# Patient Record
Sex: FEMALE | Race: WHITE | NOT HISPANIC OR LATINO | Employment: OTHER | URBAN - METROPOLITAN AREA
[De-identification: names, ages, dates, MRNs, and addresses within clinical notes are randomized per-mention and may not be internally consistent; named-entity substitution may affect disease eponyms.]

---

## 2017-02-13 ENCOUNTER — ALLSCRIPTS OFFICE VISIT (OUTPATIENT)
Dept: OTHER | Facility: OTHER | Age: 72
End: 2017-02-13

## 2017-06-16 DIAGNOSIS — I10 ESSENTIAL (PRIMARY) HYPERTENSION: ICD-10-CM

## 2017-06-16 DIAGNOSIS — E11.9 TYPE 2 DIABETES MELLITUS WITHOUT COMPLICATIONS (HCC): ICD-10-CM

## 2017-06-16 DIAGNOSIS — E03.9 HYPOTHYROIDISM: ICD-10-CM

## 2017-06-27 ENCOUNTER — ALLSCRIPTS OFFICE VISIT (OUTPATIENT)
Dept: OTHER | Facility: OTHER | Age: 72
End: 2017-06-27

## 2017-08-15 ENCOUNTER — ALLSCRIPTS OFFICE VISIT (OUTPATIENT)
Dept: OTHER | Facility: OTHER | Age: 72
End: 2017-08-15

## 2017-10-20 ENCOUNTER — GENERIC CONVERSION - ENCOUNTER (OUTPATIENT)
Dept: OTHER | Facility: OTHER | Age: 72
End: 2017-10-20

## 2017-10-30 ENCOUNTER — GENERIC CONVERSION - ENCOUNTER (OUTPATIENT)
Dept: OTHER | Facility: OTHER | Age: 72
End: 2017-10-30

## 2017-10-30 DIAGNOSIS — G51.0 BELL'S PALSY: ICD-10-CM

## 2018-01-02 ENCOUNTER — GENERIC CONVERSION - ENCOUNTER (OUTPATIENT)
Dept: OTHER | Facility: OTHER | Age: 73
End: 2018-01-02

## 2018-01-02 DIAGNOSIS — E03.9 HYPOTHYROIDISM: ICD-10-CM

## 2018-01-02 DIAGNOSIS — E11.9 TYPE 2 DIABETES MELLITUS WITHOUT COMPLICATIONS (HCC): ICD-10-CM

## 2018-01-11 NOTE — PROGRESS NOTES
Assessment    1  Encounter for preventive health examination (V70 0) (Z00 00)   2  Hypothyroidism (244 9) (E03 9)   3  Hyperlipidemia (272 4) (E78 5)   4  Hypertension (401 9) (I10)   5  Type 2 diabetes mellitus (250 00) (E11 9)    Plan  Hyperlipidemia    · Lovastatin 40 MG Oral Tablet; TAKE 1 TABLET BY MOUTH EVERY DAY AS  DIRECTED  Hypertension    · AmLODIPine Besylate 5 MG Oral Tablet; TAKE 1 TABLET BY MOUTH EVERY  DAY FOR BLOOD PRESSURE   · Levothyroxine Sodium 50 MCG Oral Tablet; Take 1 tablet daily   · Valsartan-Hydrochlorothiazide 320-25 MG Oral Tablet; take 1 tablet by mouth  every day  Type 2 diabetes mellitus    · GlipiZIDE ER 2 5 MG Oral Tablet Extended Release 24 Hour; TAKE 1 TABLET  BY MOUTH EVERY DAY AS DIRECTED   · (1) COMPREHENSIVE METABOLIC PANEL; Status:Active; Requested [id number];    · (1) HEMOGLOBIN A1C; Status:Active; Requested WBJ:[id number];    · (1) LIPID PANEL, FASTING; Status:Active; Requested [id number]:[id number];    · *VB - Foot Exam; Status:Complete;   Done: 44YBN0327 04:05PM   · Follow-up visit in 6 months Evaluation and Treatment  Follow-up  Status: Hold For -  Scheduling  Requested for: 27Jun2017    Discussion/Summary  health maintenance visit the risks and benefits of cervical cancer screening were discussed Breast cancer screening: the risks and benefits of breast cancer screening were discussed  Colorectal cancer screening: colorectal cancer screening is current  Osteoporosis screening: the risks and benefits of osteoporosis screening were discussed  The risks and benefits of immunizations were discussed, immunizations are needed and due for pneumovax  Advice and education were given regarding nutrition, aerobic exercise and cardiovascular risk reduction  Patient discussion: discussed with the patient  Chief Complaint  Patient is here for a yearly check up      History of Present Illness  HM, Adult Female: The patient is being seen for a health maintenance evaluation     General Health: The patient's health since the last visit is described as good  She has regular dental visits  She denies vision problems  She denies hearing loss  Immunizations status: up to date  Lifestyle:  She consumes a diverse and healthy diet  She does not have any weight concerns  She exercises regularly  She does not use tobacco  She denies alcohol use  She denies drug use  Reproductive health:  she reports normal menses  Screening: cancer screening reviewed and current  metabolic screening reviewed and current  risk screening reviewed and current  Review of Systems    Constitutional: No fever, no chills, feels well, no tiredness, no recent weight gain or weight loss  Eyes: No complaints of eye pain, no red eyes, no eyesight problems, no discharge, no dry eyes, no itching of eyes  ENT: no complaints of earache, no loss of hearing, no nose bleeds, no nasal discharge, no sore throat, no hoarseness  Cardiovascular: No complaints of slow heart rate, no fast heart rate, no chest pain, no palpitations, no leg claudication, no lower extremity edema  Respiratory: No complaints of shortness of breath, no wheezing, no cough, no SOB on exertion, no orthopnea, no PND  Gastrointestinal: No complaints of abdominal pain, no constipation, no nausea or vomiting, no diarrhea, no bloody stools  Genitourinary: No complaints of dysuria, no incontinence, no pelvic pain, no dysmenorrhea, no vaginal discharge or bleeding  Musculoskeletal: No complaints of arthralgias, no myalgias, no joint swelling or stiffness, no limb pain or swelling  Integumentary: No complaints of skin rash or lesions, no itching, no skin wounds, no breast pain or lump  Neurological: No complaints of headache, no confusion, no convulsions, no numbness, no dizziness or fainting, no tingling, no limb weakness, no difficulty walking     Psychiatric: Not suicidal, no sleep disturbance, no anxiety or depression, no change in personality, no emotional problems  Endocrine: No complaints of proptosis, no hot flashes, no muscle weakness, no deepening of the voice, no feelings of weakness  Hematologic/Lymphatic: No complaints of swollen glands, no swollen glands in the neck, does not bleed easily, does not bruise easily  Active Problems    1  Allergic rhinitis (477 9) (J30 9)   2  Anemia (285 9) (D64 9)   3  Arthralgia of both knees (719 46) (M25 561,M25 562)   4  Arthropathy (716 90) (M12 9)   5  Back pain, acute (724 5) (M54 9)   6  Cardiac pacemaker in situ (V45 01) (Z95 0)   7  Cellulitis of left upper extremity (682 3) (L03 114)   8  Colon cancer screening (V76 51) (Z12 11)   9  Dyspnea, unspecified dyspnea   10  Encounter for special screening examination for genitourinary disorder (V81 6) (Z13 89)   11  Epicondylitis, lateral, left (726 32) (M77 12)   12  Fatigue (780 79) (R53 83)   13  Hyperlipidemia (272 4) (E78 5)   14  Hypertension (401 9) (I10)   15  Hypokalemia (276 8) (E87 6)   16  Hypothyroidism (244 9) (E03 9)   17  Screening for genitourinary condition (V81 6) (Z13 89)   18  Sinoatrial node dysfunction (427 81) (I49 5)   19  Trapezius muscle spasm (728 85) (M62 838)   20  Type 2 diabetes mellitus (250 00) (E11 9)   21  URI (upper respiratory infection) (465 9) (J06 9)   22   Visit for screening mammogram (V76 12) (Z12 31)    Past Medical History    · History of Carpal tunnel syndrome, unspecified laterality (354 0) (G56 00)   · History of colonoscopy (V45 89) (S22 410)   · History of edema (V13 89) (Z87 898)   · History of mammogram (V15 89) (Z92 89)   · History of Reported A Previous Heart Murmur   · History of Visit for screening mammogram (V76 12) (Z12 31)    Surgical History    · History of Cataract Surgery   · History of Cholecystectomy   · History of Knee Surgery   · History of Total Abdominal Hysterectomy With Removal Of Right Ovary    Family History  Father    · Family history of Myocardial Infarction Arrhythmias  Family History    · Family history of Coronary Artery Disease (V17 49)   · Family history of Diabetes Mellitus (V18 0)   · Family history of Myocardial Infarction Arrhythmias    Social History    · Alcohol Use (History)   · Always uses seat belt   · Does not use illicit drugs (Z26 50) (Z78 9)   · Employed   · Lives with spouse   ·    · Never A Smoker   · Primary spoken language English    Current Meds   1  AmLODIPine Besylate 5 MG Oral Tablet; TAKE 1 TABLET BY MOUTH EVERY DAY FOR   BLOOD PRESSURE; Therapy: 75ZQR7160 to (Vineet Zaldivar)  Requested for: 17FCA1295; Last   Rx:04Jan2017 Ordered   2  FreeStyle Lancets Miscellaneous; CHECK BLOOD SUGAR EVERY DAY; Therapy: 98XGS1877 to (Last Rx:17Jan2017)  Requested for: 12ORK3648 Ordered   3  FreeStyle Lite Test In Vitro Strip; CHECK BLOOD SUGAR DAILY DX:250 00; Therapy: 94BOB3171 to (Evaluate:55Pwk2333)  Requested for: 62EMX4979; Last   Rx:17Jan2017 Ordered   4  GlipiZIDE ER 2 5 MG Oral Tablet Extended Release 24 Hour; TAKE 1 TABLET BY   MOUTH EVERY DAY AS DIRECTED; Therapy: 45TAM0284 to (Evaluate:14Oct2017)  Requested for: 84VOP6372; Last   Rx:17Jan2017 Ordered   5  GNP Niacin TABS Recorded   6  GNP Potassium Gluconate TABS Recorded   7  Hydrocodone-Acetaminophen 5-325 MG Oral Tablet; TAKE 1 TO 2 TABLETS EVERY 4   TO 6 HOURS AS NEEDED FOR PAIN  NO MORE THAN 8 TABLETS PER   DAY; Therapy: 65PUP5949 to (Evaluate:01Jan2016); Last Rx:70Jaq1179 Ordered   8  Indomethacin 50 MG Oral Capsule; TAKE 1 CAPSULE 3 TIMES DAILY WITH FOOD; Therapy: 51KSJ0317 to (Evaluate:13Jan2016)  Requested for: 99Hfa5027; Last   Rx:00Nhw2434 Ordered   9  Levothyroxine Sodium 50 MCG Oral Tablet; Take 1 tablet daily; Therapy: 10JWY3914 to (Nina Caceres)  Requested for: 30BJC1103; Last   UY:74GEM7925 Ordered   10  Lovastatin 40 MG Oral Tablet; TAKE 1 TABLET BY MOUTH EVERY DAY AS DIRECTED;     Therapy: 23YLB7418 to (Vineet Zaldivar)  Requested for: 98DOO2638; Last    OM:34EZV2666 Ordered   11  TiZANidine HCl - 6 MG Oral Capsule; TAKE 1 CAPSULE EVERY 6 TO 8 HOURS AS    NEEDED for muscle spasm; Therapy: 72Blj8101 to (Evaluate:22Jan2016)  Requested for: 92Kyx3757; Last    Rx:12Dtm9403 Ordered   12  Valsartan-Hydrochlorothiazide 320-25 MG Oral Tablet; take 1 tablet by mouth every day; Therapy: 36YRD9185 to (Last WD:42OBG2981)  Requested for: 44HDZ9522 Ordered    Allergies    1  No Known Drug Allergies    Vitals   Recorded: 27Jun2017 03:42PM   Heart Rate 60   Systolic 614   Diastolic 70   Height 5 ft 2 in   Weight 168 lb    BMI Calculated 30 73   BSA Calculated 1 77   O2 Saturation 98     Physical Exam    Constitutional   General appearance: No acute distress, well appearing and well nourished  Eyes   Conjunctiva and lids: No swelling, erythema or discharge  Pupils and irises: Equal, round, reactive to light  Ophthalmoscopic examination: Normal fundi and optic discs  Ears, Nose, Mouth, and Throat   External inspection of ears and nose: Normal     Otoscopic examination: Tympanic membranes translucent with normal light reflex  Canals patent without erythema  Hearing: Normal     Nasal mucosa, septum, and turbinates: Normal without edema or erythema  Lips, teeth, and gums: Normal, good dentition  Oropharynx: Normal with no erythema, edema, exudate or lesions  Neck   Neck: Supple, symmetric, trachea midline, no masses  Thyroid: Normal, no thyromegaly  Pulmonary   Respiratory effort: No increased work of breathing or signs of respiratory distress  Percussion of chest: Normal     Palpation of chest: Normal     Auscultation of lungs: Clear to auscultation  Cardiovascular   Palpation of heart: Normal PMI, no thrills  Auscultation of heart: Normal rate and rhythm, normal S1 and S2, no murmurs  Carotid pulses: 2+ bilaterally  Abdominal aorta: Normal     Femoral pulses: 2+ bilaterally  Pedal pulses: 2+ bilaterally      Examination of extremities for edema and/or varicosities: Normal     Chest   Breasts: Normal, no dimpling or skin changes appreciated  Palpation of breasts and axillae: Normal, no masses palpated  Abdomen   Abdomen: Non-tender, no masses  Liver and spleen: No hepatomegaly or splenomegaly  Examination for hernias: No hernia appreciated  Lymphatic   Palpation of lymph nodes in neck: No lymphadenopathy  Palpation of lymph nodes in axillae: No lymphadenopathy  Palpation of lymph nodes in groin: No lymphadenopathy  Palpation of lymph nodes in other areas: No lymphadenopathy  Musculoskeletal   Gait and station: Normal     Digits and nails: Normal without clubbing or cyanosis  Joints, bones, and muscles: Normal     Range of motion: Normal     Stability: Normal     Muscle strength/tone: Normal     Skin   Skin and subcutaneous tissue: Normal without rashes or lesions  Palpation of skin and subcutaneous tissue: Normal turgor  Neurologic   Cranial nerves: Cranial nerves II-XII intact  Reflexes: 2+ and symmetric  Sensation: No sensory loss  Psychiatric   Judgment and insight: Normal     Orientation to person, place, and time: Normal     Recent and remote memory: Intact      Mood and affect: Normal        Future Appointments    Date/Time Provider Specialty Site   07/14/2017 10:10 AM [facility]     Signatures   Electronically signed by : Joice Goldmann, DO; Jun 27 2017  4:14PM EST                       (Author)

## 2018-01-12 VITALS
HEIGHT: 62 IN | SYSTOLIC BLOOD PRESSURE: 158 MMHG | OXYGEN SATURATION: 98 % | BODY MASS INDEX: 30.91 KG/M2 | DIASTOLIC BLOOD PRESSURE: 70 MMHG | HEART RATE: 60 BPM | WEIGHT: 168 LBS

## 2018-01-13 VITALS
OXYGEN SATURATION: 97 % | HEIGHT: 62 IN | HEART RATE: 78 BPM | DIASTOLIC BLOOD PRESSURE: 70 MMHG | BODY MASS INDEX: 29.44 KG/M2 | WEIGHT: 160 LBS | SYSTOLIC BLOOD PRESSURE: 162 MMHG

## 2018-01-14 VITALS
DIASTOLIC BLOOD PRESSURE: 66 MMHG | WEIGHT: 165 LBS | HEART RATE: 60 BPM | HEIGHT: 62 IN | SYSTOLIC BLOOD PRESSURE: 138 MMHG | OXYGEN SATURATION: 98 % | BODY MASS INDEX: 30.36 KG/M2

## 2018-01-22 VITALS
WEIGHT: 160.25 LBS | BODY MASS INDEX: 29.49 KG/M2 | DIASTOLIC BLOOD PRESSURE: 68 MMHG | TEMPERATURE: 97.9 F | SYSTOLIC BLOOD PRESSURE: 126 MMHG | HEIGHT: 62 IN | OXYGEN SATURATION: 97 % | HEART RATE: 60 BPM

## 2018-01-24 VITALS
SYSTOLIC BLOOD PRESSURE: 120 MMHG | WEIGHT: 166 LBS | HEIGHT: 62 IN | OXYGEN SATURATION: 98 % | DIASTOLIC BLOOD PRESSURE: 60 MMHG | HEART RATE: 60 BPM | BODY MASS INDEX: 30.55 KG/M2

## 2018-02-06 ENCOUNTER — OFFICE VISIT (OUTPATIENT)
Dept: FAMILY MEDICINE CLINIC | Facility: CLINIC | Age: 73
End: 2018-02-06
Payer: COMMERCIAL

## 2018-02-06 VITALS
WEIGHT: 164 LBS | HEIGHT: 61 IN | DIASTOLIC BLOOD PRESSURE: 68 MMHG | BODY MASS INDEX: 30.96 KG/M2 | OXYGEN SATURATION: 96 % | SYSTOLIC BLOOD PRESSURE: 140 MMHG | HEART RATE: 74 BPM

## 2018-02-06 DIAGNOSIS — M79.605 PAIN OF LEFT LOWER EXTREMITY: Primary | ICD-10-CM

## 2018-02-06 DIAGNOSIS — E78.2 MIXED HYPERLIPIDEMIA: ICD-10-CM

## 2018-02-06 DIAGNOSIS — I10 ESSENTIAL HYPERTENSION: ICD-10-CM

## 2018-02-06 DIAGNOSIS — Z23 ENCOUNTER FOR VACCINATION: ICD-10-CM

## 2018-02-06 DIAGNOSIS — D50.8 IRON DEFICIENCY ANEMIA SECONDARY TO INADEQUATE DIETARY IRON INTAKE: ICD-10-CM

## 2018-02-06 DIAGNOSIS — E11.9 TYPE 2 DIABETES MELLITUS WITHOUT COMPLICATION, WITHOUT LONG-TERM CURRENT USE OF INSULIN (HCC): ICD-10-CM

## 2018-02-06 PROCEDURE — 90471 IMMUNIZATION ADMIN: CPT

## 2018-02-06 PROCEDURE — 90670 PCV13 VACCINE IM: CPT

## 2018-02-06 PROCEDURE — 99214 OFFICE O/P EST MOD 30 MIN: CPT | Performed by: FAMILY MEDICINE

## 2018-02-06 RX ORDER — NIACIN 500 MG
TABLET ORAL
COMMUNITY

## 2018-02-06 RX ORDER — TIZANIDINE HYDROCHLORIDE 6 MG/1
CAPSULE, GELATIN COATED ORAL
COMMUNITY
Start: 2015-12-21 | End: 2020-06-09

## 2018-02-06 RX ORDER — LOVASTATIN 40 MG/1
1 TABLET ORAL DAILY
COMMUNITY
Start: 2016-11-03 | End: 2018-02-19 | Stop reason: SDUPTHER

## 2018-02-06 RX ORDER — MAGNESIUM GLUCONATE 30 MG(550)
TABLET ORAL DAILY
COMMUNITY
End: 2019-10-15

## 2018-02-06 RX ORDER — VALSARTAN AND HYDROCHLOROTHIAZIDE 320; 25 MG/1; MG/1
1 TABLET, FILM COATED ORAL DAILY
COMMUNITY
Start: 2015-02-05 | End: 2018-02-19 | Stop reason: SDUPTHER

## 2018-02-06 RX ORDER — GLIPIZIDE 10 MG/1
10 TABLET ORAL
Qty: 180 TABLET | Refills: 3 | Status: SHIPPED | OUTPATIENT
Start: 2018-02-06 | End: 2018-02-19

## 2018-02-06 RX ORDER — LEVOTHYROXINE SODIUM 0.05 MG/1
1 TABLET ORAL DAILY
COMMUNITY
Start: 2016-11-03 | End: 2018-02-19 | Stop reason: SDUPTHER

## 2018-02-06 RX ORDER — GLIPIZIDE 5 MG/1
1 TABLET, FILM COATED, EXTENDED RELEASE ORAL DAILY
COMMUNITY
Start: 2016-11-03 | End: 2018-02-06 | Stop reason: DRUGHIGH

## 2018-02-06 RX ORDER — AMLODIPINE BESYLATE 2.5 MG/1
1 TABLET ORAL DAILY
COMMUNITY
Start: 2016-11-03 | End: 2018-02-19

## 2018-02-06 RX ORDER — MELOXICAM 15 MG/1
15 TABLET ORAL DAILY
Qty: 30 TABLET | Refills: 0 | Status: SHIPPED | OUTPATIENT
Start: 2018-02-06 | End: 2018-07-01 | Stop reason: SDUPTHER

## 2018-02-08 NOTE — PROGRESS NOTES
Assessment/Plan:       Diagnoses and all orders for this visit:    Pain of left lower extremity  -     meloxicam (MOBIC) 15 mg tablet; Take 1 tablet (15 mg total) by mouth daily    Type 2 diabetes mellitus without complication, without long-term current use of insulin (HCC)  -     glipiZIDE (GLUCOTROL) 10 mg tablet; Take 1 tablet (10 mg total) by mouth 2 (two) times a day before meals    Essential hypertension    Mixed hyperlipidemia    Iron deficiency anemia secondary to inadequate dietary iron intake  -     CBC; Future    Encounter for vaccination  -     PNEUMOCOCCAL CONJUGATE VACCINE 13-VALENT GREATER THAN 6 MONTHS    Other orders  -     amLODIPine (NORVASC) 2 5 mg tablet; Take 1 tablet by mouth daily  -     B Complex Vitamins (B COMPLEX 100 PO); Take 1 tablet by mouth daily  -     Discontinue: glipiZIDE (GLUCOTROL XL) 5 mg 24 hr tablet; Take 1 tablet by mouth Daily  -     niacin 250 MG tablet; Take by mouth  -     Potassium Gluconate (GNP POTASSIUM GLUCONATE) 595 (99 K) MG TABS; Take by mouth  -     levothyroxine 50 mcg tablet; Take 1 tablet by mouth daily  -     lovastatin (MEVACOR) 40 MG tablet; Take 1 tablet by mouth daily  -     TiZANidine (ZANAFLEX) 6 MG capsule; Take by mouth  -     valsartan-hydrochlorothiazide (DIOVAN-HCT) 320-25 MG per tablet; Take 1 tablet by mouth daily        Continue current medications, call if mobic does not help  Monitor blood sugar daily  Subjective:      Patient ID: Pedro Lazo is a 74 y o  female  Leg Pain    The incident occurred 3 to 5 days ago  The incident occurred at home  There was no injury mechanism  The pain is present in the left leg  The pain is moderate  The pain has been constant since onset  Pertinent negatives include no numbness  She reports no foreign bodies present  The symptoms are aggravated by movement  She has tried nothing for the symptoms  Diabetes   She presents for her follow-up diabetic visit  She has type 2 diabetes mellitus   Her disease course has been stable  There are no hypoglycemic associated symptoms  Pertinent negatives for hypoglycemia include no confusion, dizziness, headaches, nervousness/anxiousness or tremors  Pertinent negatives for diabetes include no chest pain and no fatigue  There are no hypoglycemic complications  There are no diabetic complications  Current diabetic treatment includes oral agent (monotherapy)  She is compliant with treatment all of the time  She participates in exercise daily  There is no change in her home blood glucose trend  Hypertension   This is a chronic problem  The current episode started more than 1 year ago  The problem is unchanged  The problem is controlled  Pertinent negatives include no chest pain, headaches or shortness of breath  Past treatments include calcium channel blockers, angiotensin blockers and diuretics  The current treatment provides moderate improvement  The following portions of the patient's history were reviewed and updated as appropriate:   She has a past medical history of Carpal tunnel syndrome and Heart murmur  ,   does not have any pertinent problems on file  ,   has a past surgical history that includes Colonoscopy (11/24/2007); Cataract extraction; Cholecystectomy; Knee surgery; and Total abdominal hysterectomy w/ bilateral salpingoophorectomy  ,  family history includes Coronary artery disease in her family; Diabetes in her family; Heart attack in her family and father  ,   reports that she has never smoked  She does not have any smokeless tobacco history on file  She reports that she drinks alcohol  She reports that she does not use drugs  ,  has No Known Allergies         Review of Systems   Constitutional: Negative for chills, fatigue and fever  HENT: Negative for congestion, ear pain, hearing loss, postnasal drip, rhinorrhea and sore throat  Eyes: Negative for pain and visual disturbance     Respiratory: Negative for chest tightness, shortness of breath and wheezing  Cardiovascular: Negative for chest pain and leg swelling  Gastrointestinal: Negative for abdominal distention, abdominal pain, constipation, diarrhea and vomiting  Endocrine: Negative for cold intolerance and heat intolerance  Genitourinary: Negative for difficulty urinating, frequency and urgency  Musculoskeletal: Positive for arthralgias  Negative for gait problem  Skin: Negative for color change  Neurological: Negative for dizziness, tremors, syncope, numbness and headaches  Hematological: Negative for adenopathy  Psychiatric/Behavioral: Negative for agitation, confusion and sleep disturbance  The patient is not nervous/anxious  Objective:  Vitals:    02/06/18 1537   BP: 140/68   Pulse: 74   SpO2: 96%      Physical Exam   Constitutional: She is oriented to person, place, and time  She appears well-developed and well-nourished  HENT:   Head: Normocephalic  Eyes: Conjunctivae are normal  No scleral icterus  Neck: Normal range of motion  No thyromegaly present  Cardiovascular: Normal rate, regular rhythm and normal heart sounds  No murmur heard  Pulmonary/Chest: No respiratory distress  She has no wheezes  Abdominal: Soft  Bowel sounds are normal  She exhibits no distension  There is no tenderness  Musculoskeletal: Normal range of motion  She exhibits tenderness (over left lateral knee)  She exhibits no edema  Lymphadenopathy:     She has no cervical adenopathy  Neurological: She is alert and oriented to person, place, and time  No cranial nerve deficit  Skin: Skin is warm and dry  No rash noted  No pallor  Psychiatric: She has a normal mood and affect   Her behavior is normal

## 2018-02-19 ENCOUNTER — TELEPHONE (OUTPATIENT)
Dept: FAMILY MEDICINE CLINIC | Facility: CLINIC | Age: 73
End: 2018-02-19

## 2018-02-19 DIAGNOSIS — E03.9 ADULT HYPOTHYROIDISM: ICD-10-CM

## 2018-02-19 DIAGNOSIS — I10 ESSENTIAL HYPERTENSION: ICD-10-CM

## 2018-02-19 DIAGNOSIS — E11.9 TYPE 2 DIABETES MELLITUS WITHOUT COMPLICATION, WITHOUT LONG-TERM CURRENT USE OF INSULIN (HCC): Primary | ICD-10-CM

## 2018-02-19 DIAGNOSIS — E78.2 MIXED HYPERLIPIDEMIA: ICD-10-CM

## 2018-02-19 RX ORDER — LOVASTATIN 40 MG/1
40 TABLET ORAL DAILY
Qty: 90 TABLET | Refills: 3 | Status: SHIPPED | OUTPATIENT
Start: 2018-02-19 | End: 2018-11-05 | Stop reason: SDUPTHER

## 2018-02-19 RX ORDER — VALSARTAN AND HYDROCHLOROTHIAZIDE 320; 25 MG/1; MG/1
1 TABLET, FILM COATED ORAL DAILY
Qty: 90 TABLET | Refills: 3 | Status: SHIPPED | OUTPATIENT
Start: 2018-02-19 | End: 2018-03-30 | Stop reason: SDUPTHER

## 2018-02-19 RX ORDER — LEVOTHYROXINE SODIUM 0.05 MG/1
50 TABLET ORAL DAILY
Qty: 90 TABLET | Refills: 3 | Status: SHIPPED | OUTPATIENT
Start: 2018-02-19 | End: 2018-11-05

## 2018-02-19 RX ORDER — AMLODIPINE BESYLATE 5 MG/1
5 TABLET ORAL DAILY
Qty: 90 TABLET | Refills: 3 | Status: SHIPPED | OUTPATIENT
Start: 2018-02-19 | End: 2018-11-05 | Stop reason: SDUPTHER

## 2018-02-19 RX ORDER — GLIPIZIDE 2.5 MG/1
TABLET, EXTENDED RELEASE ORAL
Qty: 180 TABLET | Refills: 3 | Status: SHIPPED | OUTPATIENT
Start: 2018-02-19 | End: 2018-07-31 | Stop reason: SDUPTHER

## 2018-03-23 ENCOUNTER — OFFICE VISIT (OUTPATIENT)
Dept: CARDIOLOGY CLINIC | Facility: CLINIC | Age: 73
End: 2018-03-23
Payer: COMMERCIAL

## 2018-03-23 DIAGNOSIS — I49.5 SINOATRIAL NODE DYSFUNCTION (HCC): ICD-10-CM

## 2018-03-23 DIAGNOSIS — Z95.0 CARDIAC PACEMAKER IN SITU: Primary | ICD-10-CM

## 2018-03-23 PROCEDURE — 93280 PM DEVICE PROGR EVAL DUAL: CPT | Performed by: INTERNAL MEDICINE

## 2018-03-23 NOTE — PROGRESS NOTES
Device in Person    [place] [employer] device    Normal pacemaker function  Normal lead impedance  Battery status is good    P waves -   3 6 mV  R-waves  -  6 5 mV    Capture thresholds    Atrial lead -     1 25 V@  0 5  ms    Ventricular lead -    0 75 V@   0 5  ms    Atrial lead impedance -    340   Ohms     ventricular lead impedance -    400  Ohms    Brief AMS episodes   AT/AF burden less than 1%

## 2018-03-26 ENCOUNTER — OFFICE VISIT (OUTPATIENT)
Dept: CARDIOLOGY CLINIC | Facility: CLINIC | Age: 73
End: 2018-03-26
Payer: COMMERCIAL

## 2018-03-26 VITALS
OXYGEN SATURATION: 97 % | HEIGHT: 61 IN | DIASTOLIC BLOOD PRESSURE: 64 MMHG | SYSTOLIC BLOOD PRESSURE: 124 MMHG | BODY MASS INDEX: 30.81 KG/M2 | WEIGHT: 163.2 LBS | HEART RATE: 60 BPM

## 2018-03-26 DIAGNOSIS — I49.5 SINOATRIAL NODE DYSFUNCTION (HCC): ICD-10-CM

## 2018-03-26 DIAGNOSIS — I10 ESSENTIAL HYPERTENSION: Primary | ICD-10-CM

## 2018-03-26 PROCEDURE — 99213 OFFICE O/P EST LOW 20 MIN: CPT | Performed by: INTERNAL MEDICINE

## 2018-03-26 RX ORDER — MULTIVITAMIN
1 TABLET ORAL DAILY
COMMUNITY

## 2018-03-26 NOTE — PROGRESS NOTES
[facility]  [address] [place] [id number]  Cardiology Follow Up    Dillon Herrera Winter  1945  [id number]      1  Essential hypertension     2  Sinoatrial node dysfunction [facility])         Chief Complaint   Patient presents with    Follow-up       Interval History:   Patient presents for follow-up visit  Patient denies any history of chest pain shortness of breath  Patient denies any history of leg edema or orthopnea PND  No history of presyncope syncope  Patient states compliance with the present list of medications  Patient Active Problem List   Diagnosis    Iron deficiency anemia secondary to inadequate dietary iron intake    Arthralgia of both knees    Mixed hyperlipidemia    Essential hypertension    Adult hypothyroidism    Sinoatrial node dysfunction (HCC)    Type 2 diabetes mellitus without complication, without long-term current use of insulin (HCC)     Past Medical History:   Diagnosis Date    Carpal tunnel syndrome     Heart murmur      Social History     Social History    Marital status: /Civil Union     Spouse name: N/A    Number of children: N/A    Years of education: N/A     Occupational History    Not on file       Social History Main Topics    Smoking status: Never Smoker    Smokeless tobacco: Not on file    Alcohol use Yes    Drug use: No    Sexual activity: Not on file     Other Topics Concern    Not on file     Social History Narrative    Always uses seat belt    Employed    Lives with spouse       Family History   Problem Relation Age of Onset    Heart attack Father      ARRHYTHMIAS    Coronary artery disease Family     Diabetes Family     Heart attack Family      ARRHYTHMIAS     Past Surgical History:   Procedure Laterality Date    CATARACT EXTRACTION      CHOLECYSTECTOMY      COLONOSCOPY  11/24/2007    KNEE SURGERY      TOTAL ABDOMINAL HYSTERECTOMY W/ BILATERAL SALPINGOOPHORECTOMY         Current Outpatient Prescriptions:     amLODIPine (NORVASC) 5 mg tablet, Take 1 tablet (5 mg total) by mouth daily, Disp: 90 tablet, Rfl: 3    B Complex Vitamins (B COMPLEX 100 PO), Take 1 tablet by mouth daily, Disp: , Rfl:     glipiZIDE (GLUCOTROL XL) 2 5 mg 24 hr tablet, One tablet twice daily  , Disp: 180 tablet, Rfl: 3    glucose blood (FREESTYLE LITE) test strip, Use as instructed, Disp: 100 each, Rfl: 3    levothyroxine 50 mcg tablet, Take 1 tablet (50 mcg total) by mouth daily, Disp: 90 tablet, Rfl: 3    lovastatin (MEVACOR) 40 MG tablet, Take 1 tablet (40 mg total) by mouth daily, Disp: 90 tablet, Rfl: 3    meloxicam (MOBIC) 15 mg tablet, Take 1 tablet (15 mg total) by mouth daily, Disp: 30 tablet, Rfl: 0    Multiple Vitamin (MULTIVITAMIN) tablet, Take 1 tablet by mouth daily, Disp: , Rfl:     niacin 250 MG tablet, Take by mouth, Disp: , Rfl:     Potassium Gluconate (GNP POTASSIUM GLUCONATE) 595 (99 K) MG TABS, Take by mouth daily  , Disp: , Rfl:     valsartan-hydrochlorothiazide (DIOVAN-HCT) 320-25 MG per tablet, Take 1 tablet by mouth daily, Disp: 90 tablet, Rfl: 3    TiZANidine (ZANAFLEX) 6 MG capsule, Take by mouth, Disp: , Rfl:   No Known Allergies    Labs:  No visits with results within 2 Month(s) from this visit  Latest known visit with results is:   No results found for any previous visit  Imaging: No results found      Review of Systems:  Review of Systems   REVIEW OF SYSTEMS:  Constitutional:  Denies fever or chills   Eyes:  Denies change in visual acuity   HENT:  Denies nasal congestion or sore throat   Respiratory:  Denies cough or shortness of breath   Cardiovascular:  Denies chest pain or edema   GI:  Denies abdominal pain, nausea, vomiting, bloody stools or diarrhea   :  Denies dysuria, frequency, difficulty in micturition and nocturia  Musculoskeletal:  Denies back pain or joint pain   Neurologic:  Denies headache, focal weakness or sensory changes   Endocrine:  Denies polyuria or polydipsia Lymphatic:  Denies swollen glands   Psychiatric:  Denies depression or anxiety     Physical Exam:    /64   Pulse 60   Ht 5' 1" (1 549 m)   Wt 74 kg (163 lb 3 2 oz)   SpO2 97%   BMI 30 84 kg/m²     Physical Exam   PHYSICAL EXAM:  General:  Patient is not in acute distress   Head: Normocephalic, Atraumatic  HEENT:  Both pupils normal-size atraumatic, normocephalic, nonicteric  Neck:  JVP not raised  Trachea central  No carotid bruit  Respiratory:  normal breath sounds no crackles  no rhonchi  Cardiovascular:  Regular rate and rhythm no S3 no murmurs  GI:  Abdomen soft nontender  No organomegaly  Lymphatic:  No cervical or inguinal lymphadenopathy  Neurologic:  Patient is awake alert, oriented   Grossly nonfocal    Discussion/Summary:  Patient with multiple medical problems who seems to be doing reasonably well from cardiac standpoint  Previous studies reviewed with patient  Medications reviewed and possible side effects discussed  concepts of cardiovascular disease , signs and symptoms of heart disease  Dietary and risk factor modification reinforced  All questions answered  Safety measures reviewed  Patient advised to report any problems prompting medical attention  Follow up with pacemaker clinic  Importance of salt restriction reinforced with the patient  Follow-up with primary care physician  Followup in 6 months

## 2018-03-30 DIAGNOSIS — I10 ESSENTIAL HYPERTENSION: ICD-10-CM

## 2018-03-30 DIAGNOSIS — E11.9 TYPE 2 DIABETES MELLITUS WITHOUT COMPLICATION, WITHOUT LONG-TERM CURRENT USE OF INSULIN (HCC): ICD-10-CM

## 2018-03-30 RX ORDER — VALSARTAN AND HYDROCHLOROTHIAZIDE 320; 25 MG/1; MG/1
1 TABLET, FILM COATED ORAL DAILY
Qty: 90 TABLET | Refills: 3 | Status: SHIPPED | OUTPATIENT
Start: 2018-03-30 | End: 2018-05-07

## 2018-04-27 LAB — HBA1C MFR BLD HPLC: 7.2 %

## 2018-05-01 ENCOUNTER — OFFICE VISIT (OUTPATIENT)
Dept: FAMILY MEDICINE CLINIC | Facility: CLINIC | Age: 73
End: 2018-05-01
Payer: COMMERCIAL

## 2018-05-01 VITALS
DIASTOLIC BLOOD PRESSURE: 62 MMHG | HEART RATE: 65 BPM | OXYGEN SATURATION: 98 % | HEIGHT: 61 IN | SYSTOLIC BLOOD PRESSURE: 122 MMHG | BODY MASS INDEX: 30.58 KG/M2 | WEIGHT: 162 LBS

## 2018-05-01 DIAGNOSIS — D50.8 IRON DEFICIENCY ANEMIA SECONDARY TO INADEQUATE DIETARY IRON INTAKE: ICD-10-CM

## 2018-05-01 DIAGNOSIS — E78.2 MIXED HYPERLIPIDEMIA: ICD-10-CM

## 2018-05-01 DIAGNOSIS — E11.9 TYPE 2 DIABETES MELLITUS WITHOUT COMPLICATION, WITHOUT LONG-TERM CURRENT USE OF INSULIN (HCC): Primary | ICD-10-CM

## 2018-05-01 DIAGNOSIS — I10 ESSENTIAL HYPERTENSION: ICD-10-CM

## 2018-05-01 DIAGNOSIS — E03.9 ADULT HYPOTHYROIDISM: ICD-10-CM

## 2018-05-01 PROCEDURE — 99214 OFFICE O/P EST MOD 30 MIN: CPT | Performed by: FAMILY MEDICINE

## 2018-05-01 PROCEDURE — 1101F PT FALLS ASSESS-DOCD LE1/YR: CPT | Performed by: FAMILY MEDICINE

## 2018-05-01 RX ORDER — RIBOFLAVIN (VITAMIN B2) 100 MG
500 TABLET ORAL DAILY
COMMUNITY

## 2018-05-01 NOTE — PATIENT INSTRUCTIONS
Diabetes in the Older Adult   WHAT YOU NEED TO KNOW:   Older adults with diabetes are at risk for heart disease, stroke, kidney disease, blindness, and nerve damage  You may also be at risk for any of the following:  · Poor nutrition or low blood sugar levels    · Confusion or problems with memory, attention, or learning new things    · Trouble controlling urination or frequent urinary tract infections    · Trouble with coordination or balance    · Falls and injuries    · Pain    · Depression    · Open sores on your legs or feet  DISCHARGE INSTRUCTIONS:   Call 911 for any of the following:   · You have any of the following signs of a stroke:      ¨ Numbness or drooping on one side of your face     ¨ Weakness in an arm or leg    ¨ Confusion or difficulty speaking    ¨ Dizziness, a severe headache, or vision loss    · You have any of the following signs of a heart attack:      ¨ Squeezing, pressure, or pain in your chest that lasts longer than 5 minutes or returns    ¨ Discomfort or pain in your back, neck, jaw, stomach, or arm     ¨ Trouble breathing    ¨ Nausea or vomiting    ¨ Lightheadedness or a sudden cold sweat, especially with chest pain or trouble breathing  Return to the emergency department if:   · You have severe abdominal pain, or the pain spreads to your back  You may also be vomiting  · You have trouble staying awake or focusing  · You are shaking or sweating  · You have blurred or double vision  · Your breath has a fruity, sweet smell  · Your breathing is deep and labored, or rapid and shallow  · Your heartbeat is fast and weak  · You fall and get hurt  Contact your healthcare provider if:   · You are vomiting or have diarrhea  · You have an upset stomach and cannot eat the foods on your meal plan  · You feel weak or more tired than usual      · You feel dizzy, have headaches, or are easily irritated  · Your skin is red, warm, dry, or swollen       · You have a wound that does not heal      · You have numbness in your arms or legs  · You have trouble coping with your illness, or you feel anxious or depressed  · You have problems with your memory  · You have changes in your vision  · You have questions or concerns about your condition or care  Medicines  may be given to decrease the amount of sugar in your blood  You may also need medicine to lower your blood pressure or cholesterol, or medicine to prevent blood clots  Manage the ABCs and prevent problems caused by diabetes:   · Check your blood sugar levels as directed  Your healthcare provider will tell you when and how often to check during the day  Your healthcare provider will also tell you what your blood sugar levels should be before and after a meal  You may need to check for ketones in your urine or blood if your level is higher than directed  Write down your results and show them to your healthcare provider  Your provider may use the results to make changes to your medicine, food, or exercise schedules  Ask your healthcare provider for more information about how to treat a high or low blood sugar level  · Follow your meal plan as directed  A dietitian will help you make a meal plan to keep your blood sugar level steady and make sure you get enough nutrition  Do not skip meals  Your blood sugar level may drop too low if you have taken diabetes medicine and do not eat  Ask your healthcare provider about programs in your community that can deliver the meals to your home  · Try to be active for 30 to 60 minutes most days of the week  Exercise can help keep your blood sugar level steady, decrease your risk of heart disease, and help you lose weight  It can also help improve your balance and decrease your risk for falls  Work with your healthcare provider to create an exercise plan  Ask a family member or friend to exercise with you   Start slow and exercise for 5 to 10 minutes at a time  Examples of activities include walking or swimming  Include muscle strengthening activities 2 to 3 days each week  Include balance training 2 to 3 times each week  Activities that help increase balance include yoga and sarthak chi      · Maintain a healthy weight  Ask your healthcare provider how much you should weigh  A healthy weight can help you control your diabetes and prevent heart disease  Ask your provider to help you create a weight loss plan if you are overweight  Together you can set manageable weight loss goals  · Do not smoke  Ask your healthcare provider for information if you currently smoke and need help to quit  Do not use e-cigarettes or smokeless tobacco in place of cigarettes or to help you quit  They still contain nicotine  · Manage stress  Stress may increase your blood sugar level  Deep breathing, muscle relaxation, and music may help you relax  Ask your healthcare provider for more information about these practices  Other ways to manage your diabetes:   · Check your feet every day for sores  Look at your whole foot, including the bottom, and between and under your toes  Check for wounds, corns, and calluses  Use a mirror to see the bottom of your feet  The skin on your feet may be shiny, tight, dry, or darker than normal  Your feet may also be cold and pale  Feel your feet by running your hands along the tops, bottoms, sides, and between your toes  Redness, swelling, and warmth are signs of blood flow problems that can lead to a foot ulcer  Do not try to remove corns or calluses yourself  · Wear medical alert identification  Wear medical alert jewelry or carry a card that says you have diabetes  Ask your healthcare provider where to get these items  · Ask about vaccines  You have a higher risk for serious illness if you get the flu, pneumonia, or hepatitis   Ask your healthcare provider if you should get a flu, pneumonia, shingles, or hepatitis B vaccine, and when to get the vaccine  · Keep all appointments  You may need to return to have your A1c checked every 3 months  You will need to return at least once each year to have your feet checked  You will need an eye exam once a year to check for retinopathy  You will also need urine tests every year to check for kidney problems  You may need tests to monitor for heart disease  Write down your questions so you remember to ask them during your visits  · Get help from family and friends  You may need help checking your blood sugar level, giving insulin injections, or preparing your meals  Ask your family and friends to help you with these tasks  Talk to your healthcare provider if you do not have someone at home to help you  A healthcare provider can come to your home to help you with these tasks  Follow up with your healthcare provider as directed: You may need to return to have your A1c checked every 3 months  You will need to return at least once each year to have your feet checked  You will need an eye exam once a year to check for retinopathy  You will also need urine tests every year to check for kidney problems  You may need tests to monitor for heart disease  Write down your questions so you remember to ask them during your visits  © 2017 [employer] Information is for End User's use only and may not be sold, redistributed or otherwise used for commercial purposes  All illustrations and images included in CareNotes® are the copyrighted property of [employer]  or David Aviles  The above information is an  only  It is not intended as medical advice for individual conditions or treatments  Talk to your doctor, nurse or pharmacist before following any medical regimen to see if it is safe and effective for you

## 2018-05-01 NOTE — ASSESSMENT & PLAN NOTE
She continues to take her iron, she is doing well however she has noted some increased fatigue  She did have her blood work done at [facility] last week, and we do not have the results yet

## 2018-05-01 NOTE — ASSESSMENT & PLAN NOTE
Stable, she is taking lovastatin without any problems, no compliance issues  She did take her blood work on Friday, we do not the results yet

## 2018-05-01 NOTE — PROGRESS NOTES
Assessment/Plan:       Diagnoses and all orders for this visit:    Type 2 diabetes mellitus without complication, without long-term current use of insulin (HCC)  -     Comprehensive metabolic panel; Future  -     HEMOGLOBIN A1C W/ EAG ESTIMATION; Future    Essential hypertension  -     CBC; Future    Adult hypothyroidism  -     TSH, 3rd generation; Future    Mixed hyperlipidemia  -     Lipid panel; Future    Iron deficiency anemia secondary to inadequate dietary iron intake    Other orders  -     Ascorbic Acid (VITAMIN C) 100 MG tablet; Vitamin C        Adult hypothyroidism  Taking her levothyroxine without any problems, however she does note some increasing fatigue  Type 2 diabetes mellitus without complication, without long-term current use of insulin ([facility])    Doing well, taking her glipizide without any problems, no compliance issues  She has been check her sugars daily and have been running in the 110-130 range  Essential hypertension    Doing well, taking her amlodipine, valsartan / hydrochlorothiazide without any problems  Iron deficiency anemia secondary to inadequate dietary iron intake    She continues to take her iron, she is doing well however she has noted some increased fatigue  She did have her blood work done at [facility] last week, and we do not have the results yet  Mixed hyperlipidemia    Stable, she is taking lovastatin without any problems, no compliance issues  She did take her blood work on Friday, we do not the results yet  Continue current medications, we will obtain her blood work results and see her back in 4 months time with her blood work done prior  Subjective:      Patient ID: Margo Alicea is a 74 y o  female  HPI    The following portions of the patient's history were reviewed and updated as appropriate:   She has a past medical history of Carpal tunnel syndrome and Heart murmur  ,   does not have any pertinent problems on file  ,   has a past surgical history that includes Colonoscopy (11/24/2007); Cataract extraction; Cholecystectomy; Knee surgery; and Total abdominal hysterectomy w/ bilateral salpingoophorectomy  ,  family history includes Coronary artery disease in her family; Diabetes in her family; Heart attack in her family and father  ,   reports that she has never smoked  She has never used smokeless tobacco  She reports that she drinks alcohol  She reports that she does not use drugs  ,  has No Known Allergies     Current Outpatient Prescriptions   Medication Sig Dispense Refill    amLODIPine (NORVASC) 5 mg tablet Take 1 tablet (5 mg total) by mouth daily 90 tablet 3    Ascorbic Acid (VITAMIN C) 100 MG tablet Vitamin C      B Complex Vitamins (B COMPLEX 100 PO) Take 1 tablet by mouth daily      glipiZIDE (GLUCOTROL XL) 2 5 mg 24 hr tablet One tablet twice daily  180 tablet 3    glucose blood (FREESTYLE LITE) test strip Use as instructed 100 each 3    levothyroxine 50 mcg tablet Take 1 tablet (50 mcg total) by mouth daily 90 tablet 3    lovastatin (MEVACOR) 40 MG tablet Take 1 tablet (40 mg total) by mouth daily 90 tablet 3    meloxicam (MOBIC) 15 mg tablet Take 1 tablet (15 mg total) by mouth daily 30 tablet 0    Multiple Vitamin (MULTIVITAMIN) tablet Take 1 tablet by mouth daily      niacin 250 MG tablet Take by mouth      Potassium Gluconate (GNP POTASSIUM GLUCONATE) 595 (99 K) MG TABS Take by mouth daily        TiZANidine (ZANAFLEX) 6 MG capsule Take by mouth      valsartan-hydrochlorothiazide (DIOVAN-HCT) 320-25 MG per tablet Take 1 tablet by mouth daily 90 tablet 3     No current facility-administered medications for this visit  Review of Systems   Constitutional: Negative for chills, fatigue and fever  HENT: Negative for congestion, ear pain, hearing loss, postnasal drip, rhinorrhea and sore throat  Eyes: Negative for pain and visual disturbance     Respiratory: Negative for chest tightness, shortness of breath and wheezing  Cardiovascular: Negative for chest pain and leg swelling  Gastrointestinal: Negative for abdominal distention, abdominal pain, constipation, diarrhea and vomiting  Endocrine: Negative for cold intolerance and heat intolerance  Genitourinary: Negative for difficulty urinating, frequency and urgency  Musculoskeletal: Positive for arthralgias (of both knees)  Negative for gait problem  Skin: Negative for color change  Neurological: Negative for dizziness, tremors, syncope, numbness and headaches  Hematological: Negative for adenopathy  Psychiatric/Behavioral: Negative for agitation, confusion and sleep disturbance  The patient is not nervous/anxious  Objective:  Vitals:    05/01/18 1013   BP: 122/62   Pulse: 65   SpO2: 98%   Weight: 73 5 kg (162 lb)   Height: 5' 1" (1 549 m)     Body mass index is 30 61 kg/m²  Physical Exam   Constitutional: She is oriented to person, place, and time  She appears well-developed and well-nourished  HENT:   Head: Normocephalic  Mouth/Throat: Oropharynx is clear and moist    Eyes: Conjunctivae are normal  No scleral icterus  Neck: Normal range of motion  No thyromegaly present  Cardiovascular: Normal rate, regular rhythm and normal heart sounds  No murmur heard  Pulmonary/Chest: Effort normal and breath sounds normal  No respiratory distress  She has no wheezes  Abdominal: Soft  Bowel sounds are normal  She exhibits no distension  There is no tenderness  Musculoskeletal: Normal range of motion  She exhibits tenderness (over the anterior knees)  She exhibits no edema  Lymphadenopathy:     She has no cervical adenopathy  Neurological: She is alert and oriented to person, place, and time  No cranial nerve deficit  Skin: Skin is warm and dry  No rash noted  No pallor  Psychiatric: She has a normal mood and affect  Her behavior is normal    Nursing note and vitals reviewed

## 2018-05-01 NOTE — ASSESSMENT & PLAN NOTE
Doing well, taking her glipizide without any problems, no compliance issues  She has been check her sugars daily and have been running in the 110-130 range

## 2018-05-07 ENCOUNTER — TELEPHONE (OUTPATIENT)
Dept: FAMILY MEDICINE CLINIC | Facility: CLINIC | Age: 73
End: 2018-05-07

## 2018-05-07 DIAGNOSIS — I10 ESSENTIAL HYPERTENSION: Primary | ICD-10-CM

## 2018-05-07 RX ORDER — ATENOLOL AND CHLORTHALIDONE TABLET 50; 25 MG/1; MG/1
1 TABLET ORAL DAILY
Qty: 30 TABLET | Refills: 3 | Status: SHIPPED | OUTPATIENT
Start: 2018-05-07 | End: 2018-05-29 | Stop reason: SDUPTHER

## 2018-05-07 NOTE — TELEPHONE ENCOUNTER
Lm for to give us a call back regarding her labs  Dr Kip Venegas stated that her A1C is high it is 7 2 and her triglycerides are slightly elevated they are 195

## 2018-05-29 DIAGNOSIS — I10 ESSENTIAL HYPERTENSION: ICD-10-CM

## 2018-05-29 RX ORDER — ATENOLOL AND CHLORTHALIDONE TABLET 50; 25 MG/1; MG/1
1 TABLET ORAL DAILY
Qty: 30 TABLET | Refills: 5 | Status: SHIPPED | OUTPATIENT
Start: 2018-05-29 | End: 2018-11-05 | Stop reason: SDUPTHER

## 2018-06-08 PROCEDURE — 3072F LOW RISK FOR RETINOPATHY: CPT | Performed by: FAMILY MEDICINE

## 2018-07-01 DIAGNOSIS — M79.605 PAIN OF LEFT LOWER EXTREMITY: ICD-10-CM

## 2018-07-01 RX ORDER — MELOXICAM 15 MG/1
TABLET ORAL
Qty: 30 TABLET | Refills: 0 | Status: SHIPPED | OUTPATIENT
Start: 2018-07-01 | End: 2018-07-05 | Stop reason: SDUPTHER

## 2018-07-05 DIAGNOSIS — M79.605 PAIN OF LEFT LOWER EXTREMITY: ICD-10-CM

## 2018-07-05 RX ORDER — MELOXICAM 15 MG/1
15 TABLET ORAL DAILY
Qty: 30 TABLET | Refills: 3 | Status: SHIPPED | OUTPATIENT
Start: 2018-07-05 | End: 2018-11-05

## 2018-07-23 ENCOUNTER — OFFICE VISIT (OUTPATIENT)
Dept: FAMILY MEDICINE CLINIC | Facility: CLINIC | Age: 73
End: 2018-07-23
Payer: COMMERCIAL

## 2018-07-23 VITALS
BODY MASS INDEX: 30.58 KG/M2 | HEIGHT: 61 IN | WEIGHT: 162 LBS | DIASTOLIC BLOOD PRESSURE: 60 MMHG | SYSTOLIC BLOOD PRESSURE: 136 MMHG | HEART RATE: 62 BPM

## 2018-07-23 DIAGNOSIS — H54.7 WORSENING VISION: Primary | ICD-10-CM

## 2018-07-23 DIAGNOSIS — Z01.818 PRE-OPERATIVE CLEARANCE: ICD-10-CM

## 2018-07-23 PROCEDURE — 99213 OFFICE O/P EST LOW 20 MIN: CPT | Performed by: NURSE PRACTITIONER

## 2018-07-23 NOTE — PROGRESS NOTES
Subjective:     Neva Torres is a 74 y o  female who presents to the office today for a preoperative consultation at the request of surgeon Dr Geri Powell who plans on performing correction of bilateral ptosis on August 3  Diagnosed with bell's palsy in October, symptoms worsened since this time  This consultation is requested for the specific conditions prompting preoperative evaluation (i e  because of potential affect on operative risk): Dillon Figueroa Planned anesthesia: MAC  The patient has the following known anesthesia issues: none identified  Patients bleeding risk: no recent abnormal bleeding  Patient does not have objections to receiving blood products if needed  Denies chest pain, SOB, recent bleeding, abnormal bruising, dizziness, or issues with anesthesia in the past      The following portions of the patient's history were reviewed and updated as appropriate:   She  has a past medical history of Carpal tunnel syndrome and Heart murmur  She   Patient Active Problem List    Diagnosis Date Noted    Worsening vision 07/23/2018    Pre-operative clearance 07/23/2018    Arthralgia of both knees 12/27/2016    Sinoatrial node dysfunction ([facility] [address] ) 01/23/2016    Iron deficiency anemia secondary to inadequate dietary iron intake 12/31/2015    Adult hypothyroidism 04/07/2015    Type 2 diabetes mellitus without complication, without long-term current use of insulin ([facility] [address] ) 12/23/2014    Mixed hyperlipidemia 04/29/2014    Essential hypertension 04/29/2014     She  has a past surgical history that includes Colonoscopy (11/24/2007); Cataract extraction; Cholecystectomy; Knee surgery; and Total abdominal hysterectomy w/ bilateral salpingoophorectomy    Current Outpatient Prescriptions   Medication Sig Dispense Refill    amLODIPine (NORVASC) 5 mg tablet Take 1 tablet (5 mg total) by mouth daily 90 tablet 3    Ascorbic Acid (VITAMIN C) 100 MG tablet Vitamin C      atenolol-chlorthalidone (TENORETIC) 50-25 mg per tablet Take 1 tablet by mouth daily 30 tablet 5    B Complex Vitamins (B COMPLEX 100 PO) Take 1 tablet by mouth daily      glipiZIDE (GLUCOTROL XL) 2 5 mg 24 hr tablet One tablet twice daily  180 tablet 3    glucose blood (FREESTYLE LITE) test strip Use as instructed 100 each 3    levothyroxine 50 mcg tablet Take 1 tablet (50 mcg total) by mouth daily 90 tablet 3    lovastatin (MEVACOR) 40 MG tablet Take 1 tablet (40 mg total) by mouth daily 90 tablet 3    meloxicam (MOBIC) 15 mg tablet Take 1 tablet (15 mg total) by mouth daily 30 tablet 3    Multiple Vitamin (MULTIVITAMIN) tablet Take 1 tablet by mouth daily      niacin 250 MG tablet Take by mouth      Potassium Gluconate (GNP POTASSIUM GLUCONATE) 595 (99 K) MG TABS Take by mouth daily        TiZANidine (ZANAFLEX) 6 MG capsule Take by mouth       No current facility-administered medications for this visit  She has No Known Allergies       Review of Systems  Constitutional: negative  Eyes: positive for difficulty seeing  Ears, nose, mouth, throat, and face: negative  Respiratory: negative  Cardiovascular: negative  Gastrointestinal: negative  Genitourinary:negative  Integument/breast: negative  Hematologic/lymphatic: negative  Musculoskeletal:negative  Neurological: negative  Behavioral/Psych: negative  Endocrine: negative  Allergic/Immunologic: negative     Objective:  Physical Exam   Constitutional: She is oriented to person, place, and time  She appears well-developed and well-nourished  HENT:   Head: Normocephalic and atraumatic  Nose: Nose normal    Mouth/Throat: Oropharynx is clear and moist    Eyes: Conjunctivae are normal  Pupils are equal, round, and reactive to light  Bilateral ptosis   Neck: Normal range of motion  Neck supple  Cardiovascular: Normal rate and regular rhythm  No murmur heard  Pulmonary/Chest: Effort normal and breath sounds normal    Abdominal: Soft  Bowel sounds are normal    Musculoskeletal: Normal range of motion  Neurological: She is alert and oriented to person, place, and time  Mild left sided facial droop   Skin: Skin is warm and dry  Capillary refill takes less than 2 seconds  Psychiatric: She has a normal mood and affect  Her behavior is normal  Judgment and thought content normal          Predictors of intubation difficulty:   Morbid obesity? no   Anatomically abnormal facies? no   Prominent incisors? no   Receding mandible? no   Short, thick neck? no   Neck range of motion: normal   Mallampati score: I (soft palate, uvula, fauces, and tonsillar pillars visible)   Dentition: No chipped, loose, or missing teeth  Cardiographics  ECG: Not obtained  Echocardiogram: not done    Imaging  Chest x-ray: Not obtained     Lab Review       Last obtain labs May 2018, will request copy for review and patient's chart  Assessment:     74 y o  female with planned surgery as above  Known risk factors for perioperative complications: Anemia  Diabetes mellitus             Plan:     1  Will obtain recent labs for review  Patient cleared for surgery  Will complete form and fax to [facility] eyes associated

## 2018-07-31 ENCOUNTER — TELEPHONE (OUTPATIENT)
Dept: FAMILY MEDICINE CLINIC | Facility: CLINIC | Age: 73
End: 2018-07-31

## 2018-07-31 DIAGNOSIS — E11.9 TYPE 2 DIABETES MELLITUS WITHOUT COMPLICATION, WITHOUT LONG-TERM CURRENT USE OF INSULIN (HCC): ICD-10-CM

## 2018-07-31 RX ORDER — GLIPIZIDE 2.5 MG/1
TABLET, EXTENDED RELEASE ORAL
Qty: 360 TABLET | Refills: 0 | Status: SHIPPED | OUTPATIENT
Start: 2018-07-31 | End: 2018-07-31 | Stop reason: SDUPTHER

## 2018-07-31 RX ORDER — GLIPIZIDE 2.5 MG/1
TABLET, EXTENDED RELEASE ORAL
Qty: 360 TABLET | Refills: 0 | Status: SHIPPED | OUTPATIENT
Start: 2018-07-31 | End: 2018-08-15 | Stop reason: SDUPTHER

## 2018-07-31 RX ORDER — GLIPIZIDE 2.5 MG/1
TABLET, EXTENDED RELEASE ORAL
Qty: 360 TABLET | Refills: 1 | Status: SHIPPED | OUTPATIENT
Start: 2018-07-31 | End: 2018-07-31 | Stop reason: SDUPTHER

## 2018-07-31 NOTE — TELEPHONE ENCOUNTER
glipiZIDE (GLUCOTROL XL) 2 5 mg 24 hr tablet   Pt stated the wrong prescription was sent in, She stated she suppose to take 2 in the morning and 2 at night     Please send to [address]

## 2018-08-15 ENCOUNTER — OFFICE VISIT (OUTPATIENT)
Dept: FAMILY MEDICINE CLINIC | Facility: CLINIC | Age: 73
End: 2018-08-15
Payer: COMMERCIAL

## 2018-08-15 VITALS
HEART RATE: 63 BPM | RESPIRATION RATE: 16 BRPM | TEMPERATURE: 99.1 F | DIASTOLIC BLOOD PRESSURE: 64 MMHG | WEIGHT: 161 LBS | HEIGHT: 61 IN | OXYGEN SATURATION: 94 % | BODY MASS INDEX: 30.4 KG/M2 | SYSTOLIC BLOOD PRESSURE: 126 MMHG

## 2018-08-15 DIAGNOSIS — E11.9 TYPE 2 DIABETES MELLITUS WITHOUT COMPLICATION, WITHOUT LONG-TERM CURRENT USE OF INSULIN (HCC): ICD-10-CM

## 2018-08-15 DIAGNOSIS — J40 BRONCHITIS: Primary | ICD-10-CM

## 2018-08-15 PROCEDURE — 3008F BODY MASS INDEX DOCD: CPT | Performed by: NURSE PRACTITIONER

## 2018-08-15 PROCEDURE — 99213 OFFICE O/P EST LOW 20 MIN: CPT | Performed by: NURSE PRACTITIONER

## 2018-08-15 RX ORDER — BENZONATATE 100 MG/1
100 CAPSULE ORAL 3 TIMES DAILY PRN
Qty: 20 CAPSULE | Refills: 0 | Status: SHIPPED | OUTPATIENT
Start: 2018-08-15 | End: 2019-06-17

## 2018-08-15 RX ORDER — ALBUTEROL SULFATE 90 UG/1
2 AEROSOL, METERED RESPIRATORY (INHALATION) EVERY 6 HOURS PRN
Qty: 1 INHALER | Refills: 0 | Status: SHIPPED | OUTPATIENT
Start: 2018-08-15 | End: 2019-06-17

## 2018-08-15 RX ORDER — AZITHROMYCIN 250 MG/1
TABLET, FILM COATED ORAL
Qty: 6 TABLET | Refills: 0 | Status: SHIPPED | OUTPATIENT
Start: 2018-08-15 | End: 2018-08-20

## 2018-08-15 RX ORDER — GLIPIZIDE 2.5 MG/1
TABLET, EXTENDED RELEASE ORAL
Qty: 360 TABLET | Refills: 0 | Status: SHIPPED | OUTPATIENT
Start: 2018-08-15 | End: 2018-11-05 | Stop reason: SDUPTHER

## 2018-08-15 NOTE — ASSESSMENT & PLAN NOTE
To begin azithromycin, 2 tabs today then 1 for the next 4 days and Ventolin 1-2 puffs as needed for shortness of breath/wheezing  Also provided Tessalon Perles needed for cough suppression  Counseled increasing fluid intake and avoiding respiratory irritants  Follow-up in 1 week if no improvement symptoms or sooner if suddenly worsen

## 2018-08-15 NOTE — PROGRESS NOTES
Assessment/Plan:    Bronchitis  To begin azithromycin, 2 tabs today then 1 for the next 4 days and Ventolin 1-2 puffs as needed for shortness of breath/wheezing  Also provided Tessalon Perles needed for cough suppression  Counseled increasing fluid intake and avoiding respiratory irritants  Follow-up in 1 week if no improvement symptoms or sooner if suddenly worsen  Diagnoses and all orders for this visit:    Bronchitis  -     azithromycin (ZITHROMAX) 250 mg tablet; Take 2 tabs today, then 1 for the next 4 days  -     albuterol (VENTOLIN HFA) 90 mcg/act inhaler; Inhale 2 puffs every 6 (six) hours as needed for wheezing or shortness of breath  -     benzonatate (TESSALON PERLES) 100 mg capsule; Take 1 capsule (100 mg total) by mouth 3 (three) times a day as needed for cough    Type 2 diabetes mellitus without complication, without long-term current use of insulin (HCC)  -     glipiZIDE (GLUCOTROL XL) 2 5 mg 24 hr tablet; two tablets twice daily  Subjective:      Patient ID: Yamile Lai is a 74 y o  female  Ashish Gloria reporting a dry cough for the past week  She is also having nasal congestion, shortness of breath, and chills  She has been taking over-the-counter Mucinex as well as Robitussin with minimal improvement  Denies sick contacts, wheezing, tobacco exposure or fever  Cough   Associated symptoms include chills, a fever, rhinorrhea and shortness of breath  Pertinent negatives include no wheezing         The following portions of the patient's history were reviewed and updated as appropriate: She   Patient Active Problem List    Diagnosis Date Noted    Bronchitis 08/15/2018    Worsening vision 07/23/2018    Pre-operative clearance 07/23/2018    Arthralgia of both knees 12/27/2016    Sinoatrial node dysfunction ([facility] [address] ) 01/23/2016    Iron deficiency anemia secondary to inadequate dietary iron intake 12/31/2015    Adult hypothyroidism 04/07/2015    Type 2 diabetes mellitus without complication, without long-term current use of insulin ([facility] [address] ) 12/23/2014    Mixed hyperlipidemia 04/29/2014    Essential hypertension 04/29/2014     Current Outpatient Prescriptions   Medication Sig Dispense Refill    amLODIPine (NORVASC) 5 mg tablet Take 1 tablet (5 mg total) by mouth daily 90 tablet 3    Ascorbic Acid (VITAMIN C) 100 MG tablet Vitamin C      atenolol-chlorthalidone (TENORETIC) 50-25 mg per tablet Take 1 tablet by mouth daily 30 tablet 5    B Complex Vitamins (B COMPLEX 100 PO) Take 1 tablet by mouth daily      glipiZIDE (GLUCOTROL XL) 2 5 mg 24 hr tablet two tablets twice daily  360 tablet 0    glucose blood (FREESTYLE LITE) test strip Use as instructed 100 each 3    levothyroxine 50 mcg tablet Take 1 tablet (50 mcg total) by mouth daily 90 tablet 3    lovastatin (MEVACOR) 40 MG tablet Take 1 tablet (40 mg total) by mouth daily 90 tablet 3    meloxicam (MOBIC) 15 mg tablet Take 1 tablet (15 mg total) by mouth daily 30 tablet 3    Multiple Vitamin (MULTIVITAMIN) tablet Take 1 tablet by mouth daily      niacin 250 MG tablet Take by mouth      Potassium Gluconate (GNP POTASSIUM GLUCONATE) 595 (99 K) MG TABS Take by mouth daily        TiZANidine (ZANAFLEX) 6 MG capsule Take by mouth      albuterol (VENTOLIN HFA) 90 mcg/act inhaler Inhale 2 puffs every 6 (six) hours as needed for wheezing or shortness of breath 1 Inhaler 0    azithromycin (ZITHROMAX) 250 mg tablet Take 2 tabs today, then 1 for the next 4 days 6 tablet 0    benzonatate (TESSALON PERLES) 100 mg capsule Take 1 capsule (100 mg total) by mouth 3 (three) times a day as needed for cough 20 capsule 0     No current facility-administered medications for this visit  She has No Known Allergies       Review of Systems   Constitutional: Positive for chills and fever  HENT: Positive for congestion, hearing loss and rhinorrhea  Eyes: Negative  Respiratory: Positive for cough and shortness of breath  Negative for chest tightness and wheezing  Cardiovascular: Negative  Neurological: Negative  Psychiatric/Behavioral: Negative  /64   Pulse 63   Temp 99 1 °F (37 3 °C)   Resp 16   Ht 5' 1" (1 549 m)   Wt 73 kg (161 lb)   SpO2 94%   BMI 30 42 kg/m²     Objective:     Physical Exam   Constitutional: She is oriented to person, place, and time  She appears well-developed and well-nourished  HENT:   Head: Normocephalic and atraumatic  Nose: Mucosal edema and rhinorrhea present  Right sinus exhibits no maxillary sinus tenderness and no frontal sinus tenderness  Left sinus exhibits no maxillary sinus tenderness and no frontal sinus tenderness  Mouth/Throat: Oropharynx is clear and moist and mucous membranes are normal      Right TM obscured by cerumen in canal     Left TM gray and opaque   Eyes: Conjunctivae are normal    Neck: Neck supple  Cardiovascular: Regular rhythm and normal heart sounds  No murmur heard  Pulmonary/Chest: Effort normal  She has no decreased breath sounds  She has wheezes in the right middle field and the right lower field  She has no rhonchi  She has no rales  Lymphadenopathy:     She has no cervical adenopathy  Neurological: She is alert and oriented to person, place, and time  Presence of left sided facial droop, residual from bell's palsy    Skin: Skin is warm and dry  Psychiatric: She has a normal mood and affect   Her behavior is normal  Judgment and thought content normal

## 2018-09-28 ENCOUNTER — TELEPHONE (OUTPATIENT)
Dept: FAMILY MEDICINE CLINIC | Facility: CLINIC | Age: 73
End: 2018-09-28

## 2018-09-28 ENCOUNTER — IN-CLINIC DEVICE VISIT (OUTPATIENT)
Dept: CARDIOLOGY CLINIC | Facility: CLINIC | Age: 73
End: 2018-09-28
Payer: COMMERCIAL

## 2018-09-28 DIAGNOSIS — Z95.0 CARDIAC PACEMAKER IN SITU: Primary | ICD-10-CM

## 2018-09-28 DIAGNOSIS — I49.5 SINOATRIAL NODE DYSFUNCTION (HCC): ICD-10-CM

## 2018-09-28 PROCEDURE — 93280 PM DEVICE PROGR EVAL DUAL: CPT | Performed by: INTERNAL MEDICINE

## 2018-10-01 DIAGNOSIS — E11.9 TYPE 2 DIABETES MELLITUS WITHOUT COMPLICATION, WITHOUT LONG-TERM CURRENT USE OF INSULIN (HCC): Primary | ICD-10-CM

## 2018-10-02 ENCOUNTER — OFFICE VISIT (OUTPATIENT)
Dept: CARDIOLOGY CLINIC | Facility: CLINIC | Age: 73
End: 2018-10-02
Payer: COMMERCIAL

## 2018-10-02 VITALS
BODY MASS INDEX: 31.3 KG/M2 | HEIGHT: 61 IN | SYSTOLIC BLOOD PRESSURE: 120 MMHG | HEART RATE: 66 BPM | OXYGEN SATURATION: 93 % | WEIGHT: 165.8 LBS | DIASTOLIC BLOOD PRESSURE: 64 MMHG

## 2018-10-02 DIAGNOSIS — I10 ESSENTIAL HYPERTENSION: Primary | ICD-10-CM

## 2018-10-02 DIAGNOSIS — I49.5 SINOATRIAL NODE DYSFUNCTION (HCC): ICD-10-CM

## 2018-10-02 PROCEDURE — 99214 OFFICE O/P EST MOD 30 MIN: CPT | Performed by: INTERNAL MEDICINE

## 2018-10-02 NOTE — PROGRESS NOTES
[facility]  [address] [place] [id number]  Cardiology Follow Up    Celeste Carroll Winter  1945  [id number]    1  Essential hypertension     2  Sinoatrial node dysfunction [facility])         Chief Complaint   Patient presents with    Follow-up       Interval History:  Patient presents for follow-up visit with history of hypertension SA node dysfunction/pacemaker implantation  Patient states she has been feeling well denies any chest pain, chest pressure, chest heaviness  She denies any dizziness, syncope, palpitations  She is considering a right total knee replacement  No date has been set yet  Shes still deciding if she wants to have it done  Patient Active Problem List   Diagnosis    Iron deficiency anemia secondary to inadequate dietary iron intake    Arthralgia of both knees    Mixed hyperlipidemia    Essential hypertension    Adult hypothyroidism    Sinoatrial node dysfunction (HCC)    Type 2 diabetes mellitus without complication, without long-term current use of insulin (HCC)    Worsening vision    Pre-operative clearance    Bronchitis    Cardiac pacemaker in situ     Past Medical History:   Diagnosis Date    Carpal tunnel syndrome     Heart murmur      Social History     Social History    Marital status: /Civil Union     Spouse name: N/A    Number of children: N/A    Years of education: N/A     Occupational History    Not on file       Social History Main Topics    Smoking status: Never Smoker    Smokeless tobacco: Never Used    Alcohol use Yes    Drug use: No    Sexual activity: Not on file     Other Topics Concern    Not on file     Social History Narrative    Always uses seat belt    Employed    Lives with spouse       Family History   Problem Relation Age of Onset    Heart attack Father         ARRHYTHMIAS    Coronary artery disease Family     Diabetes Family     Heart attack Family         ARRHYTHMIAS     Past Surgical History: Procedure Laterality Date    CATARACT EXTRACTION      CHOLECYSTECTOMY      COLONOSCOPY  11/24/2007    KNEE SURGERY      TOTAL ABDOMINAL HYSTERECTOMY W/ BILATERAL SALPINGOOPHORECTOMY         Current Outpatient Prescriptions:     albuterol (VENTOLIN HFA) 90 mcg/act inhaler, Inhale 2 puffs every 6 (six) hours as needed for wheezing or shortness of breath, Disp: 1 Inhaler, Rfl: 0    amLODIPine (NORVASC) 5 mg tablet, Take 1 tablet (5 mg total) by mouth daily, Disp: 90 tablet, Rfl: 3    Ascorbic Acid (VITAMIN C) 100 MG tablet, Vitamin C, Disp: , Rfl:     atenolol-chlorthalidone (TENORETIC) 50-25 mg per tablet, Take 1 tablet by mouth daily, Disp: 30 tablet, Rfl: 5    B Complex Vitamins (B COMPLEX 100 PO), Take 1 tablet by mouth daily, Disp: , Rfl:     benzonatate (TESSALON PERLES) 100 mg capsule, Take 1 capsule (100 mg total) by mouth 3 (three) times a day as needed for cough, Disp: 20 capsule, Rfl: 0    glipiZIDE (GLUCOTROL XL) 2 5 mg 24 hr tablet, two tablets twice daily  , Disp: 360 tablet, Rfl: 0    glucose blood (JASWANT CONTOUR TEST) test strip, Check blood sugar twice daily, DX:E11 9, Disp: 100 each, Rfl: 3    glucose blood (FREESTYLE LITE) test strip, Use as instructed, Disp: 100 each, Rfl: 3    levothyroxine 50 mcg tablet, Take 1 tablet (50 mcg total) by mouth daily, Disp: 90 tablet, Rfl: 3    lovastatin (MEVACOR) 40 MG tablet, Take 1 tablet (40 mg total) by mouth daily, Disp: 90 tablet, Rfl: 3    meloxicam (MOBIC) 15 mg tablet, Take 1 tablet (15 mg total) by mouth daily, Disp: 30 tablet, Rfl: 3    Multiple Vitamin (MULTIVITAMIN) tablet, Take 1 tablet by mouth daily, Disp: , Rfl:     niacin 250 MG tablet, Take by mouth, Disp: , Rfl:     Potassium Gluconate (GNP POTASSIUM GLUCONATE) 595 (99 K) MG TABS, Take by mouth daily  , Disp: , Rfl:     TiZANidine (ZANAFLEX) 6 MG capsule, Take by mouth, Disp: , Rfl:   No Known Allergies      Imaging: No results found      Review of Systems:  Review of Systems   Constitutional: Negative  HENT: Negative  Respiratory: Negative  Cardiovascular: Negative  Genitourinary: Negative  Neurological: Negative  All other systems reviewed and are negative  /64   Pulse 66   Ht 5' 1" (1 549 m)   Wt 75 2 kg (165 lb 12 8 oz)   SpO2 93%   BMI 31 33 kg/m²     Physical Exam:  Physical Exam   Constitutional: She is oriented to person, place, and time  She appears well-developed and well-nourished  HENT:   Head: Normocephalic and atraumatic  Cardiovascular: Normal rate and regular rhythm  Pulmonary/Chest: Effort normal and breath sounds normal    Musculoskeletal: Normal range of motion  She exhibits edema  Neurological: She is alert and oriented to person, place, and time  Psychiatric: She has a normal mood and affect  Her behavior is normal  Judgment and thought content normal    Nursing note and vitals reviewed  Discussion/Summary:  1-hypertension, stable  Continue medications    2-SA node dysfunction,/pacemaker implantation  Following pacemaker clinic  Patient advised she will need preoperative cardiac clearance with stress test before having knee replacement  Continue all medications  Previous studies reviewed with patient, medications reviewed and possible side effects discussed  Continue risk factor modification  Optimize weight, regular exercise and follow up with appropriate specialists and primary care physician as discussed  All questions answered  Patient advised to report any problems prompting to medical attention   Return for follow up visit in 6 months or earlier if needed

## 2018-10-25 LAB — HBA1C MFR BLD HPLC: 7.8 %

## 2018-11-05 ENCOUNTER — OFFICE VISIT (OUTPATIENT)
Dept: FAMILY MEDICINE CLINIC | Facility: CLINIC | Age: 73
End: 2018-11-05
Payer: COMMERCIAL

## 2018-11-05 VITALS
TEMPERATURE: 98.5 F | BODY MASS INDEX: 30.58 KG/M2 | OXYGEN SATURATION: 96 % | HEIGHT: 61 IN | DIASTOLIC BLOOD PRESSURE: 70 MMHG | HEART RATE: 61 BPM | SYSTOLIC BLOOD PRESSURE: 140 MMHG | WEIGHT: 162 LBS | RESPIRATION RATE: 17 BRPM

## 2018-11-05 DIAGNOSIS — E11.9 TYPE 2 DIABETES MELLITUS WITHOUT COMPLICATION, WITHOUT LONG-TERM CURRENT USE OF INSULIN (HCC): Primary | ICD-10-CM

## 2018-11-05 DIAGNOSIS — E78.2 MIXED HYPERLIPIDEMIA: ICD-10-CM

## 2018-11-05 DIAGNOSIS — E03.9 ADULT HYPOTHYROIDISM: ICD-10-CM

## 2018-11-05 DIAGNOSIS — I10 ESSENTIAL HYPERTENSION: ICD-10-CM

## 2018-11-05 PROBLEM — Z01.818 PRE-OPERATIVE CLEARANCE: Status: RESOLVED | Noted: 2018-07-23 | Resolved: 2018-11-05

## 2018-11-05 PROCEDURE — 99214 OFFICE O/P EST MOD 30 MIN: CPT | Performed by: FAMILY MEDICINE

## 2018-11-05 PROCEDURE — 3008F BODY MASS INDEX DOCD: CPT | Performed by: FAMILY MEDICINE

## 2018-11-05 PROCEDURE — 1036F TOBACCO NON-USER: CPT | Performed by: FAMILY MEDICINE

## 2018-11-05 RX ORDER — AMLODIPINE BESYLATE 5 MG/1
5 TABLET ORAL DAILY
Qty: 90 TABLET | Refills: 3 | Status: SHIPPED | OUTPATIENT
Start: 2018-11-05 | End: 2020-01-22

## 2018-11-05 RX ORDER — LEVOTHYROXINE SODIUM 0.07 MG/1
75 TABLET ORAL DAILY
Qty: 90 TABLET | Refills: 3 | Status: SHIPPED | OUTPATIENT
Start: 2018-11-05 | End: 2019-10-25 | Stop reason: SDUPTHER

## 2018-11-05 RX ORDER — GLIPIZIDE 2.5 MG/1
TABLET, EXTENDED RELEASE ORAL
Qty: 360 TABLET | Refills: 3 | Status: SHIPPED | OUTPATIENT
Start: 2018-11-05 | End: 2019-12-10 | Stop reason: SDUPTHER

## 2018-11-05 RX ORDER — ATENOLOL AND CHLORTHALIDONE TABLET 50; 25 MG/1; MG/1
1 TABLET ORAL DAILY
Qty: 90 TABLET | Refills: 3 | Status: SHIPPED | OUTPATIENT
Start: 2018-11-05 | End: 2019-10-25 | Stop reason: SDUPTHER

## 2018-11-05 RX ORDER — LOVASTATIN 40 MG/1
40 TABLET ORAL DAILY
Qty: 90 TABLET | Refills: 3 | Status: SHIPPED | OUTPATIENT
Start: 2018-11-05 | End: 2020-02-17

## 2018-11-05 RX ORDER — PIOGLITAZONEHYDROCHLORIDE 15 MG/1
15 TABLET ORAL DAILY
Qty: 90 TABLET | Refills: 3 | Status: SHIPPED | OUTPATIENT
Start: 2018-11-05 | End: 2019-06-17

## 2018-11-05 NOTE — ASSESSMENT & PLAN NOTE
Lab Results   Component Value Date    HGBA1C 7 8 10/25/2018       No results for input(s): POCGLU in the last 72 hours      Blood Sugar Average: Last 72 hrs:  start pioglitazone and repeat blood work in 4 months

## 2018-11-05 NOTE — PROGRESS NOTES
Assessment/Plan:       Diagnoses and all orders for this visit:    Type 2 diabetes mellitus without complication, without long-term current use of insulin (HCC)  -     pioglitazone (ACTOS) 15 mg tablet; Take 1 tablet (15 mg total) by mouth daily  -     glipiZIDE (GLUCOTROL XL) 2 5 mg 24 hr tablet; two tablets twice daily   -     glucose blood (JASWANT CONTOUR TEST) test strip; Check blood sugar twice daily, DX:E11 9  -     Comprehensive metabolic panel; Future  -     Hemoglobin A1C; Future    Essential hypertension  -     amLODIPine (NORVASC) 5 mg tablet; Take 1 tablet (5 mg total) by mouth daily  -     atenolol-chlorthalidone (TENORETIC) 50-25 mg per tablet; Take 1 tablet by mouth daily    Mixed hyperlipidemia  -     lovastatin (MEVACOR) 40 MG tablet; Take 1 tablet (40 mg total) by mouth daily  -     Lipid panel; Future    Adult hypothyroidism  -     levothyroxine 75 mcg tablet; Take 1 tablet (75 mcg total) by mouth daily  -     TSH, 3rd generation; Future        Adult hypothyroidism  Increase levothyroxine to 75mcg and re-check in 4 months    Type 2 diabetes mellitus without complication, without long-term current use of insulin (HCC)  Lab Results   Component Value Date    HGBA1C 7 8 10/25/2018       No results for input(s): POCGLU in the last 72 hours  Blood Sugar Average: Last 72 hrs:  start pioglitazone and repeat blood work in 4 months    Essential hypertension  Controlled with amlodipine and atenolol/chlorthalidone    Mixed hyperlipidemia  Continue lovastatin    Continue current medications, we will see her back in 4 months time with follow-up blood work done prior  Subjective:      Patient ID: Tish [place] is a 74 y o  female  Patient comes in today for a check on her diabetes, hypertension, hyperlipidemia, hypothyroidism  She states she has been monitoring her blood sugars for her diabetes, and has noticed that they have been gradually increasing    She continues to take her glipizide without any problems, she is compliant with this  She is taking her levothyroxine for hypothyroidism  She did her blood work done this was reviewed with her today  She is taking her atenolol/chlorthalidone and her amlodipine for hypertension, she is compliant with these, she does check her blood pressure periodically and she is well controlled  She is taking her lovastatin for hyperlipidemia, she did her blood work done this reviewed with her today  The following portions of the patient's history were reviewed and updated as appropriate:   She has a past medical history of Carpal tunnel syndrome and Heart murmur  ,   does not have any pertinent problems on file  ,   has a past surgical history that includes Colonoscopy (11/24/2007); Cataract extraction; Cholecystectomy; Knee surgery; and Total abdominal hysterectomy w/ bilateral salpingoophorectomy  ,  family history includes Coronary artery disease in her family; Diabetes in her family; Heart attack in her family and father  ,   reports that she has never smoked  She has never used smokeless tobacco  She reports that she drinks alcohol  She reports that she does not use drugs  ,  has No Known Allergies     Current Outpatient Prescriptions   Medication Sig Dispense Refill    albuterol (VENTOLIN HFA) 90 mcg/act inhaler Inhale 2 puffs every 6 (six) hours as needed for wheezing or shortness of breath 1 Inhaler 0    amLODIPine (NORVASC) 5 mg tablet Take 1 tablet (5 mg total) by mouth daily 90 tablet 3    Ascorbic Acid (VITAMIN C) 100 MG tablet Vitamin C      atenolol-chlorthalidone (TENORETIC) 50-25 mg per tablet Take 1 tablet by mouth daily 90 tablet 3    B Complex Vitamins (B COMPLEX 100 PO) Take 1 tablet by mouth daily      benzonatate (TESSALON PERLES) 100 mg capsule Take 1 capsule (100 mg total) by mouth 3 (three) times a day as needed for cough 20 capsule 0    glipiZIDE (GLUCOTROL XL) 2 5 mg 24 hr tablet two tablets twice daily   360 tablet 3    glucose blood (JASWANT CONTOUR TEST) test strip Check blood sugar twice daily, DX:E11 9 200 each 3    glucose blood (FREESTYLE LITE) test strip Use as instructed 100 each 3    levothyroxine 75 mcg tablet Take 1 tablet (75 mcg total) by mouth daily 90 tablet 3    lovastatin (MEVACOR) 40 MG tablet Take 1 tablet (40 mg total) by mouth daily 90 tablet 3    Multiple Vitamin (MULTIVITAMIN) tablet Take 1 tablet by mouth daily      niacin 250 MG tablet Take by mouth      pioglitazone (ACTOS) 15 mg tablet Take 1 tablet (15 mg total) by mouth daily 90 tablet 3    Potassium Gluconate (GNP POTASSIUM GLUCONATE) 595 (99 K) MG TABS Take by mouth daily        TiZANidine (ZANAFLEX) 6 MG capsule Take by mouth       No current facility-administered medications for this visit  Review of Systems   Constitutional: Negative for chills, fatigue and fever  HENT: Negative for congestion, ear pain, hearing loss, postnasal drip, rhinorrhea and sore throat  Eyes: Negative for pain and visual disturbance  Respiratory: Negative for chest tightness, shortness of breath and wheezing  Cardiovascular: Negative for chest pain and leg swelling  Gastrointestinal: Negative for abdominal distention, abdominal pain, constipation, diarrhea and vomiting  Endocrine: Negative for cold intolerance and heat intolerance  Genitourinary: Negative for difficulty urinating, frequency and urgency  Musculoskeletal: Negative for arthralgias and gait problem  Skin: Negative for color change  Neurological: Negative for dizziness, tremors, syncope, numbness and headaches  Hematological: Negative for adenopathy  Psychiatric/Behavioral: Negative for agitation, confusion and sleep disturbance  The patient is not nervous/anxious  Objective:  Vitals:    11/05/18 1014   BP: 140/70   Pulse: 61   Resp: 17   Temp: 98 5 °F (36 9 °C)   SpO2: 96%   Weight: 73 5 kg (162 lb)   Height: 5' 1" (1 549 m)     Body mass index is 30 61 kg/m²       Physical Exam Constitutional: She is oriented to person, place, and time  She appears well-developed and well-nourished  HENT:   Head: Normocephalic  Mouth/Throat: Oropharynx is clear and moist    Eyes: Conjunctivae are normal  No scleral icterus  Neck: Normal range of motion  No thyromegaly present  Cardiovascular: Normal rate, regular rhythm and normal heart sounds  Pulses are no weak pulses  No murmur heard  Pulses:       Dorsalis pedis pulses are 2+ on the right side, and 2+ on the left side  Posterior tibial pulses are 2+ on the right side, and 2+ on the left side  Pulmonary/Chest: Effort normal and breath sounds normal  No respiratory distress  She has no wheezes  Abdominal: Soft  Bowel sounds are normal  She exhibits no distension  There is no tenderness  Musculoskeletal: Normal range of motion  She exhibits no edema or tenderness  Feet:   Right Foot:   Skin Integrity: Negative for ulcer, skin breakdown, erythema, warmth, callus or dry skin  Left Foot:   Skin Integrity: Negative for ulcer, skin breakdown, erythema, warmth, callus or dry skin  Lymphadenopathy:     She has no cervical adenopathy  Neurological: She is alert and oriented to person, place, and time  No cranial nerve deficit  Skin: Skin is warm and dry  No rash noted  No pallor  Psychiatric: She has a normal mood and affect  Her behavior is normal    Nursing note and vitals reviewed  Patient's shoes and socks removed  Right Foot/Ankle   Right Foot Inspection  Skin Exam: skin normal and skin intact no dry skin, no warmth, no callus, no erythema, no maceration, no abnormal color, no pre-ulcer, no ulcer and no callus                            Sensory       Monofilament testing: intact  Vascular    The right DP pulse is 2+  The right PT pulse is 2+       Left Foot/Ankle  Left Foot Inspection  Skin Exam: skin normal and skin intactno dry skin, no warmth, no erythema, no maceration, normal color, no pre-ulcer, no ulcer and no callus                                         Sensory       Monofilament: intact  Vascular    The left DP pulse is 2+  The left PT pulse is 2+  Assign Risk Category:  No deformity present; No loss of protective sensation;  No weak pulses       Risk: 0

## 2019-02-11 ENCOUNTER — TELEPHONE (OUTPATIENT)
Dept: CARDIOLOGY CLINIC | Facility: CLINIC | Age: 74
End: 2019-02-11

## 2019-02-11 ENCOUNTER — OFFICE VISIT (OUTPATIENT)
Dept: FAMILY MEDICINE CLINIC | Facility: CLINIC | Age: 74
End: 2019-02-11
Payer: COMMERCIAL

## 2019-02-11 VITALS
TEMPERATURE: 98.2 F | OXYGEN SATURATION: 97 % | SYSTOLIC BLOOD PRESSURE: 122 MMHG | HEIGHT: 61 IN | WEIGHT: 165 LBS | DIASTOLIC BLOOD PRESSURE: 68 MMHG | HEART RATE: 72 BPM | BODY MASS INDEX: 31.15 KG/M2 | RESPIRATION RATE: 16 BRPM

## 2019-02-11 DIAGNOSIS — E11.9 TYPE 2 DIABETES MELLITUS WITHOUT COMPLICATION, WITHOUT LONG-TERM CURRENT USE OF INSULIN (HCC): ICD-10-CM

## 2019-02-11 DIAGNOSIS — E03.9 ADULT HYPOTHYROIDISM: ICD-10-CM

## 2019-02-11 DIAGNOSIS — M25.561 ARTHRALGIA OF BOTH KNEES: Primary | ICD-10-CM

## 2019-02-11 DIAGNOSIS — M25.562 ARTHRALGIA OF BOTH KNEES: Primary | ICD-10-CM

## 2019-02-11 DIAGNOSIS — Z95.0 CARDIAC PACEMAKER IN SITU: ICD-10-CM

## 2019-02-11 DIAGNOSIS — Z01.818 PRE-OP EXAMINATION: ICD-10-CM

## 2019-02-11 PROCEDURE — 99214 OFFICE O/P EST MOD 30 MIN: CPT | Performed by: PHYSICIAN ASSISTANT

## 2019-02-11 NOTE — PROGRESS NOTES
Subjective:     Dixon Smith is a 74 y o  female who presents to the office today for a preoperative consultation at the request of surgeon Stacy Gonzales MD who plans on performing right total knee replacement on March 4  This consultation is requested for the specific conditions prompting preoperative evaluation (i e  because of potential affect on operative risk): diabetes, hypertension  Planned anesthesia: general  The patient has the following known anesthesia issues: severe nausea and vomiting  Patients bleeding risk: no recent abnormal bleeding  Patient does not have objections to receiving blood products if needed  The following portions of the patient's history were reviewed and updated as appropriate:   She  has a past medical history of Carpal tunnel syndrome and Heart murmur  She   Patient Active Problem List    Diagnosis Date Noted    Pre-op examination 02/11/2019    Cardiac pacemaker in situ     Bronchitis 08/15/2018    Worsening vision 07/23/2018    Arthralgia of both knees 12/27/2016    Sinoatrial node dysfunction (HCC) 01/23/2016    Iron deficiency anemia secondary to inadequate dietary iron intake 12/31/2015    Adult hypothyroidism 04/07/2015    Type 2 diabetes mellitus without complication, without long-term current use of insulin ([facility] [address] ) 12/23/2014    Mixed hyperlipidemia 04/29/2014    Essential hypertension 04/29/2014     She  has a past surgical history that includes Colonoscopy (11/24/2007); Cataract extraction; Cholecystectomy; Knee surgery; and Total abdominal hysterectomy w/ bilateral salpingoophorectomy  Her family history includes Coronary artery disease in her family; Diabetes in her family; Heart attack in her family and father  She  reports that she has never smoked  She has never used smokeless tobacco  She reports that she drinks alcohol  She reports that she does not use drugs    Current Outpatient Medications   Medication Sig Dispense Refill    albuterol (VENTOLIN HFA) 90 mcg/act inhaler Inhale 2 puffs every 6 (six) hours as needed for wheezing or shortness of breath 1 Inhaler 0    amLODIPine (NORVASC) 5 mg tablet Take 1 tablet (5 mg total) by mouth daily 90 tablet 3    Ascorbic Acid (VITAMIN C) 100 MG tablet Vitamin C      atenolol-chlorthalidone (TENORETIC) 50-25 mg per tablet Take 1 tablet by mouth daily 90 tablet 3    B Complex Vitamins (B COMPLEX 100 PO) Take 1 tablet by mouth daily      benzonatate (TESSALON PERLES) 100 mg capsule Take 1 capsule (100 mg total) by mouth 3 (three) times a day as needed for cough 20 capsule 0    glipiZIDE (GLUCOTROL XL) 2 5 mg 24 hr tablet two tablets twice daily  360 tablet 3    glucose blood (JASWANT CONTOUR TEST) test strip Check blood sugar twice daily, DX:E11 9 200 each 3    glucose blood (FREESTYLE LITE) test strip Use as instructed 100 each 3    levothyroxine 75 mcg tablet Take 1 tablet (75 mcg total) by mouth daily 90 tablet 3    lovastatin (MEVACOR) 40 MG tablet Take 1 tablet (40 mg total) by mouth daily 90 tablet 3    Multiple Vitamin (MULTIVITAMIN) tablet Take 1 tablet by mouth daily      niacin 250 MG tablet Take by mouth      pioglitazone (ACTOS) 15 mg tablet Take 1 tablet (15 mg total) by mouth daily 90 tablet 3    Potassium Gluconate (GNP POTASSIUM GLUCONATE) 595 (99 K) MG TABS Take by mouth daily        TiZANidine (ZANAFLEX) 6 MG capsule Take by mouth       No current facility-administered medications for this visit        Current Outpatient Medications on File Prior to Visit   Medication Sig    albuterol (VENTOLIN HFA) 90 mcg/act inhaler Inhale 2 puffs every 6 (six) hours as needed for wheezing or shortness of breath    amLODIPine (NORVASC) 5 mg tablet Take 1 tablet (5 mg total) by mouth daily    Ascorbic Acid (VITAMIN C) 100 MG tablet Vitamin C    atenolol-chlorthalidone (TENORETIC) 50-25 mg per tablet Take 1 tablet by mouth daily    B Complex Vitamins (B COMPLEX 100 PO) Take 1 tablet by mouth daily    benzonatate (TESSALON PERLES) 100 mg capsule Take 1 capsule (100 mg total) by mouth 3 (three) times a day as needed for cough    glipiZIDE (GLUCOTROL XL) 2 5 mg 24 hr tablet two tablets twice daily   glucose blood (JASWANT CONTOUR TEST) test strip Check blood sugar twice daily, DX:E11 9    glucose blood (FREESTYLE LITE) test strip Use as instructed    levothyroxine 75 mcg tablet Take 1 tablet (75 mcg total) by mouth daily    lovastatin (MEVACOR) 40 MG tablet Take 1 tablet (40 mg total) by mouth daily    Multiple Vitamin (MULTIVITAMIN) tablet Take 1 tablet by mouth daily    niacin 250 MG tablet Take by mouth    pioglitazone (ACTOS) 15 mg tablet Take 1 tablet (15 mg total) by mouth daily    Potassium Gluconate (GNP POTASSIUM GLUCONATE) 595 (99 K) MG TABS Take by mouth daily      TiZANidine (ZANAFLEX) 6 MG capsule Take by mouth     No current facility-administered medications on file prior to visit  She has No Known Allergies       Review of Systems  Pertinent items are noted in HPI       Objective:     /68   Pulse 72   Temp 98 2 °F (36 8 °C)   Resp 16   Ht 5' 1" (1 549 m)   Wt 74 8 kg (165 lb)   SpO2 97%   BMI 31 18 kg/m²   General appearance: alert and oriented, in no acute distress  Head: Normocephalic, without obvious abnormality, atraumatic  Eyes: negative  Ears: normal TM's and external ear canals both ears  Nose: no discharge  Throat: lips, mucosa, and tongue normal; teeth and gums normal  Neck: no adenopathy, no carotid bruit, no JVD, supple, symmetrical, trachea midline and thyroid not enlarged, symmetric, no tenderness/mass/nodules  Back: negative  Lungs: clear to auscultation bilaterally  Heart: regular rate and rhythm  Abdomen: soft, non-tender; bowel sounds normal; no masses,  no organomegaly  Extremities: no edema, redness or tenderness in the calves or thighs  Pulses: 2+ and symmetric  Skin: Skin color, texture, turgor normal  No rashes or lesions  Lymph nodes: Cervical adenopathy: normal  Neurologic: Grossly normal    Predictors of intubation difficulty:   Morbid obesity? no   Anatomically abnormal facies? no   Prominent incisors? no   Receding mandible? no   Short, thick neck? no   Neck range of motion: normal   Mallampati score: II (hard and soft palate, upper portion of tonsils anduvula visible)     Dentition: No chipped, loose, or missing teeth  Cardiographics  ECG: atrial paced rhythm with occasional Premature ventricular complexes  Echocardiogram: not done    Imaging  Chest x-ray: normal     Lab Review   HgbA1c 7 0      Assessment:     74 y o  female with planned surgery as above  Known risk factors for perioperative complications: Diabetes mellitus  cardiac arrhytmia with pacemaker in place    Difficulty with intubation is not anticipated  Cardiac Risk Estimation: Moderate, Cleared for surgery by us, to see cardiology for cardiac clearance    Current medications which may produce withdrawal symptoms if withheld perioperatively: none      Plan:     1  Preoperative workup as follows cardiac clearance  2  Change in medication regimen before surgery: discontinue Ca-blockers (atorvastatin) 2 weeks before surgery to reduce bleeding risk  3  Prophylaxis for cardiac events with perioperative beta-blockers: should be considered, specific regimen per anesthesia  4  Invasive hemodynamic monitoring perioperatively: not indicated  5  Deep vein thrombosis prophylaxis postoperatively:regimen to be chosen by surgical team   6  Surveillance for postoperative MI with ECG immediately postoperatively and on postoperative days 1 and 2 AND troponin levels 24 hours postoperatively and on day 4 or hospital discharge (whichever comes first): at the discretion of anesthesiologist   7  Other measures: Preoperative pacemaker evaluation to confirm normal function (seeing cardiology)

## 2019-02-19 ENCOUNTER — OFFICE VISIT (OUTPATIENT)
Dept: CARDIOLOGY CLINIC | Facility: CLINIC | Age: 74
End: 2019-02-19
Payer: MEDICARE

## 2019-02-19 VITALS
SYSTOLIC BLOOD PRESSURE: 128 MMHG | DIASTOLIC BLOOD PRESSURE: 64 MMHG | OXYGEN SATURATION: 97 % | HEIGHT: 61 IN | WEIGHT: 162 LBS | HEART RATE: 60 BPM | BODY MASS INDEX: 30.58 KG/M2

## 2019-02-19 DIAGNOSIS — I49.5 SINOATRIAL NODE DYSFUNCTION (HCC): ICD-10-CM

## 2019-02-19 DIAGNOSIS — Z01.818 PRE-OP EXAMINATION: ICD-10-CM

## 2019-02-19 DIAGNOSIS — Z01.810 PRE-OPERATIVE CARDIOVASCULAR EXAMINATION: ICD-10-CM

## 2019-02-19 DIAGNOSIS — Z95.0 CARDIAC PACEMAKER IN SITU: ICD-10-CM

## 2019-02-19 DIAGNOSIS — R06.02 SHORTNESS OF BREATH: ICD-10-CM

## 2019-02-19 DIAGNOSIS — I10 ESSENTIAL HYPERTENSION: Primary | ICD-10-CM

## 2019-02-19 PROCEDURE — 99213 OFFICE O/P EST LOW 20 MIN: CPT | Performed by: INTERNAL MEDICINE

## 2019-02-19 NOTE — PROGRESS NOTES
[facility]  [address]  [address]  Cardiology Follow Up    Lucille Closs Winter  1945  [id number]      1  Essential hypertension     2  Pre-op examination  Ambulatory referral to Cardiology   3  Cardiac pacemaker in situ  Ambulatory referral to Cardiology   4  Sinoatrial node dysfunction (HCC)     5  Shortness of breath  NM myocardial perfusion spect (rx stress and/or rest)   6  Pre-operative cardiovascular examination  NM myocardial perfusion spect (rx stress and/or rest)       Chief Complaint   Patient presents with    Pre-op Clearance     Knee replacement       Interval History:  Patient presents for preoperative cardiovascular evaluation for knee replacement surgery on the right side by Orthopedics in 1st week of March  Patient denies any chest pain  Patient does have some shortness of breath with exertion  Patient has multiple risk factors for coronary artery disease  Patient has not had any recent functional cardiac evaluation  Patient has history of pacemaker implantation and is followed regularly by pacemaker clinic      Patient Active Problem List   Diagnosis    Iron deficiency anemia secondary to inadequate dietary iron intake    Arthralgia of both knees    Mixed hyperlipidemia    Essential hypertension    Adult hypothyroidism    Sinoatrial node dysfunction (HCC)    Type 2 diabetes mellitus without complication, without long-term current use of insulin (HCC)    Worsening vision    Bronchitis    Cardiac pacemaker in situ    Pre-op examination     Past Medical History:   Diagnosis Date    Carpal tunnel syndrome     Heart murmur      Social History     Socioeconomic History    Marital status: /Civil Union     Spouse name: Not on file    Number of children: Not on file    Years of education: Not on file    Highest education level: Not on file   Occupational History    Not on file   Social Needs    Financial resource strain: Not on file    Food insecurity:     Worry: Not on file     Inability: Not on file    Transportation needs:     Medical: Not on file     Non-medical: Not on file   Tobacco Use    Smoking status: Never Smoker    Smokeless tobacco: Never Used   Substance and Sexual Activity    Alcohol use:  Yes    Drug use: No    Sexual activity: Not on file   Lifestyle    Physical activity:     Days per week: Not on file     Minutes per session: Not on file    Stress: Not on file   Relationships    Social connections:     Talks on phone: Not on file     Gets together: Not on file     Attends Yarsanism service: Not on file     Active member of club or organization: Not on file     Attends meetings of clubs or organizations: Not on file     Relationship status: Not on file    Intimate partner violence:     Fear of current or ex partner: Not on file     Emotionally abused: Not on file     Physically abused: Not on file     Forced sexual activity: Not on file   Other Topics Concern    Not on file   Social History Narrative    Always uses seat belt    Employed    Lives with spouse       Family History   Problem Relation Age of Onset    Heart attack Father         ARRHYTHMIAS    Coronary artery disease Family     Diabetes Family     Heart attack Family         ARRHYTHMIAS     Past Surgical History:   Procedure Laterality Date    CATARACT EXTRACTION      CHOLECYSTECTOMY      COLONOSCOPY  11/24/2007    KNEE SURGERY      TOTAL ABDOMINAL HYSTERECTOMY W/ BILATERAL SALPINGOOPHORECTOMY         Current Outpatient Medications:     amLODIPine (NORVASC) 5 mg tablet, Take 1 tablet (5 mg total) by mouth daily, Disp: 90 tablet, Rfl: 3    Ascorbic Acid (VITAMIN C) 100 MG tablet, Vitamin C, Disp: , Rfl:     atenolol-chlorthalidone (TENORETIC) 50-25 mg per tablet, Take 1 tablet by mouth daily, Disp: 90 tablet, Rfl: 3    B Complex Vitamins (B COMPLEX 100 PO), Take 1 tablet by mouth daily, Disp: , Rfl:     glipiZIDE (GLUCOTROL XL) 2 5 mg 24 hr tablet, two tablets twice daily  , Disp: 360 tablet, Rfl: 3    glucose blood (JASWANT CONTOUR TEST) test strip, Check blood sugar twice daily, DX:E11 9, Disp: 200 each, Rfl: 3    glucose blood (FREESTYLE LITE) test strip, Use as instructed, Disp: 100 each, Rfl: 3    levothyroxine 75 mcg tablet, Take 1 tablet (75 mcg total) by mouth daily, Disp: 90 tablet, Rfl: 3    lovastatin (MEVACOR) 40 MG tablet, Take 1 tablet (40 mg total) by mouth daily, Disp: 90 tablet, Rfl: 3    Multiple Vitamin (MULTIVITAMIN) tablet, Take 1 tablet by mouth daily, Disp: , Rfl:     niacin 250 MG tablet, Take by mouth, Disp: , Rfl:     pioglitazone (ACTOS) 15 mg tablet, Take 1 tablet (15 mg total) by mouth daily, Disp: 90 tablet, Rfl: 3    albuterol (VENTOLIN HFA) 90 mcg/act inhaler, Inhale 2 puffs every 6 (six) hours as needed for wheezing or shortness of breath (Patient not taking: Reported on 2/19/2019), Disp: 1 Inhaler, Rfl: 0    benzonatate (TESSALON PERLES) 100 mg capsule, Take 1 capsule (100 mg total) by mouth 3 (three) times a day as needed for cough (Patient not taking: Reported on 2/19/2019), Disp: 20 capsule, Rfl: 0    Potassium Gluconate (GNP POTASSIUM GLUCONATE) 595 (99 K) MG TABS, Take by mouth daily  , Disp: , Rfl:     TiZANidine (ZANAFLEX) 6 MG capsule, Take by mouth, Disp: , Rfl:   No Known Allergies    Labs:  No visits with results within 2 Month(s) from this visit  Latest known visit with results is:   Orders Only on 10/25/2018   Component Date Value    Hemoglobin A1C 10/25/2018 7 8      Imaging: No results found      Review of Systems:  Review of Systems   REVIEW OF SYSTEMS:  Constitutional:  Denies fever or chills   Eyes:  Denies change in visual acuity   HENT:  Denies nasal congestion or sore throat   Respiratory:   shortness of breath   Cardiovascular:  Denies chest pain or edema   GI:  Denies abdominal pain, nausea, vomiting, bloody stools or diarrhea   :  Denies dysuria, frequency, difficulty in micturition and nocturia  Musculoskeletal:  DJD  Neurologic:  Denies headache, focal weakness or sensory changes   Endocrine:  Denies polyuria or polydipsia   Lymphatic:  Denies swollen glands   Psychiatric:  Denies depression or anxiety     Physical Exam:    /64   Pulse 60   Ht 5' 1" (1 549 m)   Wt 73 5 kg (162 lb)   SpO2 97%   BMI 30 61 kg/m²     Physical Exam   PHYSICAL EXAM:  General:  Patient is not in acute distress   Head: Normocephalic, Atraumatic  HEENT:  Both pupils normal-size atraumatic, normocephalic, nonicteric  Neck:  JVP not raised  Trachea central  No carotid bruit  Respiratory:  normal breath sounds no crackles  no rhonchi  Cardiovascular:  Regular rate and rhythm no S3 no murmurs  GI:  Abdomen soft nontender  No organomegaly  Lymphatic:  No cervical or inguinal lymphadenopathy  Neurologic:  Patient is awake alert, oriented   Grossly nonfocal    Discussion/Summary:  Patient with advanced age and multiple risk factors for coronary artery disease with symptoms of shortness of breath and preop for right knee replacement surgery  Patient will need a pharmacological nuclear stress test for further preoperative risk assessment  Patient will be considered low to moderate risk for knee surgery if pharmacological stress test showed no evidence of ischemia  Medications reviewed  Rationale for preoperative risk assessment discussed with the patient  Continue to follow up with pacemaker clinic  Follow-up with orthopedics as well as primary care physician    Followup in 6 months

## 2019-02-20 ENCOUNTER — TELEPHONE (OUTPATIENT)
Dept: CARDIOLOGY CLINIC | Facility: CLINIC | Age: 74
End: 2019-02-20

## 2019-02-20 NOTE — TELEPHONE ENCOUNTER
BERNARDINO ASHTON [facility]  NEEDS A SCRIPT FOR NUCLEAR MED STRESS TEST THAT PT IS HAVING THIS Saturday   PLEASE FAX -[phone]

## 2019-02-26 ENCOUNTER — TELEPHONE (OUTPATIENT)
Dept: CARDIOLOGY CLINIC | Facility: CLINIC | Age: 74
End: 2019-02-26

## 2019-02-26 NOTE — TELEPHONE ENCOUNTER
PT CALLED BACK WITH INFO:  DR Sherley Cheng  PHONE - [phone] [address] [facility] - [phone]

## 2019-02-26 NOTE — TELEPHONE ENCOUNTER
PT HAD A STRESS TEST ON Saturday 02/23/19  [address] [place]  PT WANTS TO KNOW IF DR NIEVES HAD A CHANCE TO REVIEW  PT HAS SURGERY ON 03/04/19  PLEASE CALL PT AND LET HER KNOW   Leon Simpson

## 2019-02-26 NOTE — TELEPHONE ENCOUNTER
S/w pt and infromed her stress test is overall good  She will be calling back office with 's info for me to fax letter

## 2019-02-26 NOTE — TELEPHONE ENCOUNTER
Please let the patient know that the stress test was overall good  Patient okay for surgery  Letter stating the same dated today in the chart to be faxed to her orthopedic surgeon

## 2019-05-02 ENCOUNTER — IN-CLINIC DEVICE VISIT (OUTPATIENT)
Dept: CARDIOLOGY CLINIC | Facility: CLINIC | Age: 74
End: 2019-05-02
Payer: COMMERCIAL

## 2019-05-02 DIAGNOSIS — I49.5 SSS (SICK SINUS SYNDROME) (HCC): Primary | ICD-10-CM

## 2019-05-02 DIAGNOSIS — Z95.0 PRESENCE OF PERMANENT CARDIAC PACEMAKER: ICD-10-CM

## 2019-05-02 PROCEDURE — 93280 PM DEVICE PROGR EVAL DUAL: CPT | Performed by: INTERNAL MEDICINE

## 2019-05-16 ENCOUNTER — EVALUATION (OUTPATIENT)
Dept: PHYSICAL THERAPY | Facility: CLINIC | Age: 74
End: 2019-05-16
Payer: COMMERCIAL

## 2019-05-16 DIAGNOSIS — Z96.651 PRESENCE OF RIGHT ARTIFICIAL KNEE JOINT: Primary | ICD-10-CM

## 2019-05-16 PROCEDURE — 97112 NEUROMUSCULAR REEDUCATION: CPT | Performed by: PHYSICAL THERAPIST

## 2019-05-16 PROCEDURE — 97116 GAIT TRAINING THERAPY: CPT | Performed by: PHYSICAL THERAPIST

## 2019-05-16 PROCEDURE — 97161 PT EVAL LOW COMPLEX 20 MIN: CPT | Performed by: PHYSICAL THERAPIST

## 2019-05-20 ENCOUNTER — OFFICE VISIT (OUTPATIENT)
Dept: PHYSICAL THERAPY | Facility: CLINIC | Age: 74
End: 2019-05-20
Payer: COMMERCIAL

## 2019-05-20 DIAGNOSIS — Z96.651 PRESENCE OF RIGHT ARTIFICIAL KNEE JOINT: Primary | ICD-10-CM

## 2019-05-20 PROCEDURE — 97110 THERAPEUTIC EXERCISES: CPT | Performed by: PHYSICAL THERAPIST

## 2019-05-20 PROCEDURE — 97010 HOT OR COLD PACKS THERAPY: CPT | Performed by: PHYSICAL THERAPIST

## 2019-05-22 ENCOUNTER — OFFICE VISIT (OUTPATIENT)
Dept: PHYSICAL THERAPY | Facility: CLINIC | Age: 74
End: 2019-05-22
Payer: COMMERCIAL

## 2019-05-22 DIAGNOSIS — Z96.651 PRESENCE OF RIGHT ARTIFICIAL KNEE JOINT: Primary | ICD-10-CM

## 2019-05-22 PROCEDURE — 97110 THERAPEUTIC EXERCISES: CPT | Performed by: PHYSICAL THERAPIST

## 2019-05-28 ENCOUNTER — OFFICE VISIT (OUTPATIENT)
Dept: PHYSICAL THERAPY | Facility: CLINIC | Age: 74
End: 2019-05-28
Payer: COMMERCIAL

## 2019-05-28 DIAGNOSIS — Z96.651 PRESENCE OF RIGHT ARTIFICIAL KNEE JOINT: Primary | ICD-10-CM

## 2019-05-28 PROCEDURE — 97116 GAIT TRAINING THERAPY: CPT | Performed by: PHYSICAL THERAPIST

## 2019-05-28 PROCEDURE — 97112 NEUROMUSCULAR REEDUCATION: CPT | Performed by: PHYSICAL THERAPIST

## 2019-05-28 PROCEDURE — 97110 THERAPEUTIC EXERCISES: CPT | Performed by: PHYSICAL THERAPIST

## 2019-05-30 ENCOUNTER — OFFICE VISIT (OUTPATIENT)
Dept: PHYSICAL THERAPY | Facility: CLINIC | Age: 74
End: 2019-05-30
Payer: COMMERCIAL

## 2019-05-30 DIAGNOSIS — Z96.651 PRESENCE OF RIGHT ARTIFICIAL KNEE JOINT: Primary | ICD-10-CM

## 2019-05-30 PROCEDURE — 97112 NEUROMUSCULAR REEDUCATION: CPT

## 2019-05-30 PROCEDURE — 97110 THERAPEUTIC EXERCISES: CPT

## 2019-06-03 ENCOUNTER — OFFICE VISIT (OUTPATIENT)
Dept: PHYSICAL THERAPY | Facility: CLINIC | Age: 74
End: 2019-06-03
Payer: COMMERCIAL

## 2019-06-03 DIAGNOSIS — Z96.651 PRESENCE OF RIGHT ARTIFICIAL KNEE JOINT: Primary | ICD-10-CM

## 2019-06-03 PROCEDURE — 97112 NEUROMUSCULAR REEDUCATION: CPT | Performed by: PHYSICAL THERAPIST

## 2019-06-03 PROCEDURE — 97110 THERAPEUTIC EXERCISES: CPT | Performed by: PHYSICAL THERAPIST

## 2019-06-06 ENCOUNTER — APPOINTMENT (OUTPATIENT)
Dept: PHYSICAL THERAPY | Facility: CLINIC | Age: 74
End: 2019-06-06
Payer: COMMERCIAL

## 2019-06-17 ENCOUNTER — OFFICE VISIT (OUTPATIENT)
Dept: FAMILY MEDICINE CLINIC | Facility: CLINIC | Age: 74
End: 2019-06-17
Payer: COMMERCIAL

## 2019-06-17 VITALS
BODY MASS INDEX: 30.29 KG/M2 | OXYGEN SATURATION: 93 % | HEART RATE: 72 BPM | WEIGHT: 160.4 LBS | SYSTOLIC BLOOD PRESSURE: 122 MMHG | TEMPERATURE: 99.4 F | HEIGHT: 61 IN | DIASTOLIC BLOOD PRESSURE: 58 MMHG

## 2019-06-17 DIAGNOSIS — E03.9 ADULT HYPOTHYROIDISM: ICD-10-CM

## 2019-06-17 DIAGNOSIS — E78.2 MIXED HYPERLIPIDEMIA: ICD-10-CM

## 2019-06-17 DIAGNOSIS — E11.9 TYPE 2 DIABETES MELLITUS WITHOUT COMPLICATION, WITHOUT LONG-TERM CURRENT USE OF INSULIN (HCC): Primary | ICD-10-CM

## 2019-06-17 DIAGNOSIS — I49.5 SINOATRIAL NODE DYSFUNCTION (HCC): ICD-10-CM

## 2019-06-17 DIAGNOSIS — E66.9 OBESITY (BMI 30.0-34.9): ICD-10-CM

## 2019-06-17 DIAGNOSIS — I10 ESSENTIAL HYPERTENSION: ICD-10-CM

## 2019-06-17 PROBLEM — Z96.659 ARTIFICIAL KNEE JOINT PRESENT: Status: ACTIVE | Noted: 2019-03-20

## 2019-06-17 PROBLEM — Z01.818 PRE-OP EXAMINATION: Status: RESOLVED | Noted: 2019-02-11 | Resolved: 2019-06-17

## 2019-06-17 PROBLEM — J40 BRONCHITIS: Status: RESOLVED | Noted: 2018-08-15 | Resolved: 2019-06-17

## 2019-06-17 LAB — SL AMB POCT HEMOGLOBIN AIC: 6.1 (ref ?–6.5)

## 2019-06-17 PROCEDURE — 3078F DIAST BP <80 MM HG: CPT | Performed by: FAMILY MEDICINE

## 2019-06-17 PROCEDURE — 83036 HEMOGLOBIN GLYCOSYLATED A1C: CPT | Performed by: FAMILY MEDICINE

## 2019-06-17 PROCEDURE — 1036F TOBACCO NON-USER: CPT | Performed by: FAMILY MEDICINE

## 2019-06-17 PROCEDURE — 99214 OFFICE O/P EST MOD 30 MIN: CPT | Performed by: FAMILY MEDICINE

## 2019-06-17 PROCEDURE — 3074F SYST BP LT 130 MM HG: CPT | Performed by: FAMILY MEDICINE

## 2019-08-07 ENCOUNTER — REMOTE DEVICE CLINIC VISIT (OUTPATIENT)
Dept: CARDIOLOGY CLINIC | Facility: CLINIC | Age: 74
End: 2019-08-07
Payer: COMMERCIAL

## 2019-08-07 DIAGNOSIS — Z95.0 PRESENCE OF PERMANENT CARDIAC PACEMAKER: Primary | ICD-10-CM

## 2019-08-07 PROCEDURE — 93294 REM INTERROG EVL PM/LDLS PM: CPT | Performed by: INTERNAL MEDICINE

## 2019-08-07 PROCEDURE — 93296 REM INTERROG EVL PM/IDS: CPT | Performed by: INTERNAL MEDICINE

## 2019-08-07 NOTE — PROGRESS NOTES
S[facility] DUAL CHAMBER PM  [employer] TRANSMISSION:  BATTERY VOLTAGE ADEQUATE 9 1 YR)   AP 95%  4 0%    ALL LEAD PARAMETERS WITHIN NORMAL LIMITS   1 HVR EPISODE WITH EGM SHOWING PAT (31 @ 145 BPM)   NORMAL DEVICE FUNCTION   RG

## 2019-09-16 LAB
LEFT EYE DIABETIC RETINOPATHY: NORMAL
RIGHT EYE DIABETIC RETINOPATHY: NORMAL

## 2019-09-24 ENCOUNTER — TELEPHONE (OUTPATIENT)
Dept: FAMILY MEDICINE CLINIC | Facility: CLINIC | Age: 74
End: 2019-09-24

## 2019-09-24 NOTE — TELEPHONE ENCOUNTER
Pt  Is asking for an antibiotic with a decongestant - she has sinus inf - no appts left - would you be willing to call in medication?   Call back

## 2019-09-25 DIAGNOSIS — J31.0 RHINOSINUSITIS: Primary | ICD-10-CM

## 2019-09-25 DIAGNOSIS — J32.9 RHINOSINUSITIS: Primary | ICD-10-CM

## 2019-09-25 RX ORDER — AMOXICILLIN 500 MG/1
500 CAPSULE ORAL EVERY 8 HOURS SCHEDULED
Qty: 30 CAPSULE | Refills: 0 | Status: SHIPPED | OUTPATIENT
Start: 2019-09-25 | End: 2019-10-05

## 2019-10-15 ENCOUNTER — OFFICE VISIT (OUTPATIENT)
Dept: CARDIOLOGY CLINIC | Facility: CLINIC | Age: 74
End: 2019-10-15
Payer: COMMERCIAL

## 2019-10-15 VITALS
SYSTOLIC BLOOD PRESSURE: 104 MMHG | HEIGHT: 61 IN | BODY MASS INDEX: 29.07 KG/M2 | OXYGEN SATURATION: 95 % | DIASTOLIC BLOOD PRESSURE: 60 MMHG | HEART RATE: 60 BPM | WEIGHT: 154 LBS

## 2019-10-15 DIAGNOSIS — I10 ESSENTIAL HYPERTENSION: Primary | ICD-10-CM

## 2019-10-15 DIAGNOSIS — I49.5 SSS (SICK SINUS SYNDROME) (HCC): ICD-10-CM

## 2019-10-15 PROCEDURE — 3078F DIAST BP <80 MM HG: CPT | Performed by: INTERNAL MEDICINE

## 2019-10-15 PROCEDURE — 99213 OFFICE O/P EST LOW 20 MIN: CPT | Performed by: INTERNAL MEDICINE

## 2019-10-15 PROCEDURE — 3074F SYST BP LT 130 MM HG: CPT | Performed by: INTERNAL MEDICINE

## 2019-10-15 NOTE — PROGRESS NOTES
[facility]  [address] [facility] [address]  Cardiology Follow Up    Sushil Hurley Winter  1945  [id number]      1  Essential hypertension     2  [facility])         Chief Complaint   Patient presents with    Follow-up    Hypertension       Interval History:  Patient presents for follow-up visit  Patient denies any history of chest pain shortness of breath  Patient denies any history of leg edema or orthopnea PND  No history of presyncope syncope  Patient states compliance with the present list of medications  Patient Active Problem List   Diagnosis    Iron deficiency anemia secondary to inadequate dietary iron intake    Arthralgia of both knees    Mixed hyperlipidemia    Essential hypertension    Adult hypothyroidism    Sinoatrial node dysfunction (HCC)    Type 2 diabetes mellitus without complication, without long-term current use of insulin (HCC)    Worsening vision    Cardiac pacemaker in situ    Artificial knee joint present     Past Medical History:   Diagnosis Date    Carpal tunnel syndrome     Heart murmur      Social History     Socioeconomic History    Marital status: /Civil Union     Spouse name: Not on file    Number of children: Not on file    Years of education: Not on file    Highest education level: Not on file   Occupational History    Not on file   Social Needs    Financial resource strain: Not on file    Food insecurity:     Worry: Not on file     Inability: Not on file    Transportation needs:     Medical: Not on file     Non-medical: Not on file   Tobacco Use    Smoking status: Never Smoker    Smokeless tobacco: Never Used   Substance and Sexual Activity    Alcohol use:  Yes    Drug use: No    Sexual activity: Not on file   Lifestyle    Physical activity:     Days per week: Not on file     Minutes per session: Not on file    Stress: Not on file   Relationships    Social connections: Talks on phone: Not on file     Gets together: Not on file     Attends Sikhism service: Not on file     Active member of club or organization: Not on file     Attends meetings of clubs or organizations: Not on file     Relationship status: Not on file    Intimate partner violence:     Fear of current or ex partner: Not on file     Emotionally abused: Not on file     Physically abused: Not on file     Forced sexual activity: Not on file   Other Topics Concern    Not on file   Social History Narrative    Always uses seat belt    Employed    Lives with spouse       Family History   Problem Relation Age of Onset    Heart attack Father         ARRHYTHMIAS    Coronary artery disease Family     Diabetes Family     Heart attack Family         ARRHYTHMIAS     Past Surgical History:   Procedure Laterality Date    CATARACT EXTRACTION      CHOLECYSTECTOMY      COLONOSCOPY  11/24/2007    KNEE SURGERY      REPLACEMENT TOTAL KNEE Right     TOTAL ABDOMINAL HYSTERECTOMY W/ BILATERAL SALPINGOOPHORECTOMY         Current Outpatient Medications:     amLODIPine (NORVASC) 5 mg tablet, Take 1 tablet (5 mg total) by mouth daily, Disp: 90 tablet, Rfl: 3    Ascorbic Acid (VITAMIN C) 100 MG tablet, Vitamin C, Disp: , Rfl:     atenolol-chlorthalidone (TENORETIC) 50-25 mg per tablet, Take 1 tablet by mouth daily, Disp: 90 tablet, Rfl: 3    B Complex Vitamins (B COMPLEX 100 PO), Take 1 tablet by mouth daily, Disp: , Rfl:     glipiZIDE (GLUCOTROL XL) 2 5 mg 24 hr tablet, two tablets twice daily  , Disp: 360 tablet, Rfl: 3    glucose blood (JASWANT CONTOUR TEST) test strip, Check blood sugar twice daily, DX:E11 9, Disp: 200 each, Rfl: 3    glucose blood (FREESTYLE LITE) test strip, Use as instructed, Disp: 100 each, Rfl: 3    levothyroxine 75 mcg tablet, Take 1 tablet (75 mcg total) by mouth daily, Disp: 90 tablet, Rfl: 3    lovastatin (MEVACOR) 40 MG tablet, Take 1 tablet (40 mg total) by mouth daily, Disp: 90 tablet, Rfl: 3   Multiple Vitamin (MULTIVITAMIN) tablet, Take 1 tablet by mouth daily, Disp: , Rfl:     niacin 500 mg tablet, Take by mouth , Disp: , Rfl:     TiZANidine (ZANAFLEX) 6 MG capsule, Take by mouth, Disp: , Rfl:   No Known Allergies    Labs:  Orders Only on 09/16/2019   Component Date Value    Right Eye Diabetic Retin* 09/16/2019 None     Left Eye Diabetic Retino* 09/16/2019 None      Imaging: No results found  Review of Systems:  Review of Systems     REVIEW OF SYSTEMS:  Constitutional:  Denies fever or chills   Eyes:  Denies change in visual acuity   HENT:  Denies nasal congestion or sore throat   Respiratory:  Denies cough or shortness of breath   Cardiovascular:  Denies chest pain or edema   GI:  Denies abdominal pain, nausea, vomiting, bloody stools or diarrhea   :  Denies dysuria, frequency, difficulty in micturition and nocturia  Musculoskeletal:  Denies back pain or joint pain   Neurologic:  Denies headache, focal weakness or sensory changes   Endocrine:  Denies polyuria or polydipsia   Lymphatic:  Denies swollen glands   Psychiatric:  Denies depression or anxiety     Physical Exam:    /60   Pulse 60   Ht 5' 1" (1 549 m)   Wt 69 9 kg (154 lb)   SpO2 95%   BMI 29 10 kg/m²     Physical Exam   PHYSICAL EXAM:  General:  Patient is not in acute distress   Head: Normocephalic, Atraumatic  HEENT:  Both pupils normal-size atraumatic, normocephalic, nonicteric  Neck:  JVP not raised  Trachea central  No carotid bruit  Respiratory:  normal breath sounds no crackles  no rhonchi  Cardiovascular:  Regular rate and rhythm no S3 no murmurs  GI:  Abdomen soft nontender  No organomegaly  Lymphatic:  No cervical or inguinal lymphadenopathy  Neurologic:  Patient is awake alert, oriented   Grossly nonfocal    Discussion/Summary:  Patient with multiple medical problems who seems to be doing reasonably well from cardiac standpoint  Previous studies reviewed with patient   Medications reviewed and possible side effects discussed  concepts of cardiovascular disease , signs and symptoms of heart disease  Dietary and risk factor modification reinforced  All questions answered  Safety measures reviewed  Patient advised to report any problems prompting medical attention  Patient will continue to follow up with pacemaker clinic  Events of last pacemaker interrogation data reviewed  Medications reviewed  Follow-up in 6 months  Follow-up with primary care physician

## 2019-10-25 DIAGNOSIS — E03.9 ADULT HYPOTHYROIDISM: ICD-10-CM

## 2019-10-25 DIAGNOSIS — I10 ESSENTIAL HYPERTENSION: ICD-10-CM

## 2019-10-25 RX ORDER — ATENOLOL AND CHLORTHALIDONE TABLET 50; 25 MG/1; MG/1
TABLET ORAL
Qty: 90 TABLET | Refills: 2 | Status: SHIPPED | OUTPATIENT
Start: 2019-10-25 | End: 2020-08-12

## 2019-10-25 RX ORDER — LEVOTHYROXINE SODIUM 0.07 MG/1
TABLET ORAL
Qty: 90 TABLET | Refills: 2 | Status: SHIPPED | OUTPATIENT
Start: 2019-10-25 | End: 2020-08-14

## 2019-11-06 ENCOUNTER — REMOTE DEVICE CLINIC VISIT (OUTPATIENT)
Dept: CARDIOLOGY CLINIC | Facility: CLINIC | Age: 74
End: 2019-11-06
Payer: COMMERCIAL

## 2019-11-06 DIAGNOSIS — Z95.0 PRESENCE OF PERMANENT CARDIAC PACEMAKER: Primary | ICD-10-CM

## 2019-11-06 PROCEDURE — 93296 REM INTERROG EVL PM/IDS: CPT | Performed by: INTERNAL MEDICINE

## 2019-11-06 PROCEDURE — 93294 REM INTERROG EVL PM/LDLS PM: CPT | Performed by: INTERNAL MEDICINE

## 2019-11-06 NOTE — PROGRESS NOTES
[facility] DUAL CHAMBER PM  [employer]LIN TRANSMISSION:  BATTERY VOLTAGE ADEQUATE (9 0 YR)   AP 92%  4 8%    ALL LEAD PARAMETERS WITHIN NORMAL LIMITS   NO SIGNIFICANT HIGH RATE EPISODES   NORMAL DEVICE FUNCTION  Anjali Rose

## 2019-12-10 DIAGNOSIS — E11.9 TYPE 2 DIABETES MELLITUS WITHOUT COMPLICATION, WITHOUT LONG-TERM CURRENT USE OF INSULIN (HCC): ICD-10-CM

## 2019-12-10 RX ORDER — GLIPIZIDE 2.5 MG/1
TABLET, EXTENDED RELEASE ORAL
Qty: 360 TABLET | Refills: 2 | Status: SHIPPED | OUTPATIENT
Start: 2019-12-10 | End: 2020-02-17

## 2019-12-12 ENCOUNTER — TELEPHONE (OUTPATIENT)
Dept: CARDIOLOGY CLINIC | Facility: CLINIC | Age: 74
End: 2019-12-12

## 2019-12-12 ENCOUNTER — APPOINTMENT (OUTPATIENT)
Dept: LAB | Facility: HOSPITAL | Age: 74
End: 2019-12-12
Attending: FAMILY MEDICINE
Payer: MEDICARE

## 2019-12-12 DIAGNOSIS — E78.2 MIXED HYPERLIPIDEMIA: ICD-10-CM

## 2019-12-12 DIAGNOSIS — E11.9 TYPE 2 DIABETES MELLITUS WITHOUT COMPLICATION, WITHOUT LONG-TERM CURRENT USE OF INSULIN (HCC): ICD-10-CM

## 2019-12-12 DIAGNOSIS — I47.2 NSVT (NONSUSTAINED VENTRICULAR TACHYCARDIA) (HCC): Primary | ICD-10-CM

## 2019-12-12 DIAGNOSIS — E03.9 ADULT HYPOTHYROIDISM: ICD-10-CM

## 2019-12-12 LAB
ALBUMIN SERPL BCP-MCNC: 3.7 G/DL (ref 3.5–5)
ALP SERPL-CCNC: 83 U/L (ref 46–116)
ALT SERPL W P-5'-P-CCNC: 28 U/L (ref 12–78)
ANION GAP SERPL CALCULATED.3IONS-SCNC: 10 MMOL/L (ref 4–13)
AST SERPL W P-5'-P-CCNC: 26 U/L (ref 5–45)
BILIRUB SERPL-MCNC: 0.3 MG/DL (ref 0.2–1)
BUN SERPL-MCNC: 18 MG/DL (ref 5–25)
CALCIUM ALBUM COR SERPL-MCNC: 10.8 MG/DL (ref 8.3–10.1)
CALCIUM SERPL-MCNC: 10.6 MG/DL (ref 8.3–10.1)
CHLORIDE SERPL-SCNC: 98 MMOL/L (ref 100–108)
CHOLEST SERPL-MCNC: 172 MG/DL (ref 50–200)
CO2 SERPL-SCNC: 30 MMOL/L (ref 21–32)
CREAT SERPL-MCNC: 0.73 MG/DL (ref 0.6–1.3)
CREAT UR-MCNC: 65.8 MG/DL
EST. AVERAGE GLUCOSE BLD GHB EST-MCNC: 160 MG/DL
GFR SERPL CREATININE-BSD FRML MDRD: 80 ML/MIN/1.73SQ M
GLUCOSE P FAST SERPL-MCNC: 148 MG/DL (ref 65–99)
HBA1C MFR BLD: 7.2 % (ref 4.2–6.3)
HDLC SERPL-MCNC: 40 MG/DL
LDLC SERPL CALC-MCNC: 98 MG/DL (ref 0–100)
MICROALBUMIN UR-MCNC: 112 MG/L (ref 0–20)
MICROALBUMIN/CREAT 24H UR: 170 MG/G CREATININE (ref 0–30)
NONHDLC SERPL-MCNC: 132 MG/DL
POTASSIUM SERPL-SCNC: 3 MMOL/L (ref 3.5–5.3)
PROT SERPL-MCNC: 7.7 G/DL (ref 6.4–8.2)
SODIUM SERPL-SCNC: 138 MMOL/L (ref 136–145)
TRIGL SERPL-MCNC: 169 MG/DL
TSH SERPL DL<=0.05 MIU/L-ACNC: 0.94 UIU/ML (ref 0.36–3.74)

## 2019-12-12 PROCEDURE — 36415 COLL VENOUS BLD VENIPUNCTURE: CPT

## 2019-12-12 PROCEDURE — 80061 LIPID PANEL: CPT

## 2019-12-12 PROCEDURE — 83036 HEMOGLOBIN GLYCOSYLATED A1C: CPT

## 2019-12-12 PROCEDURE — 82570 ASSAY OF URINE CREATININE: CPT

## 2019-12-12 PROCEDURE — 80053 COMPREHEN METABOLIC PANEL: CPT

## 2019-12-12 PROCEDURE — 84443 ASSAY THYROID STIM HORMONE: CPT

## 2019-12-12 PROCEDURE — 82043 UR ALBUMIN QUANTITATIVE: CPT

## 2019-12-12 NOTE — TELEPHONE ENCOUNTER
----- Message from Jeff Bains MD sent at 12/12/2019  4:19 PM EST -----  Please call the patient  Patient had 1 episode of fast heart rate/an SVT on her pacemaker interrogation  To make sure that she is taking Tenoretic  which has a beta-blocker  Also, she should have an echocardiogram done to make sure her ejection fraction is normal   Order in the chart  Please schedule

## 2019-12-12 NOTE — TELEPHONE ENCOUNTER
Pt showed up on workque [phone], stating that pts Medicare is NOT primary  Pt confirmed that Medicare IS primary and [employer] is secondary(spoke to [employer] [place] they said that [employer] only covers hospital side NOT office) - was unable to remove pt from the workque

## 2019-12-13 NOTE — TELEPHONE ENCOUNTER
Spoke with patient she states she is taking her medication as prescribed   I provided her with the scheduling number for her to schedule her testing

## 2019-12-16 ENCOUNTER — OFFICE VISIT (OUTPATIENT)
Dept: FAMILY MEDICINE CLINIC | Facility: CLINIC | Age: 74
End: 2019-12-16
Payer: MEDICARE

## 2019-12-16 VITALS
HEART RATE: 60 BPM | TEMPERATURE: 98.9 F | DIASTOLIC BLOOD PRESSURE: 68 MMHG | WEIGHT: 155.2 LBS | HEIGHT: 61 IN | OXYGEN SATURATION: 97 % | SYSTOLIC BLOOD PRESSURE: 126 MMHG | BODY MASS INDEX: 29.3 KG/M2

## 2019-12-16 DIAGNOSIS — I10 ESSENTIAL HYPERTENSION: ICD-10-CM

## 2019-12-16 DIAGNOSIS — E87.6 HYPOKALEMIA: ICD-10-CM

## 2019-12-16 DIAGNOSIS — E03.9 ADULT HYPOTHYROIDISM: Primary | ICD-10-CM

## 2019-12-16 DIAGNOSIS — E11.9 TYPE 2 DIABETES MELLITUS WITHOUT COMPLICATION, WITHOUT LONG-TERM CURRENT USE OF INSULIN (HCC): ICD-10-CM

## 2019-12-16 DIAGNOSIS — Z00.00 HEALTH CARE MAINTENANCE: ICD-10-CM

## 2019-12-16 DIAGNOSIS — E78.2 MIXED HYPERLIPIDEMIA: ICD-10-CM

## 2019-12-16 PROCEDURE — G0402 INITIAL PREVENTIVE EXAM: HCPCS | Performed by: FAMILY MEDICINE

## 2019-12-16 PROCEDURE — 99214 OFFICE O/P EST MOD 30 MIN: CPT | Performed by: FAMILY MEDICINE

## 2019-12-16 RX ORDER — POTASSIUM CHLORIDE 20 MEQ/1
20 TABLET, EXTENDED RELEASE ORAL DAILY
Qty: 30 TABLET | Refills: 1 | Status: SHIPPED | OUTPATIENT
Start: 2019-12-16 | End: 2020-04-06

## 2019-12-16 RX ORDER — PIOGLITAZONEHYDROCHLORIDE 15 MG/1
15 TABLET ORAL DAILY
Refills: 2 | COMMUNITY
Start: 2019-10-26 | End: 2020-06-30 | Stop reason: SDUPTHER

## 2019-12-16 NOTE — ASSESSMENT & PLAN NOTE
Continue the glipizide, pioglitazone  Monitor her blood sugar daily  Check CMP, hemoglobin A1c in 6 months    Lab Results   Component Value Date    HGBA1C 7 2 (H) 12/12/2019

## 2019-12-16 NOTE — PROGRESS NOTES
Assessment/Plan:       Diagnoses and all orders for this visit:    Adult hypothyroidism    Type 2 diabetes mellitus without complication, without long-term current use of insulin ([address] )  -     Comprehensive metabolic panel; Future  -     Hemoglobin A1C; Future    Essential hypertension    Mixed hyperlipidemia  -     Lipid panel; Future    Hypokalemia  -     potassium chloride (K-DUR,KLOR-CON) 20 mEq tablet; Take 1 tablet (20 mEq total) by mouth daily  -     Basic metabolic panel; Future    Health care maintenance    Other orders  -     pioglitazone (ACTOS) 15 mg tablet; Take 15 mg by mouth daily        Adult hypothyroidism    Continue levothyroxine  Type 2 diabetes mellitus without complication, without long-term current use of insulin ([facility])    Continue the glipizide, pioglitazone  Monitor her blood sugar daily  Check CMP, hemoglobin A1c in 6 months  Lab Results   Component Value Date    HGBA1C 7 2 (H) 12/12/2019       Essential hypertension    Well controlled, continue her Tenoretic , amlodipine  Mixed hyperlipidemia    Controlled, continue lovastatin  Check CMP, lipid profile in 6 months  Hypokalemia    Start potassium 20 mil equivalents daily, check BMP in 1 month  Subjective:      Patient ID: Basim Poole is a 66 y o  female  Patient comes in today for checkup on her diabetes, hypertension, hyperlipidemia  She was hospitalized at [facility]note 3] for severe hypokalemia  She has not had any follow-up blood work done , except for what we ordered  She continues to  Take her medications as prescribed, no compliance issues or side effects  She does check her sugar and it is running around 120 in the morning  She denies any hypoglycemic episodes  She does not check her blood pressure at home  The following portions of the patient's history were reviewed and updated as appropriate:   She has a past medical history of Carpal tunnel syndrome and Heart murmur  ,  does not have any pertinent problems on file  ,   has a past surgical history that includes Colonoscopy (11/24/2007); Cataract extraction; Cholecystectomy; Knee surgery; Total abdominal hysterectomy w/ bilateral salpingoophorectomy; and Replacement total knee (Right)  ,  family history includes Coronary artery disease in her family; Diabetes in her family; Heart attack in her family and father  ,   reports that she has never smoked  She has never used smokeless tobacco  She reports that she drinks alcohol  She reports that she does not use drugs  ,  has No Known Allergies     Current Outpatient Medications   Medication Sig Dispense Refill    amLODIPine (NORVASC) 5 mg tablet Take 1 tablet (5 mg total) by mouth daily 90 tablet 3    Ascorbic Acid (VITAMIN C) 100 MG tablet Vitamin C      atenolol-chlorthalidone (TENORETIC) 50-25 mg per tablet TAKE 1 TABLET BY MOUTH ONCE DAILY 90 tablet 2    B Complex Vitamins (B COMPLEX 100 PO) Take 1 tablet by mouth daily      glipiZIDE (GLUCOTROL XL) 2 5 mg 24 hr tablet TAKE 2 TABLETS BY MOUTH TWICE DAILY 360 tablet 2    glucose blood (JASWANT CONTOUR TEST) test strip Check blood sugar twice daily, DX:E11 9 200 each 3    glucose blood (FREESTYLE LITE) test strip Use as instructed 100 each 3    levothyroxine 75 mcg tablet TAKE 1 TABLET BY MOUTH ONCE DAILY 90 tablet 2    lovastatin (MEVACOR) 40 MG tablet Take 1 tablet (40 mg total) by mouth daily 90 tablet 3    niacin 500 mg tablet Take by mouth       pioglitazone (ACTOS) 15 mg tablet Take 15 mg by mouth daily  2    Multiple Vitamin (MULTIVITAMIN) tablet Take 1 tablet by mouth daily      potassium chloride (K-DUR,KLOR-CON) 20 mEq tablet Take 1 tablet (20 mEq total) by mouth daily 30 tablet 1    TiZANidine (ZANAFLEX) 6 MG capsule Take by mouth       No current facility-administered medications for this visit  Review of Systems   Constitutional: Negative for chills, fatigue and fever     HENT: Negative for congestion, ear pain, hearing loss, postnasal drip, rhinorrhea and sore throat  Eyes: Negative for pain and visual disturbance  Respiratory: Negative for chest tightness, shortness of breath and wheezing  Cardiovascular: Negative for chest pain and leg swelling  Gastrointestinal: Negative for abdominal distention, abdominal pain, constipation, diarrhea and vomiting  Endocrine: Negative for cold intolerance and heat intolerance  Genitourinary: Negative for difficulty urinating, frequency and urgency  Musculoskeletal: Negative for arthralgias and gait problem  Skin: Negative for color change  Neurological: Negative for dizziness, tremors, syncope, numbness and headaches  Hematological: Negative for adenopathy  Psychiatric/Behavioral: Negative for agitation, confusion and sleep disturbance  The patient is not nervous/anxious  Objective:  Vitals:    12/16/19 1502   BP: 126/68   Pulse: 60   Temp: 98 9 °F (37 2 °C)   TempSrc: Tympanic   SpO2: 97%   Weight: 70 4 kg (155 lb 3 2 oz)   Height: 5' 1" (1 549 m)     Body mass index is 29 32 kg/m²  Physical Exam   Constitutional: She is oriented to person, place, and time  She appears well-developed and well-nourished  HENT:   Head: Normocephalic  Mouth/Throat: Oropharynx is clear and moist    Eyes: Conjunctivae are normal  No scleral icterus  Neck: Normal range of motion  No thyromegaly present  Cardiovascular: Normal rate, regular rhythm and normal heart sounds  Pulses are no weak pulses  No murmur heard  Pulses:       Dorsalis pedis pulses are 2+ on the right side, and 2+ on the left side  Posterior tibial pulses are 2+ on the right side, and 2+ on the left side  Pulmonary/Chest: Effort normal and breath sounds normal  No respiratory distress  She has no wheezes  Abdominal: Soft  Bowel sounds are normal  She exhibits no distension  There is no tenderness  Musculoskeletal: Normal range of motion  She exhibits no edema or tenderness     Feet:   Right Foot:   Skin Integrity: Negative for ulcer, skin breakdown, erythema, warmth, callus or dry skin  Left Foot:   Skin Integrity: Negative for ulcer, skin breakdown, erythema, warmth, callus or dry skin  Lymphadenopathy:     She has no cervical adenopathy  Neurological: She is alert and oriented to person, place, and time  No cranial nerve deficit  Skin: Skin is warm and dry  No rash noted  No pallor  Psychiatric: She has a normal mood and affect  Her behavior is normal    Nursing note and vitals reviewed  Patient's shoes and socks removed  Right Foot/Ankle   Right Foot Inspection  Skin Exam: skin normal and skin intact no dry skin, no warmth, no callus, no erythema, no maceration, no abnormal color, no pre-ulcer, no ulcer and no callus                            Sensory       Monofilament testing: intact  Vascular    The right DP pulse is 2+  The right PT pulse is 2+  Left Foot/Ankle  Left Foot Inspection  Skin Exam: skin normal and skin intactno dry skin, no warmth, no erythema, no maceration, normal color, no pre-ulcer, no ulcer and no callus                                         Sensory       Monofilament: intact  Vascular    The left DP pulse is 2+  The left PT pulse is 2+  Assign Risk Category:  No deformity present; No loss of protective sensation;  No weak pulses       Risk: 0

## 2019-12-16 NOTE — PATIENT INSTRUCTIONS
Medicare Preventive Visit Patient Instructions  Thank you for completing your Welcome to Medicare Visit or Medicare Annual Wellness Visit today  Your next wellness visit will be due in one year (12/16/2020)  The screening/preventive services that you may require over the next 5-10 years are detailed below  Some tests may not apply to you based off risk factors and/or age  Screening tests ordered at today's visit but not completed yet may show as past due  Also, please note that scanned in results may not display below  Preventive Screenings:  Service Recommendations Previous Testing/Comments   Colorectal Cancer Screening  * Colonoscopy    * Fecal Occult Blood Test (FOBT)/Fecal Immunochemical Test (FIT)  * Fecal DNA/Cologuard Test  * Flexible Sigmoidoscopy Age: 54-65 years old   Colonoscopy: every 10 years (may be performed more frequently if at higher risk)  OR  FOBT/FIT: every 1 year  OR  Cologuard: every 3 years  OR  Sigmoidoscopy: every 5 years  Screening may be recommended earlier than age 48 if at higher risk for colorectal cancer  Also, an individualized decision between you and your healthcare provider will decide whether screening between the ages of 74-80 would be appropriate  Colonoscopy: 11/24/2014  FOBT/FIT: Not on file  Cologuard: Not on file  Sigmoidoscopy: Not on file    Screening Current     Breast Cancer Screening Age: 36 years old  Frequency: every 1-2 years  Not required if history of left and right mastectomy Mammogram: Not on file       Cervical Cancer Screening Between the ages of 21-29, pap smear recommended once every 3 years  Between the ages of 33-67, can perform pap smear with HPV co-testing every 5 years     Recommendations may differ for women with a history of total hysterectomy, cervical cancer, or abnormal pap smears in past  Pap Smear: Not on file    Screening Not Indicated   Hepatitis C Screening Once for adults born between 1945 and 1965  More frequently in patients at high risk for Hepatitis C Hep C Antibody: Not on file       Diabetes Screening 1-2 times per year if you're at risk for diabetes or have pre-diabetes Fasting glucose: 148 mg/dL   A1C: 7 2 %    Screening Not Indicated  History Diabetes   Cholesterol Screening Once every 5 years if you don't have a lipid disorder  May order more often based on risk factors  Lipid panel: 12/12/2019    Screening Not Indicated  History Lipid Disorder     Other Preventive Screenings Covered by Medicare:  1  Abdominal Aortic Aneurysm (AAA) Screening: covered once if your at risk  You're considered to be at risk if you have a family history of AAA  2  Lung Cancer Screening: covers low dose CT scan once per year if you meet all of the following conditions: (1) Age 50-69; (2) No signs or symptoms of lung cancer; (3) Current smoker or have quit smoking within the last 15 years; (4) You have a tobacco smoking history of at least 30 pack years (packs per day multiplied by number of years you smoked); (5) You get a written order from a healthcare provider  3  Glaucoma Screening: covered annually if you're considered high risk: (1) You have diabetes OR (2) Family history of glaucoma OR (3)  aged 48 and older OR (3)  American aged 72 and older  3  Osteoporosis Screening: covered every 2 years if you meet one of the following conditions: (1) You're estrogen deficient and at risk for osteoporosis based off medical history and other findings; (2) Have a vertebral abnormality; (3) On glucocorticoid therapy for more than 3 months; (4) Have primary hyperparathyroidism; (5) On osteoporosis medications and need to assess response to drug therapy  · Last bone density test (DXA Scan): Not on file  5  HIV Screening: covered annually if you're between the age of 12-76  Also covered annually if you are younger than 13 and older than 72 with risk factors for HIV infection   For pregnant patients, it is covered up to 3 times per pregnancy  Immunizations:  Immunization Recommendations   Influenza Vaccine Annual influenza vaccination during flu season is recommended for all persons aged >= 6 months who do not have contraindications   Pneumococcal Vaccine (Prevnar and Pneumovax)  * Prevnar = PCV13  * Pneumovax = PPSV23   Adults 25-60 years old: 1-3 doses may be recommended based on certain risk factors  Adults 72 years old: Prevnar (PCV13) vaccine recommended followed by Pneumovax (PPSV23) vaccine  If already received PPSV23 since turning 65, then PCV13 recommended at least one year after PPSV23 dose  Hepatitis B Vaccine 3 dose series if at intermediate or high risk (ex: diabetes, end stage renal disease, liver disease)   Tetanus (Td) Vaccine - COST NOT COVERED BY MEDICARE PART B Following completion of primary series, a booster dose should be given every 10 years to maintain immunity against tetanus  Td may also be given as tetanus wound prophylaxis  Tdap Vaccine - COST NOT COVERED BY MEDICARE PART B Recommended at least once for all adults  For pregnant patients, recommended with each pregnancy  Shingles Vaccine (Shingrix) - COST NOT COVERED BY MEDICARE PART B  2 shot series recommended in those aged 48 and above     Health Maintenance Due:      Topic Date Due    CRC Screening: Colonoscopy  11/24/2024     Immunizations Due:      Topic Date Due    Pneumococcal Vaccine: 65+ Years (2 of 2 - PPSV23) 02/06/2019     Advance Directives   What are advance directives? Advance directives are legal documents that state your wishes and plans for medical care  These plans are made ahead of time in case you lose your ability to make decisions for yourself  Advance directives can apply to any medical decision, such as the treatments you want, and if you want to donate organs  What are the types of advance directives? There are many types of advance directives, and each state has rules about how to use them   You may choose a combination of any of the following:  · Living will: This is a written record of the treatment you want  You can also choose which treatments you do not want, which to limit, and which to stop at a certain time  This includes surgery, medicine, IV fluid, and tube feedings  · Durable power of  for healthcare [facility]): This is a written record that states who you want to make healthcare choices for you when you are unable to make them for yourself  This person, called a proxy, is usually a family member or a friend  You may choose more than 1 proxy  · Do not resuscitate (DNR) order:  A DNR order is used in case your heart stops beating or you stop breathing  It is a request not to have certain forms of treatment, such as CPR  A DNR order may be included in other types of advance directives  · Medical directive: This covers the care that you want if you are in a coma, near death, or unable to make decisions for yourself  You can list the treatments you want for each condition  Treatment may include pain medicine, surgery, blood transfusions, dialysis, IV or tube feedings, and a ventilator (breathing machine)  · Values history: This document has questions about your views, beliefs, and how you feel and think about life  This information can help others choose the care that you would choose  Why are advance directives important? An advance directive helps you control your care  Although spoken wishes may be used, it is better to have your wishes written down  Spoken wishes can be misunderstood, or not followed  Treatments may be given even if you do not want them  An advance directive may make it easier for your family to make difficult choices about your care  Fall Prevention    Fall prevention  includes ways to make your home and other areas safer  It also includes ways you can move more carefully to prevent a fall   Health conditions that cause changes in your blood pressure, vision, or muscle strength and coordination may increase your risk for falls  Medicines may also increase your risk for falls if they make you dizzy, weak, or sleepy  Fall prevention tips:   · Stand or sit up slowly  · Use assistive devices as directed  · Wear shoes that fit well and have soles that   · Wear a personal alarm  · Stay active  · Manage your medical conditions  Home Safety Tips:  · Add items to prevent falls in the bathroom  · Keep paths clear  · Install bright lights in your home  · Keep items you use often on shelves within reach  · Paint or place reflective tape on the edges of your stairs  Urinary Incontinence   Urinary incontinence (UI)  is when you lose control of your bladder  UI develops because your bladder cannot store or empty urine properly  The 3 most common types of UI are stress incontinence, urge incontinence, or both  Medicines:   · May be given to help strengthen your bladder control  Report any side effects of medication to your healthcare provider  Do pelvic muscle exercises often:  Your pelvic muscles help you stop urinating  Squeeze these muscles tight for 5 seconds, then relax for 5 seconds  Gradually work up to squeezing for 10 seconds  Do 3 sets of 15 repetitions a day, or as directed  This will help strengthen your pelvic muscles and improve bladder control  Train your bladder:  Go to the bathroom at set times, such as every 2 hours, even if you do not feel the urge to go  You can also try to hold your urine when you feel the urge to go  For example, hold your urine for 5 minutes when you feel the urge to go  As that becomes easier, hold your urine for 10 minutes  Self-care:   · Keep a UI record  Write down how often you leak urine and how much you leak  Make a note of what you were doing when you leaked urine  · Drink liquids as directed  You may need to limit the amount of liquid you drink to help control your urine leakage   Do not drink any liquid right before you go to bed  Limit or do not have drinks that contain caffeine or alcohol  · Prevent constipation  Eat a variety of high-fiber foods  Good examples are high-fiber cereals, beans, vegetables, and whole-grain breads  Walking is the best way to trigger your intestines to have a bowel movement  · Exercise regularly and maintain a healthy weight  Weight loss and exercise will decrease pressure on your bladder and help you control your leakage  · Use a catheter as directed  to help empty your bladder  A catheter is a tiny, plastic tube that is put into your bladder to drain your urine  · Go to behavior therapy as directed  Behavior therapy may be used to help you learn to control your urge to urinate  Weight Management   Why it is important to manage your weight:  Being overweight increases your risk of health conditions such as heart disease, high blood pressure, type 2 diabetes, and certain types of cancer  It can also increase your risk for osteoarthritis, sleep apnea, and other respiratory problems  Aim for a slow, steady weight loss  Even a small amount of weight loss can lower your risk of health problems  How to lose weight safely:  A safe and healthy way to lose weight is to eat fewer calories and get regular exercise  You can lose up about 1 pound a week by decreasing the number of calories you eat by 500 calories each day  Healthy meal plan for weight management:  A healthy meal plan includes a variety of foods, contains fewer calories, and helps you stay healthy  A healthy meal plan includes the following:  · Eat whole-grain foods more often  A healthy meal plan should contain fiber  Fiber is the part of grains, fruits, and vegetables that is not broken down by your body  Whole-grain foods are healthy and provide extra fiber in your diet  Some examples of whole-grain foods are whole-wheat breads and pastas, oatmeal, brown rice, and bulgur  · Eat a variety of vegetables every day    Include dark, leafy greens such as spinach, kale, olga greens, and mustard greens  Eat yellow and orange vegetables such as carrots, sweet potatoes, and winter squash  · Eat a variety of fruits every day  Choose fresh or canned fruit (canned in its own juice or light syrup) instead of juice  Fruit juice has very little or no fiber  · Eat low-fat dairy foods  Drink fat-free (skim) milk or 1% milk  Eat fat-free yogurt and low-fat cottage cheese  Try low-fat cheeses such as mozzarella and other reduced-fat cheeses  · Choose meat and other protein foods that are low in fat  Choose beans or other legumes such as split peas or lentils  Choose fish, skinless poultry (chicken or turkey), or lean cuts of red meat (beef or pork)  Before you cook meat or poultry, cut off any visible fat  · Use less fat and oil  Try baking foods instead of frying them  Add less fat, such as margarine, sour cream, regular salad dressing and mayonnaise to foods  Eat fewer high-fat foods  Some examples of high-fat foods include french fries, doughnuts, ice cream, and cakes  · Eat fewer sweets  Limit foods and drinks that are high in sugar  This includes candy, cookies, regular soda, and sweetened drinks  Exercise:  Exercise at least 30 minutes per day on most days of the week  Some examples of exercise include walking, biking, dancing, and swimming  You can also fit in more physical activity by taking the stairs instead of the elevator or parking farther away from stores  Ask your healthcare provider about the best exercise plan for you  © Copyright [employer] 2018 Information is for End User's use only and may not be sold, redistributed or otherwise used for commercial purposes   All illustrations and images included in CareNotes® are the copyrighted property of [employer]  or [address] [place] [address]

## 2019-12-16 NOTE — PROGRESS NOTES
Assessment and Plan:     Problem List Items Addressed This Visit        Endocrine    Adult hypothyroidism - Primary    Type 2 diabetes mellitus without complication, without long-term current use of insulin (HCC)    Relevant Medications    pioglitazone (ACTOS) 15 mg tablet    Other Relevant Orders    Comprehensive metabolic panel    Hemoglobin A1C       Cardiovascular and Mediastinum    Essential hypertension       Other    Mixed hyperlipidemia    Relevant Orders    Lipid panel    Hypokalemia    Relevant Medications    potassium chloride (K-DUR,KLOR-CON) 20 mEq tablet    Other Relevant Orders    Basic metabolic panel      Other Visit Diagnoses     Health care maintenance               Preventive health issues were discussed with patient, and age appropriate screening tests were ordered as noted in patient's After Visit Summary  Personalized health advice and appropriate referrals for health education or preventive services given if needed, as noted in patient's After Visit Summary       History of Present Illness:     Patient presents for Medicare Annual Wellness visit    Patient Care Team:  Raudel Lowry DO as PCP - General     Problem List:     Patient Active Problem List   Diagnosis    Iron deficiency anemia secondary to inadequate dietary iron intake    Arthralgia of both knees    Mixed hyperlipidemia    Essential hypertension    Hypokalemia    Adult hypothyroidism    Sinoatrial node dysfunction ([address] )    Type 2 diabetes mellitus without complication, without long-term current use of insulin ([address] )    Worsening vision    Cardiac pacemaker in situ    Artificial knee joint present      Past Medical and Surgical History:     Past Medical History:   Diagnosis Date    Carpal tunnel syndrome     Heart murmur      Past Surgical History:   Procedure Laterality Date    CATARACT EXTRACTION      CHOLECYSTECTOMY      COLONOSCOPY  11/24/2007    KNEE SURGERY      REPLACEMENT TOTAL KNEE Right     TOTAL ABDOMINAL HYSTERECTOMY W/ BILATERAL SALPINGOOPHORECTOMY        Family History:     Family History   Problem Relation Age of Onset    Heart attack Father         ARRHYTHMIAS    Coronary artery disease Family     Diabetes Family     Heart attack Family         ARRHYTHMIAS      Social History:     Social History     Socioeconomic History    Marital status: /Civil Union     Spouse name: Not on file    Number of children: Not on file    Years of education: Not on file    Highest education level: Not on file   Occupational History    Not on file   Social Needs    Financial resource strain: Not on file    Food insecurity:     Worry: Not on file     Inability: Not on file    Transportation needs:     Medical: Not on file     Non-medical: Not on file   Tobacco Use    Smoking status: Never Smoker    Smokeless tobacco: Never Used   Substance and Sexual Activity    Alcohol use:  Yes    Drug use: No    Sexual activity: Not on file   Lifestyle    Physical activity:     Days per week: Not on file     Minutes per session: Not on file    Stress: Not on file   Relationships    Social connections:     Talks on phone: Not on file     Gets together: Not on file     Attends Sikhism service: Not on file     Active member of club or organization: Not on file     Attends meetings of clubs or organizations: Not on file     Relationship status: Not on file    Intimate partner violence:     Fear of current or ex partner: Not on file     Emotionally abused: Not on file     Physically abused: Not on file     Forced sexual activity: Not on file   Other Topics Concern    Not on file   Social History Narrative    Always uses seat belt    Employed    Lives with spouse        Medications and Allergies:     Current Outpatient Medications   Medication Sig Dispense Refill    amLODIPine (NORVASC) 5 mg tablet Take 1 tablet (5 mg total) by mouth daily 90 tablet 3    Ascorbic Acid (VITAMIN C) 100 MG tablet Vitamin C      atenolol-chlorthalidone (TENORETIC) 50-25 mg per tablet TAKE 1 TABLET BY MOUTH ONCE DAILY 90 tablet 2    B Complex Vitamins (B COMPLEX 100 PO) Take 1 tablet by mouth daily      glipiZIDE (GLUCOTROL XL) 2 5 mg 24 hr tablet TAKE 2 TABLETS BY MOUTH TWICE DAILY 360 tablet 2    glucose blood (JASWANT CONTOUR TEST) test strip Check blood sugar twice daily, DX:E11 9 200 each 3    glucose blood (FREESTYLE LITE) test strip Use as instructed 100 each 3    levothyroxine 75 mcg tablet TAKE 1 TABLET BY MOUTH ONCE DAILY 90 tablet 2    lovastatin (MEVACOR) 40 MG tablet Take 1 tablet (40 mg total) by mouth daily 90 tablet 3    niacin 500 mg tablet Take by mouth       pioglitazone (ACTOS) 15 mg tablet Take 15 mg by mouth daily  2    Multiple Vitamin (MULTIVITAMIN) tablet Take 1 tablet by mouth daily      potassium chloride (K-DUR,KLOR-CON) 20 mEq tablet Take 1 tablet (20 mEq total) by mouth daily 30 tablet 1    TiZANidine (ZANAFLEX) 6 MG capsule Take by mouth       No current facility-administered medications for this visit  No Known Allergies   Immunizations:     Immunization History   Administered Date(s) Administered    H1N1, All Formulations 11/11/2009    Influenza Split High Dose Preservative Free IM 10/03/2018, 10/03/2019    Influenza TIV (IM) 10/01/2015, 10/31/2016, 10/04/2017    Pneumococcal Conjugate 13-Valent 02/06/2018    Pneumococcal Polysaccharide PPV23 08/07/2006      Health Maintenance:         Topic Date Due    CRC Screening: Colonoscopy  11/24/2024         Topic Date Due    Pneumococcal Vaccine: 65+ Years (2 of 2 - PPSV23) 02/06/2019      Medicare Health Risk Assessment:     /68   Pulse 60   Temp 98 9 °F (37 2 °C) (Tympanic)   Ht 5' 1" (1 549 m)   Wt 70 4 kg (155 lb 3 2 oz)   SpO2 97%   BMI 29 32 kg/m²      Kenyon Lindsay is here for her Subsequent Wellness visit  Last Medicare Wellness visit information reviewed, patient interviewed and updates made to the record today        Health Risk Assessment:   Patient rates overall health as good  Patient feels that their physical health rating is same  Eyesight was rated as slightly worse  Hearing was rated as same  Patient feels that their emotional and mental health rating is same  Pain experienced in the last 7 days has been none  Patient states that she has experienced no weight loss or gain in last 6 months  Depression Screening:   PHQ-2 Score: 1      Fall Risk Screening: In the past year, patient has experienced: history of falling in past year    Number of falls: 1  Injured during fall?: No    Feels unsteady when standing or walking?: Yes    Worried about falling?: Yes      Urinary Incontinence Screening:   Patient has leaked urine accidently in the last six months  Home Safety:  Patient does not have trouble with stairs inside or outside of their home  Patient has working smoke alarms and has working carbon monoxide detector  Home safety hazards include: none  Nutrition:   Current diet is No Added Salt and Regular  Medications:   Patient is currently taking over-the-counter supplements  OTC medications include: see medication list  Patient is able to manage medications  Activities of Daily Living (ADLs)/Instrumental Activities of Daily Living (IADLs):   Walk and transfer into and out of bed and chair?: Yes  Dress and groom yourself?: Yes    Bathe or shower yourself?: Yes    Feed yourself? Yes  Do your laundry/housekeeping?: Yes  Manage your money, pay your bills and track your expenses?: Yes  Make your own meals?: Yes    Do your own shopping?: Yes    Previous Hospitalizations:   Any hospitalizations or ED visits within the last 12 months?: Yes    How many hospitalizations have you had in the last year?: 1-2    Advance Care Planning:   Living will: Yes    Durable POA for healthcare:  Yes    Advanced directive: Yes      Cognitive Screening:   Provider or family/friend/caregiver concerned regarding cognition?: No    PREVENTIVE SCREENINGS      Cardiovascular Screening:    General: Screening Not Indicated and History Lipid Disorder      Diabetes Screening:     General: Screening Not Indicated and History Diabetes      Colorectal Cancer Screening:     General: Screening Current      Breast Cancer Screening:     General: Patient Declines      Cervical Cancer Screening:    General: Screening Not Indicated      Osteoporosis Screening:    General: Risks and Benefits Discussed and Patient Declines      Abdominal Aortic Aneurysm (AAA) Screening:        General: Screening Not Indicated      Lung Cancer Screening:     General: Screening Not Indicated      Hepatitis C Screening:    General: Patient Declines      Jan Whitlock, DO

## 2019-12-23 ENCOUNTER — HOSPITAL ENCOUNTER (OUTPATIENT)
Dept: NON INVASIVE DIAGNOSTICS | Facility: CLINIC | Age: 74
Discharge: HOME | End: 2019-12-23
Payer: MEDICARE

## 2019-12-23 ENCOUNTER — TELEPHONE (OUTPATIENT)
Dept: CARDIOLOGY CLINIC | Facility: CLINIC | Age: 74
End: 2019-12-23

## 2019-12-23 DIAGNOSIS — I47.2 NSVT (NONSUSTAINED VENTRICULAR TACHYCARDIA) (HCC): ICD-10-CM

## 2019-12-23 PROCEDURE — 93306 TTE W/DOPPLER COMPLETE: CPT | Performed by: INTERNAL MEDICINE

## 2019-12-23 PROCEDURE — 93306 TTE W/DOPPLER COMPLETE: CPT

## 2019-12-23 NOTE — TELEPHONE ENCOUNTER
----- Message from Jesus Guerra MD sent at 12/23/2019  4:30 PM EST -----  Please call  Echocardiogram overall good

## 2020-01-17 ENCOUNTER — APPOINTMENT (OUTPATIENT)
Dept: LAB | Facility: HOSPITAL | Age: 75
End: 2020-01-17
Attending: FAMILY MEDICINE
Payer: MEDICARE

## 2020-01-17 DIAGNOSIS — E87.6 HYPOKALEMIA: ICD-10-CM

## 2020-01-17 LAB
ALBUMIN SERPL BCP-MCNC: 3.7 G/DL (ref 3.5–5)
ANION GAP SERPL CALCULATED.3IONS-SCNC: 9 MMOL/L (ref 4–13)
BUN SERPL-MCNC: 23 MG/DL (ref 5–25)
CALCIUM ALBUM COR SERPL-MCNC: 10.7 MG/DL (ref 8.3–10.1)
CALCIUM SERPL-MCNC: 10.5 MG/DL (ref 8.3–10.1)
CALCIUM SERPL-MCNC: 10.5 MG/DL (ref 8.3–10.1)
CHLORIDE SERPL-SCNC: 98 MMOL/L (ref 100–108)
CO2 SERPL-SCNC: 30 MMOL/L (ref 21–32)
CREAT SERPL-MCNC: 0.82 MG/DL (ref 0.6–1.3)
GFR SERPL CREATININE-BSD FRML MDRD: 70 ML/MIN/1.73SQ M
GLUCOSE P FAST SERPL-MCNC: 165 MG/DL (ref 65–99)
POTASSIUM SERPL-SCNC: 3.2 MMOL/L (ref 3.5–5.3)
SODIUM SERPL-SCNC: 137 MMOL/L (ref 136–145)

## 2020-01-17 PROCEDURE — 82040 ASSAY OF SERUM ALBUMIN: CPT

## 2020-01-17 PROCEDURE — 36415 COLL VENOUS BLD VENIPUNCTURE: CPT

## 2020-01-17 PROCEDURE — 80048 BASIC METABOLIC PNL TOTAL CA: CPT

## 2020-01-20 DIAGNOSIS — E83.52 HYPERCALCEMIA: Primary | ICD-10-CM

## 2020-01-22 DIAGNOSIS — I10 ESSENTIAL HYPERTENSION: ICD-10-CM

## 2020-01-22 RX ORDER — AMLODIPINE BESYLATE 5 MG/1
TABLET ORAL
Qty: 90 TABLET | Refills: 3 | Status: SHIPPED | OUTPATIENT
Start: 2020-01-22 | End: 2020-08-31

## 2020-01-23 ENCOUNTER — APPOINTMENT (OUTPATIENT)
Dept: LAB | Facility: HOSPITAL | Age: 75
End: 2020-01-23
Attending: FAMILY MEDICINE
Payer: MEDICARE

## 2020-01-23 DIAGNOSIS — E83.52 HYPERCALCEMIA: ICD-10-CM

## 2020-01-23 LAB — PTH-INTACT SERPL-MCNC: 81.5 PG/ML (ref 18.4–80.1)

## 2020-01-23 PROCEDURE — 83970 ASSAY OF PARATHORMONE: CPT

## 2020-01-23 PROCEDURE — 36415 COLL VENOUS BLD VENIPUNCTURE: CPT

## 2020-02-05 ENCOUNTER — REMOTE DEVICE CLINIC VISIT (OUTPATIENT)
Dept: CARDIOLOGY CLINIC | Facility: CLINIC | Age: 75
End: 2020-02-05
Payer: MEDICARE

## 2020-02-05 DIAGNOSIS — Z95.0 PRESENCE OF PERMANENT CARDIAC PACEMAKER: Primary | ICD-10-CM

## 2020-02-05 PROCEDURE — 93294 REM INTERROG EVL PM/LDLS PM: CPT | Performed by: INTERNAL MEDICINE

## 2020-02-05 PROCEDURE — 93296 REM INTERROG EVL PM/IDS: CPT | Performed by: INTERNAL MEDICINE

## 2020-02-05 NOTE — PROGRESS NOTES
SJM DUAL CHAMBER PM  MERLIN TRANSMISSION:  BATTERY VOLTAGE ADEQUATE (9 1 YR)   AP 96%  3 9%    ALL LEAD PARAMETERS WITHIN NORMAL LIMITS  1 NEW HVR EPISODE WITH EGM SHOWING PAT (25 @ 140 BPM)   NORMAL DEVICE FUNCTION   RG

## 2020-02-16 DIAGNOSIS — E78.2 MIXED HYPERLIPIDEMIA: ICD-10-CM

## 2020-02-17 ENCOUNTER — TELEPHONE (OUTPATIENT)
Dept: FAMILY MEDICINE CLINIC | Facility: CLINIC | Age: 75
End: 2020-02-17

## 2020-02-17 DIAGNOSIS — E11.9 TYPE 2 DIABETES MELLITUS WITHOUT COMPLICATION, WITHOUT LONG-TERM CURRENT USE OF INSULIN (HCC): Primary | ICD-10-CM

## 2020-02-17 RX ORDER — GLIPIZIDE 5 MG/1
5 TABLET, FILM COATED, EXTENDED RELEASE ORAL DAILY
Qty: 180 TABLET | Refills: 1 | Status: SHIPPED | OUTPATIENT
Start: 2020-02-17 | End: 2020-02-25 | Stop reason: SDUPTHER

## 2020-02-17 RX ORDER — LOVASTATIN 40 MG/1
TABLET ORAL
Qty: 90 TABLET | Refills: 3 | Status: SHIPPED | OUTPATIENT
Start: 2020-02-17 | End: 2021-02-05

## 2020-02-17 NOTE — TELEPHONE ENCOUNTER
Pt said her insurance isn't covering the Glipizide 2 5mg take two tablets twice a day - they would like to change it to 5 mg  one tablet twice a day -

## 2020-02-25 ENCOUNTER — TELEPHONE (OUTPATIENT)
Dept: FAMILY MEDICINE CLINIC | Facility: CLINIC | Age: 75
End: 2020-02-25

## 2020-02-25 DIAGNOSIS — E11.9 TYPE 2 DIABETES MELLITUS WITHOUT COMPLICATION, WITHOUT LONG-TERM CURRENT USE OF INSULIN (HCC): ICD-10-CM

## 2020-02-25 RX ORDER — GLIPIZIDE 5 MG/1
5 TABLET, FILM COATED, EXTENDED RELEASE ORAL 2 TIMES DAILY
Qty: 180 TABLET | Refills: 1 | Status: SHIPPED | OUTPATIENT
Start: 2020-02-25 | End: 2020-09-28

## 2020-02-25 NOTE — TELEPHONE ENCOUNTER
Glipizide   Was on 2 5 mg  BID, then increased to 2 5 2 tablets q am and q pm    Pt asked for refill and received 5 mg 1 qd  Should it be BID? Cheri Zhu: pt has a discomfort in her chest when she takes it  She d/c'd it  She went back to taking her OTC

## 2020-03-02 ENCOUNTER — TELEPHONE (OUTPATIENT)
Dept: FAMILY MEDICINE CLINIC | Facility: CLINIC | Age: 75
End: 2020-03-02

## 2020-03-02 NOTE — TELEPHONE ENCOUNTER
Pt has FBW orders for Lipid, HgbA1c, CMP  She is questioning if all 3 of theses are due prior to her next appt?

## 2020-04-01 ENCOUNTER — TELEPHONE (OUTPATIENT)
Dept: FAMILY MEDICINE CLINIC | Facility: CLINIC | Age: 75
End: 2020-04-01

## 2020-04-02 DIAGNOSIS — E11.9 TYPE 2 DIABETES MELLITUS WITHOUT COMPLICATION, WITHOUT LONG-TERM CURRENT USE OF INSULIN (HCC): ICD-10-CM

## 2020-04-02 RX ORDER — BLOOD-GLUCOSE METER
KIT MISCELLANEOUS
Qty: 100 EACH | Refills: 3 | Status: SHIPPED | OUTPATIENT
Start: 2020-04-02 | End: 2020-04-08 | Stop reason: SDUPTHER

## 2020-04-02 RX ORDER — BLOOD-GLUCOSE METER
KIT MISCELLANEOUS 2 TIMES DAILY
Qty: 1 EACH | Refills: 0 | Status: SHIPPED | OUTPATIENT
Start: 2020-04-02

## 2020-04-04 DIAGNOSIS — E87.6 HYPOKALEMIA: ICD-10-CM

## 2020-04-06 ENCOUNTER — TELEPHONE (OUTPATIENT)
Dept: FAMILY MEDICINE CLINIC | Facility: CLINIC | Age: 75
End: 2020-04-06

## 2020-04-06 RX ORDER — POTASSIUM CHLORIDE 1500 MG/1
TABLET, EXTENDED RELEASE ORAL
Qty: 30 TABLET | Refills: 1 | Status: SHIPPED | OUTPATIENT
Start: 2020-04-06 | End: 2020-06-08

## 2020-04-08 DIAGNOSIS — E11.9 TYPE 2 DIABETES MELLITUS WITHOUT COMPLICATION, WITHOUT LONG-TERM CURRENT USE OF INSULIN (HCC): ICD-10-CM

## 2020-04-08 RX ORDER — BLOOD-GLUCOSE METER
KIT MISCELLANEOUS
Qty: 100 EACH | Refills: 3 | Status: SHIPPED | OUTPATIENT
Start: 2020-04-08 | End: 2021-05-17 | Stop reason: SDUPTHER

## 2020-05-13 ENCOUNTER — REMOTE DEVICE CLINIC VISIT (OUTPATIENT)
Dept: CARDIOLOGY CLINIC | Facility: CLINIC | Age: 75
End: 2020-05-13
Payer: MEDICARE

## 2020-05-13 DIAGNOSIS — Z95.0 PRESENCE OF PERMANENT CARDIAC PACEMAKER: Primary | ICD-10-CM

## 2020-05-13 PROCEDURE — 93296 REM INTERROG EVL PM/IDS: CPT | Performed by: INTERNAL MEDICINE

## 2020-05-13 PROCEDURE — 93294 REM INTERROG EVL PM/LDLS PM: CPT | Performed by: INTERNAL MEDICINE

## 2020-06-01 ENCOUNTER — APPOINTMENT (OUTPATIENT)
Dept: LAB | Facility: HOSPITAL | Age: 75
End: 2020-06-01
Attending: FAMILY MEDICINE
Payer: MEDICARE

## 2020-06-01 DIAGNOSIS — E78.2 MIXED HYPERLIPIDEMIA: ICD-10-CM

## 2020-06-01 DIAGNOSIS — E11.9 TYPE 2 DIABETES MELLITUS WITHOUT COMPLICATION, WITHOUT LONG-TERM CURRENT USE OF INSULIN (HCC): ICD-10-CM

## 2020-06-01 LAB
ALBUMIN SERPL BCP-MCNC: 3.8 G/DL (ref 3.5–5)
ALP SERPL-CCNC: 84 U/L (ref 46–116)
ALT SERPL W P-5'-P-CCNC: 31 U/L (ref 12–78)
ANION GAP SERPL CALCULATED.3IONS-SCNC: 7 MMOL/L (ref 4–13)
AST SERPL W P-5'-P-CCNC: 28 U/L (ref 5–45)
BILIRUB SERPL-MCNC: 0.5 MG/DL (ref 0.2–1)
BUN SERPL-MCNC: 22 MG/DL (ref 5–25)
CALCIUM ALBUM COR SERPL-MCNC: 10.9 MG/DL (ref 8.3–10.1)
CALCIUM SERPL-MCNC: 10.7 MG/DL (ref 8.3–10.1)
CHLORIDE SERPL-SCNC: 101 MMOL/L (ref 100–108)
CHOLEST SERPL-MCNC: 191 MG/DL (ref 50–200)
CO2 SERPL-SCNC: 28 MMOL/L (ref 21–32)
CREAT SERPL-MCNC: 0.74 MG/DL (ref 0.6–1.3)
EST. AVERAGE GLUCOSE BLD GHB EST-MCNC: 163 MG/DL
GFR SERPL CREATININE-BSD FRML MDRD: 79 ML/MIN/1.73SQ M
GLUCOSE P FAST SERPL-MCNC: 157 MG/DL (ref 65–99)
HBA1C MFR BLD: 7.3 %
HDLC SERPL-MCNC: 39 MG/DL
LDLC SERPL CALC-MCNC: 116 MG/DL (ref 0–100)
NONHDLC SERPL-MCNC: 152 MG/DL
POTASSIUM SERPL-SCNC: 3.1 MMOL/L (ref 3.5–5.3)
PROT SERPL-MCNC: 7.6 G/DL (ref 6.4–8.2)
SODIUM SERPL-SCNC: 136 MMOL/L (ref 136–145)
TRIGL SERPL-MCNC: 182 MG/DL

## 2020-06-01 PROCEDURE — 80061 LIPID PANEL: CPT

## 2020-06-01 PROCEDURE — 80053 COMPREHEN METABOLIC PANEL: CPT

## 2020-06-01 PROCEDURE — 36415 COLL VENOUS BLD VENIPUNCTURE: CPT

## 2020-06-01 PROCEDURE — 83036 HEMOGLOBIN GLYCOSYLATED A1C: CPT

## 2020-06-08 ENCOUNTER — OFFICE VISIT (OUTPATIENT)
Dept: FAMILY MEDICINE CLINIC | Facility: CLINIC | Age: 75
End: 2020-06-08
Payer: MEDICARE

## 2020-06-08 VITALS
DIASTOLIC BLOOD PRESSURE: 64 MMHG | WEIGHT: 160 LBS | BODY MASS INDEX: 30.21 KG/M2 | OXYGEN SATURATION: 97 % | SYSTOLIC BLOOD PRESSURE: 122 MMHG | HEIGHT: 61 IN | HEART RATE: 63 BPM | TEMPERATURE: 97.8 F

## 2020-06-08 DIAGNOSIS — E11.9 TYPE 2 DIABETES MELLITUS WITHOUT COMPLICATION, WITHOUT LONG-TERM CURRENT USE OF INSULIN (HCC): ICD-10-CM

## 2020-06-08 DIAGNOSIS — E03.9 ADULT HYPOTHYROIDISM: Primary | ICD-10-CM

## 2020-06-08 DIAGNOSIS — E78.2 MIXED HYPERLIPIDEMIA: ICD-10-CM

## 2020-06-08 DIAGNOSIS — I10 ESSENTIAL HYPERTENSION: ICD-10-CM

## 2020-06-08 PROCEDURE — 4040F PNEUMOC VAC/ADMIN/RCVD: CPT | Performed by: FAMILY MEDICINE

## 2020-06-08 PROCEDURE — 1160F RVW MEDS BY RX/DR IN RCRD: CPT | Performed by: FAMILY MEDICINE

## 2020-06-08 PROCEDURE — 1036F TOBACCO NON-USER: CPT | Performed by: FAMILY MEDICINE

## 2020-06-08 PROCEDURE — 3008F BODY MASS INDEX DOCD: CPT | Performed by: FAMILY MEDICINE

## 2020-06-08 PROCEDURE — 3051F HG A1C>EQUAL 7.0%<8.0%: CPT | Performed by: FAMILY MEDICINE

## 2020-06-08 PROCEDURE — 99214 OFFICE O/P EST MOD 30 MIN: CPT | Performed by: FAMILY MEDICINE

## 2020-06-08 PROCEDURE — 3074F SYST BP LT 130 MM HG: CPT | Performed by: FAMILY MEDICINE

## 2020-06-08 PROCEDURE — 2022F DILAT RTA XM EVC RTNOPTHY: CPT | Performed by: FAMILY MEDICINE

## 2020-06-08 PROCEDURE — 3078F DIAST BP <80 MM HG: CPT | Performed by: FAMILY MEDICINE

## 2020-06-09 ENCOUNTER — OFFICE VISIT (OUTPATIENT)
Dept: CARDIOLOGY CLINIC | Facility: CLINIC | Age: 75
End: 2020-06-09
Payer: MEDICARE

## 2020-06-09 VITALS
SYSTOLIC BLOOD PRESSURE: 108 MMHG | TEMPERATURE: 98.3 F | OXYGEN SATURATION: 97 % | HEART RATE: 60 BPM | HEIGHT: 61 IN | DIASTOLIC BLOOD PRESSURE: 54 MMHG | BODY MASS INDEX: 30.4 KG/M2 | WEIGHT: 161 LBS

## 2020-06-09 DIAGNOSIS — I10 ESSENTIAL HYPERTENSION: Primary | ICD-10-CM

## 2020-06-09 DIAGNOSIS — I49.5 SSS (SICK SINUS SYNDROME) (HCC): ICD-10-CM

## 2020-06-09 PROCEDURE — 3078F DIAST BP <80 MM HG: CPT | Performed by: INTERNAL MEDICINE

## 2020-06-09 PROCEDURE — 1160F RVW MEDS BY RX/DR IN RCRD: CPT | Performed by: INTERNAL MEDICINE

## 2020-06-09 PROCEDURE — 1036F TOBACCO NON-USER: CPT | Performed by: INTERNAL MEDICINE

## 2020-06-09 PROCEDURE — 3074F SYST BP LT 130 MM HG: CPT | Performed by: INTERNAL MEDICINE

## 2020-06-09 PROCEDURE — 2022F DILAT RTA XM EVC RTNOPTHY: CPT | Performed by: INTERNAL MEDICINE

## 2020-06-09 PROCEDURE — 4040F PNEUMOC VAC/ADMIN/RCVD: CPT | Performed by: INTERNAL MEDICINE

## 2020-06-09 PROCEDURE — 3051F HG A1C>EQUAL 7.0%<8.0%: CPT | Performed by: INTERNAL MEDICINE

## 2020-06-09 PROCEDURE — 99213 OFFICE O/P EST LOW 20 MIN: CPT | Performed by: INTERNAL MEDICINE

## 2020-06-09 PROCEDURE — 3008F BODY MASS INDEX DOCD: CPT | Performed by: INTERNAL MEDICINE

## 2020-06-30 DIAGNOSIS — E78.2 MIXED HYPERLIPIDEMIA: Primary | ICD-10-CM

## 2020-06-30 RX ORDER — PIOGLITAZONEHYDROCHLORIDE 15 MG/1
15 TABLET ORAL DAILY
Qty: 90 TABLET | Refills: 1 | Status: SHIPPED | OUTPATIENT
Start: 2020-06-30 | End: 2020-12-15

## 2020-08-12 ENCOUNTER — REMOTE DEVICE CLINIC VISIT (OUTPATIENT)
Dept: CARDIOLOGY CLINIC | Facility: CLINIC | Age: 75
End: 2020-08-12
Payer: MEDICARE

## 2020-08-12 DIAGNOSIS — I10 ESSENTIAL HYPERTENSION: ICD-10-CM

## 2020-08-12 DIAGNOSIS — Z95.0 CARDIAC PACEMAKER IN SITU: Primary | ICD-10-CM

## 2020-08-12 PROCEDURE — 93294 REM INTERROG EVL PM/LDLS PM: CPT | Performed by: INTERNAL MEDICINE

## 2020-08-12 PROCEDURE — 93296 REM INTERROG EVL PM/IDS: CPT | Performed by: INTERNAL MEDICINE

## 2020-08-12 RX ORDER — ATENOLOL AND CHLORTHALIDONE TABLET 50; 25 MG/1; MG/1
TABLET ORAL
Qty: 90 TABLET | Refills: 2 | Status: SHIPPED | OUTPATIENT
Start: 2020-08-12 | End: 2021-04-23 | Stop reason: SDUPTHER

## 2020-08-12 NOTE — TELEPHONE ENCOUNTER
Requested medication(s) are due for refill today: Yes  Patient has already received a courtesy refill: No  Other reason request has been forwarded to provider: Abnormal K and Ca in chart 06/2020

## 2020-08-12 NOTE — PROGRESS NOTES
Results for orders placed or performed in visit on 08/12/20   Cardiac EP device report    Narrative    SJM DUAL CHAMBER PM  CARELINK TRANSMISSION: BATTERY STATUS "OK"  AP 97%  3 3%  ALL AVAILABLE LEAD PARAMETERS WITHIN NORMAL LIMITS  NO SIGNIFICANT HIGH RATE EPISODES  NORMAL DEVICE FUNCTION   NC       Current Outpatient Medications:     amLODIPine (NORVASC) 5 mg tablet, TAKE 1 TABLET BY MOUTH ONCE DAILY, Disp: 90 tablet, Rfl: 3    Ascorbic Acid (VITAMIN C) 100 MG tablet, Vitamin C, Disp: , Rfl:     atenolol-chlorthalidone (TENORETIC) 50-25 mg per tablet, TAKE 1 TABLET BY MOUTH ONCE DAILY, Disp: 90 tablet, Rfl: 2    B Complex Vitamins (B COMPLEX 100 PO), Take 1 tablet by mouth daily, Disp: , Rfl:     Blood Glucose Monitoring Suppl (FREESTYLE LITE) RANULFO, by Does not apply route 2 (two) times a day Dx E11 9, Disp: 1 each, Rfl: 0    FREESTYLE LITE test strip, Use as instructed once daily, Dx E11 9, Disp: 100 each, Rfl: 3    glipiZIDE (GLUCOTROL XL) 5 mg 24 hr tablet, Take 1 tablet (5 mg total) by mouth 2 (two) times a day, Disp: 180 tablet, Rfl: 1    glucose blood (JASWANT CONTOUR TEST) test strip, Check blood sugar twice daily, DX:E11 9, Disp: 200 each, Rfl: 3    levothyroxine 75 mcg tablet, TAKE 1 TABLET BY MOUTH ONCE DAILY, Disp: 90 tablet, Rfl: 2    lovastatin (MEVACOR) 40 MG tablet, TAKE 1 TABLET BY MOUTH ONCE DAILY IN THE EVENING, Disp: 90 tablet, Rfl: 3    Multiple Vitamin (MULTIVITAMIN) tablet, Take 1 tablet by mouth daily, Disp: , Rfl:     niacin 500 mg tablet, Take by mouth , Disp: , Rfl:     pioglitazone (ACTOS) 15 mg tablet, Take 1 tablet (15 mg total) by mouth daily, Disp: 90 tablet, Rfl: 1    POTASSIUM PO, Take by mouth 2 (two) times a day, Disp: , Rfl:

## 2020-08-14 DIAGNOSIS — E03.9 ADULT HYPOTHYROIDISM: ICD-10-CM

## 2020-08-14 RX ORDER — LEVOTHYROXINE SODIUM 0.07 MG/1
TABLET ORAL
Qty: 90 TABLET | Refills: 2 | Status: SHIPPED | OUTPATIENT
Start: 2020-08-14 | End: 2021-04-23 | Stop reason: SDUPTHER

## 2020-08-31 ENCOUNTER — TELEPHONE (OUTPATIENT)
Dept: FAMILY MEDICINE CLINIC | Facility: CLINIC | Age: 75
End: 2020-08-31

## 2020-08-31 DIAGNOSIS — I10 ESSENTIAL HYPERTENSION: Primary | ICD-10-CM

## 2020-08-31 RX ORDER — IRBESARTAN 150 MG/1
150 TABLET ORAL
Qty: 30 TABLET | Refills: 5 | Status: SHIPPED | OUTPATIENT
Start: 2020-08-31 | End: 2020-09-21 | Stop reason: SDUPTHER

## 2020-08-31 NOTE — TELEPHONE ENCOUNTER
T/C from patient regarding swelling in her Legs  She said she has mentioned it before but she has it again  Starts to swell in the afternoon and into the evenings  Please advise  If meds, she uses [facility]     Call pt to inform: [phone]

## 2020-09-10 ENCOUNTER — TELEPHONE (OUTPATIENT)
Dept: FAMILY MEDICINE CLINIC | Facility: CLINIC | Age: 75
End: 2020-09-10

## 2020-09-10 DIAGNOSIS — M54.6 ACUTE BILATERAL THORACIC BACK PAIN: Primary | ICD-10-CM

## 2020-09-10 RX ORDER — CYCLOBENZAPRINE HCL 10 MG
10 TABLET ORAL 3 TIMES DAILY PRN
Qty: 20 TABLET | Refills: 0 | Status: SHIPPED | OUTPATIENT
Start: 2020-09-10 | End: 2021-06-07

## 2020-09-10 NOTE — TELEPHONE ENCOUNTER
Pt called, she sees Dr Renny Harden  Pt states she pulled a muscle when she got out of the bed and twisted a certain way  Pt states she feels pulling in her shoulder up into her neck  Pt is asking if a muscle relaxer can be sent in for a few days   Please advise

## 2020-09-21 DIAGNOSIS — I10 ESSENTIAL HYPERTENSION: ICD-10-CM

## 2020-09-21 RX ORDER — IRBESARTAN 150 MG/1
150 TABLET ORAL
Qty: 30 TABLET | Refills: 5 | Status: SHIPPED | OUTPATIENT
Start: 2020-09-21 | End: 2020-10-26

## 2020-09-21 NOTE — TELEPHONE ENCOUNTER
Requested medication(s) are due for refill today: Yes  Patient has already received a courtesy refill: No  Other reason request has been forwarded to provider: kenia K

## 2020-09-27 DIAGNOSIS — E11.9 TYPE 2 DIABETES MELLITUS WITHOUT COMPLICATION, WITHOUT LONG-TERM CURRENT USE OF INSULIN (HCC): ICD-10-CM

## 2020-09-28 RX ORDER — GLIPIZIDE 5 MG/1
TABLET, FILM COATED, EXTENDED RELEASE ORAL
Qty: 180 TABLET | Refills: 1 | Status: SHIPPED | OUTPATIENT
Start: 2020-09-28 | End: 2021-03-22

## 2020-10-06 ENCOUNTER — TELEPHONE (OUTPATIENT)
Dept: FAMILY MEDICINE CLINIC | Facility: CLINIC | Age: 75
End: 2020-10-06

## 2020-10-06 DIAGNOSIS — N76.0 ACUTE VAGINITIS: Primary | ICD-10-CM

## 2020-10-06 RX ORDER — FLUCONAZOLE 150 MG/1
150 TABLET ORAL ONCE
Qty: 1 TABLET | Refills: 0 | Status: SHIPPED | OUTPATIENT
Start: 2020-10-06 | End: 2020-10-06

## 2020-10-13 DIAGNOSIS — N76.0 ACUTE VAGINITIS: Primary | ICD-10-CM

## 2020-10-13 RX ORDER — FLUCONAZOLE 150 MG/1
150 TABLET ORAL ONCE
Qty: 1 TABLET | Refills: 0 | Status: SHIPPED | OUTPATIENT
Start: 2020-10-13 | End: 2020-10-13

## 2020-10-21 ENCOUNTER — TELEPHONE (OUTPATIENT)
Dept: FAMILY MEDICINE CLINIC | Facility: CLINIC | Age: 75
End: 2020-10-21

## 2020-10-23 ENCOUNTER — IMMUNIZATIONS (OUTPATIENT)
Dept: FAMILY MEDICINE CLINIC | Facility: CLINIC | Age: 75
End: 2020-10-23
Payer: MEDICARE

## 2020-10-23 DIAGNOSIS — Z23 ENCOUNTER FOR IMMUNIZATION: ICD-10-CM

## 2020-10-23 PROCEDURE — G0008 ADMIN INFLUENZA VIRUS VAC: HCPCS

## 2020-10-23 PROCEDURE — 90662 IIV NO PRSV INCREASED AG IM: CPT

## 2020-11-13 ENCOUNTER — REMOTE DEVICE CLINIC VISIT (OUTPATIENT)
Dept: CARDIOLOGY CLINIC | Facility: CLINIC | Age: 75
End: 2020-11-13
Payer: MEDICARE

## 2020-11-13 DIAGNOSIS — Z95.0 PRESENCE OF CARDIAC PACEMAKER: Primary | ICD-10-CM

## 2020-11-13 PROCEDURE — 93296 REM INTERROG EVL PM/IDS: CPT | Performed by: INTERNAL MEDICINE

## 2020-11-13 PROCEDURE — 93294 REM INTERROG EVL PM/LDLS PM: CPT | Performed by: INTERNAL MEDICINE

## 2020-11-24 ENCOUNTER — LAB (OUTPATIENT)
Dept: LAB | Facility: HOSPITAL | Age: 75
End: 2020-11-24
Attending: FAMILY MEDICINE
Payer: MEDICARE

## 2020-11-24 DIAGNOSIS — E03.9 ADULT HYPOTHYROIDISM: ICD-10-CM

## 2020-11-24 DIAGNOSIS — E11.9 TYPE 2 DIABETES MELLITUS WITHOUT COMPLICATION, WITHOUT LONG-TERM CURRENT USE OF INSULIN (HCC): ICD-10-CM

## 2020-11-24 DIAGNOSIS — E78.2 MIXED HYPERLIPIDEMIA: ICD-10-CM

## 2020-11-24 LAB
ALBUMIN SERPL BCP-MCNC: 3.7 G/DL (ref 3.5–5)
ALP SERPL-CCNC: 72 U/L (ref 46–116)
ALT SERPL W P-5'-P-CCNC: 26 U/L (ref 12–78)
ANION GAP SERPL CALCULATED.3IONS-SCNC: 8 MMOL/L (ref 4–13)
AST SERPL W P-5'-P-CCNC: 23 U/L (ref 5–45)
BILIRUB SERPL-MCNC: 0.4 MG/DL (ref 0.2–1)
BUN SERPL-MCNC: 20 MG/DL (ref 5–25)
CALCIUM SERPL-MCNC: 11 MG/DL (ref 8.3–10.1)
CHLORIDE SERPL-SCNC: 102 MMOL/L (ref 100–108)
CHOLEST SERPL-MCNC: 161 MG/DL (ref 50–200)
CO2 SERPL-SCNC: 30 MMOL/L (ref 21–32)
CREAT SERPL-MCNC: 0.72 MG/DL (ref 0.6–1.3)
CREAT UR-MCNC: 64 MG/DL
EST. AVERAGE GLUCOSE BLD GHB EST-MCNC: 134 MG/DL
GFR SERPL CREATININE-BSD FRML MDRD: 81 ML/MIN/1.73SQ M
GLUCOSE P FAST SERPL-MCNC: 106 MG/DL (ref 65–99)
HBA1C MFR BLD: 6.3 %
HDLC SERPL-MCNC: 36 MG/DL
LDLC SERPL CALC-MCNC: 89 MG/DL (ref 0–100)
MICROALBUMIN UR-MCNC: 160 MG/L (ref 0–20)
MICROALBUMIN/CREAT 24H UR: 250 MG/G CREATININE (ref 0–30)
NONHDLC SERPL-MCNC: 125 MG/DL
POTASSIUM SERPL-SCNC: 3.2 MMOL/L (ref 3.5–5.3)
PROT SERPL-MCNC: 7.5 G/DL (ref 6.4–8.2)
SODIUM SERPL-SCNC: 140 MMOL/L (ref 136–145)
TRIGL SERPL-MCNC: 180 MG/DL
TSH SERPL DL<=0.05 MIU/L-ACNC: 0.84 UIU/ML (ref 0.36–3.74)

## 2020-11-24 PROCEDURE — 80053 COMPREHEN METABOLIC PANEL: CPT

## 2020-11-24 PROCEDURE — 84443 ASSAY THYROID STIM HORMONE: CPT

## 2020-11-24 PROCEDURE — 83036 HEMOGLOBIN GLYCOSYLATED A1C: CPT

## 2020-11-24 PROCEDURE — 82570 ASSAY OF URINE CREATININE: CPT

## 2020-11-24 PROCEDURE — 80061 LIPID PANEL: CPT

## 2020-11-24 PROCEDURE — 82043 UR ALBUMIN QUANTITATIVE: CPT

## 2020-11-24 PROCEDURE — 36415 COLL VENOUS BLD VENIPUNCTURE: CPT

## 2020-11-30 ENCOUNTER — OFFICE VISIT (OUTPATIENT)
Dept: FAMILY MEDICINE CLINIC | Facility: CLINIC | Age: 75
End: 2020-11-30
Payer: MEDICARE

## 2020-11-30 VITALS
HEIGHT: 61 IN | OXYGEN SATURATION: 96 % | HEART RATE: 84 BPM | TEMPERATURE: 99 F | WEIGHT: 162 LBS | SYSTOLIC BLOOD PRESSURE: 132 MMHG | DIASTOLIC BLOOD PRESSURE: 72 MMHG | BODY MASS INDEX: 30.58 KG/M2 | RESPIRATION RATE: 18 BRPM

## 2020-11-30 DIAGNOSIS — E03.9 ADULT HYPOTHYROIDISM: ICD-10-CM

## 2020-11-30 DIAGNOSIS — M25.561 ARTHRALGIA OF BOTH KNEES: ICD-10-CM

## 2020-11-30 DIAGNOSIS — E11.9 TYPE 2 DIABETES MELLITUS WITHOUT COMPLICATION, WITHOUT LONG-TERM CURRENT USE OF INSULIN (HCC): Primary | ICD-10-CM

## 2020-11-30 DIAGNOSIS — M25.562 ARTHRALGIA OF BOTH KNEES: ICD-10-CM

## 2020-11-30 DIAGNOSIS — I10 ESSENTIAL HYPERTENSION: ICD-10-CM

## 2020-11-30 DIAGNOSIS — E78.2 MIXED HYPERLIPIDEMIA: ICD-10-CM

## 2020-11-30 PROCEDURE — 99214 OFFICE O/P EST MOD 30 MIN: CPT | Performed by: FAMILY MEDICINE

## 2020-11-30 RX ORDER — FLUCONAZOLE 150 MG/1
TABLET ORAL
COMMUNITY
Start: 2020-10-13 | End: 2022-01-12 | Stop reason: ALTCHOICE

## 2020-12-15 DIAGNOSIS — E78.2 MIXED HYPERLIPIDEMIA: ICD-10-CM

## 2020-12-15 RX ORDER — PIOGLITAZONEHYDROCHLORIDE 15 MG/1
TABLET ORAL
Qty: 90 TABLET | Refills: 1 | Status: SHIPPED | OUTPATIENT
Start: 2020-12-15 | End: 2021-04-23 | Stop reason: SDUPTHER

## 2021-01-20 DIAGNOSIS — Z23 ENCOUNTER FOR IMMUNIZATION: ICD-10-CM

## 2021-01-26 ENCOUNTER — IMMUNIZATIONS (OUTPATIENT)
Dept: FAMILY MEDICINE CLINIC | Facility: HOSPITAL | Age: 76
End: 2021-01-26

## 2021-01-26 DIAGNOSIS — Z23 ENCOUNTER FOR IMMUNIZATION: Primary | ICD-10-CM

## 2021-01-26 PROCEDURE — 0011A SARS-COV-2 / COVID-19 MRNA VACCINE (MODERNA) 100 MCG: CPT

## 2021-01-26 PROCEDURE — 91301 SARS-COV-2 / COVID-19 MRNA VACCINE (MODERNA) 100 MCG: CPT

## 2021-02-05 DIAGNOSIS — E78.2 MIXED HYPERLIPIDEMIA: ICD-10-CM

## 2021-02-05 RX ORDER — LOVASTATIN 40 MG/1
TABLET ORAL
Qty: 90 TABLET | Refills: 3 | Status: SHIPPED | OUTPATIENT
Start: 2021-02-05 | End: 2021-04-23 | Stop reason: SDUPTHER

## 2021-02-24 ENCOUNTER — IMMUNIZATIONS (OUTPATIENT)
Dept: FAMILY MEDICINE CLINIC | Facility: HOSPITAL | Age: 76
End: 2021-02-24

## 2021-02-24 DIAGNOSIS — Z23 ENCOUNTER FOR IMMUNIZATION: Primary | ICD-10-CM

## 2021-02-24 PROCEDURE — 0012A SARS-COV-2 / COVID-19 MRNA VACCINE (MODERNA) 100 MCG: CPT

## 2021-02-24 PROCEDURE — 91301 SARS-COV-2 / COVID-19 MRNA VACCINE (MODERNA) 100 MCG: CPT

## 2021-03-21 DIAGNOSIS — E11.9 TYPE 2 DIABETES MELLITUS WITHOUT COMPLICATION, WITHOUT LONG-TERM CURRENT USE OF INSULIN (HCC): ICD-10-CM

## 2021-03-22 RX ORDER — GLIPIZIDE 5 MG/1
TABLET, FILM COATED, EXTENDED RELEASE ORAL
Qty: 180 TABLET | Refills: 1 | Status: SHIPPED | OUTPATIENT
Start: 2021-03-22 | End: 2021-04-23 | Stop reason: SDUPTHER

## 2021-04-13 ENCOUNTER — REMOTE DEVICE CLINIC VISIT (OUTPATIENT)
Dept: CARDIOLOGY CLINIC | Facility: CLINIC | Age: 76
End: 2021-04-13
Payer: MEDICARE

## 2021-04-13 DIAGNOSIS — Z95.0 PRESENCE OF PERMANENT CARDIAC PACEMAKER: Primary | ICD-10-CM

## 2021-04-13 PROCEDURE — 93296 REM INTERROG EVL PM/IDS: CPT | Performed by: INTERNAL MEDICINE

## 2021-04-13 PROCEDURE — 93294 REM INTERROG EVL PM/LDLS PM: CPT | Performed by: INTERNAL MEDICINE

## 2021-04-13 NOTE — PROGRESS NOTES
Results for orders placed or performed in visit on 04/13/21   Cardiac EP device report    Narrative    SJM DUAL CHAMBER PM  MERLIN T%RANSMISSION: BATTERY VOLTAGE ADEQUATE  (8 5 YRS) AP 90%  8%  ALL AVAILABLE LEAD PARAMETERS WITHIN NORMAL LIMITS  21 AMS EPISODES DETECTED LONGEST 13 MIN  PATIENT IS NOT ON AC'S  MADE DR NIEVES AWARE  NORMAL DEVICE FUNCTION  ----REN

## 2021-04-19 ENCOUNTER — TELEPHONE (OUTPATIENT)
Dept: CARDIOLOGY CLINIC | Facility: CLINIC | Age: 76
End: 2021-04-19

## 2021-04-19 DIAGNOSIS — I48.0 PAF (PAROXYSMAL ATRIAL FIBRILLATION) (HCC): Primary | ICD-10-CM

## 2021-04-19 NOTE — TELEPHONE ENCOUNTER
Patient stopped in the office for Eliquis samples     Per device report on 4/16 ~ talked to the patient   Explained her episodes of atrial fibrillation on on pacemaker transmission   Patient will  Eliquis 5 mg twice a day for 2 weeks samples from [facility] office Monday        Med added to medication list (sample)

## 2021-04-21 NOTE — TELEPHONE ENCOUNTER
Pt called and said that the Eliquis is too expensive for her to purchase  Pt would like to know if there is anything cheaper she is able to get on?  Pt would like a call back on her house phone [phone]

## 2021-04-22 NOTE — TELEPHONE ENCOUNTER
S/w patient she stated that she has been taking the Eliquis for about 3 days now  Informed and offered patient to fill out Eliquis Assistance form  Patient declined and would like to try the Warfarin(Coumadin)

## 2021-04-22 NOTE — TELEPHONE ENCOUNTER
This patient has paroxysmal atrial fibrillation which is new on her pacemaker interrogation  Patient was initially offered Eliquis  However she cannot afford  Please transition her to Coumadin  You can start slow like 2 5 mg daily and take life from there  Let me know if you need any help

## 2021-04-22 NOTE — TELEPHONE ENCOUNTER
Patient can check and see if Xarelto 20 mg daily is any less expensive  If that is also very expensive, we may need to consider warfarin  Thank you

## 2021-04-22 NOTE — TELEPHONE ENCOUNTER
I s/w the pt  She has agreed to go on Warfarin  I gave her instructions on how to convert  She states she is on the process of switching pharmacies and she just got a 2 week sample so she does not want to switch now  She suggested I call her back early next week so I can get her new pharmacy info and go over the instructions again  I told her that I will do that

## 2021-04-23 DIAGNOSIS — E03.9 ADULT HYPOTHYROIDISM: ICD-10-CM

## 2021-04-23 DIAGNOSIS — E11.9 TYPE 2 DIABETES MELLITUS WITHOUT COMPLICATION, WITHOUT LONG-TERM CURRENT USE OF INSULIN (HCC): ICD-10-CM

## 2021-04-23 DIAGNOSIS — I10 ESSENTIAL HYPERTENSION: ICD-10-CM

## 2021-04-23 DIAGNOSIS — E78.2 MIXED HYPERLIPIDEMIA: ICD-10-CM

## 2021-04-23 RX ORDER — GLIPIZIDE 5 MG/1
5 TABLET, FILM COATED, EXTENDED RELEASE ORAL 2 TIMES DAILY
Qty: 180 TABLET | Refills: 1 | Status: SHIPPED | OUTPATIENT
Start: 2021-04-23 | End: 2021-06-24 | Stop reason: SDUPTHER

## 2021-04-23 RX ORDER — ATENOLOL AND CHLORTHALIDONE TABLET 50; 25 MG/1; MG/1
1 TABLET ORAL DAILY
Qty: 90 TABLET | Refills: 2 | Status: SHIPPED | OUTPATIENT
Start: 2021-04-23 | End: 2022-01-17

## 2021-04-23 RX ORDER — LEVOTHYROXINE SODIUM 0.07 MG/1
75 TABLET ORAL DAILY
Qty: 90 TABLET | Refills: 2 | Status: SHIPPED | OUTPATIENT
Start: 2021-04-23 | End: 2021-10-25

## 2021-04-23 RX ORDER — PIOGLITAZONEHYDROCHLORIDE 15 MG/1
15 TABLET ORAL DAILY
Qty: 90 TABLET | Refills: 1 | Status: SHIPPED | OUTPATIENT
Start: 2021-04-23 | End: 2022-01-17

## 2021-04-23 RX ORDER — LOVASTATIN 40 MG/1
40 TABLET ORAL EVERY EVENING
Qty: 90 TABLET | Refills: 3 | Status: SHIPPED | OUTPATIENT
Start: 2021-04-23 | End: 2021-06-07

## 2021-04-23 NOTE — TELEPHONE ENCOUNTER
Pt changing pharmacies  Anamika is having difficulty getting [facility] to forward rx[facility]s and asked for new rx's for everything to get her started  [facility] not [facility] at this time though

## 2021-04-26 DIAGNOSIS — I48.0 PAF (PAROXYSMAL ATRIAL FIBRILLATION) (HCC): ICD-10-CM

## 2021-04-26 DIAGNOSIS — Z79.01 ANTICOAGULATION MONITORING, INR RANGE 2-3: Primary | ICD-10-CM

## 2021-04-26 RX ORDER — WARFARIN SODIUM 2.5 MG/1
TABLET ORAL
Qty: 90 TABLET | Refills: 3 | Status: SHIPPED | OUTPATIENT
Start: 2021-04-26 | End: 2021-06-23 | Stop reason: DRUGHIGH

## 2021-04-26 NOTE — TELEPHONE ENCOUNTER
I s/w pt  I gave her dosing instructions and sent RX for Warfarin 2 5mg to her [facility] pharmacy

## 2021-04-29 ENCOUNTER — TELEPHONE (OUTPATIENT)
Dept: CARDIOLOGY CLINIC | Facility: CLINIC | Age: 76
End: 2021-04-29

## 2021-04-29 NOTE — TELEPHONE ENCOUNTER
I s/w the pt  She had questions in regards to dietary restrictions while being on Warfarin  I informed her to eat her greens in moderation and be consistent  Informed her how alcohol, cranberry juice and other juices may increase the INR as well as some otc meds, such as nsaids and sleep aids  Pt verbalized understanding

## 2021-04-30 ENCOUNTER — TELEPHONE (OUTPATIENT)
Dept: FAMILY MEDICINE CLINIC | Facility: CLINIC | Age: 76
End: 2021-04-30

## 2021-05-05 ENCOUNTER — ANTICOAG VISIT (OUTPATIENT)
Dept: CARDIOLOGY CLINIC | Facility: CLINIC | Age: 76
End: 2021-05-05

## 2021-05-05 ENCOUNTER — APPOINTMENT (OUTPATIENT)
Dept: LAB | Facility: HOSPITAL | Age: 76
End: 2021-05-05
Attending: FAMILY MEDICINE
Payer: MEDICARE

## 2021-05-05 ENCOUNTER — TRANSCRIBE ORDERS (OUTPATIENT)
Dept: ADMINISTRATIVE | Facility: HOSPITAL | Age: 76
End: 2021-05-05

## 2021-05-05 DIAGNOSIS — E78.2 MIXED HYPERLIPIDEMIA: ICD-10-CM

## 2021-05-05 DIAGNOSIS — I48.0 PAF (PAROXYSMAL ATRIAL FIBRILLATION) (HCC): Primary | ICD-10-CM

## 2021-05-05 DIAGNOSIS — E11.9 TYPE 2 DIABETES MELLITUS WITHOUT COMPLICATION, WITHOUT LONG-TERM CURRENT USE OF INSULIN (HCC): ICD-10-CM

## 2021-05-05 DIAGNOSIS — E03.9 ADULT HYPOTHYROIDISM: ICD-10-CM

## 2021-05-05 LAB
ALBUMIN SERPL BCP-MCNC: 3.5 G/DL (ref 3.5–5)
ALP SERPL-CCNC: 76 U/L (ref 46–116)
ALT SERPL W P-5'-P-CCNC: 25 U/L (ref 12–78)
ANION GAP SERPL CALCULATED.3IONS-SCNC: 10 MMOL/L (ref 4–13)
AST SERPL W P-5'-P-CCNC: 23 U/L (ref 5–45)
BILIRUB SERPL-MCNC: 0.47 MG/DL (ref 0.2–1)
BUN SERPL-MCNC: 24 MG/DL (ref 5–25)
CALCIUM SERPL-MCNC: 10.8 MG/DL (ref 8.3–10.1)
CHLORIDE SERPL-SCNC: 99 MMOL/L (ref 100–108)
CHOLEST SERPL-MCNC: 193 MG/DL (ref 50–200)
CO2 SERPL-SCNC: 29 MMOL/L (ref 21–32)
CREAT SERPL-MCNC: 0.86 MG/DL (ref 0.6–1.3)
EST. AVERAGE GLUCOSE BLD GHB EST-MCNC: 137 MG/DL
GFR SERPL CREATININE-BSD FRML MDRD: 65 ML/MIN/1.73SQ M
GLUCOSE P FAST SERPL-MCNC: 137 MG/DL (ref 65–99)
HBA1C MFR BLD: 6.4 %
HDLC SERPL-MCNC: 35 MG/DL
INR PPP: 1.56 (ref 0.84–1.19)
LDLC SERPL CALC-MCNC: 121 MG/DL (ref 0–100)
NONHDLC SERPL-MCNC: 158 MG/DL
POTASSIUM SERPL-SCNC: 3.1 MMOL/L (ref 3.5–5.3)
PROT SERPL-MCNC: 7.6 G/DL (ref 6.4–8.2)
PROTHROMBIN TIME: 18 SECONDS (ref 11.6–14.5)
SODIUM SERPL-SCNC: 138 MMOL/L (ref 136–145)
TRIGL SERPL-MCNC: 187 MG/DL
TSH SERPL DL<=0.05 MIU/L-ACNC: 0.73 UIU/ML (ref 0.36–3.74)

## 2021-05-05 PROCEDURE — 83036 HEMOGLOBIN GLYCOSYLATED A1C: CPT

## 2021-05-05 PROCEDURE — 84443 ASSAY THYROID STIM HORMONE: CPT

## 2021-05-05 PROCEDURE — 80053 COMPREHEN METABOLIC PANEL: CPT

## 2021-05-05 PROCEDURE — 36415 COLL VENOUS BLD VENIPUNCTURE: CPT

## 2021-05-05 PROCEDURE — 80061 LIPID PANEL: CPT

## 2021-05-05 PROCEDURE — 85610 PROTHROMBIN TIME: CPT | Performed by: PHYSICIAN ASSISTANT

## 2021-05-12 ENCOUNTER — APPOINTMENT (OUTPATIENT)
Dept: LAB | Facility: HOSPITAL | Age: 76
End: 2021-05-12
Payer: MEDICARE

## 2021-05-12 ENCOUNTER — ANTICOAG VISIT (OUTPATIENT)
Dept: CARDIOLOGY CLINIC | Facility: CLINIC | Age: 76
End: 2021-05-12

## 2021-05-12 DIAGNOSIS — I48.0 PAF (PAROXYSMAL ATRIAL FIBRILLATION) (HCC): ICD-10-CM

## 2021-05-12 LAB
INR PPP: 2.21 (ref 0.84–1.19)
PROTHROMBIN TIME: 23.5 SECONDS (ref 11.6–14.5)

## 2021-05-12 PROCEDURE — 36415 COLL VENOUS BLD VENIPUNCTURE: CPT

## 2021-05-12 PROCEDURE — 85610 PROTHROMBIN TIME: CPT

## 2021-05-17 DIAGNOSIS — E11.9 TYPE 2 DIABETES MELLITUS WITHOUT COMPLICATION, WITHOUT LONG-TERM CURRENT USE OF INSULIN (HCC): ICD-10-CM

## 2021-05-17 RX ORDER — BLOOD-GLUCOSE METER
KIT MISCELLANEOUS
Qty: 100 EACH | Refills: 3 | Status: SHIPPED | OUTPATIENT
Start: 2021-05-17 | End: 2021-06-30 | Stop reason: SDUPTHER

## 2021-05-17 NOTE — TELEPHONE ENCOUNTER
PT also needs needles - do not see on list - is using a different pharmacy now and is completely out of supplies

## 2021-05-19 ENCOUNTER — ANTICOAG VISIT (OUTPATIENT)
Dept: CARDIOLOGY CLINIC | Facility: CLINIC | Age: 76
End: 2021-05-19

## 2021-05-19 ENCOUNTER — APPOINTMENT (OUTPATIENT)
Dept: LAB | Facility: HOSPITAL | Age: 76
End: 2021-05-19
Payer: MEDICARE

## 2021-05-19 DIAGNOSIS — I48.0 PAF (PAROXYSMAL ATRIAL FIBRILLATION) (HCC): ICD-10-CM

## 2021-05-19 LAB
INR PPP: 2.47 (ref 0.84–1.19)
PROTHROMBIN TIME: 25.6 SECONDS (ref 11.6–14.5)

## 2021-05-19 PROCEDURE — 85610 PROTHROMBIN TIME: CPT

## 2021-05-19 PROCEDURE — 36415 COLL VENOUS BLD VENIPUNCTURE: CPT

## 2021-06-02 ENCOUNTER — TELEPHONE (OUTPATIENT)
Dept: FAMILY MEDICINE CLINIC | Facility: CLINIC | Age: 76
End: 2021-06-02

## 2021-06-02 ENCOUNTER — APPOINTMENT (OUTPATIENT)
Dept: LAB | Facility: HOSPITAL | Age: 76
End: 2021-06-02
Payer: MEDICARE

## 2021-06-02 ENCOUNTER — ANTICOAG VISIT (OUTPATIENT)
Dept: CARDIOLOGY CLINIC | Facility: CLINIC | Age: 76
End: 2021-06-02

## 2021-06-02 DIAGNOSIS — J31.0 RHINOSINUSITIS: Primary | ICD-10-CM

## 2021-06-02 DIAGNOSIS — I48.0 PAF (PAROXYSMAL ATRIAL FIBRILLATION) (HCC): ICD-10-CM

## 2021-06-02 DIAGNOSIS — J32.9 RHINOSINUSITIS: Primary | ICD-10-CM

## 2021-06-02 LAB
INR PPP: 2.15 (ref 0.84–1.19)
PROTHROMBIN TIME: 23 SECONDS (ref 11.6–14.5)

## 2021-06-02 PROCEDURE — 36415 COLL VENOUS BLD VENIPUNCTURE: CPT

## 2021-06-02 PROCEDURE — 85610 PROTHROMBIN TIME: CPT

## 2021-06-02 RX ORDER — AMOXICILLIN 500 MG/1
500 CAPSULE ORAL EVERY 8 HOURS SCHEDULED
Qty: 30 CAPSULE | Refills: 0 | Status: SHIPPED | OUTPATIENT
Start: 2021-06-02 | End: 2021-06-12

## 2021-06-02 NOTE — TELEPHONE ENCOUNTER
T/c from pt - she is having severe sinus issues, would like amoxicillin script (pt does have appt on 6/7) - said she has been trying otc things that were suggested in the past and they have not been helping

## 2021-06-07 ENCOUNTER — OFFICE VISIT (OUTPATIENT)
Dept: FAMILY MEDICINE CLINIC | Facility: CLINIC | Age: 76
End: 2021-06-07
Payer: MEDICARE

## 2021-06-07 VITALS
HEART RATE: 96 BPM | BODY MASS INDEX: 30.51 KG/M2 | RESPIRATION RATE: 16 BRPM | TEMPERATURE: 99.4 F | WEIGHT: 161.6 LBS | OXYGEN SATURATION: 96 % | HEIGHT: 61 IN | DIASTOLIC BLOOD PRESSURE: 62 MMHG | SYSTOLIC BLOOD PRESSURE: 130 MMHG

## 2021-06-07 DIAGNOSIS — I49.5 SINOATRIAL NODE DYSFUNCTION (HCC): ICD-10-CM

## 2021-06-07 DIAGNOSIS — I10 ESSENTIAL HYPERTENSION: ICD-10-CM

## 2021-06-07 DIAGNOSIS — E11.9 TYPE 2 DIABETES MELLITUS WITHOUT COMPLICATION, WITHOUT LONG-TERM CURRENT USE OF INSULIN (HCC): ICD-10-CM

## 2021-06-07 DIAGNOSIS — E78.2 MIXED HYPERLIPIDEMIA: ICD-10-CM

## 2021-06-07 DIAGNOSIS — E03.9 ADULT HYPOTHYROIDISM: ICD-10-CM

## 2021-06-07 DIAGNOSIS — Z00.00 HEALTH CARE MAINTENANCE: Primary | ICD-10-CM

## 2021-06-07 DIAGNOSIS — J00 ACUTE RHINITIS: ICD-10-CM

## 2021-06-07 PROCEDURE — 99214 OFFICE O/P EST MOD 30 MIN: CPT | Performed by: FAMILY MEDICINE

## 2021-06-07 PROCEDURE — G0439 PPPS, SUBSEQ VISIT: HCPCS | Performed by: FAMILY MEDICINE

## 2021-06-07 PROCEDURE — 1123F ACP DISCUSS/DSCN MKR DOCD: CPT | Performed by: FAMILY MEDICINE

## 2021-06-07 RX ORDER — ROSUVASTATIN CALCIUM 10 MG/1
10 TABLET, COATED ORAL DAILY
Qty: 90 TABLET | Refills: 3 | Status: SHIPPED | OUTPATIENT
Start: 2021-06-07 | End: 2022-04-17

## 2021-06-07 RX ORDER — AZELASTINE 1 MG/ML
2 SPRAY, METERED NASAL 2 TIMES DAILY
Qty: 30 ML | Refills: 3 | Status: SHIPPED | OUTPATIENT
Start: 2021-06-07 | End: 2022-01-12 | Stop reason: ALTCHOICE

## 2021-06-07 RX ORDER — LANCETS 28 GAUGE
EACH MISCELLANEOUS
Qty: 100 EACH | Refills: 1 | Status: SHIPPED | OUTPATIENT
Start: 2021-06-07 | End: 2022-02-07

## 2021-06-07 NOTE — ASSESSMENT & PLAN NOTE
Continue her pioglitazone and her glipizide, check CMP, hemoglobin A1c in 6  Lab Results   Component Value Date    HGBA1C 6 4 (H) 05/05/2021

## 2021-06-07 NOTE — ASSESSMENT & PLAN NOTE
Stop the lovastatin, switch to rosuvastatin 10 mg p o  daily, check a CMP, lipid profile in 6 months

## 2021-06-07 NOTE — PROGRESS NOTES
Assessment/Plan:         Problem List Items Addressed This Visit        Endocrine    Adult hypothyroidism       Continue levothyroxine, check TSH in 6 months  Relevant Orders    TSH, 3rd generation    Type 2 diabetes mellitus without complication, without long-term current use of insulin (HCC)       Continue her pioglitazone and her glipizide, check CMP, hemoglobin A1c in 6  Lab Results   Component Value Date    HGBA1C 6 4 (H) 05/05/2021            Relevant Medications    Lancets (freestyle) lancets    Other Relevant Orders    Comprehensive metabolic panel    Hemoglobin A1C       Cardiovascular and Mediastinum    Essential hypertension      Controlled, continue her atenolol/chlorthalidone         Relevant Orders    CBC    Sinoatrial node dysfunction (HCC)      Continue Coumadin, follow up with her cardiologist as scheduled  Other    Mixed hyperlipidemia       Stop the lovastatin, switch to rosuvastatin 10 mg p o  daily, check a CMP, lipid profile in 6 months         Relevant Medications    rosuvastatin (CRESTOR) 10 MG tablet    Other Relevant Orders    Lipid panel      Other Visit Diagnoses     Health care maintenance    -  Primary    Acute rhinitis        Relevant Medications    azelastine (ASTELIN) 0 1 % nasal spray            Subjective:      Patient ID: Cherie Timmons is a 64 y o  female  Patient comes in today for checkup on her diabetes, hypertension, sinoatrial node dysfunction, hyperlipidemia  Hypothyroidism  She does complain sinus congestion and postnasal drip  She is using Mucinex without any relief  She is now taking Coumadin as she has had several episodes of paroxysmal atrial fibrillation as identified by her pacemaker  She continues to take her atenolol  Her Coumadin is monitored by cardiologist   She continues take her pioglitazone, glipizide as prescribed  She did get her blood work done, and this reviewed with her today   She is taking her levothyroxine as prescribed, no compliance issues or side effects  The following portions of the patient's history were reviewed and updated as appropriate:   Past Medical History:  She has a past medical history of Carpal tunnel syndrome and Heart murmur  ,  _______________________________________________________________________  Medical Problems:  does not have any pertinent problems on file ,  _______________________________________________________________________  Past Surgical History:   has a past surgical history that includes Colonoscopy (11/24/2007); Cataract extraction; Cholecystectomy; Knee surgery; Total abdominal hysterectomy w/ bilateral salpingoophorectomy; and Replacement total knee (Right)  ,  _______________________________________________________________________  Family History:  family history includes Coronary artery disease in her family; Diabetes in her family; Heart attack in her family and father ,  _______________________________________________________________________  Social History:   reports that she has never smoked  She has never used smokeless tobacco  She reports current alcohol use  She reports that she does not use drugs  ,  _______________________________________________________________________  Allergies:  has No Known Allergies     _______________________________________________________________________  Current Outpatient Medications   Medication Sig Dispense Refill    amoxicillin (AMOXIL) 500 mg capsule Take 1 capsule (500 mg total) by mouth every 8 (eight) hours for 10 days 30 capsule 0    Ascorbic Acid (VITAMIN C) 100 MG tablet Vitamin C      atenolol-chlorthalidone (TENORETIC) 50-25 mg per tablet Take 1 tablet by mouth daily 90 tablet 2    B Complex Vitamins (B COMPLEX 100 PO) Take 1 tablet by mouth daily      Blood Glucose Monitoring Suppl (FREESTYLE LITE) RANULFO by Does not apply route 2 (two) times a day Dx E11 9 1 each 0    fluconazole (DIFLUCAN) 150 mg tablet TAKE 1 TABLET BY MOUTH AS ONE DOSE  FREESTYLE LITE test strip Use as instructed once daily, Dx E11 9 100 each 3    glipiZIDE (GLUCOTROL XL) 5 mg 24 hr tablet Take 1 tablet (5 mg total) by mouth 2 (two) times a day 180 tablet 1    glucose blood (JASWANT CONTOUR TEST) test strip Check blood sugar twice daily, DX:E11 9 200 each 3    levothyroxine 75 mcg tablet Take 1 tablet (75 mcg total) by mouth daily 90 tablet 2    Multiple Vitamin (MULTIVITAMIN) tablet Take 1 tablet by mouth daily      niacin 500 mg tablet Take by mouth       pioglitazone (ACTOS) 15 mg tablet Take 1 tablet (15 mg total) by mouth daily 90 tablet 1    POTASSIUM PO Take by mouth 2 (two) times a day      warfarin (COUMADIN) 2 5 mg tablet Take one tablet daily or as directed 90 tablet 3    azelastine (ASTELIN) 0 1 % nasal spray 2 sprays into each nostril 2 (two) times a day Use in each nostril as directed 30 mL 3    Lancets (freestyle) lancets Use as instructed  each 1    rosuvastatin (CRESTOR) 10 MG tablet Take 1 tablet (10 mg total) by mouth daily 90 tablet 3     No current facility-administered medications for this visit       _______________________________________________________________________  Review of Systems   Constitutional: Negative for chills, fatigue and fever  HENT: Positive for postnasal drip and rhinorrhea  Negative for congestion, ear pain, hearing loss and sore throat  Eyes: Negative for pain and visual disturbance  Respiratory: Negative for chest tightness, shortness of breath and wheezing  Cardiovascular: Negative for chest pain and leg swelling  Gastrointestinal: Negative for abdominal distention, abdominal pain, constipation, diarrhea and vomiting  Endocrine: Negative for cold intolerance and heat intolerance  Genitourinary: Negative for difficulty urinating, frequency and urgency  Musculoskeletal: Negative for arthralgias and gait problem  Skin: Negative for color change     Neurological: Negative for dizziness, tremors, syncope, numbness and headaches  Hematological: Negative for adenopathy  Psychiatric/Behavioral: Negative for agitation, confusion and sleep disturbance  The patient is not nervous/anxious  Objective:  Vitals:    06/07/21 1308   BP: 130/62   Pulse: 96   Resp: 16   Temp: 99 4 °F (37 4 °C)   TempSrc: Tympanic   SpO2: 96%   Weight: 73 3 kg (161 lb 9 6 oz)   Height: 5' 1" (1 549 m)     Body mass index is 30 53 kg/m²  Physical Exam  Vitals signs and nursing note reviewed  Constitutional:       Appearance: She is well-developed  HENT:      Head: Normocephalic  Eyes:      General: No scleral icterus  Conjunctiva/sclera: Conjunctivae normal    Neck:      Musculoskeletal: Normal range of motion  Thyroid: No thyromegaly  Cardiovascular:      Rate and Rhythm: Normal rate and regular rhythm  Heart sounds: Normal heart sounds  No murmur  Pulmonary:      Effort: Pulmonary effort is normal  No respiratory distress  Breath sounds: Normal breath sounds  No wheezing  Abdominal:      General: Bowel sounds are normal  There is no distension  Palpations: Abdomen is soft  Tenderness: There is no abdominal tenderness  Musculoskeletal: Normal range of motion  General: No tenderness  Lymphadenopathy:      Cervical: No cervical adenopathy  Skin:     General: Skin is warm and dry  Coloration: Skin is not pale  Findings: No rash  Neurological:      Mental Status: She is alert and oriented to person, place, and time  Cranial Nerves: No cranial nerve deficit     Psychiatric:         Behavior: Behavior normal

## 2021-06-07 NOTE — PATIENT INSTRUCTIONS
Medicare Preventive Visit Patient Instructions  Thank you for completing your Welcome to Medicare Visit or Medicare Annual Wellness Visit today  Your next wellness visit will be due in one year (6/8/2022)  The screening/preventive services that you may require over the next 5-10 years are detailed below  Some tests may not apply to you based off risk factors and/or age  Screening tests ordered at today's visit but not completed yet may show as past due  Also, please note that scanned in results may not display below  Preventive Screenings:  Service Recommendations Previous Testing/Comments   Colorectal Cancer Screening  * Colonoscopy    * Fecal Occult Blood Test (FOBT)/Fecal Immunochemical Test (FIT)  * Fecal DNA/Cologuard Test  * Flexible Sigmoidoscopy Age: 54-65 years old   Colonoscopy: every 10 years (may be performed more frequently if at higher risk)  OR  FOBT/FIT: every 1 year  OR  Cologuard: every 3 years  OR  Sigmoidoscopy: every 5 years  Screening may be recommended earlier than age 48 if at higher risk for colorectal cancer  Also, an individualized decision between you and your healthcare provider will decide whether screening between the ages of 74-80 would be appropriate  Colonoscopy: 11/24/2007  FOBT/FIT: Not on file  Cologuard: Not on file  Sigmoidoscopy: Not on file          Breast Cancer Screening Age: 36 years old  Frequency: every 1-2 years  Not required if history of left and right mastectomy Mammogram: Not on file        Cervical Cancer Screening Between the ages of 21-29, pap smear recommended once every 3 years  Between the ages of 33-67, can perform pap smear with HPV co-testing every 5 years     Recommendations may differ for women with a history of total hysterectomy, cervical cancer, or abnormal pap smears in past  Pap Smear: Not on file    Screening Not Indicated   Hepatitis C Screening Once for adults born between [facility]  More frequently in patients at high risk for Hepatitis C Hep C Antibody: Not on file        Diabetes Screening 1-2 times per year if you're at risk for diabetes or have pre-diabetes Fasting glucose: 137 mg/dL   A1C: 6 4 %    Screening Not Indicated  History Diabetes   Cholesterol Screening Once every 5 years if you don't have a lipid disorder  May order more often based on risk factors  Lipid panel: 05/05/2021    Screening Not Indicated  History Lipid Disorder     Other Preventive Screenings Covered by Medicare:  1  Abdominal Aortic Aneurysm (AAA) Screening: covered once if your at risk  You're considered to be at risk if you have a family history of AAA  2  Lung Cancer Screening: covers low dose CT scan once per year if you meet all of the following conditions: (1) Age 50-69; (2) No signs or symptoms of lung cancer; (3) Current smoker or have quit smoking within the last 15 years; (4) You have a tobacco smoking history of at least 30 pack years (packs per day multiplied by number of years you smoked); (5) You get a written order from a healthcare provider  3  Glaucoma Screening: covered annually if you're considered high risk: (1) You have diabetes OR (2) Family history of glaucoma OR (3)  aged 48 and older OR (3)  American aged 72 and older  3  Osteoporosis Screening: covered every 2 years if you meet one of the following conditions: (1) You're estrogen deficient and at risk for osteoporosis based off medical history and other findings; (2) Have a vertebral abnormality; (3) On glucocorticoid therapy for more than 3 months; (4) Have primary hyperparathyroidism; (5) On osteoporosis medications and need to assess response to drug therapy  · Last bone density test (DXA Scan): Not on file  5  HIV Screening: covered annually if you're between the age of 12-76  Also covered annually if you are younger than 13 and older than 72 with risk factors for HIV infection   For pregnant patients, it is covered up to 3 times per pregnancy  Immunizations:  Immunization Recommendations   Influenza Vaccine Annual influenza vaccination during flu season is recommended for all persons aged >= 6 months who do not have contraindications   Pneumococcal Vaccine (Prevnar and Pneumovax)  * Prevnar = PCV13  * Pneumovax = PPSV23   Adults 25-60 years old: 1-3 doses may be recommended based on certain risk factors  Adults 72 years old: Prevnar (PCV13) vaccine recommended followed by Pneumovax (PPSV23) vaccine  If already received PPSV23 since turning 65, then PCV13 recommended at least one year after PPSV23 dose  Hepatitis B Vaccine 3 dose series if at intermediate or high risk (ex: diabetes, end stage renal disease, liver disease)   Tetanus (Td) Vaccine - COST NOT COVERED BY MEDICARE PART B Following completion of primary series, a booster dose should be given every 10 years to maintain immunity against tetanus  Td may also be given as tetanus wound prophylaxis  Tdap Vaccine - COST NOT COVERED BY MEDICARE PART B Recommended at least once for all adults  For pregnant patients, recommended with each pregnancy  Shingles Vaccine (Shingrix) - COST NOT COVERED BY MEDICARE PART B  2 shot series recommended in those aged 48 and above     Health Maintenance Due:  There are no preventive care reminders to display for this patient  Immunizations Due:      Topic Date Due    DTaP,Tdap,and Td Vaccines (1 - Tdap) Never done    Pneumococcal Vaccine: 65+ Years (2 of 2 - PPSV23) 02/06/2019     Advance Directives   What are advance directives? Advance directives are legal documents that state your wishes and plans for medical care  These plans are made ahead of time in case you lose your ability to make decisions for yourself  Advance directives can apply to any medical decision, such as the treatments you want, and if you want to donate organs  What are the types of advance directives?   There are many types of advance directives, and each state has rules about how to use them  You may choose a combination of any of the following:  · Living will: This is a written record of the treatment you want  You can also choose which treatments you do not want, which to limit, and which to stop at a certain time  This includes surgery, medicine, IV fluid, and tube feedings  · Durable power of  for healthcare [facility]): This is a written record that states who you want to make healthcare choices for you when you are unable to make them for yourself  This person, called a proxy, is usually a family member or a friend  You may choose more than 1 proxy  · Do not resuscitate (DNR) order:  A DNR order is used in case your heart stops beating or you stop breathing  It is a request not to have certain forms of treatment, such as CPR  A DNR order may be included in other types of advance directives  · Medical directive: This covers the care that you want if you are in a coma, near death, or unable to make decisions for yourself  You can list the treatments you want for each condition  Treatment may include pain medicine, surgery, blood transfusions, dialysis, IV or tube feedings, and a ventilator (breathing machine)  · Values history: This document has questions about your views, beliefs, and how you feel and think about life  This information can help others choose the care that you would choose  Why are advance directives important? An advance directive helps you control your care  Although spoken wishes may be used, it is better to have your wishes written down  Spoken wishes can be misunderstood, or not followed  Treatments may be given even if you do not want them  An advance directive may make it easier for your family to make difficult choices about your care  Fall Prevention    Fall prevention  includes ways to make your home and other areas safer  It also includes ways you can move more carefully to prevent a fall   Health conditions that cause changes in your blood pressure, vision, or muscle strength and coordination may increase your risk for falls  Medicines may also increase your risk for falls if they make you dizzy, weak, or sleepy  Fall prevention tips:   · Stand or sit up slowly  · Use assistive devices as directed  · Wear shoes that fit well and have soles that   · Wear a personal alarm  · Stay active  · Manage your medical conditions  Home Safety Tips:  · Add items to prevent falls in the bathroom  · Keep paths clear  · Install bright lights in your home  · Keep items you use often on shelves within reach  · Paint or place reflective tape on the edges of your stairs  Urinary Incontinence   Urinary incontinence (UI)  is when you lose control of your bladder  UI develops because your bladder cannot store or empty urine properly  The 3 most common types of UI are stress incontinence, urge incontinence, or both  Medicines:   · May be given to help strengthen your bladder control  Report any side effects of medication to your healthcare provider  Do pelvic muscle exercises often:  Your pelvic muscles help you stop urinating  Squeeze these muscles tight for 5 seconds, then relax for 5 seconds  Gradually work up to squeezing for 10 seconds  Do 3 sets of 15 repetitions a day, or as directed  This will help strengthen your pelvic muscles and improve bladder control  Train your bladder:  Go to the bathroom at set times, such as every 2 hours, even if you do not feel the urge to go  You can also try to hold your urine when you feel the urge to go  For example, hold your urine for 5 minutes when you feel the urge to go  As that becomes easier, hold your urine for 10 minutes  Self-care:   · Keep a UI record  Write down how often you leak urine and how much you leak  Make a note of what you were doing when you leaked urine  · Drink liquids as directed   You may need to limit the amount of liquid you drink to help control your urine leakage  Do not drink any liquid right before you go to bed  Limit or do not have drinks that contain caffeine or alcohol  · Prevent constipation  Eat a variety of high-fiber foods  Good examples are high-fiber cereals, beans, vegetables, and whole-grain breads  Walking is the best way to trigger your intestines to have a bowel movement  · Exercise regularly and maintain a healthy weight  Weight loss and exercise will decrease pressure on your bladder and help you control your leakage  · Use a catheter as directed  to help empty your bladder  A catheter is a tiny, plastic tube that is put into your bladder to drain your urine  · Go to behavior therapy as directed  Behavior therapy may be used to help you learn to control your urge to urinate  Weight Management   Why it is important to manage your weight:  Being overweight increases your risk of health conditions such as heart disease, high blood pressure, type 2 diabetes, and certain types of cancer  It can also increase your risk for osteoarthritis, sleep apnea, and other respiratory problems  Aim for a slow, steady weight loss  Even a small amount of weight loss can lower your risk of health problems  How to lose weight safely:  A safe and healthy way to lose weight is to eat fewer calories and get regular exercise  You can lose up about 1 pound a week by decreasing the number of calories you eat by 500 calories each day  Healthy meal plan for weight management:  A healthy meal plan includes a variety of foods, contains fewer calories, and helps you stay healthy  A healthy meal plan includes the following:  · Eat whole-grain foods more often  A healthy meal plan should contain fiber  Fiber is the part of grains, fruits, and vegetables that is not broken down by your body  Whole-grain foods are healthy and provide extra fiber in your diet   Some examples of whole-grain foods are whole-wheat breads and pastas, oatmeal, brown rice, and bulgur  · Eat a variety of vegetables every day  Include dark, leafy greens such as spinach, kale, olga greens, and mustard greens  Eat yellow and orange vegetables such as carrots, sweet potatoes, and winter squash  · Eat a variety of fruits every day  Choose fresh or canned fruit (canned in its own juice or light syrup) instead of juice  Fruit juice has very little or no fiber  · Eat low-fat dairy foods  Drink fat-free (skim) milk or 1% milk  Eat fat-free yogurt and low-fat cottage cheese  Try low-fat cheeses such as mozzarella and other reduced-fat cheeses  · Choose meat and other protein foods that are low in fat  Choose beans or other legumes such as split peas or lentils  Choose fish, skinless poultry (chicken or turkey), or lean cuts of red meat (beef or pork)  Before you cook meat or poultry, cut off any visible fat  · Use less fat and oil  Try baking foods instead of frying them  Add less fat, such as margarine, sour cream, regular salad dressing and mayonnaise to foods  Eat fewer high-fat foods  Some examples of high-fat foods include french fries, doughnuts, ice cream, and cakes  · Eat fewer sweets  Limit foods and drinks that are high in sugar  This includes candy, cookies, regular soda, and sweetened drinks  Exercise:  Exercise at least 30 minutes per day on most days of the week  Some examples of exercise include walking, biking, dancing, and swimming  You can also fit in more physical activity by taking the stairs instead of the elevator or parking farther away from stores  Ask your healthcare provider about the best exercise plan for you  © Copyright [address] 2018 Information is for End User's use only and may not be sold, redistributed or otherwise used for commercial purposes   All illustrations and images included in CareNotes® are the copyrighted property of [employer]  or [address] [place] [address] [facility]

## 2021-06-07 NOTE — PROGRESS NOTES
Assessment and Plan:     Problem List Items Addressed This Visit     None      Visit Diagnoses     Health care maintenance    -  Primary        BMI Counseling: Body mass index is 30 53 kg/m²  The BMI is above normal  Nutrition recommendations include decreasing portion sizes and encouraging healthy choices of fruits and vegetables  Exercise recommendations include moderate physical activity 150 minutes/week  No pharmacotherapy was ordered  Preventive health issues were discussed with patient, and age appropriate screening tests were ordered as noted in patient's After Visit Summary  Personalized health advice and appropriate referrals for health education or preventive services given if needed, as noted in patient's After Visit Summary       History of Present Illness:     Patient presents for Medicare Annual Wellness visit    Patient Care Team:  Monico Lynch DO as PCP - General     Problem List:     Patient Active Problem List   Diagnosis    Iron deficiency anemia secondary to inadequate dietary iron intake    Arthralgia of both knees    Mixed hyperlipidemia    Essential hypertension    Hypokalemia    Adult hypothyroidism    Sinoatrial node dysfunction ([address] )    Type 2 diabetes mellitus without complication, without long-term current use of insulin ([address] )    Worsening vision    Cardiac pacemaker in situ    Artificial knee joint present      Past Medical and Surgical History:     Past Medical History:   Diagnosis Date    Carpal tunnel syndrome     Heart murmur      Past Surgical History:   Procedure Laterality Date    CATARACT EXTRACTION      CHOLECYSTECTOMY      COLONOSCOPY  11/24/2007    KNEE SURGERY      REPLACEMENT TOTAL KNEE Right     TOTAL ABDOMINAL HYSTERECTOMY W/ BILATERAL SALPINGOOPHORECTOMY        Family History:     Family History   Problem Relation Age of Onset    Heart attack Father         ARRHYTHMIAS    Coronary artery disease Family     Diabetes Family     Heart attack Family         ARRHYTHMIAS      Social History:        Social History     Socioeconomic History    Marital status:      Spouse name: Not on file    Number of children: Not on file    Years of education: Not on file    Highest education level: Not on file   Occupational History    Not on file   Social Needs    Financial resource strain: Not on file    Food insecurity     Worry: Not on file     Inability: Not on file    Transportation needs     Medical: Not on file     Non-medical: Not on file   Tobacco Use    Smoking status: Never Smoker    Smokeless tobacco: Never Used   Substance and Sexual Activity    Alcohol use:  Yes    Drug use: No    Sexual activity: Not on file   Lifestyle    Physical activity     Days per week: Not on file     Minutes per session: Not on file    Stress: Not on file   Relationships    Social connections     Talks on phone: Not on file     Gets together: Not on file     Attends Yazidism service: Not on file     Active member of club or organization: Not on file     Attends meetings of clubs or organizations: Not on file     Relationship status: Not on file    Intimate partner violence     Fear of current or ex partner: Not on file     Emotionally abused: Not on file     Physically abused: Not on file     Forced sexual activity: Not on file   Other Topics Concern    Not on file   Social History Narrative    Always uses seat belt    Employed    Lives with spouse       Medications and Allergies:     Current Outpatient Medications   Medication Sig Dispense Refill    amoxicillin (AMOXIL) 500 mg capsule Take 1 capsule (500 mg total) by mouth every 8 (eight) hours for 10 days 30 capsule 0    Ascorbic Acid (VITAMIN C) 100 MG tablet Vitamin C      atenolol-chlorthalidone (TENORETIC) 50-25 mg per tablet Take 1 tablet by mouth daily 90 tablet 2    B Complex Vitamins (B COMPLEX 100 PO) Take 1 tablet by mouth daily      Blood Glucose Monitoring Suppl (FREESTYLE LITE) RANULFO by Does not apply route 2 (two) times a day Dx E11 9 1 each 0    fluconazole (DIFLUCAN) 150 mg tablet TAKE 1 TABLET BY MOUTH AS ONE DOSE      FREESTYLE LITE test strip Use as instructed once daily, Dx E11 9 100 each 3    glipiZIDE (GLUCOTROL XL) 5 mg 24 hr tablet Take 1 tablet (5 mg total) by mouth 2 (two) times a day 180 tablet 1    glucose blood (JASWANT CONTOUR TEST) test strip Check blood sugar twice daily, DX:E11 9 200 each 3    levothyroxine 75 mcg tablet Take 1 tablet (75 mcg total) by mouth daily 90 tablet 2    lovastatin (MEVACOR) 40 MG tablet Take 1 tablet (40 mg total) by mouth every evening 90 tablet 3    Multiple Vitamin (MULTIVITAMIN) tablet Take 1 tablet by mouth daily      niacin 500 mg tablet Take by mouth       pioglitazone (ACTOS) 15 mg tablet Take 1 tablet (15 mg total) by mouth daily 90 tablet 1    POTASSIUM PO Take by mouth 2 (two) times a day      warfarin (COUMADIN) 2 5 mg tablet Take one tablet daily or as directed 90 tablet 3     No current facility-administered medications for this visit  No Known Allergies   Immunizations:     Immunization History   Administered Date(s) Administered    H1N1, All Formulations 11/11/2009    Influenza Split High Dose Preservative Free IM 10/03/2018, 10/03/2019    Influenza, high dose seasonal 0 7 mL 10/23/2020    Influenza, seasonal, injectable 10/01/2015, 10/31/2016, 10/04/2017    Pneumococcal Conjugate 13-Valent 02/06/2018    Pneumococcal Polysaccharide PPV23 08/07/2006    SARS-CoV-2 / COVID-19 mRNA IM (Link [facility]k) 01/26/2021, 02/24/2021      Health Maintenance: There are no preventive care reminders to display for this patient        Topic Date Due    DTaP,Tdap,and Td Vaccines (1 - Tdap) Never done    Pneumococcal Vaccine: 65+ Years (2 of 2 - PPSV23) 02/06/2019      Medicare Health Risk Assessment:     /62   Pulse 96   Temp 99 4 °F (37 4 °C) (Tympanic)   Resp 16   Ht 5' 1" (1 549 m)   Wt 73 3 kg (161 lb 9 6 oz)   SpO2 96% BMI 30 53 kg/m²      Renetta Campa is here for her Subsequent Wellness visit  Last Medicare Wellness visit information reviewed, patient interviewed and updates made to the record today  Health Risk Assessment:   Patient rates overall health as good  Patient feels that their physical health rating is same  Patient is very satisfied with their life  Eyesight was rated as slightly worse  Hearing was rated as same  Patient feels that their emotional and mental health rating is same  Patients states they are never, rarely angry  Patient states they are often unusually tired/fatigued  Pain experienced in the last 7 days has been some  Patient's pain rating has been 6/10  Patient states that she has experienced weight loss or gain in last 6 months  Depression Screening:   PHQ-2 Score: 0      Fall Risk Screening: In the past year, patient has experienced: history of falling in past year    Number of falls: 1  Injured during fall?: No    Feels unsteady when standing or walking?: No    Worried about falling?: No      Urinary Incontinence Screening:   Patient has not leaked urine accidently in the last six months  Home Safety:  Patient has trouble with stairs inside or outside of their home  Patient has working smoke alarms and has working carbon monoxide detector  Home safety hazards include: loose rugs on the floor  Medications:   Patient is currently taking over-the-counter supplements  OTC medications include: see medication list  Patient is able to manage medications  Activities of Daily Living (ADLs)/Instrumental Activities of Daily Living (IADLs):   Walk and transfer into and out of bed and chair?: Yes  Dress and groom yourself?: Yes    Bathe or shower yourself?: Yes    Feed yourself?  Yes  Do your laundry/housekeeping?: Yes  Manage your money, pay your bills and track your expenses?: Yes  Make your own meals?: Yes    Do your own shopping?: Yes    Previous Hospitalizations:   Any hospitalizations or ED visits within the last 12 months?: No      Advance Care Planning:   Living will: Yes    Advanced directive: Yes      Cognitive Screening:   Provider or family/friend/caregiver concerned regarding cognition?: No    PREVENTIVE SCREENINGS      Cardiovascular Screening:    General: Screening Not Indicated and History Lipid Disorder      Diabetes Screening:     General: Screening Not Indicated and History Diabetes      Colorectal Cancer Screening:     General: Screening Not Indicated      Breast Cancer Screening:     General: Screening Not Indicated      Cervical Cancer Screening:    General: Screening Not Indicated      Osteoporosis Screening:    General: Patient Declines      Abdominal Aortic Aneurysm (AAA) Screening:        General: Screening Not Indicated      Lung Cancer Screening:     General: Screening Not Indicated      Hepatitis C Screening:    General: Screening Not Indicated    Screening, Brief Intervention, and Referral to Treatment (SBIRT)    Screening  Typical number of drinks in a day: 0  Typical number of drinks in a week: 3  Interpretation: Low risk drinking behavior      Single Item Drug Screening:  How often have you used an illegal drug (including marijuana) or a prescription medication for non-medical reasons in the past year? never    Single Item Drug Screen Score: 0  Interpretation: Negative screen for possible drug use disorder      Esau Brunson, DO

## 2021-06-08 ENCOUNTER — TELEPHONE (OUTPATIENT)
Dept: FAMILY MEDICINE CLINIC | Facility: CLINIC | Age: 76
End: 2021-06-08

## 2021-06-08 NOTE — TELEPHONE ENCOUNTER
T/c from pt - Was in to see Dr Genaro Anderson recently and was prescribed medicine  when she woke up this morning she now has a   dry hard burning cough and would like to know what she can do    Burns throat down into chest    Had the cough when she saw him but now is worse    Please advsie     or [phone]

## 2021-06-09 ENCOUNTER — APPOINTMENT (OUTPATIENT)
Dept: LAB | Facility: HOSPITAL | Age: 76
End: 2021-06-09
Payer: MEDICARE

## 2021-06-09 ENCOUNTER — ANTICOAG VISIT (OUTPATIENT)
Dept: CARDIOLOGY CLINIC | Facility: CLINIC | Age: 76
End: 2021-06-09

## 2021-06-09 DIAGNOSIS — I48.0 PAF (PAROXYSMAL ATRIAL FIBRILLATION) (HCC): ICD-10-CM

## 2021-06-09 LAB
INR PPP: 1.9 (ref 0.84–1.19)
PROTHROMBIN TIME: 22 SECONDS (ref 11.6–14.5)

## 2021-06-09 PROCEDURE — 85610 PROTHROMBIN TIME: CPT

## 2021-06-09 PROCEDURE — 36415 COLL VENOUS BLD VENIPUNCTURE: CPT

## 2021-06-14 ENCOUNTER — TELEPHONE (OUTPATIENT)
Dept: FAMILY MEDICINE CLINIC | Facility: CLINIC | Age: 76
End: 2021-06-14

## 2021-06-14 DIAGNOSIS — R05.9 COUGH: Primary | ICD-10-CM

## 2021-06-14 DIAGNOSIS — J06.9 ACUTE URI: Primary | ICD-10-CM

## 2021-06-14 RX ORDER — AZITHROMYCIN 250 MG/1
TABLET, FILM COATED ORAL
Qty: 6 TABLET | Refills: 0 | Status: SHIPPED | OUTPATIENT
Start: 2021-06-14 | End: 2021-06-18

## 2021-06-14 RX ORDER — DEXTROMETHORPHAN HYDROBROMIDE AND PROMETHAZINE HYDROCHLORIDE 15; 6.25 MG/5ML; MG/5ML
5 SOLUTION ORAL 4 TIMES DAILY PRN
Qty: 240 ML | Refills: 0 | Status: SHIPPED | OUTPATIENT
Start: 2021-06-14 | End: 2022-04-05 | Stop reason: ALTCHOICE

## 2021-06-14 NOTE — TELEPHONE ENCOUNTER
Tried to notify pt - she wants something else for her congestion - said she only has 2 days of amoxicillin left and wants something else, because it doesn't seen to be working

## 2021-06-14 NOTE — TELEPHONE ENCOUNTER
Patient called and states that she has two more days of the Amoxicillin left and that her dry cough has not had much improvement  She mentioned that she tried the delsym but this has not change the cough  Would like to know if theres anything else she could be taking for this

## 2021-06-21 ENCOUNTER — ANTICOAG VISIT (OUTPATIENT)
Dept: CARDIOLOGY CLINIC | Facility: CLINIC | Age: 76
End: 2021-06-21

## 2021-06-21 ENCOUNTER — APPOINTMENT (OUTPATIENT)
Dept: LAB | Facility: HOSPITAL | Age: 76
End: 2021-06-21
Payer: MEDICARE

## 2021-06-21 DIAGNOSIS — I48.0 PAF (PAROXYSMAL ATRIAL FIBRILLATION) (HCC): ICD-10-CM

## 2021-06-21 LAB
INR PPP: 2.23 (ref 0.84–1.19)
PROTHROMBIN TIME: 23.6 SECONDS (ref 11.6–14.5)

## 2021-06-21 PROCEDURE — 36415 COLL VENOUS BLD VENIPUNCTURE: CPT

## 2021-06-21 PROCEDURE — 85610 PROTHROMBIN TIME: CPT

## 2021-06-23 DIAGNOSIS — I48.0 PAF (PAROXYSMAL ATRIAL FIBRILLATION) (HCC): Primary | ICD-10-CM

## 2021-06-23 RX ORDER — WARFARIN SODIUM 5 MG/1
TABLET ORAL
Qty: 90 TABLET | Refills: 3 | Status: SHIPPED | OUTPATIENT
Start: 2021-06-23 | End: 2021-06-23 | Stop reason: SDUPTHER

## 2021-06-24 ENCOUNTER — OFFICE VISIT (OUTPATIENT)
Dept: CARDIOLOGY CLINIC | Facility: CLINIC | Age: 76
End: 2021-06-24
Payer: MEDICARE

## 2021-06-24 ENCOUNTER — IN-CLINIC DEVICE VISIT (OUTPATIENT)
Dept: CARDIOLOGY CLINIC | Facility: CLINIC | Age: 76
End: 2021-06-24
Payer: MEDICARE

## 2021-06-24 VITALS
DIASTOLIC BLOOD PRESSURE: 52 MMHG | OXYGEN SATURATION: 98 % | HEIGHT: 61 IN | WEIGHT: 156 LBS | HEART RATE: 65 BPM | SYSTOLIC BLOOD PRESSURE: 120 MMHG | BODY MASS INDEX: 29.45 KG/M2

## 2021-06-24 DIAGNOSIS — Z79.01 ANTICOAGULATION MONITORING, INR RANGE 2-3: ICD-10-CM

## 2021-06-24 DIAGNOSIS — E11.9 TYPE 2 DIABETES MELLITUS WITHOUT COMPLICATION, WITHOUT LONG-TERM CURRENT USE OF INSULIN (HCC): ICD-10-CM

## 2021-06-24 DIAGNOSIS — I48.0 PAF (PAROXYSMAL ATRIAL FIBRILLATION) (HCC): Primary | ICD-10-CM

## 2021-06-24 DIAGNOSIS — Z95.0 PRESENCE OF PERMANENT CARDIAC PACEMAKER: Primary | ICD-10-CM

## 2021-06-24 DIAGNOSIS — I49.5 SSS (SICK SINUS SYNDROME) (HCC): ICD-10-CM

## 2021-06-24 DIAGNOSIS — I47.2 NSVT (NONSUSTAINED VENTRICULAR TACHYCARDIA) (HCC): ICD-10-CM

## 2021-06-24 DIAGNOSIS — I10 ESSENTIAL HYPERTENSION: ICD-10-CM

## 2021-06-24 PROCEDURE — 93280 PM DEVICE PROGR EVAL DUAL: CPT | Performed by: INTERNAL MEDICINE

## 2021-06-24 PROCEDURE — 99214 OFFICE O/P EST MOD 30 MIN: CPT | Performed by: INTERNAL MEDICINE

## 2021-06-24 RX ORDER — GLIPIZIDE 2.5 MG/1
2.5 TABLET, EXTENDED RELEASE ORAL 2 TIMES DAILY
Start: 2021-06-24 | End: 2021-12-13 | Stop reason: SDUPTHER

## 2021-06-24 NOTE — PROGRESS NOTES
[facility]  [address] [place] [id number]  Cardiology Follow Up    Dillon Herrera Winter  1945  [id number]      1  PAF (paroxysmal atrial fibrillation) ([facility] 75 )     2  Anticoagulation monitoring, INR range 2-3     3  SSS (sick sinus syndrome) ([facility] )     4  NSVT (nonsustained ventricular tachycardia) ([facility] 75 )     5  Essential hypertension         Chief Complaint   Patient presents with    Follow-up       Interval History:   Patient presents for follow-up visit  Patient denies any history of chest pain shortness of breath  Patient denies any history of leg edema or orthopnea PND  No history of presyncope syncope  Patient states compliance with the present list of medications  Patient denies any bleeding issues  Patient Active Problem List   Diagnosis    Iron deficiency anemia secondary to inadequate dietary iron intake    Arthralgia of both knees    Mixed hyperlipidemia    Essential hypertension    Hypokalemia    Adult hypothyroidism    Sinoatrial node dysfunction (HCC)    Type 2 diabetes mellitus without complication, without long-term current use of insulin (HCC)    Worsening vision    Cardiac pacemaker in situ    Artificial knee joint present     Past Medical History:   Diagnosis Date    Carpal tunnel syndrome     Heart murmur      Social History     Socioeconomic History    Marital status:      Spouse name: Not on file    Number of children: Not on file    Years of education: Not on file    Highest education level: Not on file   Occupational History    Not on file   Tobacco Use    Smoking status: Never Smoker    Smokeless tobacco: Never Used   Substance and Sexual Activity    Alcohol use:  Yes    Drug use: No    Sexual activity: Not on file   Other Topics Concern    Not on file   Social History Narrative    Always uses seat belt    Employed    Lives with spouse      Social Determinants of Health     Financial Resource Strain:     Difficulty of Paying Living Expenses:    Food Insecurity:     Worried About Running Out of Food in the Last Year:     [address] in the Last Year:    Transportation Needs:     Lack of Transportation (Medical):      Lack of Transportation (Non-Medical):    Physical Activity:     Days of Exercise per Week:     Minutes of Exercise per Session:    Stress:     Feeling of Stress :    Social Connections:     Frequency of Communication with Friends and Family:     Frequency of Social Gatherings with Friends and Family:     Attends [employer] Services:     Active Member of Clubs or Organizations:     Attends Club or Organization Meetings:     Marital Status:    Intimate Partner Violence:     Fear of Current or Ex-Partner:     Emotionally Abused:     Physically Abused:     Sexually Abused:       Family History   Problem Relation Age of Onset    Heart attack Father         ARRHYTHMIAS    Coronary artery disease Family     Diabetes Family     Heart attack Family         ARRHYTHMIAS     Past Surgical History:   Procedure Laterality Date    CATARACT EXTRACTION      CHOLECYSTECTOMY      COLONOSCOPY  11/24/2007    KNEE SURGERY      REPLACEMENT TOTAL KNEE Right     TOTAL ABDOMINAL HYSTERECTOMY W/ BILATERAL SALPINGOOPHORECTOMY         Current Outpatient Medications:     Ascorbic Acid (VITAMIN C) 100 MG tablet, Vitamin C, Disp: , Rfl:     atenolol-chlorthalidone (TENORETIC) 50-25 mg per tablet, Take 1 tablet by mouth daily, Disp: 90 tablet, Rfl: 2    azelastine (ASTELIN) 0 1 % nasal spray, 2 sprays into each nostril 2 (two) times a day Use in each nostril as directed, Disp: 30 mL, Rfl: 3    B Complex Vitamins (B COMPLEX 100 PO), Take 1 tablet by mouth daily, Disp: , Rfl:     Blood Glucose Monitoring Suppl (FREESTYLE LITE) RANULFO, by Does not apply route 2 (two) times a day Dx E11 9, Disp: 1 each, Rfl: 0    fluconazole (DIFLUCAN) 150 mg tablet, TAKE 1 TABLET BY MOUTH AS ONE DOSE, Disp: , Rfl:    FREESTYLE LITE test strip, Use as instructed once daily, Dx E11 9, Disp: 100 each, Rfl: 3    glipiZIDE (GLUCOTROL XL) 5 mg 24 hr tablet, Take 1 tablet (5 mg total) by mouth 2 (two) times a day (Patient taking differently: Take 2 5 mg by mouth 2 (two) times a day ), Disp: 180 tablet, Rfl: 1    glucose blood (JASWANT CONTOUR TEST) test strip, Check blood sugar twice daily, DX:E11 9, Disp: 200 each, Rfl: 3    Lancets (freestyle) lancets, Use as instructed BID, Disp: 100 each, Rfl: 1    levothyroxine 75 mcg tablet, Take 1 tablet (75 mcg total) by mouth daily, Disp: 90 tablet, Rfl: 2    Multiple Vitamin (MULTIVITAMIN) tablet, Take 1 tablet by mouth daily, Disp: , Rfl:     niacin 500 mg tablet, Take by mouth , Disp: , Rfl:     pioglitazone (ACTOS) 15 mg tablet, Take 1 tablet (15 mg total) by mouth daily, Disp: 90 tablet, Rfl: 1    POTASSIUM PO, Take by mouth daily , Disp: , Rfl:     Promethazine-DM (PHENERGAN-DM) 6 25-15 mg/5 mL oral syrup, Take 5 mL by mouth 4 (four) times a day as needed for cough, Disp: 240 mL, Rfl: 0    rosuvastatin (CRESTOR) 10 MG tablet, Take 1 tablet (10 mg total) by mouth daily, Disp: 90 tablet, Rfl: 3    warfarin (COUMADIN) 5 mg tablet, Take one tablet daily depending on the INR, Disp: 90 tablet, Rfl: 3  No Known Allergies    Labs:  Appointment on 06/21/2021   Component Date Value    Protime 06/21/2021 23 6*    INR 06/21/2021 2 23*   Appointment on 06/09/2021   Component Date Value    Protime 06/09/2021 22 0*    INR 06/09/2021 1 90*   Appointment on 06/02/2021   Component Date Value    Protime 06/02/2021 23 0*    INR 06/02/2021 2 15*   Appointment on 05/19/2021   Component Date Value    Protime 05/19/2021 25 6*    INR 05/19/2021 2 47*   Appointment on 05/12/2021   Component Date Value    Protime 05/12/2021 23 5*    INR 05/12/2021 2 21*   Appointment on 05/05/2021   Component Date Value    Sodium 05/05/2021 138     Potassium 05/05/2021 3 1*    Chloride 05/05/2021 99*    CO2 05/05/2021 29     ANION GAP 05/05/2021 10     BUN 05/05/2021 24     Creatinine 05/05/2021 0 86     Glucose, Fasting 05/05/2021 137*    Calcium 05/05/2021 10 8*    AST 05/05/2021 23     ALT 05/05/2021 25     Alkaline Phosphatase 05/05/2021 76     Total Protein 05/05/2021 7 6     Albumin 05/05/2021 3 5     Total Bilirubin 05/05/2021 0 47     eGFR 05/05/2021 65     Hemoglobin A1C 05/05/2021 6 4*    EAG 05/05/2021 137     Cholesterol 05/05/2021 193     Triglycerides 05/05/2021 187*    HDL, Direct 05/05/2021 35*    LDL Calculated 05/05/2021 121*    Non-HDL-Chol (CHOL-HDL) 05/05/2021 158     TSH 3RD GENERATON 05/05/2021 0 726    Orders Only on 05/05/2021   Component Date Value    Protime 05/05/2021 18 0*    INR 05/05/2021 1 56*     Imaging: Cardiac EP device report    Result Date: 6/24/2021  Narrative: SJM DUAL CHAMBER PM/NOT MRI CONDITIONAL DEVICE INTERROGATED IN THE [place] OFFICE:  BATTERY VOLTAGE ADEQUATE (8 4 YR)  AP 82%  11%  ALL AVAILABLE LEAD PARAMETERS WITHIN NORMAL LIMITS  NO SIGNIFICANT HIGH RATE EPISODES  NO PROGRAMMING CHANGES MADE TO DEVICE PARAMETERS  NORMAL DEVICE FUNCTION    RG       Review of Systems:  Review of Systems     REVIEW OF SYSTEMS:  Constitutional:  Denies fever or chills   Eyes:  Denies change in visual acuity   HENT:  Denies nasal congestion or sore throat   Respiratory:  Denies cough or shortness of breath   Cardiovascular:  Denies chest pain or edema   GI:  Denies abdominal pain, nausea, vomiting, bloody stools or diarrhea   :  Denies dysuria, frequency, difficulty in micturition and nocturia  Musculoskeletal:  Denies back pain or joint pain   Neurologic:  Denies headache, focal weakness or sensory changes   Endocrine:  Denies polyuria or polydipsia   Lymphatic:  Denies swollen glands   Psychiatric:  Denies depression or anxiety     Physical Exam:    /52   Pulse 65   Ht 5' 1" (1 549 m)   Wt 70 8 kg (156 lb)   SpO2 98%   BMI 29 48 kg/m² Physical Exam   PHYSICAL EXAM:  General:  Patient is not in acute distress   Head: Normocephalic, Atraumatic  HEENT:  Both pupils normal-size atraumatic, normocephalic, nonicteric  Neck:  JVP not raised  Trachea central  No carotid bruit  Respiratory:  normal breath sounds no crackles  no rhonchi  Cardiovascular:  Regular rate and rhythm no S3 no murmurs  GI:  Abdomen soft nontender  No organomegaly  Lymphatic:  No cervical or inguinal lymphadenopathy  Neurologic:  Patient is awake alert, oriented   Grossly nonfocal    Extremities no edema        Recent pacemaker interrogation data reviewed  Normally functioning device  Battery status is good  Discussion/Summary:    Patient with multiple medical problems who seems to be doing reasonably well from cardiac standpoint  Previous studies reviewed with patient  Medications reviewed and possible side effects discussed  concepts of cardiovascular disease , signs and symptoms of heart disease  Dietary and risk factor modification reinforced  All questions answered  Safety measures reviewed  Patient advised to report any problems prompting medical attention  Risks and benefits  and alternativesof anticoagulation to prevent thromboembolic risk from atrial fibrillation discussed at length  Patient to report any bleeding issues  Patient will continue to follow up with pacemaker clinic  Follow-up in 6 months or earlier as needed  Patient is agreeable with the plan of care

## 2021-06-24 NOTE — PROGRESS NOTES
SJM DUAL CHAMBER PM/NOT MRI CONDITIONAL   DEVICE INTERROGATED IN THE [place] OFFICE:  BATTERY VOLTAGE ADEQUATE (8 4 YR)    AP 82%  11%   ALL AVAILABLE LEAD PARAMETERS WITHIN NORMAL LIMITS   NO SIGNIFICANT HIGH RATE EPISODES   NO PROGRAMMING CHANGES MADE TO DEVICE PARAMETERS   NORMAL DEVICE FUNCTION   RG

## 2021-06-30 DIAGNOSIS — E11.9 TYPE 2 DIABETES MELLITUS WITHOUT COMPLICATION, WITHOUT LONG-TERM CURRENT USE OF INSULIN (HCC): ICD-10-CM

## 2021-06-30 RX ORDER — BLOOD-GLUCOSE METER
1 KIT MISCELLANEOUS 2 TIMES DAILY
Qty: 100 EACH | Refills: 3 | Status: SHIPPED | OUTPATIENT
Start: 2021-06-30 | End: 2022-02-07

## 2021-06-30 NOTE — TELEPHONE ENCOUNTER
Pt needs new rx sent to [facility] for test strips to state that she test her sugar twice daily  Normal for race

## 2021-07-02 ENCOUNTER — ANTICOAG VISIT (OUTPATIENT)
Dept: CARDIOLOGY CLINIC | Facility: CLINIC | Age: 76
End: 2021-07-02

## 2021-07-02 ENCOUNTER — APPOINTMENT (OUTPATIENT)
Dept: LAB | Facility: HOSPITAL | Age: 76
End: 2021-07-02
Payer: MEDICARE

## 2021-07-02 DIAGNOSIS — I48.0 PAF (PAROXYSMAL ATRIAL FIBRILLATION) (HCC): ICD-10-CM

## 2021-07-02 LAB
INR PPP: 2.7 (ref 0.84–1.19)
PROTHROMBIN TIME: 27.5 SECONDS (ref 11.6–14.5)

## 2021-07-02 PROCEDURE — 85610 PROTHROMBIN TIME: CPT

## 2021-07-02 PROCEDURE — 36415 COLL VENOUS BLD VENIPUNCTURE: CPT

## 2021-07-15 ENCOUNTER — TELEPHONE (OUTPATIENT)
Dept: CARDIOLOGY CLINIC | Facility: CLINIC | Age: 76
End: 2021-07-15

## 2021-07-15 NOTE — TELEPHONE ENCOUNTER
I s/w the pt. She was unsure of her Warfarin dose. I gave her the dose she should be on. She had no further questions.

## 2021-07-19 ENCOUNTER — APPOINTMENT (OUTPATIENT)
Dept: LAB | Facility: HOSPITAL | Age: 76
End: 2021-07-19
Payer: MEDICARE

## 2021-07-19 DIAGNOSIS — I48.0 PAF (PAROXYSMAL ATRIAL FIBRILLATION) (HCC): ICD-10-CM

## 2021-07-19 LAB
INR PPP: 3.33 (ref 0.84–1.19)
PROTHROMBIN TIME: 34.1 SECONDS (ref 11.6–14.5)

## 2021-07-19 PROCEDURE — 85610 PROTHROMBIN TIME: CPT

## 2021-07-19 PROCEDURE — 36415 COLL VENOUS BLD VENIPUNCTURE: CPT

## 2021-07-20 ENCOUNTER — ANTICOAG VISIT (OUTPATIENT)
Dept: CARDIOLOGY CLINIC | Facility: CLINIC | Age: 76
End: 2021-07-20

## 2021-07-20 ENCOUNTER — TELEPHONE (OUTPATIENT)
Dept: CARDIOLOGY CLINIC | Facility: CLINIC | Age: 76
End: 2021-07-20

## 2021-07-20 NOTE — PROGRESS NOTES
As per Dr Camara Jefferson pt is to hold one day and resume regular dosing retest in 2 week  Pt verbally understood

## 2021-08-02 ENCOUNTER — ANTICOAG VISIT (OUTPATIENT)
Dept: CARDIOLOGY CLINIC | Facility: CLINIC | Age: 76
End: 2021-08-02

## 2021-08-02 ENCOUNTER — APPOINTMENT (OUTPATIENT)
Dept: LAB | Facility: HOSPITAL | Age: 76
End: 2021-08-02
Payer: MEDICARE

## 2021-08-02 DIAGNOSIS — I48.0 PAF (PAROXYSMAL ATRIAL FIBRILLATION) (HCC): ICD-10-CM

## 2021-08-02 LAB
INR PPP: 3.44 (ref 0.84–1.19)
PROTHROMBIN TIME: 35 SECONDS (ref 11.6–14.5)

## 2021-08-02 PROCEDURE — 85610 PROTHROMBIN TIME: CPT

## 2021-08-02 PROCEDURE — 36415 COLL VENOUS BLD VENIPUNCTURE: CPT

## 2021-08-09 ENCOUNTER — TELEPHONE (OUTPATIENT)
Dept: FAMILY MEDICINE CLINIC | Facility: CLINIC | Age: 76
End: 2021-08-09

## 2021-08-09 NOTE — TELEPHONE ENCOUNTER
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 She can't go to ER or come in d/t watching her grandchildren

## 2021-08-09 NOTE — TELEPHONE ENCOUNTER
I contacted pt to give Gayle's advisement  Pt states she has already been using Cortisone cream  Should pt try something else?

## 2021-08-10 NOTE — TELEPHONE ENCOUNTER
Spoke with pt and gave her Gayle's advisement  Pt states, "that is fine, I will do as advised  Just wanted to make sure I didn't need to do anything special " Pt verbalized understanding

## 2021-08-17 ENCOUNTER — TELEPHONE (OUTPATIENT)
Dept: CARDIOLOGY CLINIC | Facility: CLINIC | Age: 76
End: 2021-08-17

## 2021-08-17 NOTE — TELEPHONE ENCOUNTER
I s/w the pt. She wanted to let me know that she did not go for her INR yesterday and will try to go this week or early next week

## 2021-08-18 ENCOUNTER — APPOINTMENT (OUTPATIENT)
Dept: LAB | Facility: HOSPITAL | Age: 76
End: 2021-08-18
Payer: MEDICARE

## 2021-08-18 ENCOUNTER — ANTICOAG VISIT (OUTPATIENT)
Dept: CARDIOLOGY CLINIC | Facility: CLINIC | Age: 76
End: 2021-08-18

## 2021-08-18 DIAGNOSIS — I48.0 PAF (PAROXYSMAL ATRIAL FIBRILLATION) (HCC): ICD-10-CM

## 2021-08-18 LAB
INR PPP: 2.65 (ref 0.84–1.19)
PROTHROMBIN TIME: 27.1 SECONDS (ref 11.6–14.5)

## 2021-08-18 PROCEDURE — 85610 PROTHROMBIN TIME: CPT

## 2021-08-18 PROCEDURE — 36415 COLL VENOUS BLD VENIPUNCTURE: CPT

## 2021-09-08 ENCOUNTER — APPOINTMENT (OUTPATIENT)
Dept: LAB | Facility: HOSPITAL | Age: 76
End: 2021-09-08
Payer: MEDICARE

## 2021-09-08 ENCOUNTER — ANTICOAG VISIT (OUTPATIENT)
Dept: CARDIOLOGY CLINIC | Facility: CLINIC | Age: 76
End: 2021-09-08

## 2021-09-08 DIAGNOSIS — I48.0 PAF (PAROXYSMAL ATRIAL FIBRILLATION) (HCC): ICD-10-CM

## 2021-09-08 LAB
INR PPP: 2.71 (ref 0.84–1.19)
PROTHROMBIN TIME: 29 SECONDS (ref 11.6–14.5)

## 2021-09-08 PROCEDURE — 36415 COLL VENOUS BLD VENIPUNCTURE: CPT

## 2021-09-08 PROCEDURE — 85610 PROTHROMBIN TIME: CPT

## 2021-09-30 ENCOUNTER — REMOTE DEVICE CLINIC VISIT (OUTPATIENT)
Dept: CARDIOLOGY CLINIC | Facility: CLINIC | Age: 76
End: 2021-09-30
Payer: MEDICARE

## 2021-09-30 DIAGNOSIS — Z95.0 CARDIAC PACEMAKER IN SITU: Primary | ICD-10-CM

## 2021-09-30 PROCEDURE — 93294 REM INTERROG EVL PM/LDLS PM: CPT | Performed by: INTERNAL MEDICINE

## 2021-09-30 PROCEDURE — 93296 REM INTERROG EVL PM/IDS: CPT | Performed by: INTERNAL MEDICINE

## 2021-09-30 NOTE — PROGRESS NOTES
Results for orders placed or performed in visit on 09/30/21   Cardiac EP device report    Narrative    SJM DUAL CHAMBER PM/NOT MRI CONDITIONAL  MERLIN TRANSMISSION: BATTERY VOLTAGE ADEQUATE (8 5 YRS)  AP-93%, -8%  ALL AVAILABLE LEAD PARAMETERS WITHIN NORMAL LIMITS  1 VHR EPISODE @ 151 BPM LASTING 12 SEC- SVT ON EGM  3 AMS EPISDOES EACH 6 SEC DURATION- COMPETITIVE ATRIAL PACING ON EGM'S  PT R [place] [address] [address]  NORMAL DEVICE FUNCTION   GV

## 2021-10-07 ENCOUNTER — APPOINTMENT (OUTPATIENT)
Dept: LAB | Facility: HOSPITAL | Age: 76
End: 2021-10-07
Payer: MEDICARE

## 2021-10-07 ENCOUNTER — ANTICOAG VISIT (OUTPATIENT)
Dept: CARDIOLOGY CLINIC | Facility: CLINIC | Age: 76
End: 2021-10-07

## 2021-10-13 LAB
LEFT EYE DIABETIC RETINOPATHY: NORMAL
RIGHT EYE DIABETIC RETINOPATHY: NORMAL
SEVERITY (EYE EXAM): NORMAL

## 2021-10-25 DIAGNOSIS — E03.9 ADULT HYPOTHYROIDISM: ICD-10-CM

## 2021-10-25 RX ORDER — LEVOTHYROXINE SODIUM 0.07 MG/1
TABLET ORAL
Qty: 90 TABLET | Refills: 2 | Status: SHIPPED | OUTPATIENT
Start: 2021-10-25 | End: 2021-12-13

## 2021-11-04 ENCOUNTER — APPOINTMENT (OUTPATIENT)
Dept: LAB | Facility: HOSPITAL | Age: 76
End: 2021-11-04
Payer: MEDICARE

## 2021-11-05 ENCOUNTER — ANTICOAG VISIT (OUTPATIENT)
Dept: CARDIOLOGY CLINIC | Facility: CLINIC | Age: 76
End: 2021-11-05

## 2021-11-06 DIAGNOSIS — E11.9 TYPE 2 DIABETES MELLITUS WITHOUT COMPLICATION, WITHOUT LONG-TERM CURRENT USE OF INSULIN (HCC): Primary | ICD-10-CM

## 2021-11-06 RX ORDER — GLIPIZIDE 5 MG/1
TABLET, FILM COATED, EXTENDED RELEASE ORAL
Qty: 180 TABLET | Refills: 1 | Status: SHIPPED | OUTPATIENT
Start: 2021-11-06 | End: 2021-12-13

## 2021-12-04 ENCOUNTER — APPOINTMENT (OUTPATIENT)
Dept: LAB | Facility: HOSPITAL | Age: 76
End: 2021-12-04
Payer: MEDICARE

## 2021-12-04 DIAGNOSIS — I10 ESSENTIAL HYPERTENSION: ICD-10-CM

## 2021-12-04 DIAGNOSIS — E11.9 TYPE 2 DIABETES MELLITUS WITHOUT COMPLICATION, WITHOUT LONG-TERM CURRENT USE OF INSULIN (HCC): ICD-10-CM

## 2021-12-04 DIAGNOSIS — E03.9 ADULT HYPOTHYROIDISM: ICD-10-CM

## 2021-12-04 DIAGNOSIS — E78.2 MIXED HYPERLIPIDEMIA: ICD-10-CM

## 2021-12-04 LAB
ALBUMIN SERPL BCP-MCNC: 3.5 G/DL (ref 3.5–5)
ALP SERPL-CCNC: 79 U/L (ref 46–116)
ALT SERPL W P-5'-P-CCNC: 23 U/L (ref 12–78)
ANION GAP SERPL CALCULATED.3IONS-SCNC: 7 MMOL/L (ref 4–13)
AST SERPL W P-5'-P-CCNC: 21 U/L (ref 5–45)
BILIRUB SERPL-MCNC: 0.32 MG/DL (ref 0.2–1)
BUN SERPL-MCNC: 19 MG/DL (ref 5–25)
CALCIUM SERPL-MCNC: 11 MG/DL (ref 8.3–10.1)
CHLORIDE SERPL-SCNC: 100 MMOL/L (ref 100–108)
CHOLEST SERPL-MCNC: 159 MG/DL
CO2 SERPL-SCNC: 32 MMOL/L (ref 21–32)
CREAT SERPL-MCNC: 0.8 MG/DL (ref 0.6–1.3)
ERYTHROCYTE [DISTWIDTH] IN BLOOD BY AUTOMATED COUNT: 13.7 % (ref 11.6–15.1)
GFR SERPL CREATININE-BSD FRML MDRD: 71 ML/MIN/1.73SQ M
GLUCOSE P FAST SERPL-MCNC: 96 MG/DL (ref 65–99)
HCT VFR BLD AUTO: 38.4 % (ref 34.8–46.1)
HDLC SERPL-MCNC: 32 MG/DL
HGB BLD-MCNC: 11.9 G/DL (ref 11.5–15.4)
LDLC SERPL CALC-MCNC: 85 MG/DL (ref 0–100)
MCH RBC QN AUTO: 28.4 PG (ref 26.8–34.3)
MCHC RBC AUTO-ENTMCNC: 31 G/DL (ref 31.4–37.4)
MCV RBC AUTO: 92 FL (ref 82–98)
NONHDLC SERPL-MCNC: 127 MG/DL
PLATELET # BLD AUTO: 403 THOUSANDS/UL (ref 149–390)
PMV BLD AUTO: 10.4 FL (ref 8.9–12.7)
POTASSIUM SERPL-SCNC: 3.4 MMOL/L (ref 3.5–5.3)
PROT SERPL-MCNC: 7.5 G/DL (ref 6.4–8.2)
RBC # BLD AUTO: 4.19 MILLION/UL (ref 3.81–5.12)
SODIUM SERPL-SCNC: 139 MMOL/L (ref 136–145)
TRIGL SERPL-MCNC: 208 MG/DL
WBC # BLD AUTO: 8.42 THOUSAND/UL (ref 4.31–10.16)

## 2021-12-04 PROCEDURE — 80061 LIPID PANEL: CPT

## 2021-12-04 PROCEDURE — 80053 COMPREHEN METABOLIC PANEL: CPT

## 2021-12-04 PROCEDURE — 85027 COMPLETE CBC AUTOMATED: CPT

## 2021-12-04 PROCEDURE — 83036 HEMOGLOBIN GLYCOSYLATED A1C: CPT

## 2021-12-05 LAB
EST. AVERAGE GLUCOSE BLD GHB EST-MCNC: 137 MG/DL
HBA1C MFR BLD: 6.4 %

## 2021-12-06 ENCOUNTER — ANTICOAG VISIT (OUTPATIENT)
Dept: CARDIOLOGY CLINIC | Facility: CLINIC | Age: 76
End: 2021-12-06

## 2021-12-13 ENCOUNTER — OFFICE VISIT (OUTPATIENT)
Dept: FAMILY MEDICINE CLINIC | Facility: CLINIC | Age: 76
End: 2021-12-13
Payer: MEDICARE

## 2021-12-13 ENCOUNTER — APPOINTMENT (OUTPATIENT)
Dept: LAB | Facility: HOSPITAL | Age: 76
End: 2021-12-13
Payer: MEDICARE

## 2021-12-13 ENCOUNTER — ANTICOAG VISIT (OUTPATIENT)
Dept: CARDIOLOGY CLINIC | Facility: CLINIC | Age: 76
End: 2021-12-13

## 2021-12-13 VITALS
RESPIRATION RATE: 16 BRPM | BODY MASS INDEX: 29.07 KG/M2 | SYSTOLIC BLOOD PRESSURE: 130 MMHG | HEART RATE: 73 BPM | HEIGHT: 61 IN | TEMPERATURE: 98.2 F | DIASTOLIC BLOOD PRESSURE: 58 MMHG | WEIGHT: 154 LBS | OXYGEN SATURATION: 95 %

## 2021-12-13 DIAGNOSIS — E03.9 ADULT HYPOTHYROIDISM: Primary | ICD-10-CM

## 2021-12-13 DIAGNOSIS — E11.9 TYPE 2 DIABETES MELLITUS WITHOUT COMPLICATION, WITHOUT LONG-TERM CURRENT USE OF INSULIN (HCC): ICD-10-CM

## 2021-12-13 DIAGNOSIS — I10 ESSENTIAL HYPERTENSION: ICD-10-CM

## 2021-12-13 DIAGNOSIS — H02.421 MYOGENIC PTOSIS OF RIGHT EYELID: ICD-10-CM

## 2021-12-13 DIAGNOSIS — E78.2 MIXED HYPERLIPIDEMIA: ICD-10-CM

## 2021-12-13 DIAGNOSIS — E03.9 ADULT HYPOTHYROIDISM: ICD-10-CM

## 2021-12-13 LAB — TSH SERPL DL<=0.05 MIU/L-ACNC: 0.23 UIU/ML (ref 0.36–3.74)

## 2021-12-13 PROCEDURE — 83519 RIA NONANTIBODY: CPT

## 2021-12-13 PROCEDURE — 84443 ASSAY THYROID STIM HORMONE: CPT

## 2021-12-13 PROCEDURE — 83516 IMMUNOASSAY NONANTIBODY: CPT

## 2021-12-13 PROCEDURE — 36415 COLL VENOUS BLD VENIPUNCTURE: CPT

## 2021-12-13 PROCEDURE — 99214 OFFICE O/P EST MOD 30 MIN: CPT | Performed by: FAMILY MEDICINE

## 2021-12-13 RX ORDER — GLIPIZIDE 2.5 MG/1
2.5 TABLET, EXTENDED RELEASE ORAL 2 TIMES DAILY
Qty: 180 TABLET | Refills: 1 | Status: SHIPPED | OUTPATIENT
Start: 2021-12-13 | End: 2022-07-14

## 2021-12-13 RX ORDER — LEVOTHYROXINE SODIUM 0.05 MG/1
50 TABLET ORAL DAILY
Qty: 90 TABLET | Refills: 1 | Status: SHIPPED | OUTPATIENT
Start: 2021-12-13 | End: 2022-04-17

## 2021-12-14 LAB — STRIA MUS AB TITR SER IF: NEGATIVE {TITER}

## 2021-12-15 LAB — ACHR BIND AB SER-SCNC: 0.11 NMOL/L (ref 0–0.24)

## 2021-12-16 LAB — ACHR BLOCK AB/ACHR TOTAL SFR SER: 20 % (ref 0–25)

## 2021-12-21 LAB — MISCELLANEOUS LAB TEST RESULT: NORMAL

## 2021-12-27 ENCOUNTER — TELEPHONE (OUTPATIENT)
Dept: FAMILY MEDICINE CLINIC | Facility: CLINIC | Age: 76
End: 2021-12-27

## 2021-12-27 DIAGNOSIS — J31.0 RHINOSINUSITIS: Primary | ICD-10-CM

## 2021-12-27 DIAGNOSIS — J32.9 RHINOSINUSITIS: Primary | ICD-10-CM

## 2021-12-27 RX ORDER — AZITHROMYCIN 250 MG/1
TABLET, FILM COATED ORAL
Qty: 6 TABLET | Refills: 0 | Status: SHIPPED | OUTPATIENT
Start: 2021-12-27 | End: 2022-01-02

## 2021-12-28 ENCOUNTER — TELEPHONE (OUTPATIENT)
Dept: CARDIOLOGY CLINIC | Facility: CLINIC | Age: 76
End: 2021-12-28

## 2021-12-30 ENCOUNTER — REMOTE DEVICE CLINIC VISIT (OUTPATIENT)
Dept: CARDIOLOGY CLINIC | Facility: CLINIC | Age: 76
End: 2021-12-30
Payer: MEDICARE

## 2021-12-30 DIAGNOSIS — Z95.0 CARDIAC PACEMAKER IN SITU: Primary | ICD-10-CM

## 2021-12-30 PROCEDURE — 93296 REM INTERROG EVL PM/IDS: CPT | Performed by: INTERNAL MEDICINE

## 2021-12-30 PROCEDURE — 93294 REM INTERROG EVL PM/LDLS PM: CPT | Performed by: INTERNAL MEDICINE

## 2022-01-06 ENCOUNTER — APPOINTMENT (OUTPATIENT)
Dept: LAB | Facility: HOSPITAL | Age: 77
End: 2022-01-06
Payer: MEDICARE

## 2022-01-07 ENCOUNTER — ANTICOAG VISIT (OUTPATIENT)
Dept: CARDIOLOGY CLINIC | Facility: CLINIC | Age: 77
End: 2022-01-07

## 2022-01-07 NOTE — PROGRESS NOTES
S/w  the pt   Denies missing a pill  Denies vit k, green leafy veggie  increase  Advised to take 5mg x 2 days, resume reg dose and retest in 2 weeks

## 2022-01-12 ENCOUNTER — OFFICE VISIT (OUTPATIENT)
Dept: CARDIOLOGY CLINIC | Facility: CLINIC | Age: 77
End: 2022-01-12
Payer: MEDICARE

## 2022-01-12 VITALS
SYSTOLIC BLOOD PRESSURE: 126 MMHG | DIASTOLIC BLOOD PRESSURE: 60 MMHG | HEART RATE: 60 BPM | BODY MASS INDEX: 29.07 KG/M2 | OXYGEN SATURATION: 97 % | WEIGHT: 154 LBS | HEIGHT: 61 IN

## 2022-01-12 DIAGNOSIS — I10 ESSENTIAL HYPERTENSION: ICD-10-CM

## 2022-01-12 DIAGNOSIS — I48.0 PAF (PAROXYSMAL ATRIAL FIBRILLATION) (HCC): Primary | ICD-10-CM

## 2022-01-12 DIAGNOSIS — I49.5 SSS (SICK SINUS SYNDROME) (HCC): ICD-10-CM

## 2022-01-12 DIAGNOSIS — Z79.01 ANTICOAGULATION MONITORING, INR RANGE 2-3: ICD-10-CM

## 2022-01-12 PROCEDURE — 99213 OFFICE O/P EST LOW 20 MIN: CPT | Performed by: INTERNAL MEDICINE

## 2022-01-12 NOTE — PROGRESS NOTES
[facility] [place]  [address] [facility] [id number]  Cardiology Follow Up    Lorena Acevedo Winter  1945  [id number]      1  PAF (paroxysmal atrial fibrillation) ([facility] 75 )     2  Anticoagulation monitoring, INR range 2-3     3  SSS (sick sinus syndrome) ([facility] 75 )     4  Essential hypertension         Chief Complaint   Patient presents with    Follow-up     7 month follow up       Interval History:  Patient presents for follow-up visit  Patient denies any history of chest pain shortness of breath  Patient denies any history of leg edema or orthopnea PND  No history of presyncope syncope  Patient states compliance with the present list of medications  Patient has been regularly followed by the device Clinic for pacemaker  Patient denies any bleeding issues  Patient is on warfarin for paroxysmal atrial fibrillation  Patient Active Problem List   Diagnosis    Iron deficiency anemia secondary to inadequate dietary iron intake    Arthralgia of both knees    Mixed hyperlipidemia    Essential hypertension    Hypokalemia    Adult hypothyroidism    Sinoatrial node dysfunction (HCC)    Type 2 diabetes mellitus without complication, without long-term current use of insulin (HCC)    Worsening vision    Cardiac pacemaker in situ    Artificial knee joint present     Past Medical History:   Diagnosis Date    Carpal tunnel syndrome     Heart murmur      Social History     Socioeconomic History    Marital status:      Spouse name: Not on file    Number of children: Not on file    Years of education: Not on file    Highest education level: Not on file   Occupational History    Not on file   Tobacco Use    Smoking status: Never Smoker    Smokeless tobacco: Never Used   Substance and Sexual Activity    Alcohol use:  Yes    Drug use: No    Sexual activity: Not on file   Other Topics Concern    Not on file   Social History Narrative    Always uses seat belt    Employed Lives with spouse      Social Determinants of Health     Financial Resource Strain: Not on file   Food Insecurity: Not on file   Transportation Needs: Not on file   Physical Activity: Not on file   Stress: Not on file   Social Connections: Not on file   Intimate Partner Violence: Not on file   Housing Stability: Not on file      Family History   Problem Relation Age of Onset    Heart attack Father         ARRHYTHMIAS    Coronary artery disease Family     Diabetes Family     Heart attack Family         ARRHYTHMIAS     Past Surgical History:   Procedure Laterality Date    CATARACT EXTRACTION      CHOLECYSTECTOMY      COLONOSCOPY  11/24/2007    KNEE SURGERY      REPLACEMENT TOTAL KNEE Right     TOTAL ABDOMINAL HYSTERECTOMY W/ BILATERAL SALPINGOOPHORECTOMY         Current Outpatient Medications:     Ascorbic Acid (VITAMIN C) 100 MG tablet, Take 1,000 mg by mouth daily , Disp: , Rfl:     atenolol-chlorthalidone (TENORETIC) 50-25 mg per tablet, Take 1 tablet by mouth daily, Disp: 90 tablet, Rfl: 2    B Complex Vitamins (B COMPLEX 100 PO), Take 1 tablet by mouth daily, Disp: , Rfl:     Blood Glucose Monitoring Suppl (FREESTYLE LITE) RANULFO, by Does not apply route 2 (two) times a day Dx E11 9, Disp: 1 each, Rfl: 0    FREESTYLE LITE test strip, Use 1 each 2 (two) times a day To check blood sugar, Disp: 100 each, Rfl: 3    glipiZIDE (GLUCOTROL XL) 2 5 mg 24 hr tablet, Take 1 tablet (2 5 mg total) by mouth 2 (two) times a day, Disp: 180 tablet, Rfl: 1    glucose blood (JASWANT CONTOUR TEST) test strip, Check blood sugar twice daily, DX:E11 9, Disp: 200 each, Rfl: 3    Lancets (freestyle) lancets, Use as instructed BID, Disp: 100 each, Rfl: 1    levothyroxine (Euthyrox) 50 mcg tablet, Take 1 tablet (50 mcg total) by mouth daily, Disp: 90 tablet, Rfl: 1    Multiple Vitamin (MULTIVITAMIN) tablet, Take 1 tablet by mouth daily, Disp: , Rfl:     niacin 500 mg tablet, Take by mouth , Disp: , Rfl:     pioglitazone (ACTOS) 15 mg tablet, Take 1 tablet (15 mg total) by mouth daily, Disp: 90 tablet, Rfl: 1    Promethazine-DM (PHENERGAN-DM) 6 25-15 mg/5 mL oral syrup, Take 5 mL by mouth 4 (four) times a day as needed for cough, Disp: 240 mL, Rfl: 0    rosuvastatin (CRESTOR) 10 MG tablet, Take 1 tablet (10 mg total) by mouth daily, Disp: 90 tablet, Rfl: 3    warfarin (COUMADIN) 5 mg tablet, Take 1-2 tablets by mouth daily in the evening , Disp: 180 tablet, Rfl: 3    POTASSIUM PO, Take by mouth daily  (Patient not taking: Reported on 1/12/2022 ), Disp: , Rfl:   No Known Allergies    Labs:   Ancillary Orders on 12/24/2021   Component Date Value    Protime 01/06/2022 18 0*    INR 01/06/2022 1 57*   Appointment on 12/13/2021   Component Date Value    AChR Binding Ab, Serum 12/13/2021 0 11     AChR Blocking Abs, Serum 12/13/2021 20     Miscellaneous Lab Test R* 12/13/2021 SEE WRITTEN REPORT     Anti-striation Abs 12/13/2021 Negative    Ancillary Orders on 12/10/2021   Component Date Value    Protime 12/13/2021 23 7*    INR 12/13/2021 2 24*   Appointment on 12/04/2021   Component Date Value    WBC 12/04/2021 8 42     RBC 12/04/2021 4 19     Hemoglobin 12/04/2021 11 9     Hematocrit 12/04/2021 38 4     MCV 12/04/2021 92     MCH 12/04/2021 28 4     MCHC 12/04/2021 31 0*    RDW 12/04/2021 13 7     Platelets 16/26/3098 403*    MPV 12/04/2021 10 4     Sodium 12/04/2021 139     Potassium 12/04/2021 3 4*    Chloride 12/04/2021 100     CO2 12/04/2021 32     ANION GAP 12/04/2021 7     BUN 12/04/2021 19     Creatinine 12/04/2021 0 80     Glucose, Fasting 12/04/2021 96     Calcium 12/04/2021 11 0*    AST 12/04/2021 21     ALT 12/04/2021 23     Alkaline Phosphatase 12/04/2021 79     Total Protein 12/04/2021 7 5     Albumin 12/04/2021 3 5     Total Bilirubin 12/04/2021 0 32     eGFR 12/04/2021 71     Cholesterol 12/04/2021 159     Triglycerides 12/04/2021 208*    HDL, Direct 12/04/2021 32*    LDL Calculated 12/04/2021 85     Non-HDL-Chol (CHOL-HDL) 12/04/2021 127     Hemoglobin A1C 12/04/2021 6 4*    EAG 12/04/2021 137     TSH 3RD GENERATON 12/13/2021 0 225*     Imaging: Cardiac EP device report    Result Date: 12/30/2021  Narrative: SJM DUAL CHAMBER PM/NOT MRI CONDITIONAL MERLIN TRANSMISSION: BATTERY VOLTAGE ADEQUATE (8 YRS)  AP-97%, -3%  ALL AVAILABLE LEAD PARAMETERS WITHIN NORMAL LIMITS  1 AMS EPISODE LASTING 6 SEC- COMPETITIVE ATRIAL PACING ON EGM  NORMAL DEVICE FUNCTION  GV       Review of Systems:  Review of Systems   REVIEW OF SYSTEMS:  Constitutional:  Denies fever or chills   Eyes:  Denies change in visual acuity   HENT:  Denies nasal congestion or sore throat   Respiratory:  Denies cough or shortness of breath   Cardiovascular:  Denies chest pain or edema   GI:  Denies abdominal pain, nausea, vomiting, bloody stools or diarrhea   :  Denies dysuria, frequency, difficulty in micturition and nocturia  Musculoskeletal:  Denies back pain or joint pain   Neurologic:  Denies headache, focal weakness or sensory changes   Endocrine:  Denies polyuria or polydipsia   Lymphatic:  Denies swollen glands   Psychiatric:  Denies depression or anxiety       Physical Exam:    /60 (BP Location: Left arm, Patient Position: Sitting, Cuff Size: Standard)   Pulse 60   Ht 5' 1" (1 549 m)   Wt 69 9 kg (154 lb)   SpO2 97%   BMI 29 10 kg/m²     Physical Exam   PHYSICAL EXAM:  General:  Patient is not in acute distress   Head: Normocephalic, Atraumatic  HEENT:  Both pupils normal-size atraumatic, normocephalic, nonicteric  Neck:  JVP not raised  Trachea central  No carotid bruit  Respiratory:  normal breath sounds no crackles  no rhonchi  Cardiovascular:  Regular rate and rhythm no S3 no murmurs  GI:  Abdomen soft nontender  No organomegaly  Lymphatic:  No cervical or inguinal lymphadenopathy  Neurologic:  Patient is awake alert, oriented    Grossly nonfocal  Extremities no edema      Discussion/Summary:  Patient with multiple medical problems who seems to be doing reasonably well from cardiac standpoint  Previous studies reviewed with patient  Medications reviewed and possible side effects discussed  concepts of cardiovascular disease , signs and symptoms of heart disease  Dietary and risk factor modification reinforced  All questions answered  Safety measures reviewed  Patient advised to report any problems prompting medical attention  Patient understands the risks and benefits of anticoagulation to prevent thromboembolic risk paroxysmal atrial fibrillation  Target INR 2 to 3  Patient will continue to follow up with pacemaker clinic  Device interrogation data reviewed  Follow-up in 6 months  Follow-up with primary care physician

## 2022-01-17 DIAGNOSIS — I10 ESSENTIAL HYPERTENSION: ICD-10-CM

## 2022-01-17 DIAGNOSIS — E78.2 MIXED HYPERLIPIDEMIA: ICD-10-CM

## 2022-01-17 RX ORDER — PIOGLITAZONEHYDROCHLORIDE 15 MG/1
TABLET ORAL
Qty: 90 TABLET | Refills: 1 | Status: SHIPPED | OUTPATIENT
Start: 2022-01-17 | End: 2022-01-21

## 2022-01-17 RX ORDER — ATENOLOL AND CHLORTHALIDONE TABLET 50; 25 MG/1; MG/1
TABLET ORAL
Qty: 90 TABLET | Refills: 2 | Status: SHIPPED | OUTPATIENT
Start: 2022-01-17

## 2022-01-18 ENCOUNTER — TELEPHONE (OUTPATIENT)
Dept: FAMILY MEDICINE CLINIC | Facility: CLINIC | Age: 77
End: 2022-01-18

## 2022-01-18 NOTE — TELEPHONE ENCOUNTER
ttc from pt  -    Please update pt's chart :     01/12/2022 - Moderna Booster (3rd) - [facility]  Thank You

## 2022-01-21 DIAGNOSIS — E78.2 MIXED HYPERLIPIDEMIA: ICD-10-CM

## 2022-01-21 RX ORDER — PIOGLITAZONEHYDROCHLORIDE 15 MG/1
TABLET ORAL
Qty: 90 TABLET | Refills: 1 | Status: SHIPPED | OUTPATIENT
Start: 2022-01-21 | End: 2022-02-28

## 2022-01-22 ENCOUNTER — APPOINTMENT (OUTPATIENT)
Dept: LAB | Facility: HOSPITAL | Age: 77
End: 2022-01-22
Payer: MEDICARE

## 2022-01-24 ENCOUNTER — ANTICOAG VISIT (OUTPATIENT)
Dept: CARDIOLOGY CLINIC | Facility: CLINIC | Age: 77
End: 2022-01-24

## 2022-02-05 ENCOUNTER — APPOINTMENT (OUTPATIENT)
Dept: LAB | Facility: HOSPITAL | Age: 77
End: 2022-02-05
Payer: MEDICARE

## 2022-02-07 ENCOUNTER — ANTICOAG VISIT (OUTPATIENT)
Dept: CARDIOLOGY CLINIC | Facility: CLINIC | Age: 77
End: 2022-02-07

## 2022-02-07 DIAGNOSIS — E11.9 TYPE 2 DIABETES MELLITUS WITHOUT COMPLICATION, WITHOUT LONG-TERM CURRENT USE OF INSULIN (HCC): ICD-10-CM

## 2022-02-07 RX ORDER — LANCETS 28 GAUGE
EACH MISCELLANEOUS
Qty: 100 EACH | Refills: 1 | Status: SHIPPED | OUTPATIENT
Start: 2022-02-07 | End: 2022-07-22

## 2022-02-07 RX ORDER — BLOOD-GLUCOSE METER
KIT MISCELLANEOUS
Qty: 100 EACH | Refills: 3 | Status: SHIPPED | OUTPATIENT
Start: 2022-02-07

## 2022-02-18 ENCOUNTER — ANTICOAG VISIT (OUTPATIENT)
Dept: CARDIOLOGY CLINIC | Facility: CLINIC | Age: 77
End: 2022-02-18

## 2022-02-18 ENCOUNTER — APPOINTMENT (OUTPATIENT)
Dept: LAB | Facility: HOSPITAL | Age: 77
End: 2022-02-18
Payer: MEDICARE

## 2022-02-28 DIAGNOSIS — E78.2 MIXED HYPERLIPIDEMIA: ICD-10-CM

## 2022-02-28 RX ORDER — PIOGLITAZONEHYDROCHLORIDE 15 MG/1
TABLET ORAL
Qty: 90 TABLET | Refills: 1 | Status: SHIPPED | OUTPATIENT
Start: 2022-02-28

## 2022-03-04 ENCOUNTER — APPOINTMENT (OUTPATIENT)
Dept: LAB | Facility: HOSPITAL | Age: 77
End: 2022-03-04
Payer: MEDICARE

## 2022-03-04 ENCOUNTER — ANTICOAG VISIT (OUTPATIENT)
Dept: CARDIOLOGY CLINIC | Facility: CLINIC | Age: 77
End: 2022-03-04

## 2022-03-14 ENCOUNTER — TELEPHONE (OUTPATIENT)
Dept: FAMILY MEDICINE CLINIC | Facility: CLINIC | Age: 77
End: 2022-03-14

## 2022-03-14 DIAGNOSIS — R26.2 AMBULATORY DYSFUNCTION: Primary | ICD-10-CM

## 2022-03-14 NOTE — TELEPHONE ENCOUNTER
ordered High Dose Vitamin A Pregnancy And Lactation Text: High dose vitamin A therapy is contraindicated during pregnancy and breast feeding.

## 2022-03-14 NOTE — TELEPHONE ENCOUNTER
Pt called -       Pt is asking for Referral to Physical Therapy  Within  ( [place], [address]) -  As discussed at the last office visit pt would like to strengthen both legs and  to be able to walk more - I gave pt facility phone # [phone]  Please call pt once order placed - pt will call and schedule an appt  Please advise

## 2022-03-24 ENCOUNTER — EVALUATION (OUTPATIENT)
Dept: PHYSICAL THERAPY | Facility: CLINIC | Age: 77
End: 2022-03-24
Payer: MEDICARE

## 2022-03-24 DIAGNOSIS — R26.2 AMBULATORY DYSFUNCTION: ICD-10-CM

## 2022-03-24 PROCEDURE — 97161 PT EVAL LOW COMPLEX 20 MIN: CPT

## 2022-03-24 NOTE — PROGRESS NOTES
PT Evaluation     Today's date: 3/24/2022  Patient name: Divya Sánchez  : 1945  MRN: [id number]  Referring provider: Savanah Clifton DO  Dx:   Encounter Diagnosis     ICD-10-CM    1  Ambulatory dysfunction  R26 2 Ambulatory Referral to Physical Therapy                  Assessment  Assessment details: Divya Sánchez is a 64 y o  female who presents with signs and symptoms consistent of referring diagnosis which has resulted in decreased hip strength and functional mobility  Patient presents with decreased strength, decreased ROM, ambulatory dysfunction and balance dysfunction  Due to these impairments, Patient has difficulty performing a/iadls, recreational activities and engaging in social activities  Patient ambulates with Trendelenburg gait pattern B/L signifying weak hip abductors which was then confirmed with MMT's of the LE  Patient also shows increased balance deficits which is most prevalent with a decrease in base of support  Patient would benefit from a comprehensive HEP focusing on improving strength, mobility, and dynamic balance  Patient would benefit from skilled physical therapy to address the impairments, improve their level of function, and to improve their overall quality of life  PT POC 2x/wk for 8 weeks  Thank you for this referral      Impairments: abnormal gait, abnormal or restricted ROM, abnormal movement, activity intolerance, impaired balance, impaired physical strength, lacks appropriate home exercise program and safety issue    Goals  Short Term Goals: to be achieved by 4 weeks  1) Patient to be independent with basic HEP  2) Stair negotiation will improve to reciprocal with UE support  3) Increase LE strength by 1/2 MMT grade in all deficient planes  Long Term Goals: to be achieved by discharge  1) FOTO equal to or greater than 49   2) Patient to be independent with comprehensive HEP  3) Increase LE strength to 5/5 MMT grade in all planes to improve a/iadls    4) Patient will demonstrate increase ambulation tolerance to 30 min  5) Improve sit to stand transfers to maximal level of function  6) Improve stair negotiation to maximal level of function      Plan  Plan details: PT POC 2x/wk for 8 weeks  Patient would benefit from: skilled physical therapy  Planned therapy interventions: activity modification, ADL retraining, ADL training, balance, balance/weight bearing training, gait training, therapeutic activities, therapeutic exercise, patient education, neuromuscular re-education and home exercise program  Frequency: 2x week  Duration in weeks: 8  Treatment plan discussed with: patient        Subjective Evaluation    History of Present Illness  Mechanism of injury: History of Current Injury: Patient notes that she has been getting weaker and has had issue with balance  Patient reports that she finds herself extra cautious because she is afraid of falling  She also finds herself pulling herself up the stairs  Patient notes that her balance and walking is very difficulty for her to complete  Her L knee does buckle when she is tired  Patient takes care of her great grandchildren and is kept very busy but is currently struggling to keep up with their energy  She recently reached out to her physician regarding a script for PT so she can improve her strength    Patient goals:  Walk around, increase strength, not be afraid of falling  Hobbies/Interest: clean, play with grandchildren  Occupation: Retired      Patient Goals  Patient goals for therapy: improved balance, increased motion, independence with ADLs/IADLs, return to sport/leisure activities and increased strength          Objective     Strength/Myotome Testing     Left Hip   Planes of Motion   Flexion: 3+  Abduction: 3-  External rotation: 4-  Internal rotation: 4    Right Hip   Planes of Motion   Flexion: 3+  Abduction: 3-  External rotation: 4  Internal rotation: 4    Left Knee   Flexion: 4  Extension: 4+    Right Knee Flexion: 4  Extension: 4+    Left Ankle/Foot   Dorsiflexion: 4+  Plantar flexion: 4+    Right Ankle/Foot   Dorsiflexion: 4+  Plantar flexion: 4+    Ambulation     Observational Gait   Decreased walking speed and stride length  Functional Assessment        Forward Step Up 6"   Left Leg  Within functional limits  Right Leg  Within functional limits       Comments  FAEO: 30" no noted sway    FAEC: 30" increased use of ankle mechanism     FTEO: 30" increased used of ankle and hip mechanism    FTEC: 30" increased lateral sway with difficulty correcting; large use of ankle and foot mechanism     5xSTS: 18 seconds- decreased eccentric control       Required UE support during step ups              Diagnosis: Ambulatory dysfunction   Precautions: None   POC Expires: 5/19/22   Re-evaluation Date: 4/21/22   FOTO Scores/Date: 52 (3/24)   Visit Count        Manuals                                                Ther Ex        Bike        Seated Marches        Seated Abduction        Clamshells        Glute Bridges         SL Abduction        SLR         Hip 3 way         Quad Stretch         Hamstring Stretch         Neuro Re-Ed                                                                                                                       Ther Act             Retro walking         Sit to stand              TRX squat             Modalities

## 2022-03-28 ENCOUNTER — OFFICE VISIT (OUTPATIENT)
Dept: PHYSICAL THERAPY | Facility: CLINIC | Age: 77
End: 2022-03-28
Payer: MEDICARE

## 2022-03-28 DIAGNOSIS — R26.2 AMBULATORY DYSFUNCTION: Primary | ICD-10-CM

## 2022-03-28 PROCEDURE — 97530 THERAPEUTIC ACTIVITIES: CPT

## 2022-03-28 PROCEDURE — 97110 THERAPEUTIC EXERCISES: CPT

## 2022-03-28 NOTE — PROGRESS NOTES
Daily Note     Today's date: 3/28/2022  Patient name: Tish Jones  : 1945  MRN: [id number]  Referring provider: Olamide Church DO  Dx:   Encounter Diagnosis     ICD-10-CM    1  Ambulatory dysfunction  R26 2                   Subjective: Patient reports no changes since her last session  Objective: See treatment diary below      Assessment: Tolerated treatment well  Responded well to implementation of exercises this date  Focused on patient LE mobility, strength, and motor control  Noted difficulty with sidelying clamshells especially when resistance added  Patient unable to perform step ups without UE assistance due to fear of falling  L knee buckle noted after repeated step ups Try step ups next session at lower height with no UE support  Patient responded well with increased fatigue post session  Progress as tolerated  Patient would benefit from continued PT  Plan: Continue per plan of care        Diagnosis: Ambulatory dysfunction   Precautions: None   POC Expires: 22   Re-evaluation Date: 22   FOTO Scores/Date: 52 (3/24)   Visit Count 1       Manuals 3/28                                               Ther Ex        Bike 5'       Seated Marches        Seated Abduction        Clamshells 2x12 ea leg        Glute Bridges  2x10        SL Abduction        Leg Press         SLR  2x12 ea leg        Hip 3 way  2x10 ext, abd        Side steps         Quad Stretch         Hamstring Stretch  10x10" B/L        Neuro Re-Ed        DL Balance        SL Balance        Foam Balance        Bent over extensions                                                                                        Ther Act             Retro walking         Sit to stand  15x no UE support           Step ups  2x10 ea leg 6" UE support        TRX squat             Modalities

## 2022-03-30 ENCOUNTER — OFFICE VISIT (OUTPATIENT)
Dept: PHYSICAL THERAPY | Facility: CLINIC | Age: 77
End: 2022-03-30
Payer: MEDICARE

## 2022-03-30 DIAGNOSIS — R26.2 AMBULATORY DYSFUNCTION: Primary | ICD-10-CM

## 2022-03-30 PROCEDURE — 97530 THERAPEUTIC ACTIVITIES: CPT

## 2022-03-30 PROCEDURE — 97110 THERAPEUTIC EXERCISES: CPT

## 2022-03-30 NOTE — PROGRESS NOTES
Daily Note     Today's date: 3/30/2022  Patient name: Matthew Francis  : 1945  MRN: [id number]  Referring provider: Radha Portillo DO  Dx:   Encounter Diagnosis     ICD-10-CM    1  Ambulatory dysfunction  R26 2                   Subjective: Patient reported some increased muscle tightness after the last session  Still noted some increased tightness this date although it has improved  Objective: See treatment diary below      Assessment: Tolerated treatment well  Sessions continued to progress strengthening while implementing stretching and mobility exercises  Good response shown to exercises this date with no increases in pain or discomfort noted  Increased hip abduction weakness noted and seen during SL activity which resulted in hip flexion compensations  Patient able to correct after tactile cueing  Increased difficulty with any form of resistance applied to the hips  Fatigue noted at end of session  Patient would benefit from continued PT      Plan: Continue per plan of care        Diagnosis: Ambulatory dysfunction   Precautions: None   POC Expires: 22   Re-evaluation Date: 22   FOTO Scores/Date: 52 (3/24)   Visit Count 1 2      Manuals 3/28 3/30                                              Ther Ex        Bike 5' 5'      Seated Marches        Seated Abduction        Clamshells 2x12 ea leg        Glute Bridges  2x10  3x10 gtb      Reverse clamshells        SL Abduction  2x12 ea leg      Leg Press         SLR  2x12 ea leg        Hip 3 way  2x10 ext, abd  x10 fwd,bwd,abd ytb ea leg       Supine hip flexor stretch  2' ea side add quad stretch       Side steps         Quad Stretch         Hamstring Stretch  10x10" B/L        Neuro Re-Ed        DL Balance        SL Balance        Foam Balance        Bent over extensions                                                                                        Ther Act             Retro walking         Sit to stand  15x no UE support  weighted 1x10 7 5# 1x10 10#          Step ups  2x10 ea leg 6" UE support 4" 1x10 ea leg 6" 1x10 ea leg       Resisted forwards walking          TRX squat             Modalities

## 2022-03-31 ENCOUNTER — REMOTE DEVICE CLINIC VISIT (OUTPATIENT)
Dept: CARDIOLOGY CLINIC | Facility: CLINIC | Age: 77
End: 2022-03-31
Payer: MEDICARE

## 2022-03-31 DIAGNOSIS — Z95.0 PRESENCE OF PERMANENT CARDIAC PACEMAKER: Primary | ICD-10-CM

## 2022-03-31 PROCEDURE — 93296 REM INTERROG EVL PM/IDS: CPT | Performed by: INTERNAL MEDICINE

## 2022-03-31 PROCEDURE — 93294 REM INTERROG EVL PM/LDLS PM: CPT | Performed by: INTERNAL MEDICINE

## 2022-03-31 NOTE — PROGRESS NOTES
[facility] DUAL CHAMBER PM/NOT MRI CONDITIONAL   MERLIN TRANSMISSION:  BATTERY VOLTAGE ADEQUATE (7 9 YR)   AP 97%  1 8%    ALL LEAD PARAMETERS WITHIN NORMAL LIMITS   NO SIGNIFICANT HIGH RATE EPISODES   NORMAL DEVICE FUNCTION    RG

## 2022-04-04 ENCOUNTER — TELEPHONE (OUTPATIENT)
Dept: FAMILY MEDICINE CLINIC | Facility: CLINIC | Age: 77
End: 2022-04-04

## 2022-04-04 ENCOUNTER — OFFICE VISIT (OUTPATIENT)
Dept: PHYSICAL THERAPY | Facility: CLINIC | Age: 77
End: 2022-04-04
Payer: MEDICARE

## 2022-04-04 DIAGNOSIS — R26.2 AMBULATORY DYSFUNCTION: Primary | ICD-10-CM

## 2022-04-04 PROCEDURE — 97110 THERAPEUTIC EXERCISES: CPT

## 2022-04-04 PROCEDURE — 97112 NEUROMUSCULAR REEDUCATION: CPT

## 2022-04-04 PROCEDURE — 97530 THERAPEUTIC ACTIVITIES: CPT

## 2022-04-04 NOTE — PROGRESS NOTES
Daily Note     Today's date: 2022  Patient name: Enoch Valenzuela  : 1945  MRN: [id number]  Referring provider: Alex Shell DO  Dx:   Encounter Diagnosis     ICD-10-CM    1  Ambulatory dysfunction  R26 2        Start Time: 1420          Subjective: Pt reports she has been feeling pretty good except for some dizziness which she is seeing the doctor for tomorrow  Objective: See treatment diary below      Assessment: Tolerated treatment well  Patient would benefit from continued PT  Pt required VC to keep knees and hips in line during bridges  Good stability noted w/ UE support w/ balance activities  Limited supine exercises secondary to increased dizziness  Plan: Continue per plan of care        Diagnosis: Ambulatory dysfunction   Precautions: None   POC Expires: 22   Re-evaluation Date: 22   FOTO Scores/Date: 52 (3/24)   Visit Count 1 2 3     Manuals 3/28 3/30 4/4                                             Ther Ex        Bike 5' 5' 6'     Seated Marches        Seated Abduction        Clamshells 2x12 ea leg   2x12 ea gtb     Glute Bridges  2x10  3x10 gtb 3x10 gtb     Reverse clamshells        SL Abduction  2x12 ea leg      Leg Press         SLR  2x12 ea leg        Hip 3 way  2x10 ext, abd  x10 fwd,bwd,abd ytb ea leg  x10 fwd,bwd,abd ytb ea leg      Supine hip flexor stretch  2' ea side add quad stretch       Side steps    3 laps @ bar             Quad Stretch         Hamstring Stretch  10x10" B/L        Neuro Re-Ed        DL Balance   Tandem stance 20" 3x     SL Balance   airex 10" x10     Foam Balance        Bent over extensions                                                                                        Ther Act             Retro walking         Sit to stand  15x no UE support  weighted 1x10 7 5# 1x10 10#   weighted 2x10 10#       Step ups  2x10 ea leg 6" UE support 4" 1x10 ea leg 6" 1x10 ea leg  6" 2x10 ea leg      Resisted forwards walking          TRX squat Modalities

## 2022-04-05 ENCOUNTER — OFFICE VISIT (OUTPATIENT)
Dept: FAMILY MEDICINE CLINIC | Facility: CLINIC | Age: 77
End: 2022-04-05
Payer: MEDICARE

## 2022-04-05 VITALS
WEIGHT: 155 LBS | OXYGEN SATURATION: 95 % | HEIGHT: 61 IN | HEART RATE: 62 BPM | DIASTOLIC BLOOD PRESSURE: 62 MMHG | SYSTOLIC BLOOD PRESSURE: 100 MMHG | BODY MASS INDEX: 29.27 KG/M2 | TEMPERATURE: 98.1 F

## 2022-04-05 DIAGNOSIS — E87.6 HYPOKALEMIA: ICD-10-CM

## 2022-04-05 DIAGNOSIS — R42 DIZZINESS: Primary | ICD-10-CM

## 2022-04-05 DIAGNOSIS — R42 VERTIGO: ICD-10-CM

## 2022-04-05 DIAGNOSIS — H61.21 IMPACTED CERUMEN OF RIGHT EAR: ICD-10-CM

## 2022-04-05 DIAGNOSIS — E03.9 ADULT HYPOTHYROIDISM: ICD-10-CM

## 2022-04-05 DIAGNOSIS — I10 ESSENTIAL HYPERTENSION: ICD-10-CM

## 2022-04-05 PROCEDURE — 99214 OFFICE O/P EST MOD 30 MIN: CPT | Performed by: FAMILY MEDICINE

## 2022-04-05 PROCEDURE — 69210 REMOVE IMPACTED EAR WAX UNI: CPT | Performed by: FAMILY MEDICINE

## 2022-04-05 RX ORDER — MECLIZINE HCL 12.5 MG/1
12.5 TABLET ORAL EVERY 8 HOURS PRN
Qty: 30 TABLET | Refills: 0 | Status: SHIPPED | OUTPATIENT
Start: 2022-04-05

## 2022-04-05 NOTE — PROGRESS NOTES
Assessment/Plan:    No problem-specific Assessment & Plan notes found for this encounter  Diagnoses and all orders for this visit:    Dizziness  -     TSH, 3rd generation with Free T4 reflex; Future  -     CBC and differential; Future  -     Comprehensive metabolic panel; Future    Essential hypertension  -     Comprehensive metabolic panel; Future    Hypokalemia  -     Comprehensive metabolic panel; Future    Adult hypothyroidism  -     TSH, 3rd generation with Free T4 reflex; Future    Vertigo  -     meclizine (ANTIVERT) 12 5 MG tablet; Take 1 tablet (12 5 mg total) by mouth every 8 (eight) hours as needed for dizziness        Orthostatics negative  Labs ordered  PRN meclizine  Follow up if no improvement  On sulfonylurea, consider decrease or changing, Hgb A1c is 6 4 , as this could be contributing to the dizziness  Subjective:      Patient ID: Nelida Keane is a 64 y o  female  HPI    Notes that she has had intermittent dizziness for the last few months  States that this has not been getting better or worse in the last few months  Notes that her blood sugars have been running good  This morning it was 109  Sometimes with lower blood sugars in the 80s  Denies dizziness with her lower blood sugars  The following portions of the patient's history were reviewed and updated as appropriate: allergies, current medications, past family history, past medical history, past social history, past surgical history and problem list     Review of Systems      Objective:  /62 (BP Location: Left arm, Patient Position: Standing, Cuff Size: Large)   Pulse 62   Temp 98 1 °F (36 7 °C)   Ht 5' 1" (1 549 m)   Wt 70 3 kg (155 lb)   SpO2 95%   BMI 29 29 kg/m²      Physical Exam  Vitals reviewed  Constitutional:       General: She is not in acute distress  Appearance: Normal appearance  HENT:      Head: Normocephalic and atraumatic        Right Ear: Ear canal and external ear normal  No drainage or swelling  A middle ear effusion is present  There is impacted cerumen  No mastoid tenderness  Tympanic membrane is not injected or bulging  Left Ear: Tympanic membrane, ear canal and external ear normal       Nose: Nose normal       Mouth/Throat:      Mouth: Mucous membranes are moist    Eyes:      Extraocular Movements: Extraocular movements intact  Conjunctiva/sclera: Conjunctivae normal       Pupils: Pupils are equal, round, and reactive to light  Cardiovascular:      Rate and Rhythm: Normal rate and regular rhythm  Heart sounds: Normal heart sounds  Pulmonary:      Effort: Pulmonary effort is normal       Breath sounds: Normal breath sounds  No wheezing, rhonchi or rales  Abdominal:      General: Bowel sounds are normal  There is no distension  Palpations: Abdomen is soft  Tenderness: There is no abdominal tenderness  Musculoskeletal:      Cervical back: Neck supple  Right lower leg: No edema  Left lower leg: No edema  Lymphadenopathy:      Cervical: No cervical adenopathy  Skin:     General: Skin is warm  Capillary Refill: Capillary refill takes less than 2 seconds  Findings: No rash  Neurological:      Mental Status: She is alert  Mental status is at baseline  Ear cerumen removal    Date/Time: 4/5/2022 4:23 PM  Performed by: Stas Romero DO  Authorized by: Stas Romero DO   Universal Protocol:  Consent given by: patient  Patient identity confirmed: verbally with patient      Patient location:  Clinic  Procedure details:     Local anesthetic:  None    Location:  R ear    Procedure type: irrigation with instrumentation      Instrumentation: curette      Approach:  Natural orifice  Post-procedure details:     Complication:  None    Hearing quality:  Improved    Patient tolerance of procedure:   Tolerated well, no immediate complications      Stas Romero DO  [facility]  4/5/2022 4:11 PM

## 2022-04-06 ENCOUNTER — TELEPHONE (OUTPATIENT)
Dept: ADMINISTRATIVE | Facility: OTHER | Age: 77
End: 2022-04-06

## 2022-04-06 ENCOUNTER — APPOINTMENT (OUTPATIENT)
Dept: PHYSICAL THERAPY | Facility: CLINIC | Age: 77
End: 2022-04-06
Payer: MEDICARE

## 2022-04-06 NOTE — PROGRESS NOTES
Daily Note     Today's date: 2022  Patient name: Micki Connelly  : 1945  MRN: [id number]  Referring provider: Mayte Sanchez DO  Dx: No diagnosis found  Subjective: pt reports      Objective: See treatment diary below      Assessment: Tolerated treatment well  Patient would benefit from continued PT  Plan: Continue per plan of care        Diagnosis: Ambulatory dysfunction   Precautions: None   POC Expires: 22   Re-evaluation Date: 22   FOTO Scores/Date: 52 (3/24)   Visit Count 1 2 3 4    Manuals 3/28 3/30 4/4 4/6                                            Ther Ex        Bike 5' 5' 6'     Seated Marches        Seated Abduction        Clamshells 2x12 ea leg   2x12 ea gtb     Glute Bridges  2x10  3x10 gtb 3x10 gtb     Reverse clamshells        SL Abduction  2x12 ea leg      Leg Press         SLR  2x12 ea leg        Hip 3 way  2x10 ext, abd  x10 fwd,bwd,abd ytb ea leg  x10 fwd,bwd,abd ytb ea leg      Supine hip flexor stretch  2' ea side add quad stretch       Side steps    3 laps @ bar             Quad Stretch         Hamstring Stretch  10x10" B/L        Neuro Re-Ed        DL Balance   Tandem stance 20" 3x     SL Balance   airex 10" x10     Foam Balance        Bent over extensions                                                                                        Ther Act             Retro walking         Sit to stand  15x no UE support  weighted 1x10 7 5# 1x10 10#   weighted 2x10 10#       Step ups  2x10 ea leg 6" UE support 4" 1x10 ea leg 6" 1x10 ea leg  6" 2x10 ea leg      Resisted forwards walking          TRX squat             Modalities

## 2022-04-06 NOTE — TELEPHONE ENCOUNTER
Upon review of the In Basket request and the patient's chart, initial outreach has been made via fax, please see Contacts section for details       Thank you  Shae Wray

## 2022-04-06 NOTE — TELEPHONE ENCOUNTER
----- Message from Christine Evans sent at 4/5/2022  3:34 PM EDT -----  Regarding: Care gap request  04/05/22 3:34 PM    Hello, our patient Flor Chamberlain has had Diabetic Eye Exam completed/performed   Please assist in updating the patient chart by making an External outreach to [facility] facility located in [facility] The date of service is 12/2021    Thank you,  Christine Evans   [facility] [address]

## 2022-04-06 NOTE — LETTER
Diabetic Eye Exam Form    Date Requested: 22  Patient: Adriane Reddy  Patient : 1945   Referring Provider: José Manuel Shirley DO    DIABETIC Eye Exam Date _______________________________    Type of Exam MUST be documented for Diabetic Eye Exams  Please CHECK ONE  Retinal Exam       Dilated Retinal Exam       OCT       Optomap-Iris Exam      Fundus Photography     Left Eye - Please check Retinopathy AND Type or No Retinopathy      Exam did show retinopathy    Exam did not show retinopathy         Mild     Proliferative           Moderate    Severe            None         Right Eye - Please check Retinopathy AND Type or No Retinopathy     Exam did show retinopathy    Exam did not show retinopathy         Mild     Proliferative        Moderate    Severe        None       Comments __________________________________________________________    Practice Providing Exam ______________________________________________    Exam Performed By (print name) _______________________________________      Provider Signature ___________________________________________________    These reports are needed for  compliance  Please fax this completed form and a copy of the Diabetic Eye Exam report to our office located at [place] [address] as soon as possible via [phone] krystle Oliva Brionna: Phone [phone]  We thank you for your assistance in treating our mutual patient

## 2022-04-07 NOTE — TELEPHONE ENCOUNTER
T/c from pt - she is going for bw for Dr Michael Elizalde on Wednesday (PT/INR, is going to be put on warfarin) - pt is reporting she has been feeling tried and drained, which is what happened when her potassium was low previously  She would like to know if she should have test for that done at that time? Please advise  Calm

## 2022-04-08 NOTE — TELEPHONE ENCOUNTER
Upon review of the In Basket request we were able to locate, review, and update the patient chart as requested for Diabetic Eye Exam     Any additional questions or concerns should be emailed to the Practice Liaisons via [email or web address] email, please do not reply via In Basket      Thank you  Khalida Youngblood

## 2022-04-09 ENCOUNTER — APPOINTMENT (OUTPATIENT)
Dept: LAB | Facility: HOSPITAL | Age: 77
End: 2022-04-09
Payer: MEDICARE

## 2022-04-09 DIAGNOSIS — E03.9 ADULT HYPOTHYROIDISM: ICD-10-CM

## 2022-04-09 DIAGNOSIS — I10 ESSENTIAL HYPERTENSION: ICD-10-CM

## 2022-04-09 DIAGNOSIS — E87.6 HYPOKALEMIA: ICD-10-CM

## 2022-04-09 DIAGNOSIS — R42 DIZZINESS: ICD-10-CM

## 2022-04-09 LAB
ALBUMIN SERPL BCP-MCNC: 3.5 G/DL (ref 3.5–5)
ALP SERPL-CCNC: 79 U/L (ref 46–116)
ALT SERPL W P-5'-P-CCNC: 21 U/L (ref 12–78)
ANION GAP SERPL CALCULATED.3IONS-SCNC: 6 MMOL/L (ref 4–13)
AST SERPL W P-5'-P-CCNC: 19 U/L (ref 5–45)
BASOPHILS # BLD AUTO: 0.04 THOUSANDS/ΜL (ref 0–0.1)
BASOPHILS NFR BLD AUTO: 1 % (ref 0–1)
BILIRUB SERPL-MCNC: 0.3 MG/DL (ref 0.2–1)
BUN SERPL-MCNC: 20 MG/DL (ref 5–25)
CALCIUM SERPL-MCNC: 11.2 MG/DL (ref 8.3–10.1)
CHLORIDE SERPL-SCNC: 99 MMOL/L (ref 100–108)
CO2 SERPL-SCNC: 31 MMOL/L (ref 21–32)
CREAT SERPL-MCNC: 0.84 MG/DL (ref 0.6–1.3)
EOSINOPHIL # BLD AUTO: 0.25 THOUSAND/ΜL (ref 0–0.61)
EOSINOPHIL NFR BLD AUTO: 3 % (ref 0–6)
ERYTHROCYTE [DISTWIDTH] IN BLOOD BY AUTOMATED COUNT: 14.6 % (ref 11.6–15.1)
GFR SERPL CREATININE-BSD FRML MDRD: 66 ML/MIN/1.73SQ M
GLUCOSE P FAST SERPL-MCNC: 132 MG/DL (ref 65–99)
HCT VFR BLD AUTO: 36.8 % (ref 34.8–46.1)
HGB BLD-MCNC: 11.8 G/DL (ref 11.5–15.4)
IMM GRANULOCYTES # BLD AUTO: 0.03 THOUSAND/UL (ref 0–0.2)
IMM GRANULOCYTES NFR BLD AUTO: 0 % (ref 0–2)
LYMPHOCYTES # BLD AUTO: 2.36 THOUSANDS/ΜL (ref 0.6–4.47)
LYMPHOCYTES NFR BLD AUTO: 30 % (ref 14–44)
MCH RBC QN AUTO: 29.6 PG (ref 26.8–34.3)
MCHC RBC AUTO-ENTMCNC: 32.1 G/DL (ref 31.4–37.4)
MCV RBC AUTO: 93 FL (ref 82–98)
MONOCYTES # BLD AUTO: 0.84 THOUSAND/ΜL (ref 0.17–1.22)
MONOCYTES NFR BLD AUTO: 11 % (ref 4–12)
NEUTROPHILS # BLD AUTO: 4.4 THOUSANDS/ΜL (ref 1.85–7.62)
NEUTS SEG NFR BLD AUTO: 55 % (ref 43–75)
NRBC BLD AUTO-RTO: 0 /100 WBCS
PLATELET # BLD AUTO: 432 THOUSANDS/UL (ref 149–390)
PMV BLD AUTO: 9.9 FL (ref 8.9–12.7)
POTASSIUM SERPL-SCNC: 3.3 MMOL/L (ref 3.5–5.3)
PROT SERPL-MCNC: 7.4 G/DL (ref 6.4–8.2)
RBC # BLD AUTO: 3.98 MILLION/UL (ref 3.81–5.12)
SODIUM SERPL-SCNC: 136 MMOL/L (ref 136–145)
TSH SERPL DL<=0.05 MIU/L-ACNC: 2.84 UIU/ML (ref 0.45–4.5)
WBC # BLD AUTO: 7.92 THOUSAND/UL (ref 4.31–10.16)

## 2022-04-09 PROCEDURE — 80053 COMPREHEN METABOLIC PANEL: CPT

## 2022-04-09 PROCEDURE — 84443 ASSAY THYROID STIM HORMONE: CPT

## 2022-04-09 PROCEDURE — 85025 COMPLETE CBC W/AUTO DIFF WBC: CPT

## 2022-04-11 ENCOUNTER — TELEPHONE (OUTPATIENT)
Dept: FAMILY MEDICINE CLINIC | Facility: CLINIC | Age: 77
End: 2022-04-11

## 2022-04-11 ENCOUNTER — ANTICOAG VISIT (OUTPATIENT)
Dept: CARDIOLOGY CLINIC | Facility: CLINIC | Age: 77
End: 2022-04-11

## 2022-04-11 ENCOUNTER — APPOINTMENT (OUTPATIENT)
Dept: PHYSICAL THERAPY | Facility: CLINIC | Age: 77
End: 2022-04-11
Payer: MEDICARE

## 2022-04-11 NOTE — TELEPHONE ENCOUNTER
T/c from pt -- pt noticed that her bw results, bw ordered by Dr Manohar Chen, has returned through [address]  Is requesting Dr Manohar Chen look it over and let her know what she has found in terms of her dizziness  Please advise

## 2022-04-13 ENCOUNTER — OFFICE VISIT (OUTPATIENT)
Dept: PHYSICAL THERAPY | Facility: CLINIC | Age: 77
End: 2022-04-13
Payer: MEDICARE

## 2022-04-13 DIAGNOSIS — R26.2 AMBULATORY DYSFUNCTION: Primary | ICD-10-CM

## 2022-04-13 PROCEDURE — 97530 THERAPEUTIC ACTIVITIES: CPT

## 2022-04-13 PROCEDURE — 97110 THERAPEUTIC EXERCISES: CPT

## 2022-04-13 PROCEDURE — 97112 NEUROMUSCULAR REEDUCATION: CPT

## 2022-04-13 NOTE — PROGRESS NOTES
Daily Note     Today's date: 2022  Patient name: Basim Poole  : 1945  MRN: [id number]  Referring provider: Anni Hay DO  Dx:   Encounter Diagnosis     ICD-10-CM    1  Ambulatory dysfunction  R26 2                   Subjective: Patient reports that her dizziness has not improved much  Reports going to see the Dr and being prescribed medications although the medication only made her sleepy so she stopped taking it  Overall, is feeling better than last week  Objective: See treatment diary below      Assessment: Tolerated treatment well  Continued to progress patient through strengthening activities this date  Increased difficulty noted during hip 3 way, especially during abduction with difficulty during both the concentric and eccentric portions of the activity  Responded well to resisted walking this date although she struggled to control eccentric portions of both forward and backward resisted walking  Compensations noted with fatigue as patient's foot would ER during side stepping activity signifying weak hip abductor musculature  Continues to rely on UE during dynamic balance  Patient would benefit from continued PT      Plan: Continue per plan of care        Diagnosis: Ambulatory dysfunction   Precautions: None   POC Expires: 22   Re-evaluation Date: 22   FOTO Scores/Date: 52 (3/24)   Visit Count 1 2 3 4    Manuals 3/28 3/30 4/4 4/13                                            Ther Ex        Bike 5' 5' 6' 5'    Seated Marches        Seated Abduction        Clamshells 2x12 ea leg   2x12 ea gtb     Glute Bridges  2x10  3x10 gtb 3x10 gtb     Reverse clamshells        SL Abduction  2x12 ea leg      Leg Press         SLR  2x12 ea leg        Hip 3 way  2x10 ext, abd  x10 fwd,bwd,abd ytb ea leg  x10 fwd,bwd,abd ytb ea leg  2x10 fwd, bwd, abd rtb     Supine hip flexor stretch  2' ea side add quad stretch       Side steps    3 laps @ bar 4 laps @ bar rtb             Quad Stretch Hamstring Stretch  10x10" B/L        Neuro Re-Ed        DL Balance   Tandem stance 20" 3x     SL Balance   airex 10" x10 SL 2x30" ea leg     Foam Balance        Bent over extensions          walks on foam     Tandem, side steps 4x                                                                            Ther Act             Retro walking    8x 12 5#      Sit to stand  15x no UE support  weighted 1x10 7 5# 1x10 10#   weighted 2x10 10#       Step ups  2x10 ea leg 6" UE support 4" 1x10 ea leg 6" 1x10 ea leg  6" 2x10 ea leg      Resisted forwards walking     8x 9#      TRX squat        3x10 to low table      Modalities

## 2022-04-17 DIAGNOSIS — E78.2 MIXED HYPERLIPIDEMIA: ICD-10-CM

## 2022-04-17 DIAGNOSIS — E03.9 ADULT HYPOTHYROIDISM: ICD-10-CM

## 2022-04-17 RX ORDER — LEVOTHYROXINE SODIUM 0.05 MG/1
TABLET ORAL
Qty: 90 TABLET | Refills: 1 | Status: SHIPPED | OUTPATIENT
Start: 2022-04-17

## 2022-04-17 RX ORDER — ROSUVASTATIN CALCIUM 10 MG/1
TABLET, COATED ORAL
Qty: 90 TABLET | Refills: 3 | Status: SHIPPED | OUTPATIENT
Start: 2022-04-17

## 2022-04-18 ENCOUNTER — OFFICE VISIT (OUTPATIENT)
Dept: FAMILY MEDICINE CLINIC | Facility: CLINIC | Age: 77
End: 2022-04-18
Payer: MEDICARE

## 2022-04-18 ENCOUNTER — APPOINTMENT (OUTPATIENT)
Dept: PHYSICAL THERAPY | Facility: CLINIC | Age: 77
End: 2022-04-18
Payer: MEDICARE

## 2022-04-18 VITALS
OXYGEN SATURATION: 97 % | HEIGHT: 61 IN | HEART RATE: 60 BPM | BODY MASS INDEX: 30.21 KG/M2 | SYSTOLIC BLOOD PRESSURE: 128 MMHG | DIASTOLIC BLOOD PRESSURE: 68 MMHG | TEMPERATURE: 100.3 F | WEIGHT: 160 LBS

## 2022-04-18 DIAGNOSIS — Z01.818 ENCOUNTER FOR PREOPERATIVE ASSESSMENT: Primary | ICD-10-CM

## 2022-04-18 DIAGNOSIS — H02.401 PTOSIS OF EYELID, RIGHT: ICD-10-CM

## 2022-04-18 PROCEDURE — 99214 OFFICE O/P EST MOD 30 MIN: CPT | Performed by: FAMILY MEDICINE

## 2022-04-18 NOTE — PROGRESS NOTES
[facility]    NAME: Joana Nageotte Winter  AGE: 64 y o  SEX: female  : 1945     DATE: 2022    Internal Medicine Pre-Operative Evaluation      Chief Complaint: Pre-operative Evaluation     Surgery: RUL ptosis surgery  Anticipated Date of Surgery: 22  Referring Provider: Dr Wilfredo Finn     History of Present Illness:     Madeleine Marlow is a 64 y o  female who presents to the office today for a preoperative consultation at the request of surgeon Dr Wilfredo Finn who plans on performing RUL on 22  Planned anesthesia is MAC  Patient has a bleeding risk of: no recent abnormal bleeding  Patient does not have objections to receiving blood products if needed  Current anti-platelet/anti-coagulation medications that the patient is prescribed includes: Warfarin (Coumadin or Jantoven)     Assessment of Chronic Conditions:   - Diabetes Mellitus: Last HGb A1c of 6 4, on Glipizide   - Hypertension: BP controlled  - Sinoatrial node dysfunction: stable, following with cardiology  Assessment of Cardiac Risk:  · Denies unstable or severe angina or MI in the last 6 weeks or history of stent placement in the last year   · Denies decompensated heart failure (e g  New onset heart failure, NYHA functional class IV heart failure, or worsening existing heart failure)  · Denies significant arrhythmias such as high grade AV block, symptomatic ventricular arrhythmia, newly recognized ventricular tachycardia, supraventricular tachycardia with resting heart rate >100, or symptomatic bradycardia  · Denies severe heart valve disease including aortic stenosis or symptomatic mitral stenosis     Exercise Capacity:  · Able to walk 4 blocks without symptoms?: Yes  · Able to walk 2 flights without symptoms?: Yes    Prior Anesthesia Reactions: No     Personal history of venous thromboembolic disease? No    History of steroid use for >2 weeks within last year?  No    STOP-BANG Sleep Apnea Screening Questionnaire:       STOP-BANG Questionnaire (GEORGINA)     Snoring Loud enough to hear through a door? Yes   Tired Falling asleep while driving or talking? No   Observed Gasping or choking while sleeping? No   Pressure Hypertension? Yes   BMI >35? No   Age  Older then 48? Yes   Neck Size   >17 in Male? >16 in Female? No   Male  No        Risk Low (0-2)  Intermediate (3-4)  High (5-8) intermediate      Review of Systems:     Review of Systems   Constitutional: Negative for activity change, appetite change, fatigue and fever  HENT: Negative for congestion, ear pain, rhinorrhea and sore throat  Eyes: Negative for pain  Respiratory: Negative for cough and shortness of breath  Cardiovascular: Negative for chest pain and leg swelling  Gastrointestinal: Negative for abdominal pain, constipation, diarrhea, nausea and vomiting  Genitourinary: Negative for dysuria, frequency and urgency  Musculoskeletal: Negative for gait problem  Skin: Negative for rash  Neurological: Negative for headaches       Current Problem List:     Patient Active Problem List   Diagnosis    Iron deficiency anemia secondary to inadequate dietary iron intake    Arthralgia of both knees    Mixed hyperlipidemia    Essential hypertension    Hypokalemia    Adult hypothyroidism    Sinoatrial node dysfunction (HCC)    Type 2 diabetes mellitus without complication, without long-term current use of insulin (HCC)    Worsening vision    Cardiac pacemaker in situ    Artificial knee joint present       Allergies:     No Known Allergies    Physical Exam:       Current Outpatient Medications:     Ascorbic Acid (VITAMIN C) 100 MG tablet, Take 1,000 mg by mouth daily , Disp: , Rfl:     atenolol-chlorthalidone (TENORETIC) 50-25 mg per tablet, TAKE ONE TABLET BY MOUTH EVERY DAY, Disp: 90 tablet, Rfl: 2    B Complex Vitamins (B COMPLEX 100 PO), Take 1 tablet by mouth daily, Disp: , Rfl:     Blood Glucose Monitoring Suppl (FREESTYLE LITE) RANULFO, by Does not apply route 2 (two) times a day Dx E11 9, Disp: 1 each, Rfl: 0    FREESTYLE LITE test strip, USE TO CHECK BLOOD SUGAR TWO TIMES A DAY, Disp: 100 each, Rfl: 3    glipiZIDE (GLUCOTROL XL) 2 5 mg 24 hr tablet, Take 1 tablet (2 5 mg total) by mouth 2 (two) times a day, Disp: 180 tablet, Rfl: 1    glucose blood (JASWANT CONTOUR TEST) test strip, Check blood sugar twice daily, DX:E11 9, Disp: 200 each, Rfl: 3    Lancets (freestyle) lancets, USE TO TEST TWO TIMES A DAY, Disp: 100 each, Rfl: 1    levothyroxine 50 mcg tablet, TAKE ONE TABLET BY MOUTH EVERY DAY, Disp: 90 tablet, Rfl: 1    meclizine (ANTIVERT) 12 5 MG tablet, Take 1 tablet (12 5 mg total) by mouth every 8 (eight) hours as needed for dizziness, Disp: 30 tablet, Rfl: 0    Multiple Vitamin (MULTIVITAMIN) tablet, Take 1 tablet by mouth daily, Disp: , Rfl:     niacin 500 mg tablet, Take by mouth , Disp: , Rfl:     pioglitazone (ACTOS) 15 mg tablet, TAKE ONE TABLET BY MOUTH EVERY DAY, Disp: 90 tablet, Rfl: 1    rosuvastatin (CRESTOR) 10 MG tablet, TAKE ONE TABLET BY MOUTH EVERY DAY, Disp: 90 tablet, Rfl: 3    warfarin (COUMADIN) 5 mg tablet, Take 1-2 tablets by mouth daily in the evening , Disp: 180 tablet, Rfl: 3    Past Medical History:       Past Medical History:   Diagnosis Date    Carpal tunnel syndrome     Heart murmur         Past Surgical History:   Procedure Laterality Date    CATARACT EXTRACTION      CHOLECYSTECTOMY      COLONOSCOPY  11/24/2007    KNEE SURGERY      REPLACEMENT TOTAL KNEE Right     TOTAL ABDOMINAL HYSTERECTOMY W/ BILATERAL SALPINGOOPHORECTOMY          Family History   Problem Relation Age of Onset    Heart attack Father         ARRHYTHMIAS    Coronary artery disease Family     Diabetes Family     Heart attack Family         ARRHYTHMIAS      Social History     Socioeconomic History    Marital status:       Spouse name: Not on file    Number of children: Not on file    Years of education: Not on file    Highest education level: Not on file   Occupational History    Not on file   Tobacco Use    Smoking status: Never Smoker    Smokeless tobacco: Never Used   Vaping Use    Vaping Use: Never used   Substance and Sexual Activity    Alcohol use: Yes    Drug use: No    Sexual activity: Not on file   Other Topics Concern    Not on file   Social History Narrative    Always uses seat belt    Employed    Lives with spouse      Social Determinants of Health     Financial Resource Strain: Not on file   Food Insecurity: Not on file   Transportation Needs: Not on file   Physical Activity: Not on file   Stress: Not on file   Social Connections: Not on file   Intimate Partner Violence: Not on file   Housing Stability: Not on file     Physical Exam:     Vitals:    04/18/22 0822   BP: 128/68   Pulse: 60   Temp: 100 3 °F (37 9 °C)   SpO2: 97%     Physical Exam  Vitals reviewed  Constitutional:       General: She is not in acute distress  Appearance: Normal appearance  HENT:      Head: Normocephalic and atraumatic  Right Ear: External ear normal       Left Ear: External ear normal       Nose: Nose normal       Mouth/Throat:      Mouth: Mucous membranes are moist    Eyes:      Extraocular Movements: Extraocular movements intact  Conjunctiva/sclera: Conjunctivae normal       Comments: RUL ptosis   Cardiovascular:      Rate and Rhythm: Normal rate and regular rhythm  Heart sounds: Normal heart sounds  Pulmonary:      Effort: Pulmonary effort is normal       Breath sounds: Normal breath sounds  No wheezing, rhonchi or rales  Abdominal:      General: Bowel sounds are normal  There is no distension  Palpations: Abdomen is soft  Tenderness: There is no abdominal tenderness  Musculoskeletal:      Right lower leg: No edema  Left lower leg: No edema  Skin:     General: Skin is warm  Capillary Refill: Capillary refill takes less than 2 seconds  Findings: No rash  Neurological:      Mental Status: She is alert  Mental status is at baseline  Data:     Pre-operative work-up  CBC:   Results from last 6 Months   Lab Units 04/09/22  0742   WBC Thousand/uL 7 92   RBC Million/uL 3 98   HEMOGLOBIN g/dL 11 8   HEMATOCRIT % 36 8   MCV fL 93   MCH pg 29 6   MCHC g/dL 32 1   RDW % 14 6   MPV fL 9 9   PLATELETS Thousands/uL 432*   NRBC AUTO /100 WBCs 0   NEUTROS PCT % 55   LYMPHS PCT % 30   MONOS PCT % 11   EOS PCT % 3   BASOS PCT % 1   NEUTROS ABS Thousands/µL 4 40   LYMPHS ABS Thousands/µL 2 36   MONOS ABS Thousand/µL 0 84   EOS ABS Thousand/µL 0 25     Chemistry Profile:   Results from last 6 Months   Lab Units 04/09/22  0742   POTASSIUM mmol/L 3 3*   CHLORIDE mmol/L 99*   CO2 mmol/L 31   BUN mg/dL 20   CREATININE mg/dL 0 84   GLUCOSE FASTING mg/dL 132*   CALCIUM mg/dL 11 2*   AST U/L 19   ALT U/L 21   ALK PHOS U/L 79   EGFR ml/min/1 73sq m 66     Coagulation Studies:   Results from last 6 Months   Lab Units 04/09/22  0742   PROTIME seconds 28 3*   INR  2 83*     Endocrine Studies:   Results from last 6 Months   Lab Units 04/09/22  0742 12/13/21  1228 12/04/21  0918   HEMOGLOBIN A1C %  --   --  6 4*   TSH 3RD GENERATON uIU/mL 2 836   < >  --     < > = values in this interval not displayed  EKG: N/A  Chest x-ray: N/A    Previous cardiopulmonary studies within the past year:  · Echocardiogram: N/A  · Cardiac Catheterization: N/A  · Stress Test: N/A  · Pulmonary Function Testing: N/A      Assessment & Recommendations:     1  Encounter for preoperative assessment     2  Ptosis of eyelid, right         Pre-Op Evaluation Assessment  64 y o  female with planned surgery: RUL ptosis  Known risk factors for perioperative complications: Diabetes mellitus        Cardiac Risk Estimation: per the Revised Cardiac Risk Index (Circ  100:1043, 1999), the patient's risk factors for cardiac complications include none, putting her in: RCI RISK CLASS I (0 risk factors, risk of major cardiac compl  appr  0 5%)  Current medications which may produce withdrawal symptoms if withheld perioperatively: none  Pre-Op Evaluation Plan  1  Further preoperative workup as follows:   - None; no further preoperative work-up is required    2  Change in medication regimen before surgery:   - Warfarin management: Discontinue warfarin 2 days before surgery and resume evening of procedure  Message sent to cardiology to clear with cardiologist as her coumadin is managed by cardiology  - DM Management: hold glipizide day of surgery  - Patient has been instructed to avoid aspirin containing medications or non-steroidal anti-inflammatory drugs for the week preceding surgery  3  Prophylaxis for cardiac events with perioperative beta-blockers: not indicated  4  Patient requires further consultation with: None    Clearance  Patient is CLEARED for surgery without any additional cardiac testing       DO Davion Salazar [address] [place], [place] [id number]  Phone#  [phone]  Fax#  [phone]

## 2022-04-20 ENCOUNTER — TELEPHONE (OUTPATIENT)
Dept: FAMILY MEDICINE CLINIC | Facility: CLINIC | Age: 77
End: 2022-04-20

## 2022-04-20 ENCOUNTER — APPOINTMENT (OUTPATIENT)
Dept: PHYSICAL THERAPY | Facility: CLINIC | Age: 77
End: 2022-04-20
Payer: MEDICARE

## 2022-04-20 NOTE — TELEPHONE ENCOUNTER
Completed ppwrk / form for pre-op including recent chart notes from 04/18/2022 faxed to [facility] # [phone] / [phone] numerous times, unsuccessfully to complete transmission - all forms emailed to : [email or web address]  Left a detailed vm with [facility]  Forms are scanned into media and attached to this message  Originals were mailed to [address] [place] [address] [place] [address]

## 2022-04-21 ENCOUNTER — TELEPHONE (OUTPATIENT)
Dept: FAMILY MEDICINE CLINIC | Facility: CLINIC | Age: 77
End: 2022-04-21

## 2022-04-21 NOTE — TELEPHONE ENCOUNTER
I recommend using muccinex over the counter for 2-3 days and if not better make appt since we still have time before her surgery

## 2022-04-21 NOTE — TELEPHONE ENCOUNTER
T/c from pt - pt has eye surgery scheduled on 5/6 for right lid lift - she is experiencing a runny nose and a cough with mucus - said her nose is very runny - is concerned about having to blow her nose after surgery with stitches  Pt is aware that ABX are not called in without an appt  Please advise

## 2022-04-25 ENCOUNTER — OFFICE VISIT (OUTPATIENT)
Dept: FAMILY MEDICINE CLINIC | Facility: CLINIC | Age: 77
End: 2022-04-25
Payer: MEDICARE

## 2022-04-25 ENCOUNTER — APPOINTMENT (OUTPATIENT)
Dept: PHYSICAL THERAPY | Facility: CLINIC | Age: 77
End: 2022-04-25
Payer: MEDICARE

## 2022-04-25 VITALS
HEIGHT: 61 IN | OXYGEN SATURATION: 98 % | WEIGHT: 155 LBS | HEART RATE: 60 BPM | BODY MASS INDEX: 29.27 KG/M2 | DIASTOLIC BLOOD PRESSURE: 82 MMHG | SYSTOLIC BLOOD PRESSURE: 122 MMHG | TEMPERATURE: 97.8 F

## 2022-04-25 DIAGNOSIS — M25.512 ACUTE PAIN OF LEFT SHOULDER: ICD-10-CM

## 2022-04-25 DIAGNOSIS — E83.52 HYPERCALCEMIA: Primary | ICD-10-CM

## 2022-04-25 DIAGNOSIS — H10.33 ACUTE CONJUNCTIVITIS OF BOTH EYES, UNSPECIFIED ACUTE CONJUNCTIVITIS TYPE: Primary | ICD-10-CM

## 2022-04-25 DIAGNOSIS — M72.2 PLANTAR FASCIITIS OF RIGHT FOOT: ICD-10-CM

## 2022-04-25 PROCEDURE — 99214 OFFICE O/P EST MOD 30 MIN: CPT | Performed by: FAMILY MEDICINE

## 2022-04-25 RX ORDER — OFLOXACIN 3 MG/ML
1 SOLUTION/ DROPS OPHTHALMIC 4 TIMES DAILY
Qty: 5 ML | Refills: 0 | Status: SHIPPED | OUTPATIENT
Start: 2022-04-25 | End: 2022-06-22

## 2022-04-25 NOTE — PROGRESS NOTES
Assessment/Plan:    No problem-specific Assessment & Plan notes found for this encounter  Diagnoses and all orders for this visit:    Acute conjunctivitis of both eyes, unspecified acute conjunctivitis type  -     ofloxacin (OCUFLOX) 0 3 % ophthalmic solution; Administer 1 drop to both eyes 4 (four) times a day    Acute pain of left shoulder  Discussed stretches and exercises  Ns signs of acute injury  Advised to use heating pad and follow up if no improvement  Plantar fasciitis of right foot  Discussed stretches and exercise  Given recommendations for supportive care  Subjective:      Patient ID: Flor Chamberlain is a 64 y o  female  HPI    States that she was with her grand kids who had pink eye over the last week  States that she has had pink eyes and crusting  Notes that they are itchy  Notes that she has pain under her foot by the heel, states that this has been going on for a few days, it has not eased up  She took Tylenol, did not help  Notes that she has new shoes, started wearing them today  Denies change in shoes before  States that she did physical therapy about 2 weeks ago and thinks that something happened at that time  Denies redness or swelling  States that she has pain in the left shoulder for about 1 week  Notes that this is painful with movement  Notes pain when she lays on the shoulder  Denies lightheadedness or dizziness with the shoulder pain  Denies chest pain or SOB or nausea or abdominal pain  Shoulder is tender to firm touch  Denies redness or swelling  Denies any known injury  Denies lifting  She is having a lid lift done on 5/5/22  The following portions of the patient's history were reviewed and updated as appropriate: allergies, current medications, past family history, past medical history, past social history, past surgical history and problem list     Review of Systems   Constitutional: Negative for activity change, appetite change, fatigue and fever  HENT: Negative for congestion, ear pain, rhinorrhea and sore throat  Eyes: Positive for discharge, redness and itching  Negative for pain  Respiratory: Negative for cough and shortness of breath  Cardiovascular: Negative for chest pain and leg swelling  Gastrointestinal: Negative for abdominal distention, abdominal pain, constipation, diarrhea, nausea and vomiting  Genitourinary: Negative for dysuria, frequency and urgency  Musculoskeletal: Negative for gait problem  Foot pain   Skin: Negative for rash  Neurological: Negative for dizziness, light-headedness and headaches  Objective:  /82 (BP Location: Left arm, Patient Position: Sitting, Cuff Size: Large)   Pulse 60   Temp 97 8 °F (36 6 °C)   Ht 5' 1" (1 549 m)   Wt 70 3 kg (155 lb)   SpO2 98%   BMI 29 29 kg/m²      Physical Exam  Vitals reviewed  Constitutional:       General: She is not in acute distress  Appearance: Normal appearance  HENT:      Head: Normocephalic and atraumatic  Right Ear: External ear normal       Left Ear: External ear normal       Nose: Nose normal       Mouth/Throat:      Mouth: Mucous membranes are moist    Eyes:      Extraocular Movements: Extraocular movements intact  Conjunctiva/sclera: Conjunctivae normal       Pupils: Pupils are equal, round, and reactive to light  Comments: B/L eye redness with drainage from left  Cardiovascular:      Rate and Rhythm: Normal rate and regular rhythm  Heart sounds: Normal heart sounds  Pulmonary:      Effort: Pulmonary effort is normal       Breath sounds: Normal breath sounds  No wheezing, rhonchi or rales  Abdominal:      General: Bowel sounds are normal  There is no distension  Palpations: Abdomen is soft  Tenderness: There is no abdominal tenderness  Musculoskeletal:      Right lower leg: No edema  Left lower leg: No edema  Comments: right foot with pain at calcaneous on plantar surface      Skin: General: Skin is warm  Capillary Refill: Capillary refill takes less than 2 seconds  Neurological:      Mental Status: She is alert  Mental status is at baseline           Robin OregonDO  [facility]  4/25/2022 2:49 PM

## 2022-04-27 ENCOUNTER — APPOINTMENT (OUTPATIENT)
Dept: PHYSICAL THERAPY | Facility: CLINIC | Age: 77
End: 2022-04-27
Payer: MEDICARE

## 2022-04-29 ENCOUNTER — TELEPHONE (OUTPATIENT)
Dept: FAMILY MEDICINE CLINIC | Facility: CLINIC | Age: 77
End: 2022-04-29

## 2022-04-29 DIAGNOSIS — M62.838 MUSCLE SPASM: Primary | ICD-10-CM

## 2022-04-29 RX ORDER — METHOCARBAMOL 500 MG/1
500 TABLET, FILM COATED ORAL 3 TIMES DAILY
Qty: 30 TABLET | Refills: 0 | Status: SHIPPED | OUTPATIENT
Start: 2022-04-29 | End: 2022-06-22

## 2022-04-29 NOTE — TELEPHONE ENCOUNTER
She should be using the heating pad and doing the stretches that we discussed at her visit  I have sent in a muscles relaxer that she can use as well       Samia Valenzuela DO  [facility]  4/29/2022 3:45 PM

## 2022-04-29 NOTE — TELEPHONE ENCOUNTER
Dr Patrick - pt saw you on 04/25/2022 for pre-op  Pt states that she had pain in her shoulders  (as discussed at the visit)  but now it transferred over to her neck and its hard to move the neck  Pt is c/o pinched nerve  Reports it started couple days ago  Pt is taking Tylenol, she is using icy compresses but the pain travels across her ears and head  Pt is asking  if there is anything else what would you recommend? Pt was requesting an appt with Dr Mick Hackett but aware of he is out of office until Monday  Evangelina Tracy is booked solid on 05/02/2022  Pt is scheduled for eye sx on 05/05/2022      Please advise

## 2022-05-02 ENCOUNTER — APPOINTMENT (OUTPATIENT)
Dept: LAB | Facility: HOSPITAL | Age: 77
End: 2022-05-02
Payer: MEDICARE

## 2022-05-02 ENCOUNTER — ANTICOAG VISIT (OUTPATIENT)
Dept: CARDIOLOGY CLINIC | Facility: CLINIC | Age: 77
End: 2022-05-02

## 2022-05-02 DIAGNOSIS — E83.52 HYPERCALCEMIA: ICD-10-CM

## 2022-05-02 LAB
CA-I BLD-SCNC: 1.38 MMOL/L (ref 1.12–1.32)
PTH-INTACT SERPL-MCNC: 51 PG/ML (ref 18.4–80.1)

## 2022-05-02 PROCEDURE — 82330 ASSAY OF CALCIUM: CPT

## 2022-05-02 PROCEDURE — 83970 ASSAY OF PARATHORMONE: CPT

## 2022-05-02 NOTE — PROGRESS NOTES
S/w pt  She was taking Robaxin  She is having eye sx this Friday  States she will be holding Warfarin starting today  States the surgeon also wants her to hold 3 days after   Advised to check INR in 10-14 days  after restarting

## 2022-05-03 ENCOUNTER — TELEPHONE (OUTPATIENT)
Dept: CARDIOLOGY CLINIC | Facility: CLINIC | Age: 77
End: 2022-05-03

## 2022-05-03 NOTE — TELEPHONE ENCOUNTER
----- Message from Brittani Shields MD sent at 5/2/2022  4:05 PM EDT -----  It will be up to the surgeon  The longer the interruption of warfarin, the higher the risk for thromboembolic events in general     ----- Message -----  From: Quincy Lozano LPN  Sent: 9/6/9588  11:41 AM EDT  To: MD Kev Hilli  S/w pt  She was taking Robaxin  She is having eye sx this Friday  States she will be holding Warfarin starting today  States the surgeon also wants her to hold 3 days after

## 2022-05-18 ENCOUNTER — HOSPITAL ENCOUNTER (INPATIENT)
Facility: HOSPITAL | Age: 77
LOS: 1 days | Discharge: HOME | DRG: 194 | End: 2022-05-20
Attending: EMERGENCY MEDICINE | Admitting: FAMILY MEDICINE
Payer: MEDICARE

## 2022-05-18 ENCOUNTER — APPOINTMENT (EMERGENCY)
Dept: RADIOLOGY | Facility: HOSPITAL | Age: 77
DRG: 194 | End: 2022-05-18
Payer: MEDICARE

## 2022-05-18 DIAGNOSIS — E87.6 HYPOKALEMIA: ICD-10-CM

## 2022-05-18 DIAGNOSIS — J10.1 INFLUENZA A: Primary | ICD-10-CM

## 2022-05-18 DIAGNOSIS — R09.02 HYPOXEMIA: ICD-10-CM

## 2022-05-18 PROBLEM — M79.672 ACUTE FOOT PAIN, LEFT: Status: ACTIVE | Noted: 2022-05-18

## 2022-05-18 PROBLEM — J96.01 ACUTE RESPIRATORY FAILURE WITH HYPOXIA (HCC): Status: ACTIVE | Noted: 2022-05-18

## 2022-05-18 PROBLEM — E87.1 HYPONATREMIA: Status: ACTIVE | Noted: 2022-05-18

## 2022-05-18 PROBLEM — I48.0 PAROXYSMAL ATRIAL FIBRILLATION (HCC): Status: ACTIVE | Noted: 2022-05-18

## 2022-05-18 LAB
2HR DELTA HS TROPONIN: -1 NG/L
ALBUMIN SERPL BCP-MCNC: 3 G/DL (ref 3.5–5)
ALP SERPL-CCNC: 79 U/L (ref 46–116)
ALT SERPL W P-5'-P-CCNC: 26 U/L (ref 12–78)
ANION GAP SERPL CALCULATED.3IONS-SCNC: 5 MMOL/L (ref 4–13)
APTT PPP: 51 SECONDS (ref 23–37)
AST SERPL W P-5'-P-CCNC: 39 U/L (ref 5–45)
BASOPHILS # BLD AUTO: 0.03 THOUSANDS/ΜL (ref 0–0.1)
BASOPHILS NFR BLD AUTO: 0 % (ref 0–1)
BILIRUB SERPL-MCNC: 0.46 MG/DL (ref 0.2–1)
BUN SERPL-MCNC: 23 MG/DL (ref 5–25)
CALCIUM ALBUM COR SERPL-MCNC: 11.6 MG/DL (ref 8.3–10.1)
CALCIUM SERPL-MCNC: 10.8 MG/DL (ref 8.3–10.1)
CARDIAC TROPONIN I PNL SERPL HS: 10 NG/L
CARDIAC TROPONIN I PNL SERPL HS: 11 NG/L
CHLORIDE SERPL-SCNC: 96 MMOL/L (ref 100–108)
CO2 SERPL-SCNC: 31 MMOL/L (ref 21–32)
CREAT SERPL-MCNC: 0.85 MG/DL (ref 0.6–1.3)
EOSINOPHIL # BLD AUTO: 0.09 THOUSAND/ΜL (ref 0–0.61)
EOSINOPHIL NFR BLD AUTO: 1 % (ref 0–6)
ERYTHROCYTE [DISTWIDTH] IN BLOOD BY AUTOMATED COUNT: 14.7 % (ref 11.6–15.1)
ERYTHROCYTE [SEDIMENTATION RATE] IN BLOOD: 78 MM/HOUR (ref 0–29)
FLUAV RNA RESP QL NAA+PROBE: POSITIVE
FLUBV RNA RESP QL NAA+PROBE: NEGATIVE
GFR SERPL CREATININE-BSD FRML MDRD: 65 ML/MIN/1.73SQ M
GLUCOSE SERPL-MCNC: 134 MG/DL (ref 65–140)
HCT VFR BLD AUTO: 36 % (ref 34.8–46.1)
HGB BLD-MCNC: 11.5 G/DL (ref 11.5–15.4)
IMM GRANULOCYTES # BLD AUTO: 0.03 THOUSAND/UL (ref 0–0.2)
IMM GRANULOCYTES NFR BLD AUTO: 0 % (ref 0–2)
INR PPP: 1.57 (ref 0.84–1.19)
LYMPHOCYTES # BLD AUTO: 1.01 THOUSANDS/ΜL (ref 0.6–4.47)
LYMPHOCYTES NFR BLD AUTO: 10 % (ref 14–44)
MCH RBC QN AUTO: 29 PG (ref 26.8–34.3)
MCHC RBC AUTO-ENTMCNC: 31.9 G/DL (ref 31.4–37.4)
MCV RBC AUTO: 91 FL (ref 82–98)
MONOCYTES # BLD AUTO: 0.91 THOUSAND/ΜL (ref 0.17–1.22)
MONOCYTES NFR BLD AUTO: 9 % (ref 4–12)
NEUTROPHILS # BLD AUTO: 7.77 THOUSANDS/ΜL (ref 1.85–7.62)
NEUTS SEG NFR BLD AUTO: 80 % (ref 43–75)
NRBC BLD AUTO-RTO: 0 /100 WBCS
NT-PROBNP SERPL-MCNC: 820 PG/ML
PLATELET # BLD AUTO: 343 THOUSANDS/UL (ref 149–390)
PMV BLD AUTO: 9.4 FL (ref 8.9–12.7)
POTASSIUM SERPL-SCNC: 2.8 MMOL/L (ref 3.5–5.3)
PROT SERPL-MCNC: 7.5 G/DL (ref 6.4–8.2)
PROTHROMBIN TIME: 18 SECONDS (ref 11.6–14.5)
RBC # BLD AUTO: 3.96 MILLION/UL (ref 3.81–5.12)
RSV RNA RESP QL NAA+PROBE: NEGATIVE
SARS-COV-2 RNA RESP QL NAA+PROBE: NEGATIVE
SODIUM SERPL-SCNC: 132 MMOL/L (ref 136–145)
URATE SERPL-MCNC: 5.9 MG/DL (ref 2–6.8)
WBC # BLD AUTO: 9.84 THOUSAND/UL (ref 4.31–10.16)

## 2022-05-18 PROCEDURE — 84550 ASSAY OF BLOOD/URIC ACID: CPT | Performed by: PHYSICIAN ASSISTANT

## 2022-05-18 PROCEDURE — 85025 COMPLETE CBC W/AUTO DIFF WBC: CPT | Performed by: EMERGENCY MEDICINE

## 2022-05-18 PROCEDURE — 99220 PR INITIAL OBSERVATION CARE/DAY 70 MINUTES: CPT | Performed by: PHYSICIAN ASSISTANT

## 2022-05-18 PROCEDURE — 71045 X-RAY EXAM CHEST 1 VIEW: CPT

## 2022-05-18 PROCEDURE — 80053 COMPREHEN METABOLIC PANEL: CPT | Performed by: EMERGENCY MEDICINE

## 2022-05-18 PROCEDURE — 36415 COLL VENOUS BLD VENIPUNCTURE: CPT | Performed by: EMERGENCY MEDICINE

## 2022-05-18 PROCEDURE — 96374 THER/PROPH/DIAG INJ IV PUSH: CPT

## 2022-05-18 PROCEDURE — 73630 X-RAY EXAM OF FOOT: CPT

## 2022-05-18 PROCEDURE — 85652 RBC SED RATE AUTOMATED: CPT | Performed by: PHYSICIAN ASSISTANT

## 2022-05-18 PROCEDURE — 84484 ASSAY OF TROPONIN QUANT: CPT | Performed by: EMERGENCY MEDICINE

## 2022-05-18 PROCEDURE — 99284 EMERGENCY DEPT VISIT MOD MDM: CPT

## 2022-05-18 PROCEDURE — 85610 PROTHROMBIN TIME: CPT | Performed by: EMERGENCY MEDICINE

## 2022-05-18 PROCEDURE — 83880 ASSAY OF NATRIURETIC PEPTIDE: CPT | Performed by: EMERGENCY MEDICINE

## 2022-05-18 PROCEDURE — 0241U HB NFCT DS VIR RESP RNA 4 TRGT: CPT | Performed by: EMERGENCY MEDICINE

## 2022-05-18 PROCEDURE — 99285 EMERGENCY DEPT VISIT HI MDM: CPT | Performed by: EMERGENCY MEDICINE

## 2022-05-18 PROCEDURE — 85730 THROMBOPLASTIN TIME PARTIAL: CPT | Performed by: EMERGENCY MEDICINE

## 2022-05-18 RX ORDER — WARFARIN SODIUM 1 MG/1
5.5 TABLET ORAL ONCE
Status: DISCONTINUED | OUTPATIENT
Start: 2022-05-19 | End: 2022-05-19

## 2022-05-18 RX ORDER — OSELTAMIVIR PHOSPHATE 30 MG/1
30 CAPSULE ORAL EVERY 12 HOURS SCHEDULED
Status: DISCONTINUED | OUTPATIENT
Start: 2022-05-19 | End: 2022-05-20 | Stop reason: HOSPADM

## 2022-05-18 RX ORDER — POTASSIUM CHLORIDE 20 MEQ/1
40 TABLET, EXTENDED RELEASE ORAL ONCE
Status: COMPLETED | OUTPATIENT
Start: 2022-05-18 | End: 2022-05-18

## 2022-05-18 RX ORDER — OSELTAMIVIR PHOSPHATE 75 MG/1
75 CAPSULE ORAL ONCE
Status: COMPLETED | OUTPATIENT
Start: 2022-05-18 | End: 2022-05-18

## 2022-05-18 RX ORDER — WARFARIN SODIUM 1 MG/1
0.5 TABLET ORAL
Status: DISCONTINUED | OUTPATIENT
Start: 2022-05-18 | End: 2022-05-18

## 2022-05-18 RX ORDER — OXYMETAZOLINE HYDROCHLORIDE 0.05 G/100ML
2 SPRAY NASAL ONCE
Status: COMPLETED | OUTPATIENT
Start: 2022-05-18 | End: 2022-05-18

## 2022-05-18 RX ORDER — INSULIN LISPRO 100 [IU]/ML
1-5 INJECTION, SOLUTION INTRAVENOUS; SUBCUTANEOUS
Status: DISCONTINUED | OUTPATIENT
Start: 2022-05-19 | End: 2022-05-20 | Stop reason: HOSPADM

## 2022-05-18 RX ORDER — POTASSIUM CHLORIDE 14.9 MG/ML
20 INJECTION INTRAVENOUS ONCE
Status: COMPLETED | OUTPATIENT
Start: 2022-05-18 | End: 2022-05-18

## 2022-05-18 RX ORDER — ONDANSETRON 2 MG/ML
4 INJECTION INTRAMUSCULAR; INTRAVENOUS EVERY 6 HOURS PRN
Status: DISCONTINUED | OUTPATIENT
Start: 2022-05-18 | End: 2022-05-20 | Stop reason: HOSPADM

## 2022-05-18 RX ORDER — ACETAMINOPHEN 325 MG/1
650 TABLET ORAL EVERY 6 HOURS PRN
Status: DISCONTINUED | OUTPATIENT
Start: 2022-05-18 | End: 2022-05-20 | Stop reason: HOSPADM

## 2022-05-18 RX ORDER — BENZONATATE 100 MG/1
100 CAPSULE ORAL 3 TIMES DAILY PRN
Status: DISCONTINUED | OUTPATIENT
Start: 2022-05-18 | End: 2022-05-20 | Stop reason: HOSPADM

## 2022-05-18 RX ORDER — METHOCARBAMOL 500 MG/1
500 TABLET, FILM COATED ORAL 3 TIMES DAILY
Status: DISCONTINUED | OUTPATIENT
Start: 2022-05-18 | End: 2022-05-20 | Stop reason: HOSPADM

## 2022-05-18 RX ORDER — ATORVASTATIN CALCIUM 20 MG/1
20 TABLET, FILM COATED ORAL
Status: DISCONTINUED | OUTPATIENT
Start: 2022-05-19 | End: 2022-05-20 | Stop reason: HOSPADM

## 2022-05-18 RX ORDER — LEVOTHYROXINE SODIUM 0.05 MG/1
50 TABLET ORAL
Status: DISCONTINUED | OUTPATIENT
Start: 2022-05-19 | End: 2022-05-20 | Stop reason: HOSPADM

## 2022-05-18 RX ADMIN — POTASSIUM CHLORIDE 20 MEQ: 200 INJECTION, SOLUTION INTRAVENOUS at 21:16

## 2022-05-18 RX ADMIN — SODIUM CHLORIDE 500 ML: 0.9 INJECTION, SOLUTION INTRAVENOUS at 23:57

## 2022-05-18 RX ADMIN — POTASSIUM CHLORIDE 40 MEQ: 1500 TABLET, EXTENDED RELEASE ORAL at 23:21

## 2022-05-18 RX ADMIN — OXYMETAZOLINE HCL 2 SPRAY: 0.05 SPRAY NASAL at 19:42

## 2022-05-18 RX ADMIN — METHOCARBAMOL 500 MG: 500 TABLET ORAL at 23:57

## 2022-05-18 RX ADMIN — OSELTAMIVIR PHOSPHATE 75 MG: 75 CAPSULE ORAL at 23:57

## 2022-05-18 NOTE — ED PROVIDER NOTES
Pt Name: Silvia Flores  MRN: [id number]  [place] 1945  Age/Sex: 64 y o  female  Date of evaluation: 5/18/2022  PCP: Petty Cannon, [address] [place] [address]    Chief Complaint   Patient presents with    Cough     Cough, sinus pain, headache and bodyaches since last week  Flu exposure    Foot Swelling     L foot pain and swelling, dx with plantar fascitis but new redness to top of foot         HPI    64 y o  female presenting with cough, sinus pressure, headache, body aches  Patient states she was exposed to the flu from her grandson prior to symptoms starting  She also complains of swelling and pain to the left foot  She has had this intermittently over the course of weeks to months, states there is a new redness on the top of the foot  The pain in the head is a feeling of sinus pressure, mild to moderate, in the center of the forehead, radiating throughout the head, worse with coughing or sneezing and better rest   She denies fever, nausea, vomiting,  trauma, other symptoms  HPI      Past Medical and Surgical History    Past Medical History:   Diagnosis Date    Carpal tunnel syndrome     Heart murmur        Past Surgical History:   Procedure Laterality Date    CATARACT EXTRACTION      CHOLECYSTECTOMY      COLONOSCOPY  11/24/2007    EYE SURGERY      KNEE SURGERY      REPLACEMENT TOTAL KNEE Right     TOTAL ABDOMINAL HYSTERECTOMY W/ BILATERAL SALPINGOOPHORECTOMY         Family History   Problem Relation Age of Onset    Heart attack Father         ARRHYTHMIAS    Coronary artery disease Family     Diabetes Family     Heart attack Family         ARRHYTHMIAS       Social History     Tobacco Use    Smoking status: Never Smoker    Smokeless tobacco: Never Used   Vaping Use    Vaping Use: Never used   Substance Use Topics    Alcohol use:  Yes    Drug use: No           Allergies    No Known Allergies    Home Medications    Prior to Admission medications    Medication Sig Start Date End Date Taking?  Authorizing Provider   Ascorbic Acid (VITAMIN C) 100 MG tablet Take 1,000 mg by mouth daily     Historical Provider, MD   atenolol-chlorthalidone (TENORETIC) 50-25 mg per tablet TAKE ONE TABLET BY MOUTH EVERY DAY 1/17/22   Nuria Ernst, DO   B Complex Vitamins (B COMPLEX 100 PO) Take 1 tablet by mouth daily 8/15/17   Historical Provider, MD   Blood Glucose Monitoring Suppl (FREESTYLE LITE) RANULFO by Does not apply route 2 (two) times a day Dx E11 9 4/2/20   Nuria Ernst, DO   FREESTYLE LITE test strip USE TO CHECK BLOOD SUGAR TWO TIMES A DAY 2/7/22   Nuria Ernst, DO   glipiZIDE (GLUCOTROL XL) 2 5 mg 24 hr tablet Take 1 tablet (2 5 mg total) by mouth 2 (two) times a day 12/13/21   Nuria Ernst, DO   glucose blood (JASWANT CONTOUR TEST) test strip Check blood sugar twice daily, DX:E11 9 11/5/18   Nuria Ernst, DO   Lancets (freestyle) lancets USE TO TEST TWO TIMES A DAY 2/7/22   Nuria Ernst, DO   levothyroxine 50 mcg tablet TAKE ONE TABLET BY MOUTH EVERY DAY 4/17/22   Nuria Ernst, DO   meclizine (ANTIVERT) 12 5 MG tablet Take 1 tablet (12 5 mg total) by mouth every 8 (eight) hours as needed for dizziness 4/5/22   Gaby Katz,    methocarbamol (ROBAXIN) 500 mg tablet Take 1 tablet (500 mg total) by mouth 3 (three) times a day 4/29/22   Gaby Katz,    Multiple Vitamin (MULTIVITAMIN) tablet Take 1 tablet by mouth daily    Historical Provider, MD   niacin 500 mg tablet Take by mouth     Historical Provider, MD   ofloxacin (OCUFLOX) 0 3 % ophthalmic solution Administer 1 drop to both eyes 4 (four) times a day 4/25/22   Gaby Katz,    pioglitazone (ACTOS) 15 mg tablet TAKE ONE TABLET BY MOUTH EVERY DAY 2/28/22   Nuria Ernst, DO   rosuvastatin (CRESTOR) 10 MG tablet TAKE ONE TABLET BY MOUTH EVERY DAY 4/17/22   Nuria Ernst, DO   warfarin (COUMADIN) 5 mg tablet Take 1-2 tablets by mouth daily in the evening  6/27/21   Renetta Love MD           Review of Systems    Review of Systems   Constitutional: Negative for activity change, chills and fever  HENT: Positive for congestion, sinus pressure and sinus pain  Negative for drooling and facial swelling  Eyes: Negative for pain, discharge and visual disturbance  Respiratory: Positive for cough  Negative for apnea, chest tightness, shortness of breath and wheezing  Cardiovascular: Negative for chest pain and leg swelling  Gastrointestinal: Negative for abdominal pain, constipation, diarrhea, nausea and vomiting  Genitourinary: Negative for difficulty urinating, dysuria and urgency  Musculoskeletal: Positive for myalgias  Negative for arthralgias, back pain and gait problem  Skin: Negative for color change and rash  Neurological: Positive for headaches  Negative for dizziness, speech difficulty and weakness  Psychiatric/Behavioral: Negative for agitation, behavioral problems and confusion  All other systems reviewed and negative  Physical Exam      ED Triage Vitals   Temperature Pulse Respirations Blood Pressure SpO2   05/18/22 1836 05/18/22 1836 05/18/22 1836 05/18/22 1836 05/18/22 1836   99 °F (37 2 °C) 61 18 169/71 96 %      Temp Source Heart Rate Source Patient Position - Orthostatic VS BP Location FiO2 (%)   05/18/22 1836 05/18/22 1836 05/18/22 1836 05/18/22 1836 --   Oral Monitor Sitting Left arm       Pain Score       05/18/22 2346       No Pain               Physical Exam  Vitals and nursing note reviewed  Constitutional:       General: She is not in acute distress  Appearance: She is well-developed  She is not ill-appearing, toxic-appearing or diaphoretic  HENT:      Head: Normocephalic and atraumatic  Right Ear: External ear normal       Left Ear: External ear normal       Nose: Congestion present  Mouth/Throat:      Mouth: Mucous membranes are moist       Pharynx: Oropharynx is clear  Eyes:      Conjunctiva/sclera: Conjunctivae normal       Pupils: Pupils are equal, round, and reactive to light  Cardiovascular:      Rate and Rhythm: Normal rate and regular rhythm  Pulses: Normal pulses  Heart sounds: Normal heart sounds  Pulmonary:      Effort: Pulmonary effort is normal  No respiratory distress  Breath sounds: Normal breath sounds  No wheezing or rales  Abdominal:      General: There is no distension  Palpations: Abdomen is soft  Tenderness: There is no abdominal tenderness  There is no guarding or rebound  Musculoskeletal:         General: No deformity  Normal range of motion  Cervical back: Normal range of motion and neck supple  Right lower leg: No edema  Left lower leg: No edema  Skin:     General: Skin is warm and dry  Findings: Rash present  No erythema  Comments: Mild erythematous rash the left foot with scattered petechiae, minimally tender to palpation, strength sensation pulse and cap refill intact, no pain out of proportion, no significant swelling  Patient reports severe swelling of that foot that has since gone down on and off over the past few days   Neurological:      Mental Status: She is alert and oriented to person, place, and time  Psychiatric:         Behavior: Behavior normal          Thought Content:  Thought content normal          Judgment: Judgment normal               Diagnostic Results      Labs:    Results Reviewed     Procedure Component Value Units Date/Time    HS Troponin I 4hr [[id number]]  (Normal) Collected: 05/19/22 0024    Lab Status: Final result Specimen: Blood from Arm, Left Updated: 05/19/22 0051     hs TnI 4hr 11 ng/L      Delta 4hr hsTnI 0 ng/L     Uric acid [[id number]]  (Normal) Collected: 05/18/22 1936    Lab Status: Final result Specimen: Blood from Arm, Right Updated: 05/18/22 2332     Uric Acid 5 9 mg/dL     Sedimentation rate, automated [[id number]]  (Abnormal) Collected: 05/18/22 1936    Lab Status: Final result Specimen: Blood from Arm, Right Updated: 05/18/22 2330     Sed Rate 78 mm/hour Osmolality, urine [[id number]]     Lab Status: No result Specimen: Urine     Sodium, urine, random [[id number]]     Lab Status: No result Specimen: Urine     HS Troponin I 2hr [[id number]]  (Normal) Collected: 05/18/22 2132    Lab Status: Final result Specimen: Blood from Arm, Right Updated: 05/18/22 2202     hs TnI 2hr 10 ng/L      Delta 2hr hsTnI -1 ng/L     Protime-INR [[id number]]  (Abnormal) Collected: 05/18/22 1936    Lab Status: Final result Specimen: Blood from Arm, Right Updated: 05/18/22 2024     Protime 18 0 seconds      INR 1 57    APTT [[id number]]  (Abnormal) Collected: 05/18/22 1936    Lab Status: Final result Specimen: Blood from Arm, Right Updated: 05/18/22 2024     PTT 51 seconds     HS Troponin 0hr (reflex protocol) [[id number]]  (Normal) Collected: 05/18/22 1936    Lab Status: Final result Specimen: Blood from Arm, Right Updated: 05/18/22 2005     hs TnI 0hr 11 ng/L     NT-BNP PRO [[id number]]  (Abnormal) Collected: 05/18/22 1936    Lab Status: Final result Specimen: Blood from Arm, Right Updated: 05/18/22 2004     NT-proBNP 820 pg/mL     Comprehensive metabolic panel [[id number]]  (Abnormal) Collected: 05/18/22 1936    Lab Status: Final result Specimen: Blood from Arm, Right Updated: 05/18/22 1957     Sodium 132 mmol/L      Potassium 2 8 mmol/L      Chloride 96 mmol/L      CO2 31 mmol/L      ANION GAP 5 mmol/L      BUN 23 mg/dL      Creatinine 0 85 mg/dL      Glucose 134 mg/dL      Calcium 10 8 mg/dL      Corrected Calcium 11 6 mg/dL      AST 39 U/L      ALT 26 U/L      Alkaline Phosphatase 79 U/L      Total Protein 7 5 g/dL      Albumin 3 0 g/dL      Total Bilirubin 0 46 mg/dL      eGFR 65 ml/min/1 73sq m     Narrative:      [facility] guidelines for Chronic Kidney Disease (CKD):     Stage 1 with normal or high GFR (GFR > 90 mL/min/1 73 square meters)    Stage 2 Mild CKD (GFR = 60-89 mL/min/1 73 square meters)    Stage 3A Moderate CKD (GFR = 45-59 mL/min/1 73 square meters)    Stage 3B Moderate CKD (GFR = 30-44 mL/min/1 73 square meters)    Stage 4 Severe CKD (GFR = 15-29 mL/min/1 73 square meters)    Stage 5 End Stage CKD (GFR <15 mL/min/1 73 square meters)  Note: GFR calculation is accurate only with a steady state creatinine    CBC and differential [[id number]]  (Abnormal) Collected: 05/18/22 1936    Lab Status: Final result Specimen: Blood from Arm, Right Updated: 05/18/22 1944     WBC 9 84 Thousand/uL      RBC 3 96 Million/uL      Hemoglobin 11 5 g/dL      Hematocrit 36 0 %      MCV 91 fL      MCH 29 0 pg      MCHC 31 9 g/dL      RDW 14 7 %      MPV 9 4 fL      Platelets 390 Thousands/uL      nRBC 0 /100 WBCs      Neutrophils Relative 80 %      Immat GRANS % 0 %      Lymphocytes Relative 10 %      Monocytes Relative 9 %      Eosinophils Relative 1 %      Basophils Relative 0 %      Neutrophils Absolute 7 77 Thousands/µL      Immature Grans Absolute 0 03 Thousand/uL      Lymphocytes Absolute 1 01 Thousands/µL      Monocytes Absolute 0 91 Thousand/µL      Eosinophils Absolute 0 09 Thousand/µL      Basophils Absolute 0 03 Thousands/µL     COVID/FLU/RSV [[id number]]  (Abnormal) Collected: 05/18/22 1841    Lab Status: Final result Specimen: Nares from Nose Updated: 05/18/22 1932     SARS-CoV-2 Negative     INFLUENZA A PCR Positive     INFLUENZA B PCR Negative     RSV PCR Negative    Narrative:      FOR PEDIATRIC PATIENTS - copy/paste COVID Guidelines URL to browser: [email or web address]    SARS-CoV-2 assay is a Nucleic Acid Amplification assay intended for the  qualitative detection of nucleic acid from SARS-CoV-2 in nasopharyngeal  swabs  Results are for the presumptive identification of SARS-CoV-2 RNA  Positive results are indicative of infection with SARS-CoV-2, the virus  causing COVID-19, but do not rule out bacterial infection or co-infection  with other viruses   Laboratories within the [place] and its  territories are required to report all positive results to the appropriate  public health authorities  Negative results do not preclude SARS-CoV-2  infection and should not be used as the sole basis for treatment or other  patient management decisions  Negative results must be combined with  clinical observations, patient history, and epidemiological information  This test has not been FDA cleared or approved  This test has been authorized by FDA under an Emergency Use Authorization  (EUA)  This test is only authorized for the duration of time the  declaration that circumstances exist justifying the authorization of the  emergency use of an in vitro diagnostic tests for detection of SARS-CoV-2  virus and/or diagnosis of COVID-19 infection under section 564(b)(1) of  the Act, 21 U  S C  944PPB-5(U)(9), unless the authorization is terminated  or revoked sooner  The test has been validated but independent review by FDA  and CLIA is pending  Test performed using [employer] Gene[employer]pert: This RT-PCR assay targets N2,  a region unique to SARS-CoV-2  A conserved region in the E-gene was chosen  for pan-Sarbecovirus detection which includes SARS-CoV-2  All labs reviewed and utilized in the medical decision making process    Radiology:    XR chest 1 view portable    (Results Pending)   XR foot 3+ views LEFT    (Results Pending)       All radiology studies independently viewed by me and interpreted by the radiologist     Procedure    Procedures        ED Course of Care and Re-Assessments    Patient noted be positive for influenza a, likely source of symptoms  Patient greater than 5 days out from onset of symptoms, Tamiflu not felt to be indicated in ER      Medications   insulin lispro (HumaLOG) 100 units/mL subcutaneous injection 1-5 Units (has no administration in time range)   atenolol-chlorthalidone (TENORETIC 50/25) combination (has no administration in time range)   levothyroxine tablet 50 mcg (has no administration in time range)   methocarbamol (ROBAXIN) tablet 500 mg (500 mg Oral Given 5/18/22 2357)   atorvastatin (LIPITOR) tablet 20 mg (has no administration in time range)   ondansetron (ZOFRAN) injection 4 mg (has no administration in time range)   acetaminophen (TYLENOL) tablet 650 mg (has no administration in time range)   warfarin (COUMADIN) tablet 5 5 mg (has no administration in time range)   benzonatate (TESSALON PERLES) capsule 100 mg (has no administration in time range)   oseltamivir (TAMIFLU) capsule 30 mg (has no administration in time range)   sodium chloride 0 9 % bolus 500 mL (500 mL Intravenous New Bag 5/18/22 2357)   oxymetazoline (AFRIN) 0 05 % nasal spray 2 spray (2 sprays Each Nare Given 5/18/22 1942)   potassium chloride 20 mEq IVPB (premix) (0 mEq Intravenous Stopped 5/18/22 2317)   potassium chloride (K-DUR,KLOR-CON) CR tablet 40 mEq (40 mEq Oral Given 5/18/22 2321)   oseltamivir (TAMIFLU) capsule 75 mg (75 mg Oral Given 5/18/22 2357)   hydrALAZINE (APRESOLINE) injection 5 mg (5 mg Intravenous Given 5/19/22 0114)           FINAL IMPRESSION    Final diagnoses:   Influenza A   Hypokalemia   Hypoxemia         DISPOSITION/PLAN    Presentation as above with influenza a in the setting of hypokalemia and hypoxemia  Rash noted as above, suspect petechiae secondary to mild vascular trauma from rapid swelling of foot  Very low suspicion for necrotizing soft tissue infection, [facility] spotted fever, sepsis, meningitis, encephalitis, bacterial pneumonia  Based on hypoxemia hypokalemia, patient admitted for further care, hemodynamically stable and comfortable at that time    Time reflects when diagnosis was documented in both MDM as applicable and the Disposition within this note     Time User Action Codes Description Comment    5/18/2022  9:20 PM Nena LARSON Add [J10 1] Influenza A     5/18/2022  9:20 PM Radha Hayden Add [E87 6] Hypokalemia     5/18/2022  9:20 PM Madiha Mancera Lenora Dys Add [R09 02] Hypoxemia       ED Disposition     ED Disposition   Admit    Condition   Stable    Date/Time   Wed May 18, 2022  9:20 PM    Comment   Case was discussed with HAMLET and the patient's admission status was agreed to be Admission Status: observation status to the service of Dr Rubina Iglesias   Follow-up Information    None           PATIENT REFERRED TO:    No follow-up provider specified  DISCHARGE MEDICATIONS:    Current Discharge Medication List      CONTINUE these medications which have NOT CHANGED    Details   Ascorbic Acid (VITAMIN C) 100 MG tablet Take 1,000 mg by mouth daily       atenolol-chlorthalidone (TENORETIC) 50-25 mg per tablet TAKE ONE TABLET BY MOUTH EVERY DAY  Qty: 90 tablet, Refills: 2    Associated Diagnoses: Essential hypertension      B Complex Vitamins (B COMPLEX 100 PO) Take 1 tablet by mouth daily      Blood Glucose Monitoring Suppl (FREESTYLE LITE) RANULFO by Does not apply route 2 (two) times a day Dx E11 9  Qty: 1 each, Refills: 0    Associated Diagnoses: Type 2 diabetes mellitus without complication, without long-term current use of insulin ([address] )      ! ! FREESTYLE LITE test strip USE TO CHECK BLOOD SUGAR TWO TIMES A DAY  Qty: 100 each, Refills: 3    Associated Diagnoses: Type 2 diabetes mellitus without complication, without long-term current use of insulin ([facility])      glipiZIDE (GLUCOTROL XL) 2 5 mg 24 hr tablet Take 1 tablet (2 5 mg total) by mouth 2 (two) times a day  Qty: 180 tablet, Refills: 1    Associated Diagnoses: Type 2 diabetes mellitus without complication, without long-term current use of insulin ([address] )      ! ! glucose blood (JASWANT CONTOUR TEST) test strip Check blood sugar twice daily, DX:E11 9  Qty: 200 each, Refills: 3    Associated Diagnoses: Type 2 diabetes mellitus without complication, without long-term current use of insulin ([facility])      Lancets (freestyle) lancets USE TO TEST TWO TIMES A DAY  Qty: 100 each, Refills: 1    Associated Diagnoses: Type 2 diabetes mellitus without complication, without long-term current use of insulin ([facility])      levothyroxine 50 mcg tablet TAKE ONE TABLET BY MOUTH EVERY DAY  Qty: 90 tablet, Refills: 1    Associated Diagnoses: Adult hypothyroidism      meclizine (ANTIVERT) 12 5 MG tablet Take 1 tablet (12 5 mg total) by mouth every 8 (eight) hours as needed for dizziness  Qty: 30 tablet, Refills: 0    Associated Diagnoses: Vertigo      methocarbamol (ROBAXIN) 500 mg tablet Take 1 tablet (500 mg total) by mouth 3 (three) times a day  Qty: 30 tablet, Refills: 0    Associated Diagnoses: Muscle spasm      Multiple Vitamin (MULTIVITAMIN) tablet Take 1 tablet by mouth daily      niacin 500 mg tablet Take by mouth       ofloxacin (OCUFLOX) 0 3 % ophthalmic solution Administer 1 drop to both eyes 4 (four) times a day  Qty: 5 mL, Refills: 0    Associated Diagnoses: Acute conjunctivitis of both eyes, unspecified acute conjunctivitis type      pioglitazone (ACTOS) 15 mg tablet TAKE ONE TABLET BY MOUTH EVERY DAY  Qty: 90 tablet, Refills: 1    Associated Diagnoses: Mixed hyperlipidemia      rosuvastatin (CRESTOR) 10 MG tablet TAKE ONE TABLET BY MOUTH EVERY DAY  Qty: 90 tablet, Refills: 3    Associated Diagnoses: Mixed hyperlipidemia      warfarin (COUMADIN) 5 mg tablet Take 1-2 tablets by mouth daily in the evening  Qty: 180 tablet, Refills: 3    Associated Diagnoses: PAF (paroxysmal atrial fibrillation) ([facility] [address] )       ! ! - Potential duplicate medications found  Please discuss with provider  No discharge procedures on file  Mireya Olivarez MD    Portions of the record may have been created with voice recognition software  Occasional wrong word or "sound alike" substitutions may have occurred due to the inherent limitations of voice recognition software    Please read the chart carefully and recognize, using context, where substitutions have occurred     Mireya Olivarez MD  05/19/22 5255

## 2022-05-19 PROBLEM — E87.1 HYPONATREMIA: Status: RESOLVED | Noted: 2022-05-18 | Resolved: 2022-05-19

## 2022-05-19 LAB
4HR DELTA HS TROPONIN: 0 NG/L
ANION GAP SERPL CALCULATED.3IONS-SCNC: 9 MMOL/L (ref 4–13)
BUN SERPL-MCNC: 20 MG/DL (ref 5–25)
CALCIUM SERPL-MCNC: 10.3 MG/DL (ref 8.3–10.1)
CARDIAC TROPONIN I PNL SERPL HS: 11 NG/L
CHLORIDE SERPL-SCNC: 97 MMOL/L (ref 100–108)
CO2 SERPL-SCNC: 28 MMOL/L (ref 21–32)
CREAT SERPL-MCNC: 0.72 MG/DL (ref 0.6–1.3)
ERYTHROCYTE [DISTWIDTH] IN BLOOD BY AUTOMATED COUNT: 14.8 % (ref 11.6–15.1)
GFR SERPL CREATININE-BSD FRML MDRD: 80 ML/MIN/1.73SQ M
GLUCOSE SERPL-MCNC: 101 MG/DL (ref 65–140)
GLUCOSE SERPL-MCNC: 111 MG/DL (ref 65–140)
GLUCOSE SERPL-MCNC: 113 MG/DL (ref 65–140)
GLUCOSE SERPL-MCNC: 243 MG/DL (ref 65–140)
GLUCOSE SERPL-MCNC: 89 MG/DL (ref 65–140)
HCT VFR BLD AUTO: 32.9 % (ref 34.8–46.1)
HGB BLD-MCNC: 10.8 G/DL (ref 11.5–15.4)
MCH RBC QN AUTO: 29.3 PG (ref 26.8–34.3)
MCHC RBC AUTO-ENTMCNC: 32.8 G/DL (ref 31.4–37.4)
MCV RBC AUTO: 89 FL (ref 82–98)
OSMOLALITY UR: 459 MMOL/KG
PLATELET # BLD AUTO: 343 THOUSANDS/UL (ref 149–390)
PMV BLD AUTO: 9.4 FL (ref 8.9–12.7)
POTASSIUM SERPL-SCNC: 3.5 MMOL/L (ref 3.5–5.3)
RBC # BLD AUTO: 3.69 MILLION/UL (ref 3.81–5.12)
SODIUM 24H UR-SCNC: 47 MOL/L
SODIUM SERPL-SCNC: 134 MMOL/L (ref 136–145)
WBC # BLD AUTO: 8.63 THOUSAND/UL (ref 4.31–10.16)

## 2022-05-19 PROCEDURE — 80048 BASIC METABOLIC PNL TOTAL CA: CPT | Performed by: PHYSICIAN ASSISTANT

## 2022-05-19 PROCEDURE — 83935 ASSAY OF URINE OSMOLALITY: CPT | Performed by: PHYSICIAN ASSISTANT

## 2022-05-19 PROCEDURE — 99225 PR SBSQ OBSERVATION CARE/DAY 25 MINUTES: CPT | Performed by: FAMILY MEDICINE

## 2022-05-19 PROCEDURE — 84300 ASSAY OF URINE SODIUM: CPT | Performed by: PHYSICIAN ASSISTANT

## 2022-05-19 PROCEDURE — 85027 COMPLETE CBC AUTOMATED: CPT | Performed by: PHYSICIAN ASSISTANT

## 2022-05-19 PROCEDURE — 84484 ASSAY OF TROPONIN QUANT: CPT | Performed by: EMERGENCY MEDICINE

## 2022-05-19 PROCEDURE — 82948 REAGENT STRIP/BLOOD GLUCOSE: CPT

## 2022-05-19 RX ORDER — ATENOLOL 50 MG/1
50 TABLET ORAL DAILY
Status: DISCONTINUED | OUTPATIENT
Start: 2022-05-19 | End: 2022-05-20 | Stop reason: HOSPADM

## 2022-05-19 RX ORDER — CHLORTHALIDONE 25 MG/1
25 TABLET ORAL DAILY
Status: DISCONTINUED | OUTPATIENT
Start: 2022-05-19 | End: 2022-05-20 | Stop reason: HOSPADM

## 2022-05-19 RX ORDER — HYDRALAZINE HYDROCHLORIDE 20 MG/ML
5 INJECTION INTRAMUSCULAR; INTRAVENOUS ONCE
Status: COMPLETED | OUTPATIENT
Start: 2022-05-19 | End: 2022-05-19

## 2022-05-19 RX ORDER — PREDNISONE 20 MG/1
20 TABLET ORAL DAILY
Status: DISCONTINUED | OUTPATIENT
Start: 2022-05-19 | End: 2022-05-20 | Stop reason: HOSPADM

## 2022-05-19 RX ADMIN — METHOCARBAMOL 500 MG: 500 TABLET ORAL at 16:18

## 2022-05-19 RX ADMIN — ONDANSETRON 4 MG: 2 INJECTION INTRAMUSCULAR; INTRAVENOUS at 03:30

## 2022-05-19 RX ADMIN — ATORVASTATIN CALCIUM 20 MG: 20 TABLET, FILM COATED ORAL at 16:18

## 2022-05-19 RX ADMIN — CHLORTHALIDONE 25 MG: 25 TABLET ORAL at 12:10

## 2022-05-19 RX ADMIN — OSELTAMIVIR PHOSPHATE 30 MG: 30 CAPSULE ORAL at 09:44

## 2022-05-19 RX ADMIN — ATENOLOL 50 MG: 50 TABLET ORAL at 12:10

## 2022-05-19 RX ADMIN — LEVOTHYROXINE SODIUM 50 MCG: 50 TABLET ORAL at 05:19

## 2022-05-19 RX ADMIN — METHOCARBAMOL 500 MG: 500 TABLET ORAL at 22:00

## 2022-05-19 RX ADMIN — PREDNISONE 20 MG: 20 TABLET ORAL at 12:41

## 2022-05-19 RX ADMIN — OSELTAMIVIR PHOSPHATE 30 MG: 30 CAPSULE ORAL at 22:00

## 2022-05-19 RX ADMIN — BENZONATATE 100 MG: 100 CAPSULE ORAL at 09:44

## 2022-05-19 RX ADMIN — HYDRALAZINE HYDROCHLORIDE 5 MG: 20 INJECTION INTRAMUSCULAR; INTRAVENOUS at 01:14

## 2022-05-19 RX ADMIN — METHOCARBAMOL 500 MG: 500 TABLET ORAL at 09:44

## 2022-05-19 RX ADMIN — WARFARIN SODIUM 5.5 MG: 3 TABLET ORAL at 18:17

## 2022-05-19 NOTE — ASSESSMENT & PLAN NOTE
· Reportedly hypoxic in the ED, currently 92% on 2 L oxygen via nasal cannula  · Likely rate related to influenza A Status  · Chest x-ray was negative for any acute intrapulmonary findings  · Treatment plan below

## 2022-05-19 NOTE — H&P
[address] [facility]  H&P- Mayra Duffy Winter 1945, 64 y o  female MRN: [id number]  Unit/Bed#: -01 Encounter: [id number]  Primary Care Provider: Georgena Alpers, DO   Date and time admitted to hospital: 5/18/2022  7:10 PM    * Acute respiratory failure with hypoxia [facility])  Assessment & Plan  Likely secondary to influenza  · Continue supplemental oxygen  · Wean O2 as able  · IS    Influenza A  Assessment & Plan  Cough, malaise x5 days  Exposure to sick contact   · Tamiflu (renal dose) x5 days  · Supportive care    Paroxysmal atrial fibrillation [facility])  Assessment & Plan  · Rate controlled  · On coumadin therapy, 5mg on Sunday, Tuesday, Thursday/2 5mg on Mon, Wednesday, Friday, Saturday  · INR subtherapeutic 1 57, will give 5 5mg tomorrow, repeat INR 5/20       Acute foot pain, left  Assessment & Plan  Patient with redness and pain on lateral aspect of left foot with localized tenderness  Rash present x1 week  No open lesion or wound  Denies trauma  · XR left foot pending  · Check ESR, uric acid level  · Tylenol prn     Hyponatremia  Assessment & Plan  Patient reached adequate hydration  No recent diarrhea  · Unclear etiology although appears mildly hypovolemic on exam and may be secondary to diuretic use with antihypertensive meds  · Will give 500 cc NS bolus x1  · Check urine sodium, urine osm   · Repeat BMP in a m  Type 2 diabetes mellitus without complication, without long-term current use of insulin ([facility])  Assessment & Plan  Lab Results   Component Value Date    HGBA1C 6 4 (H) 12/04/2021       · Hold home oral agents   · Insulin sliding scale and glucose checks a c   · Monitor for hypoglycemia  · CCD    Adult hypothyroidism  Assessment & Plan  · Continue home levothyroxine    Hypokalemia  Assessment & Plan  Potassium 2 8 on arrival  May be 2/2 diuretic use   · Received 20meq IV KCL in ed   · Give additional 40meq po   · Telemetry  · Repeat BMP in a m        Essential hypertension  Assessment & Plan  · BP elevated  · Continue atenolol-chlorthalidone 50-25 mg per day    VTE Pharmacologic Prophylaxis: VTE Score: 5 High Risk (Score >/= 5) - Pharmacological DVT Prophylaxis Ordered: warfarin (Coumadin)  Sequential Compression Devices Ordered  Code Status: Level 1 - Full Code   Discussion with family: Updated  (daughter) at bedside  Anticipated Length of Stay: Patient will be admitted on an observation basis with an anticipated length of stay of less than 2 midnights secondary to hypoxia  Total Time for Visit, including Counseling / Coordination of Care: 60 minutes Greater than 50% of this total time spent on direct patient counseling and coordination of care  Chief Complaint:   Chief Complaint   Patient presents with    Cough     Cough, sinus pain, headache and bodyaches since last week  Flu exposure    Foot Swelling     L foot pain and swelling, dx with plantar fascitis but new redness to top of foot         History of Present Illness:  Basim Poole is a 64 y o  female with a PMH of HTN, T2DM, paroxysmal afib on warfarin who presents with cough, sinus pain and left foot pain  Patient reports cough with clear sputum, sinus pain, headache and malaise x5 days  She does have known flu exposure approximately 1 week ago  Denies fever or chills  Patient states that she is also having left foot pain times few days  Pain is worse with ambulation and palpation  Does not radiate  She was diagnosed with plantar fasciitis however has developed a red rash on the top of her foot  Per daughter the patient had same pain in the right foot approximately 1-2 weeks ago before onset of pain in the left foot  Right foot pain pain is now resolved  Patient does have history of gout although states that this feels different " No history of DVT or PE  Review of Systems:  A 10-point review of systems was obtained    Pertinent positives and negatives are outlined in the HPI above   Remainder of review of systems are otherwise negative  Past Medical and Surgical History:   Past Medical History:   Diagnosis Date    Carpal tunnel syndrome     Heart murmur        Past Surgical History:   Procedure Laterality Date    CATARACT EXTRACTION      CHOLECYSTECTOMY      COLONOSCOPY  11/24/2007    EYE SURGERY      KNEE SURGERY      REPLACEMENT TOTAL KNEE Right     TOTAL ABDOMINAL HYSTERECTOMY W/ BILATERAL SALPINGOOPHORECTOMY         Meds/Allergies:  Prior to Admission medications    Medication Sig Start Date End Date Taking?  Authorizing Provider   Ascorbic Acid (VITAMIN C) 100 MG tablet Take 1,000 mg by mouth daily     Historical Provider, MD   atenolol-chlorthalidone (TENORETIC) 50-25 mg per tablet TAKE ONE TABLET BY MOUTH EVERY DAY 1/17/22   Enrico Foote DO   B Complex Vitamins (B COMPLEX 100 PO) Take 1 tablet by mouth daily 8/15/17   Historical Provider, MD   Blood Glucose Monitoring Suppl (FREESTYLE LITE) RANULFO by Does not apply route 2 (two) times a day Dx E11 9 4/2/20   Enrico Foote,    FREESTYLE LITE test strip USE TO CHECK BLOOD SUGAR TWO TIMES A DAY 2/7/22   Enrico Foote DO   glipiZIDE (GLUCOTROL XL) 2 5 mg 24 hr tablet Take 1 tablet (2 5 mg total) by mouth 2 (two) times a day 12/13/21   Enrico Foote DO   glucose blood (JASWANT CONTOUR TEST) test strip Check blood sugar twice daily, DX:E11 9 11/5/18   Enrico Foote DO   Lancets (freestyle) lancets USE TO TEST TWO TIMES A DAY 2/7/22   Enrico Foote DO   levothyroxine 50 mcg tablet TAKE ONE TABLET BY MOUTH EVERY DAY 4/17/22   Enrico Foote DO   meclizine (ANTIVERT) 12 5 MG tablet Take 1 tablet (12 5 mg total) by mouth every 8 (eight) hours as needed for dizziness 4/5/22   Anne Saba DO   methocarbamol (ROBAXIN) 500 mg tablet Take 1 tablet (500 mg total) by mouth 3 (three) times a day 4/29/22   Anne Saba DO   Multiple Vitamin (MULTIVITAMIN) tablet Take 1 tablet by mouth daily    Historical Provider, MD   niacin 500 mg tablet Take by mouth     Historical Provider, MD   ofloxacin (OCUFLOX) 0 3 % ophthalmic solution Administer 1 drop to both eyes 4 (four) times a day 4/25/22   Mamadou Later, DO   pioglitazone (ACTOS) 15 mg tablet TAKE ONE TABLET BY MOUTH EVERY DAY 2/28/22   Carine Jerome, DO   rosuvastatin (CRESTOR) 10 MG tablet TAKE ONE TABLET BY MOUTH EVERY DAY 4/17/22   Carine Jerome, DO   warfarin (COUMADIN) 5 mg tablet Take 1-2 tablets by mouth daily in the evening  6/27/21   Kaitlin Stuart MD     I have reviewed home medications with patient personally  Allergies: No Known Allergies    Social History:  Marital Status:    Occupation:   Patient Pre-hospital Living Situation: Home  Patient Pre-hospital Level of Mobility: walks  Patient Pre-hospital Diet Restrictions:   Substance Use History:   Social History     Substance and Sexual Activity   Alcohol Use Yes     Social History     Tobacco Use   Smoking Status Never Smoker   Smokeless Tobacco Never Used     Social History     Substance and Sexual Activity   Drug Use No       Family History:  Family History   Problem Relation Age of Onset    Heart attack Father         ARRHYTHMIAS    Coronary artery disease Family     Diabetes Family     Heart attack Family         ARRHYTHMIAS       Physical Exam:     Vitals:   Blood Pressure: 161/71 (05/18/22 2300)  Pulse: 60 (05/18/22 2300)  Temperature: 99 °F (37 2 °C) (05/18/22 1836)  Temp Source: Oral (05/18/22 1836)  Respirations: 18 (05/18/22 1836)  Height: 5' 1" (154 9 cm) (05/18/22 1836)  Weight - Scale: 70 3 kg (155 lb) (05/18/22 1836)  SpO2: 95 % (05/18/22 2300)    Physical Exam  Vitals and nursing note reviewed  Constitutional:       General: She is not in acute distress  Appearance: She is well-developed  HENT:      Head: Normocephalic and atraumatic  Nose: Congestion present  Mouth/Throat:      Mouth: Mucous membranes are moist    Eyes:      General: No scleral icterus       Extraocular Movements: Extraocular movements intact  Conjunctiva/sclera: Conjunctivae normal       Pupils: Pupils are equal, round, and reactive to light  Cardiovascular:      Rate and Rhythm: Normal rate and regular rhythm  Heart sounds: Normal heart sounds  No murmur heard  Pulmonary:      Effort: Pulmonary effort is normal  No respiratory distress  Breath sounds: Normal breath sounds  No wheezing  Abdominal:      General: Abdomen is flat  Bowel sounds are normal       Palpations: Abdomen is soft  Tenderness: There is no abdominal tenderness  Musculoskeletal:         General: Normal range of motion  Cervical back: Neck supple  Comments: 2+ bilateral pedal pulse  Slight edema left foot  Pinpoint/Localized tenderness on lateral aspect  Skin:     General: Skin is warm and dry  Findings: Rash (dorsum on left foot, petechial rash) present  Neurological:      General: No focal deficit present  Mental Status: She is alert     Psychiatric:         Mood and Affect: Mood normal          Additional Data:     Lab Results:  Results from last 7 days   Lab Units 05/18/22 1936   WBC Thousand/uL 9 84   HEMOGLOBIN g/dL 11 5   HEMATOCRIT % 36 0   PLATELETS Thousands/uL 343   NEUTROS PCT % 80*   LYMPHS PCT % 10*   MONOS PCT % 9   EOS PCT % 1     Results from last 7 days   Lab Units 05/18/22  1936   SODIUM mmol/L 132*   POTASSIUM mmol/L 2 8*   CHLORIDE mmol/L 96*   CO2 mmol/L 31   BUN mg/dL 23   CREATININE mg/dL 0 85   ANION GAP mmol/L 5   CALCIUM mg/dL 10 8*   ALBUMIN g/dL 3 0*   TOTAL BILIRUBIN mg/dL 0 46   ALK PHOS U/L 79   ALT U/L 26   AST U/L 39   GLUCOSE RANDOM mg/dL 134     Results from last 7 days   Lab Units 05/18/22 1936   INR  1 57*                   Imaging: Reviewed radiology reports from this admission including: chest xray  XR chest 1 view portable    (Results Pending)   XR foot 3+ views LEFT    (Results Pending)       EKG and Other Studies Reviewed on Admission:   · EKG: No EKG obtained  ** Please Note: This note has been constructed using a voice recognition system   **

## 2022-05-19 NOTE — ASSESSMENT & PLAN NOTE
Lab Results   Component Value Date    HGBA1C 6 4 (H) 12/04/2021       · Hold home oral agents   · Insulin sliding scale and glucose checks a c   · Monitor for hypoglycemia  · CCD

## 2022-05-19 NOTE — PLAN OF CARE
Pt aware she is positive for the flu and thus the isolation  Pt reports feeling no better or worse today  Appetitie fair at best   Pt able to take all meds whole

## 2022-05-19 NOTE — ASSESSMENT & PLAN NOTE
Patient with redness and pain on lateral aspect of left foot with localized tenderness  Rash present x1 week  No open lesion or wound    Denies trauma  · XR left foot pending  · Check ESR, uric acid level  · Tylenol prn

## 2022-05-19 NOTE — ASSESSMENT & PLAN NOTE
Cough, malaise x5 days  Exposure to grandson who tested positive for influenza a as well  · Tamiflu (renal dose) x day 2/5  · Supportive care  · Monitor O2 levels

## 2022-05-19 NOTE — CASE MANAGEMENT
Case Management Assessment & Discharge Planning Note    Patient name Enoch Valenzuela  Location /-21 MRN [id number]  : 1945 Date 2022       Current Admission Date: 2022  Current Admission Diagnosis:Acute respiratory failure with hypoxia [facility])   Patient Active Problem List    Diagnosis Date Noted    Acute respiratory failure with hypoxia ([facility] ) 2022    Influenza A 2022    Acute foot pain, left 2022    Paroxysmal atrial fibrillation ([facility] ) 2022    Artificial knee joint present 2019    Cardiac pacemaker in situ     Worsening vision 2018    Arthralgia of both knees 2016    Sinoatrial node dysfunction ([facility] ) 2016    Iron deficiency anemia secondary to inadequate dietary iron intake 2015    Adult hypothyroidism 2015    Type 2 diabetes mellitus without complication, without long-term current use of insulin [facility] ) 2014    Mixed hyperlipidemia 2014    Essential hypertension 2014      LOS (days): 0  Geometric Mean LOS (GMLOS) (days):   Days to GMLOS:     OBJECTIVE:     Current admission status: Observation       Preferred Pharmacy:   [id number] [address] [place] [address] [address] [place] [address] [address]  Phone: [phone] Fax: [phone]    Primary Care Provider: Varun Miranda DO    Primary Insurance: MEDICARE  Secondary Insurance: AETNA    ASSESSMENT:  [address] Representative - Child   Primary Phone: [phone] (Home)               Advance Directives  Does patient have a [address] [place] [address] Avenue?: Yes  Does patient have Advance Directives?: Yes  Advance Directives: Power of  for health care  Primary Contact: Daughter Shyann Mckeon    Obs Notice Signed: 22    Readmission Root Cause  30 Day Readmission: No    Patient Information  Admitted from[de-identified] Home  Mental Status: Alert  During Assessment patient was accompanied by: Not accompanied during assessment  Assessment information provided by[de-identified] Patient  Primary Caregiver: Self  Support Systems: Daughter, Family members  [place] of Residence: [place] [address] do you live in?: [place]  Type of Current Residence: [place]  In the last 12 months, was there a time when you were not able to pay the mortgage or rent on time?: No  In the last 12 months, was there a time when you did not have a steady place to sleep or slept in a shelter (including now)?: No  Homeless/housing insecurity resource given?: No  Living Arrangements: Lives w/ Daughter    Activities of Daily Living Prior to Admission  Functional Status: Independent  Completes ADLs independently?: Yes  Ambulates independently?: Yes  Does patient use assisted devices?: No  Does patient currently own DME?: Yes  What DME does the patient currently own?: [place] [employer]  Does patient have a history of Outpatient Therapy (PT/OT)?: Yes  Does the patient have a history of Short-Term Rehab?: No  Does patient have a history of [facility]?: No  Does patient currently have [facility]?: No    Patient Information Continued  Income Source: Pension/residential  Does patient have prescription coverage?: Yes  Food insecurity resource given?: No  Does patient receive dialysis treatments?: No  Does patient have a history of substance abuse?: No  Does patient have a history of Mental Health Diagnosis?: No    PHQ 2/9 Screening   Reviewed PHQ 2/9 Depression Screening Score?: No    Means of Transportation  Means of Transport to Appts[de-identified] Drives Self  In the past 12 months, has lack of transportation kept you from medical appointments or from getting medications?: No  In the past 12 months, has lack of transportation kept you from meetings, work, or from getting things needed for daily living?: No  Was application for public transport provided?: No    DISCHARGE DETAILS:    Discharge planning discussed with[de-identified] patient  Freedom of Choice: Yes     CM contacted family/caregiver?: Yes  Were Treatment Team discharge recommendations reviewed with patient/caregiver?: Yes  Did patient/caregiver verbalize understanding of patient care needs?: Yes  Were patient/caregiver advised of the risks associated with not following Treatment Team discharge recommendations?: Yes    Contacts  Patient Contacts: Silvina-Daughter  Relationship to Patient[de-identified] Family  Contact Method: Phone  Phone Number: -MNNL voice message  Reason/Outcome: Continuity of Care, Emergency Contact, Discharge Planning    CM spoke with patient at the bedside  CM introduced self and role  Patient lives in [facility] with her daughter and grandson in a ranch style home  Patient very independent at baseline  Patient drives and goes to OP PT  Patient currently on 1 5 L NC oxygen  No home oxygen needs  Patient has a roller walker at home if needed  Patient is a diabetic  Patient stated she checks her sugars twice a day and takes po diabetic medications  Patient's daughter Keely Camarillo is her POA, 26 [phone] 80  CM left voice message for Keely Camarillo  Waiting for callback  CM will continue to f/u with patient/family re:DCP  CM reviewed discharge planning process including the following: identifying caregivers at home, preference for d/c planning needs,   availability of [facility] Meds to Bed program, availability of treatment team to discuss questions or concerns patient and/or family may have regarding diagnosis, plan of care, old or new medications and discharge planning   CM will continue to follow for care coordination and update assessment as appropriate

## 2022-05-19 NOTE — PROGRESS NOTES
[facility]  Progress Note - Monica Juan Winter 1945, 64 y o  female MRN: [id number]  Unit/Bed#: -Trudy Encounter: [id number]  Primary Care Provider: Azalea Puckett DO   Date and time admitted to hospital: 5/18/2022  7:10 PM    * Acute respiratory failure with hypoxia ([address] )  Assessment & Plan  · Reportedly hypoxic in the ED, currently 92% on 2 L oxygen via nasal cannula  · Likely rate related to influenza A Status  · Chest x-ray was negative for any acute intrapulmonary findings  · Treatment plan below  Influenza A  Assessment & Plan  Cough, malaise x5 days  Exposure to grandson who tested positive for influenza a as well  · Tamiflu (renal dose) x day 2/5  · Supportive care  · Monitor O2 levels  Hypokalemia-resolved as of 5/19/2022  Assessment & Plan  · 2 8 on arrival  · Repleted  Hyponatremia-resolved as of 5/19/2022  Assessment & Plan  · Status post IV fluids  · Now resolved  · Monitor BMP  Acute foot pain, left  Assessment & Plan  + with redness/pain on lateral aspect of left foot for approximately 1 week, improved this morning  Patient with known prior history of gout  · X-ray negative for any acute osseous abnormality  · Does not appear infectious in nature, hold off antibiotics  · Start patient on low-dose steroid x5 days given patient diabetic at baseline  · P r n  Tylenol      Type 2 diabetes mellitus without complication, without long-term current use of insulin (HCC)  Assessment & Plan  Lab Results   Component Value Date    HGBA1C 6 4 (H) 12/04/2021       · Hold glipizide/actos  · Insulin sliding scale and glucose checks a c   · Monitor for hypoglycemia  · CCD    Paroxysmal atrial fibrillation (HCC)  Assessment & Plan  · Rate controlled  · On coumadin therapy, 5mg on Sunday, Tuesday, Thursday/2 5mg on Mon, Wednesday, Friday, Saturday  · INR subtherapeutic 1 57, will give 5 5mg tomorrow, repeat INR 5/20       Adult hypothyroidism  Assessment & Plan  · Continue home levothyroxine    Essential hypertension  Assessment & Plan  · BP better controlled  · Continue atenolol-chlorthalidone 50-25 mg per day    VTE Pharmacologic Prophylaxis:   Pharmacologic: Warfarin (Coumadin)  Mechanical VTE Prophylaxis in Place: No    Patient Centered Rounds: I have performed bedside rounds with nursing staff today  Discussions with Specialists or Other Care Team Provider: yarely    Education and Discussions with Family / Patient: dw patient    Time Spent for Care: 30 minutes  More than 50% of total time spent on counseling and coordination of care as described above  Current Length of Stay: 0 day(s)    Current Patient Status: Observation   Certification Statement: The patient will continue to require additional inpatient hospital stay due to needs o2    Discharge Plan: 24-48hrs    Code Status: Level 1 - Full Code      Subjective:   Afebrile overnight  Denies shortness of breath  Does have coughing spurts  Reports malaise/weakness overall fatigue  Does report her left foot pain is slightly improved  Redness also improved  Ongoing for almost 1 week now  Objective:     Vitals:   Temp (24hrs), Av 5 °F (36 9 °C), Min:98 1 °F (36 7 °C), Max:99 °F (37 2 °C)    Temp:  [98 1 °F (36 7 °C)-99 °F (37 2 °C)] 98 9 °F (37 2 °C)  HR:  [60-71] 60  Resp:  [16-18] 16  BP: (105-181)/(44-72) 105/44  SpO2:  [90 %-96 %] 92 %  Body mass index is 29 29 kg/m²  Input and Output Summary (last 24 hours): Intake/Output Summary (Last 24 hours) at 2022 1214  Last data filed at 2022 0034  Gross per 24 hour   Intake 120 ml   Output --   Net 120 ml       Physical Exam:     Physical Exam  Constitutional:       General: She is not in acute distress  Appearance: She is obese  She is ill-appearing  HENT:      Mouth/Throat:      Mouth: Mucous membranes are moist       Pharynx: Oropharynx is clear  Cardiovascular:      Rate and Rhythm: Normal rate and regular rhythm  Pulses: Normal pulses  Pulmonary:      Effort: Pulmonary effort is normal  No respiratory distress  Breath sounds: Normal breath sounds  Abdominal:      General: Abdomen is flat  Bowel sounds are normal       Palpations: Abdomen is soft  Neurological:      General: No focal deficit present  Mental Status: She is alert and oriented to person, place, and time  Additional Data:     Labs:    Results from last 7 days   Lab Units 05/19/22  0615 05/18/22  1936   WBC Thousand/uL 8 63 9 84   HEMOGLOBIN g/dL 10 8* 11 5   HEMATOCRIT % 32 9* 36 0   PLATELETS Thousands/uL 343 343   NEUTROS PCT %  --  80*   LYMPHS PCT %  --  10*   MONOS PCT %  --  9   EOS PCT %  --  1     Results from last 7 days   Lab Units 05/19/22  0615 05/18/22  1936   SODIUM mmol/L 134* 132*   POTASSIUM mmol/L 3 5 2 8*   CHLORIDE mmol/L 97* 96*   CO2 mmol/L 28 31   BUN mg/dL 20 23   CREATININE mg/dL 0 72 0 85   ANION GAP mmol/L 9 5   CALCIUM mg/dL 10 3* 10 8*   ALBUMIN g/dL  --  3 0*   TOTAL BILIRUBIN mg/dL  --  0 46   ALK PHOS U/L  --  79   ALT U/L  --  26   AST U/L  --  39   GLUCOSE RANDOM mg/dL 101 134     Results from last 7 days   Lab Units 05/18/22  1936   INR  1 57*     Results from last 7 days   Lab Units 05/19/22  1132 05/19/22  0520   POC GLUCOSE mg/dl 111 89                   * I Have Reviewed All Lab Data Listed Above  * Additional Pertinent Lab Tests Reviewed:  All Labs Within Last 24 Hours Reviewed    Recent Cultures (last 7 days):           Last 24 Hours Medication List:   Current Facility-Administered Medications   Medication Dose Route Frequency Provider Last Rate    acetaminophen  650 mg Oral Q6H PRN Nasir Robertson PA-C      atenolol  50 mg Oral Daily Michel Banuelos MD      And    chlorthalidone  25 mg Oral Daily Michel Banuelos MD      atorvastatin  20 mg Oral Daily With [place], [place]      benzonatate  100 mg Oral TID PRN Nasir Robertson PA-C      insulin lispro  1-5 Units Subcutaneous TID [facility]      levothyroxine  50 mcg Oral Early Morning [place], [place]      methocarbamol  500 mg Oral TID Hi Mejia PA-C      ondansetron  4 mg Intravenous Q6H PRN Hi Mejia PA-C      oseltamivir  30 mg Oral Q12H 49 [place], [place]      predniSONE  20 mg Oral Daily Hayes Armendariz MD      warfarin  5 5 mg Oral Daily (warfarin) Hayes Armendariz MD          Today, Patient Was Seen By: FERDINAND Woodson      ** Please Note: Dictation voice to text software may have been used in the creation of this document   **

## 2022-05-19 NOTE — ASSESSMENT & PLAN NOTE
Lab Results   Component Value Date    HGBA1C 6 4 (H) 12/04/2021       · Hold glipizide/actos  · Insulin sliding scale and glucose checks a c   · Monitor for hypoglycemia  · CCD

## 2022-05-19 NOTE — ASSESSMENT & PLAN NOTE
+ with redness/pain on lateral aspect of left foot for approximately 1 week, improved this morning  Patient with known prior history of gout  · X-ray negative for any acute osseous abnormality  · Does not appear infectious in nature, hold off antibiotics  · Start patient on low-dose steroid x5 days given patient diabetic at baseline  · P r n  Tylenol

## 2022-05-19 NOTE — ASSESSMENT & PLAN NOTE
· Rate controlled  · On coumadin therapy, 5mg on Sunday, Tuesday, Thursday/2 5mg on Mon, Wednesday, Friday, Saturday  · INR subtherapeutic 1 57, will give 5 5mg tomorrow, repeat INR 5/20

## 2022-05-19 NOTE — ASSESSMENT & PLAN NOTE
Cough, malaise x5 days   Exposure to sick contact   · Tamiflu (renal dose) x5 days  · Supportive care

## 2022-05-19 NOTE — ASSESSMENT & PLAN NOTE
Potassium 2 8 on arrival  May be 2/2 diuretic use   · Received 20meq IV KCL in ed   · Give additional 40meq po   · Telemetry  · Repeat BMP in a m

## 2022-05-19 NOTE — ASSESSMENT & PLAN NOTE
Patient reached adequate hydration  No recent diarrhea  · Unclear etiology although appears mildly hypovolemic on exam and may be secondary to diuretic use with antihypertensive meds  · Will give 500 cc NS bolus x1  · Check urine sodium, urine osm   · Repeat BMP in a m

## 2022-05-19 NOTE — PLAN OF CARE
Problem: PAIN - ADULT  Goal: Verbalizes/displays adequate comfort level or baseline comfort level  Description: Interventions:  - Encourage patient to monitor pain and request assistance  - Assess pain using appropriate pain scale  - Administer analgesics based on type and severity of pain and evaluate response  - Implement non-pharmacological measures as appropriate and evaluate response  - Consider cultural and social influences on pain and pain management  - Notify physician/advanced practitioner if interventions unsuccessful or patient reports new pain  Outcome: Progressing     Problem: INFECTION - ADULT  Goal: Absence or prevention of progression during hospitalization  Description: INTERVENTIONS:  - Assess and monitor for signs and symptoms of infection  - Monitor lab/diagnostic results  - Monitor all insertion sites, i e  indwelling lines, tubes, and drains  - Monitor endotracheal if appropriate and nasal secretions for changes in amount and color  - [place] appropriate cooling/warming therapies per order  - Administer medications as ordered  - Instruct and encourage patient and family to use good hand hygiene technique  - Identify and instruct in appropriate isolation precautions for identified infection/condition  Outcome: Progressing  Goal: Absence of fever/infection during neutropenic period  Description: INTERVENTIONS:  - Monitor WBC    Outcome: Progressing     Problem: DISCHARGE PLANNING  Goal: Discharge to home or other facility with appropriate resources  Description: INTERVENTIONS:  - Identify barriers to discharge w/patient and caregiver  - Arrange for needed discharge resources and transportation as appropriate  - Identify discharge learning needs (meds, wound care, etc )  - Arrange for interpretive services to assist at discharge as needed  - Refer to Case Management Department for coordinating discharge planning if the patient needs post-hospital services based on physician/advanced practitioner order or complex needs related to functional status, cognitive ability, or social support system  Outcome: Progressing     Problem: Knowledge Deficit  Goal: Patient/family/caregiver demonstrates understanding of disease process, treatment plan, medications, and discharge instructions  Description: Complete learning assessment and assess knowledge base    Interventions:  - Provide teaching at level of understanding  - Provide teaching via preferred learning methods  Outcome: Progressing

## 2022-05-20 VITALS
BODY MASS INDEX: 29.27 KG/M2 | DIASTOLIC BLOOD PRESSURE: 70 MMHG | RESPIRATION RATE: 17 BRPM | HEIGHT: 61 IN | WEIGHT: 155 LBS | SYSTOLIC BLOOD PRESSURE: 140 MMHG | OXYGEN SATURATION: 95 % | HEART RATE: 61 BPM | TEMPERATURE: 97.8 F

## 2022-05-20 LAB
ANION GAP SERPL CALCULATED.3IONS-SCNC: 5 MMOL/L (ref 4–13)
BASOPHILS # BLD AUTO: 0.01 THOUSANDS/ΜL (ref 0–0.1)
BASOPHILS NFR BLD AUTO: 0 % (ref 0–1)
BUN SERPL-MCNC: 17 MG/DL (ref 5–25)
CALCIUM SERPL-MCNC: 10.5 MG/DL (ref 8.3–10.1)
CHLORIDE SERPL-SCNC: 98 MMOL/L (ref 100–108)
CO2 SERPL-SCNC: 32 MMOL/L (ref 21–32)
CREAT SERPL-MCNC: 0.65 MG/DL (ref 0.6–1.3)
EOSINOPHIL # BLD AUTO: 0.01 THOUSAND/ΜL (ref 0–0.61)
EOSINOPHIL NFR BLD AUTO: 0 % (ref 0–6)
ERYTHROCYTE [DISTWIDTH] IN BLOOD BY AUTOMATED COUNT: 14.5 % (ref 11.6–15.1)
GFR SERPL CREATININE-BSD FRML MDRD: 85 ML/MIN/1.73SQ M
GLUCOSE SERPL-MCNC: 145 MG/DL (ref 65–140)
GLUCOSE SERPL-MCNC: 172 MG/DL (ref 65–140)
GLUCOSE SERPL-MCNC: 261 MG/DL (ref 65–140)
HCT VFR BLD AUTO: 34.2 % (ref 34.8–46.1)
HGB BLD-MCNC: 10.9 G/DL (ref 11.5–15.4)
IMM GRANULOCYTES # BLD AUTO: 0.02 THOUSAND/UL (ref 0–0.2)
IMM GRANULOCYTES NFR BLD AUTO: 0 % (ref 0–2)
INR PPP: 1.99 (ref 0.84–1.19)
LYMPHOCYTES # BLD AUTO: 1.62 THOUSANDS/ΜL (ref 0.6–4.47)
LYMPHOCYTES NFR BLD AUTO: 23 % (ref 14–44)
MCH RBC QN AUTO: 28.8 PG (ref 26.8–34.3)
MCHC RBC AUTO-ENTMCNC: 31.9 G/DL (ref 31.4–37.4)
MCV RBC AUTO: 91 FL (ref 82–98)
MONOCYTES # BLD AUTO: 0.7 THOUSAND/ΜL (ref 0.17–1.22)
MONOCYTES NFR BLD AUTO: 10 % (ref 4–12)
NEUTROPHILS # BLD AUTO: 4.71 THOUSANDS/ΜL (ref 1.85–7.62)
NEUTS SEG NFR BLD AUTO: 67 % (ref 43–75)
NRBC BLD AUTO-RTO: 0 /100 WBCS
PLATELET # BLD AUTO: 336 THOUSANDS/UL (ref 149–390)
PMV BLD AUTO: 9.5 FL (ref 8.9–12.7)
POTASSIUM SERPL-SCNC: 3.4 MMOL/L (ref 3.5–5.3)
PROTHROMBIN TIME: 21.6 SECONDS (ref 11.6–14.5)
RBC # BLD AUTO: 3.78 MILLION/UL (ref 3.81–5.12)
SODIUM SERPL-SCNC: 135 MMOL/L (ref 136–145)
WBC # BLD AUTO: 7.07 THOUSAND/UL (ref 4.31–10.16)

## 2022-05-20 PROCEDURE — 82948 REAGENT STRIP/BLOOD GLUCOSE: CPT

## 2022-05-20 PROCEDURE — 80048 BASIC METABOLIC PNL TOTAL CA: CPT | Performed by: FAMILY MEDICINE

## 2022-05-20 PROCEDURE — 85610 PROTHROMBIN TIME: CPT | Performed by: PHYSICIAN ASSISTANT

## 2022-05-20 PROCEDURE — 85025 COMPLETE CBC W/AUTO DIFF WBC: CPT | Performed by: FAMILY MEDICINE

## 2022-05-20 PROCEDURE — 99239 HOSP IP/OBS DSCHRG MGMT >30: CPT | Performed by: FAMILY MEDICINE

## 2022-05-20 RX ORDER — OSELTAMIVIR PHOSPHATE 30 MG/1
30 CAPSULE ORAL EVERY 12 HOURS SCHEDULED
Qty: 6 CAPSULE | Refills: 0 | Status: SHIPPED | OUTPATIENT
Start: 2022-05-20 | End: 2022-05-23

## 2022-05-20 RX ORDER — POTASSIUM CHLORIDE 20 MEQ/1
40 TABLET, EXTENDED RELEASE ORAL ONCE
Status: COMPLETED | OUTPATIENT
Start: 2022-05-20 | End: 2022-05-20

## 2022-05-20 RX ORDER — BENZONATATE 100 MG/1
100 CAPSULE ORAL 3 TIMES DAILY PRN
Qty: 20 CAPSULE | Refills: 0 | Status: SHIPPED | OUTPATIENT
Start: 2022-05-20 | End: 2022-06-22

## 2022-05-20 RX ORDER — PREDNISONE 20 MG/1
20 TABLET ORAL DAILY
Qty: 3 TABLET | Refills: 0 | Status: SHIPPED | OUTPATIENT
Start: 2022-05-21 | End: 2022-05-24

## 2022-05-20 RX ADMIN — INSULIN LISPRO 2 UNITS: 100 INJECTION, SOLUTION INTRAVENOUS; SUBCUTANEOUS at 12:23

## 2022-05-20 RX ADMIN — METHOCARBAMOL 500 MG: 500 TABLET ORAL at 08:53

## 2022-05-20 RX ADMIN — ATENOLOL 50 MG: 50 TABLET ORAL at 08:53

## 2022-05-20 RX ADMIN — BENZONATATE 100 MG: 100 CAPSULE ORAL at 09:04

## 2022-05-20 RX ADMIN — LEVOTHYROXINE SODIUM 50 MCG: 50 TABLET ORAL at 06:04

## 2022-05-20 RX ADMIN — PREDNISONE 20 MG: 20 TABLET ORAL at 08:53

## 2022-05-20 RX ADMIN — POTASSIUM CHLORIDE 40 MEQ: 1500 TABLET, EXTENDED RELEASE ORAL at 08:53

## 2022-05-20 RX ADMIN — CHLORTHALIDONE 25 MG: 25 TABLET ORAL at 08:54

## 2022-05-20 RX ADMIN — OSELTAMIVIR PHOSPHATE 30 MG: 30 CAPSULE ORAL at 08:53

## 2022-05-20 NOTE — PLAN OF CARE
Problem: PAIN - ADULT  Goal: Verbalizes/displays adequate comfort level or baseline comfort level  Description: Interventions:  - Encourage patient to monitor pain and request assistance  - Assess pain using appropriate pain scale  - Administer analgesics based on type and severity of pain and evaluate response  - Implement non-pharmacological measures as appropriate and evaluate response  - Consider cultural and social influences on pain and pain management  - Notify physician/advanced practitioner if interventions unsuccessful or patient reports new pain  Outcome: Progressing     Problem: INFECTION - ADULT  Goal: Absence or prevention of progression during hospitalization  Description: INTERVENTIONS:  - Assess and monitor for signs and symptoms of infection  - Monitor lab/diagnostic results  - Monitor all insertion sites, i e  indwelling lines, tubes, and drains  - Monitor endotracheal if appropriate and nasal secretions for changes in amount and color  - [place] appropriate cooling/warming therapies per order  - Administer medications as ordered  - Instruct and encourage patient and family to use good hand hygiene technique  - Identify and instruct in appropriate isolation precautions for identified infection/condition  Outcome: Progressing  Goal: Absence of fever/infection during neutropenic period  Description: INTERVENTIONS:  - Monitor WBC    Outcome: Progressing     Goal: Maintain or return to baseline ADL function  Description: INTERVENTIONS:  -  Assess patient's ability to carry out ADLs; assess patient's baseline for ADL function and identify physical deficits which impact ability to perform ADLs (bathing, care of mouth/teeth, toileting, grooming, dressing, etc )  - Assess/evaluate cause of self-care deficits   - Assess range of motion  - Assess patient's mobility; develop plan if impaired  - Assess patient's need for assistive devices and provide as appropriate  - Encourage maximum independence but intervene and supervise when necessary  - Involve family in performance of ADLs  - Assess for home care needs following discharge   - Consider OT consult to assist with ADL evaluation and planning for discharge  - Provide patient education as appropriate  Outcome: Progressing  Goal: Maintains/Returns to pre admission functional level  Description: INTERVENTIONS:  - Perform BMAT or MOVE assessment daily    - Set and communicate daily mobility goal to care team and patient/family/caregiver  - Collaborate with rehabilitation services on mobility goals if consulted  - Perform Range of Motion 2 times a day  - Reposition patient every 2 hours  - Dangle patient 2 times a day  - Stand patient 2 times a day  - Ambulate patient 2 times a day  - Out of bed to chair 2 times a day   - Out of bed for meals 2 times a day  - Out of bed for toileting  - Record patient progress and toleration of activity level   Outcome: Progressing     Problem: DISCHARGE PLANNING  Goal: Discharge to home or other facility with appropriate resources  Description: INTERVENTIONS:  - Identify barriers to discharge w/patient and caregiver  - Arrange for needed discharge resources and transportation as appropriate  - Identify discharge learning needs (meds, wound care, etc )  - Arrange for interpretive services to assist at discharge as needed  - Refer to Case Management Department for coordinating discharge planning if the patient needs post-hospital services based on physician/advanced practitioner order or complex needs related to functional status, cognitive ability, or social support system  Outcome: Progressing     Problem: Knowledge Deficit  Goal: Patient/family/caregiver demonstrates understanding of disease process, treatment plan, medications, and discharge instructions  Description: Complete learning assessment and assess knowledge base    Interventions:  - Provide teaching at level of understanding  - Provide teaching via preferred learning methods  Outcome: Progressing     Problem: MOBILITY - ADULT  Goal: Maintain or return to baseline ADL function  Description: INTERVENTIONS:  -  Assess patient's ability to carry out ADLs; assess patient's baseline for ADL function and identify physical deficits which impact ability to perform ADLs (bathing, care of mouth/teeth, toileting, grooming, dressing, etc )  - Assess/evaluate cause of self-care deficits   - Assess range of motion  - Assess patient's mobility; develop plan if impaired  - Assess patient's need for assistive devices and provide as appropriate  - Encourage maximum independence but intervene and supervise when necessary  - Involve family in performance of ADLs  - Assess for home care needs following discharge   - Consider OT consult to assist with ADL evaluation and planning for discharge  - Provide patient education as appropriate  Outcome: Progressing     Problem: Potential for Falls  Goal: Patient will remain free of falls  Description: INTERVENTIONS:  - Educate patient/family on patient safety including physical limitations  - Instruct patient to call for assistance with activity   - Consult OT/PT to assist with strengthening/mobility   - Keep Call bell within reach  - Keep bed low and locked with side rails adjusted as appropriate  - Keep care items and personal belongings within reach  - Initiate and maintain comfort rounds  - Make Fall Risk Sign visible to staff  - Offer Toileting every 2 Hours, in advance of need  - Initiate/Maintain bed alarm  - Obtain necessary fall risk management equipment:   - Apply yellow socks and bracelet for high fall risk patients  - Consider moving patient to room near nurses station  Outcome: Progressing

## 2022-05-20 NOTE — ASSESSMENT & PLAN NOTE
· Ruled out  · Patient was assessed by nursing, saturating 92-94% on room air at rest and 91% with ambulation

## 2022-05-20 NOTE — ASSESSMENT & PLAN NOTE
Cough, malaise x5 days  Exposure to grandson who tested positive for influenza a as well  · Tamiflu (renal dose) x day 3/5, complete course at home  · Supportive care  Being discharged on tessalon perles  · Monitor O2 levels at home, return if it drops <88%   Patient educated of the same

## 2022-05-20 NOTE — ASSESSMENT & PLAN NOTE
· Rate controlled  · On coumadin therapy, 5mg on Sunday, Tuesday, Thursday/2 5mg on Mon, Wednesday, Friday, Saturday  · INR subtherapeutic 1 99  continue coumadin through weekend and re check INR on monday

## 2022-05-20 NOTE — ASSESSMENT & PLAN NOTE
Lab Results   Component Value Date    HGBA1C 6 4 (H) 12/04/2021       · Resume glipizide/actos at DC

## 2022-05-20 NOTE — DISCHARGE SUMMARY
[facility]  Discharge- [facility] Winter 1945, 64 y o  female MRN: [id number]  Unit/Bed#: -01 Encounter: [id number]  Primary Care Provider: Mara Cardona DO   Date and time admitted to hospital: 5/18/2022  7:10 PM    * Acute respiratory failure with hypoxia [facility])  Assessment & Plan  · Ruled out  · Patient was assessed by nursing, saturating 92-94% on room air at rest and 91% with ambulation  Influenza A  Assessment & Plan  Cough, malaise x5 days  Exposure to grandson who tested positive for influenza a as well  · Tamiflu (renal dose) x day 3/5, complete course at home  · Supportive care  Being discharged on tessalon perles  · Monitor O2 levels at home, return if it drops <88%  Patient educated of the same    Acute foot pain, left  Assessment & Plan  + with redness/pain on lateral aspect of left foot for approximately 1 week, improved this morning  Patient with known prior history of gout  · X-ray negative for any acute osseous abnormality  · Does not appear infectious in nature, hold off antibiotics  · Start patient on low-dose steroid x5 days given patient diabetic at baseline  · P r n  Tylenol      Type 2 diabetes mellitus without complication, without long-term current use of insulin (formerly [facility])  Assessment & Plan  Lab Results   Component Value Date    HGBA1C 6 4 (H) 12/04/2021       · Resume glipizide/actos at DC      Paroxysmal atrial fibrillation [facility])  Assessment & Plan  · Rate controlled  · On coumadin therapy, 5mg on Sunday, Tuesday, Thursday/2 5mg on Mon, Wednesday, Friday, Saturday  · INR subtherapeutic 1 99  continue coumadin through weekend and re check INR on monday      Adult hypothyroidism  Assessment & Plan  · Continue home levothyroxine    Essential hypertension  Assessment & Plan  · BP better controlled  · Continue atenolol-chlorthalidone 50-25 mg per day      Discharging Physician / Practitioner: Tan Phillips MD  PCP: Mara Cardona DO  Admission Date: Admission Orders (From admission, onward)     Ordered        05/19/22 1404  Inpatient Admission  Once            05/18/22 2120  Place in Observation  Once                      Discharge Date: 05/20/22    Disposition:      · Home with home health    Reason for Admission: sob    Discharge Diagnoses:     Please see assessment and plan section above for further details regarding discharge diagnoses  Medical Problems             Resolved Problems  Date Reviewed: 5/18/2022   None                 Medication Adjustments and Discharge Medications:  · Summary of Medication Adjustments made as a result of this hospitalization: see med rec  · Medication Dosing Tapers - Please refer to Discharge Medication List for details on any medication dosing tapers (if applicable to patient)  · Medications being temporarily held (include recommended restart time): see med rec  · Discharge Medication List: See after visit summary for reconciled discharge medications  Diet Recommendations at Discharge:  Diet -        Diet Orders   (From admission, onward)             Start     Ordered    05/18/22 2311  Diet [place]/CHO Controlled; Consistent Carbohydrate Diet Level 2 (5 carb servings/75 grams CHO/meal)  Diet effective now        References:    Nutrtion Support Algorithm Enteral vs  Parenteral   Question Answer Comment   Diet Type Bartolome/CHO Controlled    B[place]/CHO Controlled Consistent Carbohydrate Diet Level 2 (5 carb servings/75 grams CHO/meal)    RD to adjust diet per protocol? Yes        05/18/22 [address] [place] [address] Course:     Germania Perry is a 64 y o  female patient who originally presented to the hospital on 5/18/2022 with underlying history of hypertension type 2 diabetes mellitus/AFib on warfarin who presented with symptoms of cough/shortness of breath/sinus pain  Also reported left foot pain ongoing for 5 days  Patient with known flu exposure  · Patient tested positive for influenza A   Started on Tamiflu, currently on day 3 of 5  Complete course at home  Continue with supportive care and being discharged on Tessalon Perles  Her O2 levels were assessed by nursing, currently saturating 92-94% on room air at rest and 91% with ambulation  No evidence of pneumonia on chest x-ray  · Patient with acute left foot pain ongoing for approximately 1 week  She does have prior known history of gout  X-ray was negative for any acute osseous abnormality  Does not appear infectious in nature and hold off on antibiotics  Started on a low-dose steroid for 5 days (patient is diabetic)  Patient on p r n  Tylenol  Ambulating without difficulty this morning  Patient denies shortness of breath  Occasional nonproductive cough  Afebrile  Continues to have some weakness  She is ambulatory  Discharge home and outpatient follow-up with PC P  Above plan of care discussed with patient  She verbally states understanding of the same and is in agreement  Condition at Discharge: fair     Discharge Day Visit / Exam:     Vitals: Blood Pressure: 140/70 (05/20/22 0712)  Pulse: 61 (05/20/22 0712)  Temperature: 97 8 °F (36 6 °C) (05/20/22 0712)  Temp Source: Oral (05/18/22 1836)  Respirations: 17 (05/20/22 0712)  Height: 5' 1" (154 9 cm) (05/18/22 1836)  Weight - Scale: 70 3 kg (155 lb) (05/18/22 1836)  SpO2: 95 % (05/20/22 8956)  Exam:   Physical Exam  Constitutional:       General: She is not in acute distress  Appearance: She is obese  She is not toxic-appearing  HENT:      Mouth/Throat:      Mouth: Mucous membranes are moist       Pharynx: Oropharynx is clear  Cardiovascular:      Rate and Rhythm: Normal rate and regular rhythm  Pulses: Normal pulses  Pulmonary:      Effort: Pulmonary effort is normal       Breath sounds: Normal breath sounds  Abdominal:      General: Abdomen is flat  Bowel sounds are normal       Palpations: Abdomen is soft  Musculoskeletal:      Right lower leg: No edema  Left lower leg: No edema  Neurological:      General: No focal deficit present  Mental Status: She is alert and oriented to person, place, and time  Goals of Care Discussions:  · Code Status at Discharge: Level 1 - Full Code    Discharge instructions/Information to patient and family:   See after visit summary section titled Discharge Instructions for information provided to patient and family  Discharge Statement:  I spent 40 minutes discharging the patient  This time was spent on the day of discharge  I had direct contact with the patient on the day of discharge  Greater than 50% of the total time was spent examining patient, answering all patient questions, arranging and discussing plan of care with patient as well as directly providing post-discharge instructions  Additional time then spent on discharge activities      ** Please Note: This note has been constructed using a voice recognition system **

## 2022-05-23 ENCOUNTER — TELEPHONE (OUTPATIENT)
Dept: CARDIOLOGY CLINIC | Facility: CLINIC | Age: 77
End: 2022-05-23

## 2022-05-23 ENCOUNTER — TRANSITIONAL CARE MANAGEMENT (OUTPATIENT)
Dept: FAMILY MEDICINE CLINIC | Facility: CLINIC | Age: 77
End: 2022-05-23

## 2022-05-23 NOTE — TELEPHONE ENCOUNTER
Pt called and stated that she was released from the hospital on Friday and she would like to know when she can be tested again for her coumadin

## 2022-05-26 NOTE — PHYSICIAN ADVISOR
Current patient class: Inpatient  The patient is currently on Hospital Day: 3 at [address]      The patient was admitted to the hospital at  2:04 PM on 5/19/22 for the following diagnosis:  Cough [R05 9]  Hypoxemia [R09 02]  Hypokalemia [E87 6]  Foot swelling [M79 89]  Influenza A [J10 1]       There is documentation in the medical record of an expected length of stay of at least 2 midnights  The patient is therefore expected to satisfy the 2 midnight benchmark and given the 2 midnight presumption is appropriate for INPATIENT ADMISSION  Given this expectation of a satisfying stay, CMS instructs us that the patient is most often appropriate for inpatient admission under part A provided medical necessity is documented in the chart  After review of the relevant documentation, labs, vital signs and test results, the patient is appropriate for INPATIENT ADMISSION  Admission to the hospital as an inpatient is a complex decision making process which requires the practitioner to consider the patients presenting complaint, history and physical examination and all relevant testing  With this in mind, in this case, the patient was deemed appropriate for INPATIENT ADMISSION  After review of the documentation and testing available at the time of the admission I concur with this clinical determination of medical necessity  Rationale is as follows: The patient is a 64 yrs old Female who presented to the ED at 5/18/2022  7:10 PM with a chief complaint of Cough (Cough, sinus pain, headache and bodyaches since last week  Flu exposure) and Foot Swelling (L foot pain and swelling, dx with plantar fascitis but new redness to top of foot)  Patient initially admitted observation  Diabetic patient, she was found to have influenza A and started on Tamiflu  He did receive supplemental o2 5/18-5/19/22  She also c/o foot swelling   Initially, she was monitored off antibiotics and but later was treated for gout with   Prednisone  Given multiple medical issues, IP seems appropriate  The patients vitals on arrival were   ED Triage Vitals   Temperature Pulse Respirations Blood Pressure SpO2   05/18/22 1836 05/18/22 1836 05/18/22 1836 05/18/22 1836 05/18/22 1836   99 °F (37 2 °C) 61 18 169/71 96 %      Temp Source Heart Rate Source Patient Position - Orthostatic VS BP Location FiO2 (%)   05/18/22 1836 05/18/22 1836 05/18/22 1836 05/18/22 1836 --   Oral Monitor Sitting Left arm       Pain Score       05/18/22 2346       No Pain           Past Medical History:   Diagnosis Date    Carpal tunnel syndrome     Heart murmur      Past Surgical History:   Procedure Laterality Date    CATARACT EXTRACTION      CHOLECYSTECTOMY      COLONOSCOPY  11/24/2007    EYE SURGERY      KNEE SURGERY      REPLACEMENT TOTAL KNEE Right     TOTAL ABDOMINAL HYSTERECTOMY W/ BILATERAL SALPINGOOPHORECTOMY             Consults have been placed to:   None    Vitals:    05/19/22 1855 05/19/22 2303 05/19/22 2304 05/20/22 0712   BP: 135/59 (!) 138/48 (!) 138/48 140/70   Pulse: 69 61 60 61   Resp: 18 18 14 17   Temp: 97 9 °F (36 6 °C) 98 2 °F (36 8 °C) 98 2 °F (36 8 °C) 97 8 °F (36 6 °C)   TempSrc:       SpO2: 93% 94% 96% 95%   Weight:       Height:           Most recent labs:    No results for input(s): WBC, HGB, HCT, PLT, K, NA, CALCIUM, BUN, CREATININE, LIPASE, AMYLASE, INR, TROPONINI, CKTOTAL, AST, ALT, ALKPHOS, BILITOT in the last 72 hours  Scheduled Meds:  Continuous Infusions:No current facility-administered medications for this encounter  PRN Meds:      Surgical procedures (if appropriate):

## 2022-05-27 NOTE — PROGRESS NOTES
PT Discharge    Patient has not been seen by OPPT services since 4/13/22 and would require a new prescription for PT  At this time patient will be d/c from PT services

## 2022-06-01 ENCOUNTER — OFFICE VISIT (OUTPATIENT)
Dept: FAMILY MEDICINE CLINIC | Facility: CLINIC | Age: 77
End: 2022-06-01
Payer: MEDICARE

## 2022-06-01 VITALS
DIASTOLIC BLOOD PRESSURE: 72 MMHG | HEART RATE: 76 BPM | OXYGEN SATURATION: 98 % | WEIGHT: 152 LBS | HEIGHT: 61 IN | SYSTOLIC BLOOD PRESSURE: 118 MMHG | TEMPERATURE: 96.3 F | BODY MASS INDEX: 28.7 KG/M2

## 2022-06-01 DIAGNOSIS — J10.1 INFLUENZA A: ICD-10-CM

## 2022-06-01 DIAGNOSIS — L23.7 POISON IVY: ICD-10-CM

## 2022-06-01 DIAGNOSIS — E11.9 TYPE 2 DIABETES MELLITUS WITHOUT COMPLICATION, WITHOUT LONG-TERM CURRENT USE OF INSULIN (HCC): ICD-10-CM

## 2022-06-01 DIAGNOSIS — M25.50 ARTHRALGIA, UNSPECIFIED JOINT: ICD-10-CM

## 2022-06-01 DIAGNOSIS — J96.01 ACUTE RESPIRATORY FAILURE WITH HYPOXIA (HCC): Primary | ICD-10-CM

## 2022-06-01 DIAGNOSIS — E78.2 MIXED HYPERLIPIDEMIA: ICD-10-CM

## 2022-06-01 PROCEDURE — 99495 TRANSJ CARE MGMT MOD F2F 14D: CPT | Performed by: FAMILY MEDICINE

## 2022-06-01 RX ORDER — METHYLPREDNISOLONE 4 MG/1
TABLET ORAL
Qty: 21 TABLET | Refills: 0 | Status: SHIPPED | OUTPATIENT
Start: 2022-06-01 | End: 2022-06-07

## 2022-06-01 NOTE — LETTER
Date: 6/1/2022    To whom it may concern: This is to certify that Adriane Reddy has been under my care for the following diagnosis: Testing information inserted here  HOSPITALIZATION FOR HYPOXIA, RESPIRATORY INSUFFICIENCY  I feel that Adriane Reddy is unable to serve on [address] [place] [address] Duty at this time for the above mentioned medical reasons                    Sincerely,  José Manuel Shirley, DO

## 2022-06-01 NOTE — PROGRESS NOTES
Assessment/Plan:         Problem List Items Addressed This Visit        Endocrine    Type 2 diabetes mellitus without complication, without long-term current use of insulin (HCC)    Relevant Orders    Hemoglobin A1C       Respiratory    Acute respiratory failure with hypoxia (HCC) - Primary    Influenza A       Other    Mixed hyperlipidemia    Relevant Orders    Lipid panel      Other Visit Diagnoses     Poison ivy        Relevant Medications    methylPREDNISolone 4 MG tablet therapy pack    Arthralgia, unspecified joint        Relevant Orders    C-reactive protein    Lyme Total Antibody Profile with reflex to WB      No further treatment for the hypoxemia since this is now resolved  See her back for her regular checkup in July  Subjective:      Patient ID: Rea Farfan is a 64 y o  female  TCM Call (since 5/1/2022)     Date and time call was made  5/23/2022  8:53 AM    Hospital care reviewed  Records reviewed    Patient was hospitialized at  [facility]    Date of Admission  05/18/22    Date of discharge  05/20/22    Diagnosis  Acute respiratory failure with hypoxia    Disposition  Home    Were the patients medications reviewed and updated  Yes    Current Symptoms  --  body ache      TCM Call (since 5/1/2022)     Post hospital issues  Reduced activity    Should patient be enrolled in anticoag monitoring? Yes    Scheduled for follow up? Yes    Did you obtain your prescribed medications  Yes    Is transportation to your appointment needed  No    I have advised the patient to call PCP with any new or worsening symptoms    Ramonita Miller/bandar      Living Arrangements  Family members    Support System  Family    The type of support provided  Emotional    Do you have social support  Yes, as much as I need    Are you recieving any outpatient services  No    Are you recieving home care services  No    Are you using any community resources  No    Current waiver services  No    Have you fallen in the last 12 months  No    Interperter language line needed  No    Counseling  Patient    Counseling topics  patient and family education; Importance of RX   compliance; Activities of daily living    Comments  spoke with pt on 5/23/22 after her hospital d/c on 5/20/22  pt   stated that she feels good, reported mild body aches only, denied fever,   chills, cough, nausea, headaches, pt stated that she is taking her   medications as prescribed  she will see PCP on 6/1/22 for hospital f/u and   she is aware to call our office if she has any questions or concern  ER/CMA  PATIENT COMES IN TODAY FOR TRANSITION CARE MANAGEMENT, SHE WAS ADMITTED TO [facility] ON BE 18TH AND DISCHARGED ON MAY 20TH WITH DISCHARGE DIAGNOSIS OF HYPOXEMIA SECONDARY TO INFLUENZA A  SHE WAS NOTED TO HAVE A MILD ANEMIA, AND HYPOKALEMIA WHICH WAS REPLETED  SHE STATES SHE IS FEELING MUCH BETTER SINCE SHE WAS DISCHARGED  SHE DOES COMPLAIN OF SOME ARTHRALGIAS IN HER SHOULDER AND NECK  SHE IS ALSO COMPLAINING OF POISON IVY ON HER ARMS  The following portions of the patient's history were reviewed and updated as appropriate:   Past Medical History:  She has a past medical history of Carpal tunnel syndrome and Heart murmur  ,  _______________________________________________________________________  Medical Problems:  does not have any pertinent problems on file ,  _______________________________________________________________________  Past Surgical History:   has a past surgical history that includes Colonoscopy (11/24/2007); Cataract extraction; Cholecystectomy; Knee surgery; Total abdominal hysterectomy w/ bilateral salpingoophorectomy; Replacement total knee (Right); and Eye surgery  ,  _______________________________________________________________________  Family History:  family history includes Coronary artery disease in her family; Diabetes in her family;  Heart attack in her family and father ,  _______________________________________________________________________  Social History:   reports that she has never smoked  She has never used smokeless tobacco  She reports current alcohol use  She reports that she does not use drugs  ,  _______________________________________________________________________  Allergies:  has No Known Allergies     _______________________________________________________________________  Current Outpatient Medications   Medication Sig Dispense Refill    Ascorbic Acid (VITAMIN C) 100 MG tablet Take 1,000 mg by mouth daily       atenolol-chlorthalidone (TENORETIC) 50-25 mg per tablet TAKE ONE TABLET BY MOUTH EVERY DAY 90 tablet 2    B Complex Vitamins (B COMPLEX 100 PO) Take 1 tablet by mouth daily      benzonatate (TESSALON PERLES) 100 mg capsule Take 1 capsule (100 mg total) by mouth as needed in the morning and 1 capsule (100 mg total) as needed at noon and 1 capsule (100 mg total) as needed in the evening for cough   20 capsule 0    Blood Glucose Monitoring Suppl (FREESTYLE LITE) RANULFO by Does not apply route 2 (two) times a day Dx E11 9 1 each 0    FREESTYLE LITE test strip USE TO CHECK BLOOD SUGAR TWO TIMES A  each 3    glipiZIDE (GLUCOTROL XL) 2 5 mg 24 hr tablet Take 1 tablet (2 5 mg total) by mouth 2 (two) times a day 180 tablet 1    glucose blood (JASWANT CONTOUR TEST) test strip Check blood sugar twice daily, DX:E11 9 200 each 3    Lancets (freestyle) lancets USE TO TEST TWO TIMES A  each 1    levothyroxine 50 mcg tablet TAKE ONE TABLET BY MOUTH EVERY DAY 90 tablet 1    meclizine (ANTIVERT) 12 5 MG tablet Take 1 tablet (12 5 mg total) by mouth every 8 (eight) hours as needed for dizziness 30 tablet 0    methocarbamol (ROBAXIN) 500 mg tablet Take 1 tablet (500 mg total) by mouth 3 (three) times a day 30 tablet 0    methylPREDNISolone 4 MG tablet therapy pack Use as directed on package 21 tablet 0    Multiple Vitamin (MULTIVITAMIN) tablet Take 1 tablet by mouth daily      niacin 500 mg tablet Take by mouth       ofloxacin (OCUFLOX) 0 3 % ophthalmic solution Administer 1 drop to both eyes 4 (four) times a day 5 mL 0    pioglitazone (ACTOS) 15 mg tablet TAKE ONE TABLET BY MOUTH EVERY DAY 90 tablet 1    rosuvastatin (CRESTOR) 10 MG tablet TAKE ONE TABLET BY MOUTH EVERY DAY 90 tablet 3    warfarin (COUMADIN) 5 mg tablet Take 1-2 tablets by mouth daily in the evening  180 tablet 3     No current facility-administered medications for this visit      _______________________________________________________________________  Review of Systems   Constitutional: Negative for chills, fatigue and fever  HENT: Negative for congestion, ear pain, hearing loss, postnasal drip, rhinorrhea and sore throat  Eyes: Negative for pain and visual disturbance  Respiratory: Negative for chest tightness, shortness of breath and wheezing  Cardiovascular: Negative for chest pain and leg swelling  Gastrointestinal: Negative for abdominal distention, abdominal pain, constipation, diarrhea and vomiting  Endocrine: Negative for cold intolerance and heat intolerance  Genitourinary: Negative for difficulty urinating, frequency and urgency  Musculoskeletal: Positive for arthralgias  Negative for gait problem  Skin: Positive for rash  Negative for color change  Neurological: Negative for dizziness, tremors, syncope, numbness and headaches  Hematological: Negative for adenopathy  Psychiatric/Behavioral: Negative for agitation, confusion and sleep disturbance  The patient is not nervous/anxious  Objective:  Vitals:    06/01/22 1519   BP: 118/72   BP Location: Left arm   Patient Position: Sitting   Cuff Size: Large   Pulse: 76   Temp: (!) 96 3 °F (35 7 °C)   SpO2: 98%   Weight: 68 9 kg (152 lb)   Height: 5' 1" (1 549 m)     Body mass index is 28 72 kg/m²  Physical Exam  Vitals and nursing note reviewed     Constitutional:       Appearance: She is well-developed  HENT:      Head: Normocephalic  Eyes:      General: No scleral icterus  Conjunctiva/sclera: Conjunctivae normal    Neck:      Thyroid: No thyromegaly  Cardiovascular:      Rate and Rhythm: Normal rate and regular rhythm  Heart sounds: Normal heart sounds  No murmur heard  Pulmonary:      Effort: Pulmonary effort is normal  No respiratory distress  Breath sounds: Normal breath sounds  No wheezing  Abdominal:      General: Bowel sounds are normal  There is no distension  Palpations: Abdomen is soft  Tenderness: There is no abdominal tenderness  Musculoskeletal:         General: No tenderness  Normal range of motion  Cervical back: Normal range of motion  Lymphadenopathy:      Cervical: No cervical adenopathy  Skin:     General: Skin is warm and dry  Coloration: Skin is not pale  Findings: No rash  Neurological:      Mental Status: She is alert and oriented to person, place, and time  Cranial Nerves: No cranial nerve deficit     Psychiatric:         Behavior: Behavior normal

## 2022-06-07 ENCOUNTER — ANTICOAG VISIT (OUTPATIENT)
Dept: CARDIOLOGY CLINIC | Facility: CLINIC | Age: 77
End: 2022-06-07

## 2022-06-07 ENCOUNTER — APPOINTMENT (OUTPATIENT)
Dept: LAB | Facility: HOSPITAL | Age: 77
End: 2022-06-07
Payer: MEDICARE

## 2022-06-07 ENCOUNTER — TELEPHONE (OUTPATIENT)
Dept: CARDIOLOGY CLINIC | Facility: CLINIC | Age: 77
End: 2022-06-07

## 2022-06-07 DIAGNOSIS — I48.0 PAROXYSMAL ATRIAL FIBRILLATION (HCC): Primary | ICD-10-CM

## 2022-06-07 DIAGNOSIS — E11.9 TYPE 2 DIABETES MELLITUS WITHOUT COMPLICATION, WITHOUT LONG-TERM CURRENT USE OF INSULIN (HCC): ICD-10-CM

## 2022-06-07 DIAGNOSIS — E78.2 MIXED HYPERLIPIDEMIA: ICD-10-CM

## 2022-06-07 DIAGNOSIS — Z79.01 ANTICOAGULATION MONITORING, INR RANGE 2-3: Primary | ICD-10-CM

## 2022-06-07 DIAGNOSIS — M25.50 ARTHRALGIA, UNSPECIFIED JOINT: ICD-10-CM

## 2022-06-07 LAB
CHOLEST SERPL-MCNC: 220 MG/DL
CRP SERPL QL: <3 MG/L
HDLC SERPL-MCNC: 56 MG/DL
INR PPP: 3.49 (ref 0.84–1.19)
LDLC SERPL CALC-MCNC: 127 MG/DL (ref 0–100)
NONHDLC SERPL-MCNC: 164 MG/DL
PROTHROMBIN TIME: 33.2 SECONDS (ref 11.6–14.5)
TRIGL SERPL-MCNC: 185 MG/DL

## 2022-06-07 PROCEDURE — 80061 LIPID PANEL: CPT

## 2022-06-07 PROCEDURE — 83036 HEMOGLOBIN GLYCOSYLATED A1C: CPT

## 2022-06-07 PROCEDURE — 86140 C-REACTIVE PROTEIN: CPT

## 2022-06-07 PROCEDURE — 85610 PROTHROMBIN TIME: CPT

## 2022-06-07 PROCEDURE — 36415 COLL VENOUS BLD VENIPUNCTURE: CPT

## 2022-06-07 PROCEDURE — 86618 LYME DISEASE ANTIBODY: CPT

## 2022-06-07 NOTE — PROGRESS NOTES
Pt advised to take half dose so she will take 2 5mg tonight then resume regular dose and retest in one week

## 2022-06-07 NOTE — TELEPHONE ENCOUNTER
[address] [id number] contacting office for an INR order to be put into Epic  Patient is currently @ the lab now

## 2022-06-08 LAB
B BURGDOR IGG+IGM SER-ACNC: 27
EST. AVERAGE GLUCOSE BLD GHB EST-MCNC: 148 MG/DL
HBA1C MFR BLD: 6.8 %

## 2022-06-21 ENCOUNTER — APPOINTMENT (OUTPATIENT)
Dept: LAB | Facility: HOSPITAL | Age: 77
End: 2022-06-21
Payer: MEDICARE

## 2022-06-21 ENCOUNTER — ANTICOAG VISIT (OUTPATIENT)
Dept: CARDIOLOGY CLINIC | Facility: CLINIC | Age: 77
End: 2022-06-21

## 2022-06-22 ENCOUNTER — OFFICE VISIT (OUTPATIENT)
Dept: CARDIOLOGY CLINIC | Facility: CLINIC | Age: 77
End: 2022-06-22
Payer: MEDICARE

## 2022-06-22 VITALS
DIASTOLIC BLOOD PRESSURE: 66 MMHG | BODY MASS INDEX: 28.98 KG/M2 | HEIGHT: 61 IN | HEART RATE: 60 BPM | OXYGEN SATURATION: 96 % | WEIGHT: 153.5 LBS | SYSTOLIC BLOOD PRESSURE: 130 MMHG

## 2022-06-22 DIAGNOSIS — M79.10 MUSCLE PAIN: ICD-10-CM

## 2022-06-22 DIAGNOSIS — Z79.01 ANTICOAGULATION MONITORING, INR RANGE 2-3: ICD-10-CM

## 2022-06-22 DIAGNOSIS — I10 ESSENTIAL HYPERTENSION: ICD-10-CM

## 2022-06-22 DIAGNOSIS — I48.0 PAROXYSMAL ATRIAL FIBRILLATION (HCC): ICD-10-CM

## 2022-06-22 DIAGNOSIS — I49.5 SSS (SICK SINUS SYNDROME) (HCC): ICD-10-CM

## 2022-06-22 PROCEDURE — 99214 OFFICE O/P EST MOD 30 MIN: CPT | Performed by: INTERNAL MEDICINE

## 2022-06-22 NOTE — PROGRESS NOTES
[facility]  [address] [address]  Cardiology Follow Up    Keshav Whitaker Winter  1945  [id number]      1  Muscle pain  CK    Aldolase   2  Paroxysmal atrial fibrillation (HCC)     3  Anticoagulation monitoring, INR range 2-3     4  Essential hypertension     5  SSS (sick sinus syndrome) [facility])         Chief Complaint   Patient presents with    Follow-up       Interval History:  Patient presents for follow-up visit  Patient denies any history of chest pain shortness of breath  Patient denies any history of leg edema or orthopnea PND  No history of presyncope syncope  Patient states compliance with the present list of medications  Patient denies any bleeding issues  Patient recently was hospitalized for influenza A  Patient is having some muscle pain she is not sure whether this is related to her arthritis or it started after her statin therapy was started by her primary care physician    Patient Active Problem List   Diagnosis    Arthralgia of both knees    Mixed hyperlipidemia    Essential hypertension    Adult hypothyroidism    Sinoatrial node dysfunction (HCC)    Type 2 diabetes mellitus without complication, without long-term current use of insulin ([address] )    Worsening vision    Cardiac pacemaker in situ    Artificial knee joint present    Acute respiratory failure with hypoxia ([address] )    Influenza A    Acute foot pain, left    Paroxysmal atrial fibrillation ([address] )     Past Medical History:   Diagnosis Date    Carpal tunnel syndrome     Heart murmur      Social History     Socioeconomic History    Marital status:       Spouse name: Not on file    Number of children: Not on file    Years of education: Not on file    Highest education level: Not on file   Occupational History    Not on file   Tobacco Use    Smoking status: Never Smoker    Smokeless tobacco: Never Used   Vaping Use    Vaping Use: Never used   Substance and Sexual Activity    Alcohol use: Yes    Drug use: No    Sexual activity: Not on file   Other Topics Concern    Not on file   Social History Narrative    Always uses seat belt    Employed    Lives with spouse      Social Determinants of Health     Financial Resource Strain: Not on file   Food Insecurity: Not on file   Transportation Needs: No Transportation Needs    Lack of Transportation (Medical): No    Lack of Transportation (Non-Medical):  No   Physical Activity: Not on file   Stress: Not on file   Social Connections: Not on file   Intimate Partner Violence: Not on file   Housing Stability: Unknown    Unable to Pay for Housing in the Last Year: No    Number of Places Lived in the Last Year: Not on file    Unstable Housing in the Last Year: No      Family History   Problem Relation Age of Onset    Heart attack Father         ARRHYTHMIAS    Coronary artery disease Family     Diabetes Family     Heart attack Family         ARRHYTHMIAS     Past Surgical History:   Procedure Laterality Date    CATARACT EXTRACTION      CHOLECYSTECTOMY      COLONOSCOPY  11/24/2007    EYE SURGERY      KNEE SURGERY      REPLACEMENT TOTAL KNEE Right     TOTAL ABDOMINAL HYSTERECTOMY W/ BILATERAL SALPINGOOPHORECTOMY         Current Outpatient Medications:     Ascorbic Acid (VITAMIN C) 100 MG tablet, Take 500 mg by mouth daily, Disp: , Rfl:     atenolol-chlorthalidone (TENORETIC) 50-25 mg per tablet, TAKE ONE TABLET BY MOUTH EVERY DAY, Disp: 90 tablet, Rfl: 2    B Complex Vitamins (B COMPLEX 100 PO), Take 1 tablet by mouth daily, Disp: , Rfl:     Blood Glucose Monitoring Suppl (FREESTYLE LITE) RANULFO, by Does not apply route 2 (two) times a day Dx E11 9, Disp: 1 each, Rfl: 0    FREESTYLE LITE test strip, USE TO CHECK BLOOD SUGAR TWO TIMES A DAY, Disp: 100 each, Rfl: 3    glipiZIDE (GLUCOTROL XL) 2 5 mg 24 hr tablet, Take 1 tablet (2 5 mg total) by mouth 2 (two) times a day, Disp: 180 tablet, Rfl: 1    glucose blood (JASWANT CONTOUR TEST) test strip, Check blood sugar twice daily, DX:E11 9, Disp: 200 each, Rfl: 3    Lancets (freestyle) lancets, USE TO TEST TWO TIMES A DAY, Disp: 100 each, Rfl: 1    levothyroxine 50 mcg tablet, TAKE ONE TABLET BY MOUTH EVERY DAY, Disp: 90 tablet, Rfl: 1    Multiple Vitamin (MULTIVITAMIN) tablet, Take 1 tablet by mouth daily, Disp: , Rfl:     niacin 500 mg tablet, Take by mouth , Disp: , Rfl:     pioglitazone (ACTOS) 15 mg tablet, TAKE ONE TABLET BY MOUTH EVERY DAY, Disp: 90 tablet, Rfl: 1    rosuvastatin (CRESTOR) 10 MG tablet, TAKE ONE TABLET BY MOUTH EVERY DAY, Disp: 90 tablet, Rfl: 3    warfarin (COUMADIN) 5 mg tablet, Take 1-2 tablets by mouth daily in the evening , Disp: 180 tablet, Rfl: 3    meclizine (ANTIVERT) 12 5 MG tablet, Take 1 tablet (12 5 mg total) by mouth every 8 (eight) hours as needed for dizziness (Patient not taking: Reported on 6/22/2022), Disp: 30 tablet, Rfl: 0  No Known Allergies    Labs:   Ancillary Orders on 06/17/2022   Component Date Value    Protime 06/21/2022 27 8 (A)    INR 06/21/2022 2 76 (A)   Telephone on 06/07/2022   Component Date Value    Protime 06/07/2022 33 2 (A)    INR 06/07/2022 3 49 (A)   Appointment on 06/07/2022   Component Date Value    CRP 06/07/2022 <3 0     Lyme total antibody 06/07/2022 27     Hemoglobin A1C 06/07/2022 6 8 (A)    EAG 06/07/2022 148     Cholesterol 06/07/2022 220 (A)    Triglycerides 06/07/2022 185 (A)    HDL, Direct 06/07/2022 56     LDL Calculated 06/07/2022 127 (A)    Non-HDL-Chol (CHOL-HDL) 06/07/2022 164    Admission on 05/18/2022, Discharged on 05/20/2022   Component Date Value    SARS-CoV-2 05/18/2022 Negative     INFLUENZA A PCR 05/18/2022 Positive (A)    INFLUENZA B PCR 05/18/2022 Negative     RSV PCR 05/18/2022 Negative     WBC 05/18/2022 9 84     RBC 05/18/2022 3 96     Hemoglobin 05/18/2022 11 5     Hematocrit 05/18/2022 36 0     MCV 05/18/2022 91     MCH 05/18/2022 29 0     MCHC 05/18/2022 31 9  RDW 05/18/2022 14 7     MPV 05/18/2022 9 4     Platelets 93/21/9313 343     nRBC 05/18/2022 0     Neutrophils Relative 05/18/2022 80 (A)    Immat GRANS % 05/18/2022 0     Lymphocytes Relative 05/18/2022 10 (A)    Monocytes Relative 05/18/2022 9     Eosinophils Relative 05/18/2022 1     Basophils Relative 05/18/2022 0     Neutrophils Absolute 05/18/2022 7 77 (A)    Immature Grans Absolute 05/18/2022 0 03     Lymphocytes Absolute 05/18/2022 1 01     Monocytes Absolute 05/18/2022 0 91     Eosinophils Absolute 05/18/2022 0 09     Basophils Absolute 05/18/2022 0 03     Sodium 05/18/2022 132 (A)    Potassium 05/18/2022 2 8 (A)    Chloride 05/18/2022 96 (A)    CO2 05/18/2022 31     ANION GAP 05/18/2022 5     BUN 05/18/2022 23     Creatinine 05/18/2022 0 85     Glucose 05/18/2022 134     Calcium 05/18/2022 10 8 (A)    Corrected Calcium 05/18/2022 11 6 (A)    AST 05/18/2022 39     ALT 05/18/2022 26     Alkaline Phosphatase 05/18/2022 79     Total Protein 05/18/2022 7 5     Albumin 05/18/2022 3 0 (A)    Total Bilirubin 05/18/2022 0 46     eGFR 05/18/2022 65     Protime 05/18/2022 18 0 (A)    INR 05/18/2022 1 57 (A)    PTT 05/18/2022 51 (A)    hs TnI 0hr 05/18/2022 11     NT-proBNP 05/18/2022 820 (A)    hs TnI 2hr 05/18/2022 10     Delta 2hr hsTnI 05/18/2022 -1     hs TnI 4hr 05/19/2022 11     Delta 4hr hsTnI 05/19/2022 0     Osmolality, Ur 05/19/2022 459     Sodium, Ur 05/19/2022 47     Uric Acid 05/18/2022 5 9     Sed Rate 05/18/2022 78 (A)    Sodium 05/19/2022 134 (A)    Potassium 05/19/2022 3 5     Chloride 05/19/2022 97 (A)    CO2 05/19/2022 28     ANION GAP 05/19/2022 9     BUN 05/19/2022 20     Creatinine 05/19/2022 0 72     Glucose 05/19/2022 101     Calcium 05/19/2022 10 3 (A)    eGFR 05/19/2022 80     WBC 05/19/2022 8 63     RBC 05/19/2022 3 69 (A)    Hemoglobin 05/19/2022 10 8 (A)    Hematocrit 05/19/2022 32 9 (A)    MCV 05/19/2022 89     MCH 05/19/2022 29 3     MCHC 05/19/2022 32 8     RDW 05/19/2022 14 8     Platelets 58/20/7689 343     MPV 05/19/2022 9 4     POC Glucose 05/19/2022 89     POC Glucose 05/19/2022 111     POC Glucose 05/19/2022 113     POC Glucose 05/19/2022 243 (A)    Protime 05/20/2022 21 6 (A)    INR 05/20/2022 1 99 (A)    WBC 05/20/2022 7 07     RBC 05/20/2022 3 78 (A)    Hemoglobin 05/20/2022 10 9 (A)    Hematocrit 05/20/2022 34 2 (A)    MCV 05/20/2022 91     MCH 05/20/2022 28 8     MCHC 05/20/2022 31 9     RDW 05/20/2022 14 5     MPV 05/20/2022 9 5     Platelets 79/80/6883 336     nRBC 05/20/2022 0     Neutrophils Relative 05/20/2022 67     Immat GRANS % 05/20/2022 0     Lymphocytes Relative 05/20/2022 23     Monocytes Relative 05/20/2022 10     Eosinophils Relative 05/20/2022 0     Basophils Relative 05/20/2022 0     Neutrophils Absolute 05/20/2022 4 71     Immature Grans Absolute 05/20/2022 0 02     Lymphocytes Absolute 05/20/2022 1 62     Monocytes Absolute 05/20/2022 0 70     Eosinophils Absolute 05/20/2022 0 01     Basophils Absolute 05/20/2022 0 01     Sodium 05/20/2022 135 (A)    Potassium 05/20/2022 3 4 (A)    Chloride 05/20/2022 98 (A)    CO2 05/20/2022 32     ANION GAP 05/20/2022 5     BUN 05/20/2022 17     Creatinine 05/20/2022 0 65     Glucose 05/20/2022 172 (A)    Calcium 05/20/2022 10 5 (A)    eGFR 05/20/2022 85     POC Glucose 05/20/2022 145 (A)    POC Glucose 05/20/2022 261 (A)   Appointment on 05/02/2022   Component Date Value    PTH 05/02/2022 51 0     Calcium, Ionized 05/02/2022 1 38 (A)     Imaging: No results found      Review of Systems:  Review of Systems   REVIEW OF SYSTEMS:  Constitutional:  Denies fever or chills   Eyes:  Denies change in visual acuity   HENT:  Denies nasal congestion or sore throat   Respiratory:  Denies cough or shortness of breath   Cardiovascular:  Denies chest pain or edema   GI:  Denies abdominal pain, nausea, vomiting, bloody stools or diarrhea   :  Denies dysuria, frequency, difficulty in micturition and nocturia  Musculoskeletal:  Arthritis and muscle pain  Neurologic:  Denies headache, focal weakness or sensory changes   Endocrine:  Denies polyuria or polydipsia   Lymphatic:  Denies swollen glands   Psychiatric:  Denies depression or anxiety     Physical Exam:    /66 (BP Location: Left arm, Patient Position: Sitting, Cuff Size: Standard)   Pulse 60   Ht 5' 1" (1 549 m)   Wt 69 6 kg (153 lb 8 oz)   SpO2 96%   BMI 29 00 kg/m²     Physical Exam   PHYSICAL EXAM:  General:  Patient is not in acute distress   Head: Normocephalic, Atraumatic  HEENT:  Both pupils normal-size atraumatic, normocephalic, nonicteric  Neck:  JVP not raised  Trachea central  No carotid bruit  Respiratory:  normal breath sounds no crackles  no rhonchi  Cardiovascular:  Regular rate and rhythm no S3 no murmurs  GI:  Abdomen soft nontender  No organomegaly  Lymphatic:  No cervical or inguinal lymphadenopathy  Neurologic:  Patient is awake alert, oriented   Grossly nonfocal  Extremities no edema      Discussion/Summary:  Patient with multiple medical problems who seems to be doing reasonably well from cardiac standpoint  Previous studies reviewed with patient  Medications reviewed and possible side effects discussed  concepts of cardiovascular disease , signs and symptoms of heart disease  Dietary and risk factor modification reinforced  All questions answered  Safety measures reviewed  Patient advised to report any problems prompting medical attention  Risks and benefits  and alternativesof anticoagulation to prevent thromboembolic risk from atrial fibrillation discussed at length  Patient to report any bleeding issues  Target INR 2 to 3  Patient will continue to follow up with pacemaker clinic  Check CPK and aldolase as she is complaining of muscle pain and on statin  Side effects of statins discussed  Follow-up with primary care physician  Followup in 6 months or earlier as needed  Patient is agreeable with the plan of care

## 2022-06-30 ENCOUNTER — IN-CLINIC DEVICE VISIT (OUTPATIENT)
Dept: CARDIOLOGY CLINIC | Facility: CLINIC | Age: 77
End: 2022-06-30
Payer: MEDICARE

## 2022-06-30 DIAGNOSIS — Z95.0 PRESENCE OF PERMANENT CARDIAC PACEMAKER: Primary | ICD-10-CM

## 2022-06-30 PROCEDURE — 93280 PM DEVICE PROGR EVAL DUAL: CPT | Performed by: INTERNAL MEDICINE

## 2022-06-30 NOTE — PROGRESS NOTES
SJM DUAL CHAMBER PM/NOT MRI CONDITIONAL   DEVICE INTERROGATED IN THE [place] OFFICE:  BATTERY VOLTAGE ADEQUATE (7 0 YR )   AP 94%  3 1%   ALL LEAD PARAMETERS WITHIN NORMAL LIMITS   24 AMS EPISODES WITH EGMS SHOWING COMPETITIVE PACING, ANF AF WITH LONGEST EPISODE 2 HR 36 MIN     PT TAKES WARFARIN   25 MS INCREASE MADE TO AVD   NORMAL DEVICE FUNCTION   RG

## 2022-07-06 ENCOUNTER — ANTICOAG VISIT (OUTPATIENT)
Dept: CARDIOLOGY CLINIC | Facility: CLINIC | Age: 77
End: 2022-07-06

## 2022-07-06 ENCOUNTER — RA CDI HCC (OUTPATIENT)
Dept: OTHER | Facility: HOSPITAL | Age: 77
End: 2022-07-06

## 2022-07-06 ENCOUNTER — APPOINTMENT (OUTPATIENT)
Dept: LAB | Facility: HOSPITAL | Age: 77
End: 2022-07-06
Payer: MEDICARE

## 2022-07-06 DIAGNOSIS — M79.10 MUSCLE PAIN: ICD-10-CM

## 2022-07-06 DIAGNOSIS — E03.9 ADULT HYPOTHYROIDISM: ICD-10-CM

## 2022-07-06 DIAGNOSIS — E11.9 TYPE 2 DIABETES MELLITUS WITHOUT COMPLICATION, WITHOUT LONG-TERM CURRENT USE OF INSULIN (HCC): ICD-10-CM

## 2022-07-06 LAB
CK SERPL-CCNC: 65 U/L (ref 26–192)
CREAT UR-MCNC: 67.4 MG/DL
MICROALBUMIN UR-MCNC: 94.4 MG/L (ref 0–20)
MICROALBUMIN/CREAT 24H UR: 140 MG/G CREATININE (ref 0–30)
TSH SERPL DL<=0.05 MIU/L-ACNC: 2.61 UIU/ML (ref 0.45–4.5)

## 2022-07-06 PROCEDURE — 82043 UR ALBUMIN QUANTITATIVE: CPT

## 2022-07-06 PROCEDURE — 82085 ASSAY OF ALDOLASE: CPT

## 2022-07-06 PROCEDURE — 82570 ASSAY OF URINE CREATININE: CPT

## 2022-07-06 PROCEDURE — 82550 ASSAY OF CK (CPK): CPT

## 2022-07-06 PROCEDURE — 84443 ASSAY THYROID STIM HORMONE: CPT

## 2022-07-06 NOTE — PROGRESS NOTES
[address]  coding opportunities       Chart reviewed, no opportunity found: CHART REVIEWED, NO OPPORTUNITY FOUND        Patients Insurance     Medicare Insurance: Medicare

## 2022-07-07 LAB — ALDOLASE SERPL-CCNC: 2.4 U/L (ref 3.3–10.3)

## 2022-07-11 ENCOUNTER — OFFICE VISIT (OUTPATIENT)
Dept: FAMILY MEDICINE CLINIC | Facility: CLINIC | Age: 77
End: 2022-07-11
Payer: MEDICARE

## 2022-07-11 VITALS
TEMPERATURE: 98.1 F | OXYGEN SATURATION: 97 % | HEART RATE: 60 BPM | BODY MASS INDEX: 28.66 KG/M2 | RESPIRATION RATE: 18 BRPM | SYSTOLIC BLOOD PRESSURE: 140 MMHG | WEIGHT: 151.8 LBS | HEIGHT: 61 IN | DIASTOLIC BLOOD PRESSURE: 66 MMHG

## 2022-07-11 DIAGNOSIS — I10 ESSENTIAL HYPERTENSION: ICD-10-CM

## 2022-07-11 DIAGNOSIS — I48.0 PAROXYSMAL ATRIAL FIBRILLATION (HCC): ICD-10-CM

## 2022-07-11 DIAGNOSIS — E11.9 TYPE 2 DIABETES MELLITUS WITHOUT COMPLICATION, WITHOUT LONG-TERM CURRENT USE OF INSULIN (HCC): ICD-10-CM

## 2022-07-11 DIAGNOSIS — N39.41 URINARY INCONTINENCE, URGE: ICD-10-CM

## 2022-07-11 DIAGNOSIS — Z00.00 HEALTH CARE MAINTENANCE: ICD-10-CM

## 2022-07-11 DIAGNOSIS — E03.9 ADULT HYPOTHYROIDISM: Primary | ICD-10-CM

## 2022-07-11 PROBLEM — J10.1 INFLUENZA A: Status: RESOLVED | Noted: 2022-05-18 | Resolved: 2022-07-11

## 2022-07-11 PROBLEM — J96.01 ACUTE RESPIRATORY FAILURE WITH HYPOXIA (HCC): Status: RESOLVED | Noted: 2022-05-18 | Resolved: 2022-07-11

## 2022-07-11 PROBLEM — M79.672 ACUTE FOOT PAIN, LEFT: Status: RESOLVED | Noted: 2022-05-18 | Resolved: 2022-07-11

## 2022-07-11 PROCEDURE — G0439 PPPS, SUBSEQ VISIT: HCPCS | Performed by: FAMILY MEDICINE

## 2022-07-11 PROCEDURE — 1123F ACP DISCUSS/DSCN MKR DOCD: CPT | Performed by: FAMILY MEDICINE

## 2022-07-11 PROCEDURE — 99214 OFFICE O/P EST MOD 30 MIN: CPT | Performed by: FAMILY MEDICINE

## 2022-07-11 RX ORDER — TOLTERODINE 4 MG/1
4 CAPSULE, EXTENDED RELEASE ORAL DAILY
Qty: 30 CAPSULE | Refills: 5 | Status: SHIPPED | OUTPATIENT
Start: 2022-07-11 | End: 2022-07-14

## 2022-07-11 NOTE — ASSESSMENT & PLAN NOTE
Continue the pioglitazone, glipizide, check CMP, hemoglobin A1c in 6 months  Lab Results   Component Value Date    HGBA1C 6 8 (H) 06/07/2022

## 2022-07-11 NOTE — PROGRESS NOTES
Assessment and Plan:     Problem List Items Addressed This Visit        Endocrine    Adult hypothyroidism - Primary       Continue levothyroxine, check a TSH in 6 months  Type 2 diabetes mellitus without complication, without long-term current use of insulin (HCC)       Continue the pioglitazone, glipizide, check CMP, hemoglobin A1c in 6 months  Lab Results   Component Value Date    HGBA1C 6 8 (H) 06/07/2022              Relevant Orders    Comprehensive metabolic panel    Lipid panel    Hemoglobin A1C       Cardiovascular and Mediastinum    Essential hypertension      Controlled, continue her atenolol /chlorthalidone           Relevant Orders    CBC    TSH, 3rd generation    Paroxysmal atrial fibrillation (HCC)      Rate controlled, continue her atenolol, Coumadin  This is managed by her cardiologist              Other    Urinary incontinence, urge      Start Detrol LA 4 mg p o  daily  Relevant Medications    tolterodine (DETROL LA) 4 mg 24 hr capsule    BMI 28 0-28 9,adult      Other Visit Diagnoses     Health care maintenance            BMI Counseling: Body mass index is 28 68 kg/m²  The BMI is above normal  Nutrition recommendations include decreasing portion sizes and encouraging healthy choices of fruits and vegetables  Exercise recommendations include moderate physical activity 150 minutes/week  No pharmacotherapy was ordered  Rationale for BMI follow-up plan is due to patient being overweight or obese  Depression Screening and Follow-up Plan: Patient was screened for depression during today's encounter  They screened negative with a PHQ-2 score of 0  Preventive health issues were discussed with patient, and age appropriate screening tests were ordered as noted in patient's After Visit Summary  Personalized health advice and appropriate referrals for health education or preventive services given if needed, as noted in patient's After Visit Summary       History of Present Illness:     Patient presents for a Medicare Wellness Visit      Patient comes in today for checkup, she does complain of some intermittent urinary incontinence especially when she gets the urge to go to the bathroom she does complain of leakage  Patient Care Team:  Jhonatan Gallego DO as PCP - General     Review of Systems:     Review of Systems   Constitutional: Negative for chills, fatigue and fever  HENT: Negative for congestion, ear pain, hearing loss, postnasal drip, rhinorrhea and sore throat  Eyes: Negative for pain and visual disturbance  Respiratory: Negative for chest tightness, shortness of breath and wheezing  Cardiovascular: Negative for chest pain and leg swelling  Gastrointestinal: Negative for abdominal distention, abdominal pain, constipation, diarrhea and vomiting  Endocrine: Negative for cold intolerance and heat intolerance  Genitourinary: Negative for difficulty urinating, frequency and urgency  Urge incontinence   Musculoskeletal: Negative for arthralgias and gait problem  Skin: Negative for color change  Neurological: Negative for dizziness, tremors, syncope, numbness and headaches  Hematological: Negative for adenopathy  Psychiatric/Behavioral: Negative for agitation, confusion and sleep disturbance  The patient is not nervous/anxious           Problem List:     Patient Active Problem List   Diagnosis    Arthralgia of both knees    Mixed hyperlipidemia    Essential hypertension    Adult hypothyroidism    Sinoatrial node dysfunction (HCC)    Type 2 diabetes mellitus without complication, without long-term current use of insulin (HCC)    Worsening vision    Cardiac pacemaker in situ    Artificial knee joint present    Paroxysmal atrial fibrillation (HCC)    Urinary incontinence, urge    BMI 28 0-28 9,adult      Past Medical and Surgical History:     Past Medical History:   Diagnosis Date    Carpal tunnel syndrome     Heart murmur      Past Surgical History:   Procedure Laterality Date    CATARACT EXTRACTION      CHOLECYSTECTOMY      COLONOSCOPY  11/24/2007    EYE SURGERY      KNEE SURGERY      REPLACEMENT TOTAL KNEE Right     TOTAL ABDOMINAL HYSTERECTOMY W/ BILATERAL SALPINGOOPHORECTOMY        Family History:     Family History   Problem Relation Age of Onset    Heart attack Father         ARRHYTHMIAS    Coronary artery disease Family     Diabetes Family     Heart attack Family         ARRHYTHMIAS      Social History:     Social History     Socioeconomic History    Marital status:      Spouse name: None    Number of children: None    Years of education: None    Highest education level: None   Occupational History    None   Tobacco Use    Smoking status: Never Smoker    Smokeless tobacco: Never Used   Vaping Use    Vaping Use: Never used   Substance and Sexual Activity    Alcohol use: Yes    Drug use: No    Sexual activity: None   Other Topics Concern    None   Social History Narrative    Always uses seat belt    Employed    Lives with spouse      Social Determinants of Health     Financial Resource Strain: Not on file   Food Insecurity: Not on file   Transportation Needs: No Transportation Needs    Lack of Transportation (Medical): No    Lack of Transportation (Non-Medical):  No   Physical Activity: Not on file   Stress: Not on file   Social Connections: Not on file   Intimate Partner Violence: Not on file   Housing Stability: Unknown    Unable to Pay for Housing in the Last Year: No    Number of Places Lived in the Last Year: Not on file    Unstable Housing in the Last Year: No      Medications and Allergies:     Current Outpatient Medications   Medication Sig Dispense Refill    Ascorbic Acid (VITAMIN C) 100 MG tablet Take 500 mg by mouth daily      atenolol-chlorthalidone (TENORETIC) 50-25 mg per tablet TAKE ONE TABLET BY MOUTH EVERY DAY 90 tablet 2    B Complex Vitamins (B COMPLEX 100 PO) Take 1 tablet by mouth daily  Blood Glucose Monitoring Suppl (FREESTYLE LITE) RANULFO by Does not apply route 2 (two) times a day Dx E11 9 1 each 0    FREESTYLE LITE test strip USE TO CHECK BLOOD SUGAR TWO TIMES A  each 3    glipiZIDE (GLUCOTROL XL) 2 5 mg 24 hr tablet Take 1 tablet (2 5 mg total) by mouth 2 (two) times a day 180 tablet 1    glucose blood (JASWANT CONTOUR TEST) test strip Check blood sugar twice daily, DX:E11 9 200 each 3    Lancets (freestyle) lancets USE TO TEST TWO TIMES A  each 1    levothyroxine 50 mcg tablet TAKE ONE TABLET BY MOUTH EVERY DAY 90 tablet 1    meclizine (ANTIVERT) 12 5 MG tablet Take 1 tablet (12 5 mg total) by mouth every 8 (eight) hours as needed for dizziness 30 tablet 0    Multiple Vitamin (MULTIVITAMIN) tablet Take 1 tablet by mouth daily      niacin 500 mg tablet Take by mouth       pioglitazone (ACTOS) 15 mg tablet TAKE ONE TABLET BY MOUTH EVERY DAY 90 tablet 1    rosuvastatin (CRESTOR) 10 MG tablet TAKE ONE TABLET BY MOUTH EVERY DAY 90 tablet 3    tolterodine (DETROL LA) 4 mg 24 hr capsule Take 1 capsule (4 mg total) by mouth daily 30 capsule 5    warfarin (COUMADIN) 5 mg tablet Take 1-2 tablets by mouth daily in the evening  180 tablet 3     No current facility-administered medications for this visit       No Known Allergies   Immunizations:     Immunization History   Administered Date(s) Administered    COVID-19 MODERNA VACC 0 25 ML IM BOOSTER 01/12/2022    COVID-19 MODERNA VACC 0 5 ML IM 01/26/2021, 02/24/2021    H1N1, All Formulations 11/11/2009    Influenza Split High Dose Preservative Free IM 10/03/2018, 10/03/2019    Influenza, high dose seasonal 0 7 mL 10/23/2020, 10/06/2021    Influenza, seasonal, injectable 10/01/2015, 10/31/2016, 10/04/2017    Pneumococcal Conjugate 13-Valent 02/06/2018    Pneumococcal Polysaccharide PPV23 08/07/2006      Health Maintenance:         Topic Date Due    Hepatitis C Screening  Never done         Topic Date Due    DTaP,Tdap,and Td Vaccines (1 - Tdap) Never done    Pneumococcal Vaccine: 65+ Years (3 - PPSV23 or PCV20) 02/06/2019    COVID-19 Vaccine (4 - Booster for Moderna series) 05/12/2022    Influenza Vaccine (1) 09/01/2022      Medicare Screening Tests and Risk Assessments:     Nam Austin is here for her Subsequent Wellness visit  Last Medicare Wellness visit information reviewed, patient interviewed and updates made to the record today  Health Risk Assessment:   Patient rates overall health as good  Patient feels that their physical health rating is slightly better  Patient is very satisfied with their life  Eyesight was rated as same  Hearing was rated as same  Patient feels that their emotional and mental health rating is much better  Patients states they are never, rarely angry  Patient states they are sometimes unusually tired/fatigued  Pain experienced in the last 7 days has been some  Patient's pain rating has been 7/10  Patient states that she has experienced weight loss or gain in last 6 months  Depression Screening:   PHQ-2 Score: 0      Fall Risk Screening: In the past year, patient has experienced: no history of falling in past year      Urinary Incontinence Screening:   Patient has leaked urine accidently in the last six months  Home Safety:  Patient has trouble with stairs inside or outside of their home  Patient has working smoke alarms and has working carbon monoxide detector  Home safety hazards include: none  Nutrition:   Current diet is Regular  Medications:   Patient is currently taking over-the-counter supplements  OTC medications include: see medication list  Patient is able to manage medications  Activities of Daily Living (ADLs)/Instrumental Activities of Daily Living (IADLs):   Walk and transfer into and out of bed and chair?: Yes  Dress and groom yourself?: Yes    Bathe or shower yourself?: Yes    Feed yourself?  Yes  Do your laundry/housekeeping?: Yes  Manage your money, pay your bills and track your expenses?: Yes  Make your own meals?: Yes    Do your own shopping?: Yes    Previous Hospitalizations:   Any hospitalizations or ED visits within the last 12 months?: Yes    How many hospitalizations have you had in the last year?: 1-2    Advance Care Planning:   Living will: Yes    Durable POA for healthcare: Yes    Advanced directive: Yes      Cognitive Screening:   Provider or family/friend/caregiver concerned regarding cognition?: No    PREVENTIVE SCREENINGS      Cardiovascular Screening:    General: Screening Not Indicated and History Lipid Disorder      Diabetes Screening:     General: Screening Not Indicated and History Diabetes      Colorectal Cancer Screening:     General: Screening Not Indicated      Breast Cancer Screening:     General: Risks and Benefits Discussed and Patient Declines      Cervical Cancer Screening:    General: Screening Not Indicated      Osteoporosis Screening:    General: Risks and Benefits Discussed and Screening Not Indicated      Abdominal Aortic Aneurysm (AAA) Screening:        General: Screening Not Indicated      Lung Cancer Screening:     General: Screening Not Indicated      Hepatitis C Screening:    General: Risks and Benefits Discussed    Screening, Brief Intervention, and Referral to Treatment (SBIRT)    Screening  Typical number of drinks in a day: 2  Typical number of drinks in a week: 4  Interpretation: Low risk drinking behavior      Single Item Drug Screening:  How often have you used an illegal drug (including marijuana) or a prescription medication for non-medical reasons in the past year? never    Single Item Drug Screen Score: 0  Interpretation: Negative screen for possible drug use disorder    No exam data present     Physical Exam:     /66   Pulse 60   Temp 98 1 °F (36 7 °C) (Tympanic)   Resp 18   Ht 5' 1" (1 549 m)   Wt 68 9 kg (151 lb 12 8 oz)   SpO2 97%   BMI 28 68 kg/m²     Physical Exam  Constitutional:       Appearance: She is well-developed  HENT:      Head: Normocephalic and atraumatic  Right Ear: External ear normal       Left Ear: External ear normal       Nose: Nose normal    Eyes:      Conjunctiva/sclera: Conjunctivae normal       Pupils: Pupils are equal, round, and reactive to light  Neck:      Thyroid: No thyromegaly  Cardiovascular:      Rate and Rhythm: Normal rate and regular rhythm  Heart sounds: Normal heart sounds  No murmur heard  Pulmonary:      Effort: Pulmonary effort is normal    Abdominal:      General: Bowel sounds are normal  There is no distension  Palpations: Abdomen is soft  Musculoskeletal:         General: Normal range of motion  Cervical back: Normal range of motion and neck supple  Skin:     General: Skin is warm  Neurological:      Mental Status: She is alert and oriented to person, place, and time            Edwar Nicole, DO

## 2022-07-11 NOTE — PATIENT INSTRUCTIONS
Medicare Preventive Visit Patient Instructions  Thank you for completing your Welcome to Medicare Visit or Medicare Annual Wellness Visit today  Your next wellness visit will be due in one year (7/12/2023)  The screening/preventive services that you may require over the next 5-10 years are detailed below  Some tests may not apply to you based off risk factors and/or age  Screening tests ordered at today's visit but not completed yet may show as past due  Also, please note that scanned in results may not display below  Preventive Screenings:  Service Recommendations Previous Testing/Comments   Colorectal Cancer Screening  * Colonoscopy    * Fecal Occult Blood Test (FOBT)/Fecal Immunochemical Test (FIT)  * Fecal DNA/Cologuard Test  * Flexible Sigmoidoscopy Age: 54-65 years old   Colonoscopy: every 10 years (may be performed more frequently if at higher risk)  OR  FOBT/FIT: every 1 year  OR  Cologuard: every 3 years  OR  Sigmoidoscopy: every 5 years  Screening may be recommended earlier than age 48 if at higher risk for colorectal cancer  Also, an individualized decision between you and your healthcare provider will decide whether screening between the ages of 74-80 would be appropriate  Colonoscopy: 11/24/2007  FOBT/FIT: Not on file  Cologuard: Not on file  Sigmoidoscopy: Not on file          Breast Cancer Screening Age: 36 years old  Frequency: every 1-2 years  Not required if history of left and right mastectomy Mammogram: Not on file        Cervical Cancer Screening Between the ages of 21-29, pap smear recommended once every 3 years  Between the ages of 33-67, can perform pap smear with HPV co-testing every 5 years     Recommendations may differ for women with a history of total hysterectomy, cervical cancer, or abnormal pap smears in past  Pap Smear: Not on file    Screening Not Indicated   Hepatitis C Screening Once for adults born between [facility]  More frequently in patients at high risk for Hepatitis C Hep C Antibody: Not on file        Diabetes Screening 1-2 times per year if you're at risk for diabetes or have pre-diabetes Fasting glucose: 132 mg/dL   A1C: 6 8 %    Screening Not Indicated  History Diabetes   Cholesterol Screening Once every 5 years if you don't have a lipid disorder  May order more often based on risk factors  Lipid panel: 06/07/2022    Screening Not Indicated  History Lipid Disorder     Other Preventive Screenings Covered by Medicare:  1  Abdominal Aortic Aneurysm (AAA) Screening: covered once if your at risk  You're considered to be at risk if you have a family history of AAA  2  Lung Cancer Screening: covers low dose CT scan once per year if you meet all of the following conditions: (1) Age 50-69; (2) No signs or symptoms of lung cancer; (3) Current smoker or have quit smoking within the last 15 years; (4) You have a tobacco smoking history of at least 30 pack years (packs per day multiplied by number of years you smoked); (5) You get a written order from a healthcare provider  3  Glaucoma Screening: covered annually if you're considered high risk: (1) You have diabetes OR (2) Family history of glaucoma OR (3)  aged 48 and older OR (3)  American aged 72 and older  3  Osteoporosis Screening: covered every 2 years if you meet one of the following conditions: (1) You're estrogen deficient and at risk for osteoporosis based off medical history and other findings; (2) Have a vertebral abnormality; (3) On glucocorticoid therapy for more than 3 months; (4) Have primary hyperparathyroidism; (5) On osteoporosis medications and need to assess response to drug therapy  · Last bone density test (DXA Scan): Not on file  5  HIV Screening: covered annually if you're between the age of 12-76  Also covered annually if you are younger than 13 and older than 72 with risk factors for HIV infection   For pregnant patients, it is covered up to 3 times per pregnancy  Immunizations:  Immunization Recommendations   Influenza Vaccine Annual influenza vaccination during flu season is recommended for all persons aged >= 6 months who do not have contraindications   Pneumococcal Vaccine (Prevnar and Pneumovax)  * Prevnar = PCV13  * Pneumovax = PPSV23   Adults 25-60 years old: 1-3 doses may be recommended based on certain risk factors  Adults 72 years old: Prevnar (PCV13) vaccine recommended followed by Pneumovax (PPSV23) vaccine  If already received PPSV23 since turning 65, then PCV13 recommended at least one year after PPSV23 dose  Hepatitis B Vaccine 3 dose series if at intermediate or high risk (ex: diabetes, end stage renal disease, liver disease)   Tetanus (Td) Vaccine - COST NOT COVERED BY MEDICARE PART B Following completion of primary series, a booster dose should be given every 10 years to maintain immunity against tetanus  Td may also be given as tetanus wound prophylaxis  Tdap Vaccine - COST NOT COVERED BY MEDICARE PART B Recommended at least once for all adults  For pregnant patients, recommended with each pregnancy  Shingles Vaccine (Shingrix) - COST NOT COVERED BY MEDICARE PART B  2 shot series recommended in those aged 48 and above     Health Maintenance Due:      Topic Date Due    Hepatitis C Screening  Never done     Immunizations Due:      Topic Date Due    DTaP,Tdap,and Td Vaccines (1 - Tdap) Never done    Pneumococcal Vaccine: 65+ Years (3 - PPSV23 or PCV20) 02/06/2019    COVID-19 Vaccine (4 - Booster for Moderna series) 05/12/2022    Influenza Vaccine (1) 09/01/2022     Advance Directives   What are advance directives? Advance directives are legal documents that state your wishes and plans for medical care  These plans are made ahead of time in case you lose your ability to make decisions for yourself  Advance directives can apply to any medical decision, such as the treatments you want, and if you want to donate organs     What are the types of advance directives? There are many types of advance directives, and each state has rules about how to use them  You may choose a combination of any of the following:  · Living will: This is a written record of the treatment you want  You can also choose which treatments you do not want, which to limit, and which to stop at a certain time  This includes surgery, medicine, IV fluid, and tube feedings  · Durable power of  for healthcare [facility]): This is a written record that states who you want to make healthcare choices for you when you are unable to make them for yourself  This person, called a proxy, is usually a family member or a friend  You may choose more than 1 proxy  · Do not resuscitate (DNR) order:  A DNR order is used in case your heart stops beating or you stop breathing  It is a request not to have certain forms of treatment, such as CPR  A DNR order may be included in other types of advance directives  · Medical directive: This covers the care that you want if you are in a coma, near death, or unable to make decisions for yourself  You can list the treatments you want for each condition  Treatment may include pain medicine, surgery, blood transfusions, dialysis, IV or tube feedings, and a ventilator (breathing machine)  · Values history: This document has questions about your views, beliefs, and how you feel and think about life  This information can help others choose the care that you would choose  Why are advance directives important? An advance directive helps you control your care  Although spoken wishes may be used, it is better to have your wishes written down  Spoken wishes can be misunderstood, or not followed  Treatments may be given even if you do not want them  An advance directive may make it easier for your family to make difficult choices about your care  Urinary Incontinence   Urinary incontinence (UI)  is when you lose control of your bladder   UI develops because your bladder cannot store or empty urine properly  The 3 most common types of UI are stress incontinence, urge incontinence, or both  Medicines:   · May be given to help strengthen your bladder control  Report any side effects of medication to your healthcare provider  Do pelvic muscle exercises often:  Your pelvic muscles help you stop urinating  Squeeze these muscles tight for 5 seconds, then relax for 5 seconds  Gradually work up to squeezing for 10 seconds  Do 3 sets of 15 repetitions a day, or as directed  This will help strengthen your pelvic muscles and improve bladder control  Train your bladder:  Go to the bathroom at set times, such as every 2 hours, even if you do not feel the urge to go  You can also try to hold your urine when you feel the urge to go  For example, hold your urine for 5 minutes when you feel the urge to go  As that becomes easier, hold your urine for 10 minutes  Self-care:   · Keep a UI record  Write down how often you leak urine and how much you leak  Make a note of what you were doing when you leaked urine  · Drink liquids as directed  You may need to limit the amount of liquid you drink to help control your urine leakage  Do not drink any liquid right before you go to bed  Limit or do not have drinks that contain caffeine or alcohol  · Prevent constipation  Eat a variety of high-fiber foods  Good examples are high-fiber cereals, beans, vegetables, and whole-grain breads  Walking is the best way to trigger your intestines to have a bowel movement  · Exercise regularly and maintain a healthy weight  Weight loss and exercise will decrease pressure on your bladder and help you control your leakage  · Use a catheter as directed  to help empty your bladder  A catheter is a tiny, plastic tube that is put into your bladder to drain your urine  · Go to behavior therapy as directed    Behavior therapy may be used to help you learn to control your urge to urinate  Weight Management   Why it is important to manage your weight:  Being overweight increases your risk of health conditions such as heart disease, high blood pressure, type 2 diabetes, and certain types of cancer  It can also increase your risk for osteoarthritis, sleep apnea, and other respiratory problems  Aim for a slow, steady weight loss  Even a small amount of weight loss can lower your risk of health problems  How to lose weight safely:  A safe and healthy way to lose weight is to eat fewer calories and get regular exercise  You can lose up about 1 pound a week by decreasing the number of calories you eat by 500 calories each day  Healthy meal plan for weight management:  A healthy meal plan includes a variety of foods, contains fewer calories, and helps you stay healthy  A healthy meal plan includes the following:  · Eat whole-grain foods more often  A healthy meal plan should contain fiber  Fiber is the part of grains, fruits, and vegetables that is not broken down by your body  Whole-grain foods are healthy and provide extra fiber in your diet  Some examples of whole-grain foods are whole-wheat breads and pastas, oatmeal, brown rice, and bulgur  · Eat a variety of vegetables every day  Include dark, leafy greens such as spinach, kale, olga greens, and mustard greens  Eat yellow and orange vegetables such as carrots, sweet potatoes, and winter squash  · Eat a variety of fruits every day  Choose fresh or canned fruit (canned in its own juice or light syrup) instead of juice  Fruit juice has very little or no fiber  · Eat low-fat dairy foods  Drink fat-free (skim) milk or 1% milk  Eat fat-free yogurt and low-fat cottage cheese  Try low-fat cheeses such as mozzarella and other reduced-fat cheeses  · Choose meat and other protein foods that are low in fat  Choose beans or other legumes such as split peas or lentils   Choose fish, skinless poultry (chicken or turkey), or lean cuts of red meat (beef or pork)  Before you cook meat or poultry, cut off any visible fat  · Use less fat and oil  Try baking foods instead of frying them  Add less fat, such as margarine, sour cream, regular salad dressing and mayonnaise to foods  Eat fewer high-fat foods  Some examples of high-fat foods include french fries, doughnuts, ice cream, and cakes  · Eat fewer sweets  Limit foods and drinks that are high in sugar  This includes candy, cookies, regular soda, and sweetened drinks  Exercise:  Exercise at least 30 minutes per day on most days of the week  Some examples of exercise include walking, biking, dancing, and swimming  You can also fit in more physical activity by taking the stairs instead of the elevator or parking farther away from stores  Ask your healthcare provider about the best exercise plan for you  © Copyright [employer] 2018 Information is for End User's use only and may not be sold, redistributed or otherwise used for commercial purposes   All illustrations and images included in CareNotes® are the copyrighted property of [employer]  or [address] [place] [address]

## 2022-07-14 ENCOUNTER — TELEPHONE (OUTPATIENT)
Dept: FAMILY MEDICINE CLINIC | Facility: CLINIC | Age: 77
End: 2022-07-14

## 2022-07-14 DIAGNOSIS — E11.9 TYPE 2 DIABETES MELLITUS WITHOUT COMPLICATION, WITHOUT LONG-TERM CURRENT USE OF INSULIN (HCC): ICD-10-CM

## 2022-07-14 DIAGNOSIS — N39.41 URINARY INCONTINENCE, URGE: Primary | ICD-10-CM

## 2022-07-14 RX ORDER — GLIPIZIDE 2.5 MG/1
TABLET, EXTENDED RELEASE ORAL
Qty: 180 TABLET | Refills: 1 | Status: SHIPPED | OUTPATIENT
Start: 2022-07-14

## 2022-07-14 RX ORDER — OXYBUTYNIN CHLORIDE 5 MG/1
5 TABLET, EXTENDED RELEASE ORAL DAILY
Qty: 90 TABLET | Refills: 1 | Status: SHIPPED | OUTPATIENT
Start: 2022-07-14

## 2022-07-14 NOTE — TELEPHONE ENCOUNTER
Received alert from [employer] that tolterodine tartrate requires prior auth  Proceed with PA?      Key: [id number]    Form placed in PA folder

## 2022-07-20 ENCOUNTER — APPOINTMENT (OUTPATIENT)
Dept: LAB | Facility: HOSPITAL | Age: 77
End: 2022-07-20
Payer: MEDICARE

## 2022-07-20 ENCOUNTER — ANTICOAG VISIT (OUTPATIENT)
Dept: CARDIOLOGY CLINIC | Facility: CLINIC | Age: 77
End: 2022-07-20

## 2022-07-22 DIAGNOSIS — E11.9 TYPE 2 DIABETES MELLITUS WITHOUT COMPLICATION, WITHOUT LONG-TERM CURRENT USE OF INSULIN (HCC): ICD-10-CM

## 2022-07-22 RX ORDER — LANCETS 28 GAUGE
EACH MISCELLANEOUS
Qty: 100 EACH | Refills: 1 | Status: SHIPPED | OUTPATIENT
Start: 2022-07-22 | End: 2022-09-19 | Stop reason: SDUPTHER

## 2022-08-03 ENCOUNTER — ANTICOAG VISIT (OUTPATIENT)
Dept: CARDIOLOGY CLINIC | Facility: CLINIC | Age: 77
End: 2022-08-03

## 2022-08-03 ENCOUNTER — APPOINTMENT (OUTPATIENT)
Dept: LAB | Facility: HOSPITAL | Age: 77
End: 2022-08-03
Payer: MEDICARE

## 2022-08-03 NOTE — PROGRESS NOTES
S/w pt   States she is taking 5mg Sun, Tues,Wed, Thurs, 2 5mg Fri, Sat, Mon  I stated since it seems to be working and in range stay on that dose and retest in 2 weeks

## 2022-08-15 ENCOUNTER — OFFICE VISIT (OUTPATIENT)
Dept: FAMILY MEDICINE CLINIC | Facility: CLINIC | Age: 77
End: 2022-08-15
Payer: MEDICARE

## 2022-08-15 ENCOUNTER — TELEPHONE (OUTPATIENT)
Dept: FAMILY MEDICINE CLINIC | Facility: CLINIC | Age: 77
End: 2022-08-15

## 2022-08-15 VITALS
SYSTOLIC BLOOD PRESSURE: 128 MMHG | WEIGHT: 152 LBS | BODY MASS INDEX: 28.7 KG/M2 | DIASTOLIC BLOOD PRESSURE: 76 MMHG | OXYGEN SATURATION: 98 % | HEIGHT: 61 IN | HEART RATE: 79 BPM

## 2022-08-15 DIAGNOSIS — I10 ESSENTIAL HYPERTENSION: ICD-10-CM

## 2022-08-15 DIAGNOSIS — T63.444A BEE STING, UNDETERMINED INTENT, INITIAL ENCOUNTER: Primary | ICD-10-CM

## 2022-08-15 DIAGNOSIS — E11.9 TYPE 2 DIABETES MELLITUS WITHOUT COMPLICATION, WITHOUT LONG-TERM CURRENT USE OF INSULIN (HCC): ICD-10-CM

## 2022-08-15 PROBLEM — T63.441A BEE STING: Status: ACTIVE | Noted: 2022-08-15

## 2022-08-15 PROCEDURE — 99214 OFFICE O/P EST MOD 30 MIN: CPT

## 2022-08-15 RX ORDER — PREDNISONE 10 MG/1
TABLET ORAL
Qty: 30 TABLET | Refills: 0 | Status: SHIPPED | OUTPATIENT
Start: 2022-08-15 | End: 2022-08-15

## 2022-08-15 RX ORDER — METHYLPREDNISOLONE 4 MG/1
TABLET ORAL
Qty: 21 EACH | Refills: 0 | Status: SHIPPED | OUTPATIENT
Start: 2022-08-15

## 2022-08-15 NOTE — TELEPHONE ENCOUNTER
T/c from pt --Was putting pool cover over pool on Saturday  Got stung in hand by bee, hand is really swollen has been taking benadryl (oral and cream) and is not making much improvement and is wondering if there is something else she can take  States her hand is about 3x the normal size   Does not want to go to hospital      Please advise

## 2022-08-15 NOTE — PROGRESS NOTES
Assessment/Plan:         Problem List Items Addressed This Visit        Endocrine    Type 2 diabetes mellitus without complication, without long-term current use of insulin (HCC)     Last A1C acceptable, will order steroids today, continue to monitor blood sugar and take DM medications and limit refined carbohydrates  Lab Results   Component Value Date    HGBA1C 6 8 (H) 06/07/2022               Cardiovascular and Mediastinum    Essential hypertension     BP is great today, continue current medications  Follow up with Dr Na Mcdonough for annual              Other    Bee sting - Primary     Localized swelling in left hand and right upper arm with some itching  Suggest continuing benadryl at night and start steroid taper and cream  Call if symptoms are not improved by Wednesday  Relevant Medications    triamcinolone (KENALOG) 0 1 % ointment    methylPREDNISolone 4 MG tablet therapy pack            Subjective:      Patient ID: Leif Patel is a 76 y o  female  [place] Randhawa got stung by a bee on Saturday on the left hand and right arm  The following portions of the patient's history were reviewed and updated as appropriate:   Past Medical History:  She has a past medical history of Carpal tunnel syndrome and Heart murmur  ,  _______________________________________________________________________  Medical Problems:  does not have any pertinent problems on file ,  _______________________________________________________________________  Past Surgical History:   has a past surgical history that includes Colonoscopy (11/24/2007); Cataract extraction; Cholecystectomy; Knee surgery; Total abdominal hysterectomy w/ bilateral salpingoophorectomy; Replacement total knee (Right); and Eye surgery  ,  _______________________________________________________________________  Family History:  family history includes Coronary artery disease in her family; Diabetes in her family;  Heart attack in her family and father ,  _______________________________________________________________________  Social History:   reports that she has never smoked  She has never used smokeless tobacco  She reports current alcohol use  She reports that she does not use drugs  ,  _______________________________________________________________________  Allergies:  has No Known Allergies     _______________________________________________________________________  Current Outpatient Medications   Medication Sig Dispense Refill    glipiZIDE (GLUCOTROL XL) 2 5 mg 24 hr tablet TAKE ONE TABLET BY MOUTH TWICE A  tablet 1    methylPREDNISolone 4 MG tablet therapy pack Use as directed on package 21 each 0    oxybutynin (DITROPAN-XL) 5 mg 24 hr tablet Take 1 tablet (5 mg total) by mouth daily 90 tablet 1    triamcinolone (KENALOG) 0 1 % ointment Apply topically 2 (two) times a day 30 g 0    Ascorbic Acid (VITAMIN C) 100 MG tablet Take 500 mg by mouth daily      atenolol-chlorthalidone (TENORETIC) 50-25 mg per tablet TAKE ONE TABLET BY MOUTH EVERY DAY 90 tablet 2    B Complex Vitamins (B COMPLEX 100 PO) Take 1 tablet by mouth daily      Blood Glucose Monitoring Suppl (FREESTYLE LITE) RANULFO by Does not apply route 2 (two) times a day Dx E11 9 1 each 0    FREESTYLE LITE test strip USE TO CHECK BLOOD SUGAR TWO TIMES A  each 3    glucose blood (JASWANT CONTOUR TEST) test strip Check blood sugar twice daily, DX:E11 9 200 each 3    Lancets (freestyle) lancets TEST TWO TIMES A  each 1    levothyroxine 50 mcg tablet TAKE ONE TABLET BY MOUTH EVERY DAY 90 tablet 1    meclizine (ANTIVERT) 12 5 MG tablet Take 1 tablet (12 5 mg total) by mouth every 8 (eight) hours as needed for dizziness 30 tablet 0    Multiple Vitamin (MULTIVITAMIN) tablet Take 1 tablet by mouth daily      niacin 500 mg tablet Take by mouth       pioglitazone (ACTOS) 15 mg tablet TAKE ONE TABLET BY MOUTH EVERY DAY 90 tablet 1    rosuvastatin (CRESTOR) 10 MG tablet TAKE ONE TABLET BY MOUTH EVERY DAY 90 tablet 3    warfarin (COUMADIN) 5 mg tablet Take 1-2 tablets by mouth daily in the evening  180 tablet 3     No current facility-administered medications for this visit      _______________________________________________________________________  Review of Systems   Constitutional: Negative for chills, diaphoresis, fatigue and fever  HENT: Negative for congestion, ear pain, sinus pressure, sinus pain and sore throat  Eyes: Negative for pain and visual disturbance  Respiratory: Negative for cough, chest tightness, shortness of breath and wheezing  Cardiovascular: Negative for chest pain and palpitations  Gastrointestinal: Negative for abdominal pain, constipation, diarrhea, nausea and vomiting  Genitourinary: Negative for dysuria, frequency, hematuria and urgency  Musculoskeletal: Negative for arthralgias, back pain and myalgias  Skin: Positive for wound (bee sting with swelling left hand and right arm)  Negative for color change and rash  Neurological: Negative for dizziness, seizures, syncope and headaches  Psychiatric/Behavioral: Negative for dysphoric mood  The patient is not nervous/anxious  All other systems reviewed and are negative  Objective:  Vitals:    08/15/22 1502   BP: 128/76   Pulse: 79   SpO2: 98%   Weight: 68 9 kg (152 lb)   Height: 5' 1" (1 549 m)     Body mass index is 28 72 kg/m²  Physical Exam  Vitals and nursing note reviewed  Constitutional:       Appearance: Normal appearance  She is not ill-appearing  HENT:      Right Ear: Tympanic membrane, ear canal and external ear normal  There is no impacted cerumen  Left Ear: Tympanic membrane, ear canal and external ear normal  There is no impacted cerumen  Nose: Nose normal       Mouth/Throat:      Mouth: Mucous membranes are moist       Pharynx: No posterior oropharyngeal erythema  Eyes:      Extraocular Movements: Extraocular movements intact     Cardiovascular: Rate and Rhythm: Normal rate and regular rhythm  Heart sounds: Normal heart sounds  No murmur heard  Pulmonary:      Effort: Pulmonary effort is normal       Breath sounds: Normal breath sounds  No wheezing  Abdominal:      Palpations: Abdomen is soft  Tenderness: There is no abdominal tenderness  Musculoskeletal:         General: Normal range of motion  Cervical back: Normal range of motion  Skin:     General: Skin is warm and dry  Findings: Erythema and lesion present  Neurological:      General: No focal deficit present  Mental Status: She is alert     Psychiatric:         Mood and Affect: Mood normal          Behavior: Behavior normal

## 2022-08-15 NOTE — ASSESSMENT & PLAN NOTE
Localized swelling in left hand and right upper arm with some itching  Suggest continuing benadryl at night and start steroid taper and cream  Call if symptoms are not improved by Wednesday

## 2022-08-15 NOTE — ASSESSMENT & PLAN NOTE
Last A1C acceptable, will order steroids today, continue to monitor blood sugar and take DM medications and limit refined carbohydrates     Lab Results   Component Value Date    HGBA1C 6 8 (H) 06/07/2022

## 2022-08-15 NOTE — PATIENT INSTRUCTIONS
Problem List Items Addressed This Visit          Endocrine    Type 2 diabetes mellitus without complication, without long-term current use of insulin (HCC)     Last A1C acceptable, will order steroids today, continue to monitor blood sugar and take DM medications and limit refined carbohydrates  Lab Results   Component Value Date    HGBA1C 6 8 (H) 06/07/2022               Cardiovascular and Mediastinum    Essential hypertension     BP is great today, continue current medications  Follow up with Dr Melbourne Dubin for annual                Other    Bee sting - Primary     Localized swelling in left hand and right upper arm with some itching  Suggest continuing benadryl at night and start steroid taper and cream  Call if symptoms are not improved by Wednesday  Relevant Medications    triamcinolone (KENALOG) 0 1 % ointment    methylPREDNISolone 4 MG tablet therapy pack          Diabetes and Nutrition   WHAT YOU NEED TO KNOW:   Nutrition plans help with healthy eating patterns that improve health  Nutrition plans and regular exercise help keep your blood sugar levels steady  They also help delay or prevent complications of diabetes, such as diabetic kidney disease  DISCHARGE INSTRUCTIONS:   Call your local emergency number (911 in the [facility]) if:   You have any of the following signs of a heart attack:      Squeezing, pressure, or pain in your chest    You may  also have any of the following:     Discomfort or pain in your back, neck, jaw, stomach, or arm    Shortness of breath    Nausea or vomiting    Lightheadedness or a sudden cold sweat      Return to the emergency department if:   You have a low blood sugar level and it does not improve with treatment  Symptoms are trouble thinking, a pounding heartbeat, and sweating  Your blood sugar level is above 240 mg/dL and does not come down within 15 minutes of treatment  You have ketones in your blood or urine      You have nausea or are vomiting and cannot keep any food or liquid down  You have blurred or double vision  Your breath has a fruity, sweet smell, or your breathing is shallow  Call your doctor or diabetes care team if:   Your blood sugar levels are higher than your target goals  You often have low blood sugar levels  You have trouble coping with diabetes, or you feel anxious or depressed  You have questions or concerns about your condition or care  A dietitian will help you create a nutrition plan  to meet your needs and your family's needs  The goal is for you to reach or maintain healthy weight, blood sugar, blood pressure, and lipid levels  You should meet with the dietitian at least 1 time each year  You will learn the following: How food affects your blood sugar levels    How to create healthy eating habits    How to make food choices based on your activity level, weight, and glucose levels    How your favorite foods may fit into your plan    How to keep track of carbohydrates    Correct portion sizes for each food    Changes you can make to your plan if you get pregnant or are breastfeeding    Do not skip meals: The goal is to keep your blood sugar level steady  Blood sugar levels may drop too low if you have received insulin and do not eat  Eat more high-fiber foods:  High-fiber foods include fresh or frozen fruits and vegetables, whole-grain breads, and beans  Fiber helps control or lower blood sugar and cholesterol levels  Choose whole fruits instead of fruit juice as much as possible  Sugar may be added to juice, and fiber may be removed  Choose heart-healthy fats:  Foods high in heart-healthy fats include olive oil, nuts, avocados, and fatty fish, such as salmon and tuna  Foods high in unhealthy fats include red meat, full-fat dairy products, and soft margarine  Unhealthy fats can increase your risk for heart disease, increase bad cholesterol, and lower good cholesterol    Choose complex carbohydrates:  Foods with complex carbohydrates include brown rice, whole-grain breads and cereals, and cooked beans  Foods with simple carbohydrates include white bread, white rice, most cold cereals, and snack foods  Your plan will include the amount of carbohydrate to have at one time or in a day  Your blood sugar level can get too high if you eat too much carbohydrate at one time  Blood sugar levels do not spike as high or drop as quickly with complex carbohydrates as with simple carbohydrates  Choose complex carbohydrates whenever possible  Have less sodium (salt): The risk for high blood pressure (BP) increases with high-sodium foods  Limit high-sodium foods, such as soy sauce, potato chips, and canned soup  Do not add salt to food you cook  Limit your use of table salt  Read labels to have no more than 2,300 milligrams of sodium in one day  Limit artificial sweeteners: These may be found in food or drinks, such as diet soft drinks or other low-calorie beverages  Artificial sweeteners are low in calories  They may help you lower your overall calories and carbohydrates  It is important not to have more calories from other foods to make up for the calories saved  Artificial sweeteners do not have any nutrition  Eat whole foods and drink water as much as possible  Your plan may include beverages with artificial sweeteners for a short time  These can help you transition from high-sugar beverages to water  Use the plate method for each meal:  This method can help you eat the right amount of carbohydrates and keep your blood sugar levels under control  Draw an imaginary line down the middle of a 9-inch dinner plate  On one side, draw another line to divide that section in half  Your plate will have one large section and 2 small sections  Fill the largest section with non-starchy vegetables  These include broccoli, spinach, cucumbers, peppers, cauliflower, and tomatoes  Add a starch to one of the small sections   Starches include pasta, rice, whole-grain bread, tortillas, corn, potatoes, and beans  Add meat or another source of protein to the other small section  Examples include chicken or turkey without skin, fish, lean beef or pork, low-fat cheese, tofu, and eggs  Add dairy products or fruit next to your plate if your meal plan allows  Examples of dairy include skim or 1% milk and low-fat yogurt  If you do not drink milk or eat dairy products, you may be able to add another serving of starchy food instead  Have a low-calorie or calorie-free drink with your meal  Examples include water or unsweetened tea or coffee  Know the risks if you choose to drink alcohol:  Alcohol can cause hypoglycemia (low blood sugar level), especially if you use insulin  Alcohol can cause high blood sugar and BP levels, and weight gain if you drink too much  Women 21 years or older and men 72 years or older should limit alcohol to 1 drink a day  Men aged 24 to 59 years should limit alcohol to 2 drinks a day  A drink of alcohol is 12 ounces of beer, 5 ounces of wine, or 1½ ounces of liquor  Hypoglycemia can happen hours after you drink alcohol  Check your blood sugar level for several hours after you drink alcohol  Have a source of fast-acting carbohydrates with you in case your level goes too low  You need immediate care if you have signs or symptoms of hypoglycemia, such as sweating, confusion, or fainting  Maintain a healthy weight:  A healthy weight can help you control your diabetes  You can maintain a healthy weight with a nutrition plan and exercise  Ask your healthcare provider how much you should weigh  Ask him or her to help you create a weight loss plan if you are overweight  Together you can set weight loss and maintenance goals  Follow up with your doctor or diabetes team as directed:  Write down your questions so you remember to ask them during your visits    © Copyright [employer] 2022 Information is for End User's use only and may not be sold, redistributed or otherwise used for commercial purposes  All illustrations and images included in CareNotes® are the copyrighted property of [employer]  or Dylan Gomez  The above information is an  only  It is not intended as medical advice for individual conditions or treatments  Talk to your doctor, nurse or pharmacist before following any medical regimen to see if it is safe and effective for you

## 2022-08-17 ENCOUNTER — APPOINTMENT (OUTPATIENT)
Dept: LAB | Facility: HOSPITAL | Age: 77
End: 2022-08-17
Payer: MEDICARE

## 2022-08-17 ENCOUNTER — ANTICOAG VISIT (OUTPATIENT)
Dept: CARDIOLOGY CLINIC | Facility: CLINIC | Age: 77
End: 2022-08-17

## 2022-09-17 DIAGNOSIS — E11.9 TYPE 2 DIABETES MELLITUS WITHOUT COMPLICATION, WITHOUT LONG-TERM CURRENT USE OF INSULIN (HCC): ICD-10-CM

## 2022-09-17 RX ORDER — BLOOD-GLUCOSE METER
KIT MISCELLANEOUS
Qty: 100 EACH | Refills: 3 | Status: SHIPPED | OUTPATIENT
Start: 2022-09-17

## 2022-09-19 DIAGNOSIS — E11.9 TYPE 2 DIABETES MELLITUS WITHOUT COMPLICATION, WITHOUT LONG-TERM CURRENT USE OF INSULIN (HCC): ICD-10-CM

## 2022-09-19 RX ORDER — LANCETS 28 GAUGE
EACH MISCELLANEOUS
Qty: 100 EACH | Refills: 1 | Status: SHIPPED | OUTPATIENT
Start: 2022-09-19

## 2022-09-20 ENCOUNTER — APPOINTMENT (OUTPATIENT)
Dept: LAB | Facility: HOSPITAL | Age: 77
End: 2022-09-20
Payer: MEDICARE

## 2022-09-20 ENCOUNTER — ANTICOAG VISIT (OUTPATIENT)
Dept: CARDIOLOGY CLINIC | Facility: CLINIC | Age: 77
End: 2022-09-20

## 2022-09-30 DIAGNOSIS — I10 ESSENTIAL HYPERTENSION: ICD-10-CM

## 2022-09-30 DIAGNOSIS — E03.9 ADULT HYPOTHYROIDISM: ICD-10-CM

## 2022-09-30 RX ORDER — LEVOTHYROXINE SODIUM 0.05 MG/1
TABLET ORAL
Qty: 90 TABLET | Refills: 1 | Status: SHIPPED | OUTPATIENT
Start: 2022-09-30

## 2022-10-03 RX ORDER — ATENOLOL AND CHLORTHALIDONE TABLET 50; 25 MG/1; MG/1
TABLET ORAL
Qty: 90 TABLET | Refills: 2 | Status: SHIPPED | OUTPATIENT
Start: 2022-10-03

## 2022-10-05 ENCOUNTER — REMOTE DEVICE CLINIC VISIT (OUTPATIENT)
Dept: CARDIOLOGY CLINIC | Facility: CLINIC | Age: 77
End: 2022-10-05
Payer: MEDICARE

## 2022-10-05 DIAGNOSIS — Z95.0 PRESENCE OF PERMANENT CARDIAC PACEMAKER: Primary | ICD-10-CM

## 2022-10-05 PROCEDURE — 93294 REM INTERROG EVL PM/LDLS PM: CPT | Performed by: INTERNAL MEDICINE

## 2022-10-05 PROCEDURE — 93296 REM INTERROG EVL PM/IDS: CPT | Performed by: INTERNAL MEDICINE

## 2022-10-05 NOTE — PROGRESS NOTES
SJM DUAL CHAMBER PM/NOT MRI CONDITIONAL   MERLIN TRANSMISSION:  BATTERY VOLTAGE ADEQUATE (2 6 YR )   AP 92%  3 4%    ALL LEAD PARAMETERS WITHIN NORMAL LIMITS   1 HVR EPISODE WITH EGM SHOWING PAT (23 @ 132 BPM)   NO OTHER SIGNIFICANT HIGH RATE EPISODES   NORMAL DEVICE FUNCTION   RG

## 2022-10-10 NOTE — PROGRESS NOTES
S/w pt  Advised to stay on the same dose and retest in one month  Itraconazole Counseling:  I discussed with the patient the risks of itraconazole including but not limited to liver damage, nausea/vomiting, neuropathy, and severe allergy.  The patient understands that this medication is best absorbed when taken with acidic beverages such as non-diet cola or ginger ale.  The patient understands that monitoring is required including baseline LFTs and repeat LFTs at intervals.  The patient understands that they are to contact us or the primary physician if concerning signs are noted.

## 2022-10-12 ENCOUNTER — APPOINTMENT (OUTPATIENT)
Dept: LAB | Facility: HOSPITAL | Age: 77
End: 2022-10-12
Payer: MEDICARE

## 2022-10-12 ENCOUNTER — ANTICOAG VISIT (OUTPATIENT)
Dept: CARDIOLOGY CLINIC | Facility: CLINIC | Age: 77
End: 2022-10-12

## 2022-10-29 ENCOUNTER — APPOINTMENT (OUTPATIENT)
Dept: LAB | Facility: HOSPITAL | Age: 77
End: 2022-10-29

## 2022-10-31 ENCOUNTER — ANTICOAG VISIT (OUTPATIENT)
Dept: CARDIOLOGY CLINIC | Facility: CLINIC | Age: 77
End: 2022-10-31

## 2022-11-04 DIAGNOSIS — I48.0 PAF (PAROXYSMAL ATRIAL FIBRILLATION) (HCC): ICD-10-CM

## 2022-11-04 RX ORDER — WARFARIN SODIUM 5 MG/1
TABLET ORAL
Qty: 180 TABLET | Refills: 3 | Status: SHIPPED | OUTPATIENT
Start: 2022-11-04

## 2022-11-22 ENCOUNTER — APPOINTMENT (OUTPATIENT)
Dept: LAB | Facility: HOSPITAL | Age: 77
End: 2022-11-22

## 2022-11-22 ENCOUNTER — ANTICOAG VISIT (OUTPATIENT)
Dept: CARDIOLOGY CLINIC | Facility: CLINIC | Age: 77
End: 2022-11-22

## 2022-12-01 ENCOUNTER — TELEPHONE (OUTPATIENT)
Dept: FAMILY MEDICINE CLINIC | Facility: CLINIC | Age: 77
End: 2022-12-01

## 2022-12-01 DIAGNOSIS — B34.9 VIRAL ILLNESS: Primary | ICD-10-CM

## 2022-12-01 NOTE — TELEPHONE ENCOUNTER
T/c from pt -- states she has sinus problems and is having bad case of the sinus  Dry cough, Sore throat mucus, difficulty sleeping at night  Has been taking mucinex for over a week and has not been helping much  Pt wondering if Dr Nader Sosa can prescribe an abx      Please advise

## 2022-12-01 NOTE — TELEPHONE ENCOUNTER
Spoke with patient  Patient states she will call back office to see if she will be able to come at 4pm for a covid swab

## 2022-12-02 LAB — SARS-COV-2 RNA RESP QL NAA+PROBE: NEGATIVE

## 2022-12-05 ENCOUNTER — ANTICOAG VISIT (OUTPATIENT)
Dept: CARDIOLOGY CLINIC | Facility: CLINIC | Age: 77
End: 2022-12-05

## 2022-12-05 ENCOUNTER — APPOINTMENT (OUTPATIENT)
Dept: LAB | Facility: HOSPITAL | Age: 77
End: 2022-12-05

## 2022-12-06 ENCOUNTER — TELEPHONE (OUTPATIENT)
Dept: FAMILY MEDICINE CLINIC | Facility: CLINIC | Age: 77
End: 2022-12-06

## 2022-12-06 DIAGNOSIS — J32.9 RHINOSINUSITIS: Primary | ICD-10-CM

## 2022-12-06 DIAGNOSIS — J31.0 RHINOSINUSITIS: Primary | ICD-10-CM

## 2022-12-06 RX ORDER — AMOXICILLIN 500 MG/1
500 CAPSULE ORAL EVERY 8 HOURS SCHEDULED
Qty: 30 CAPSULE | Refills: 0 | Status: SHIPPED | OUTPATIENT
Start: 2022-12-06 | End: 2022-12-16

## 2022-12-06 NOTE — TELEPHONE ENCOUNTER
Pt called -     tested negative for Covid on 12/01/2022  Pt reports ongoing sinuses, nasal drip  Has been taking Mucinex DM over a week now and is not helping, pt takes cough syrup but its knocking her out  Pt declines all other covid/flu s/s  Pt is asking for some meds to be sent in for her - preferably abx  Message tree 12/01/022  Notes [facility] did sent something in past which dried out the sinuses       Please advise

## 2022-12-28 ENCOUNTER — APPOINTMENT (OUTPATIENT)
Dept: LAB | Facility: HOSPITAL | Age: 77
End: 2022-12-28

## 2022-12-28 ENCOUNTER — ANTICOAG VISIT (OUTPATIENT)
Dept: CARDIOLOGY CLINIC | Facility: CLINIC | Age: 77
End: 2022-12-28

## 2023-01-04 ENCOUNTER — REMOTE DEVICE CLINIC VISIT (OUTPATIENT)
Dept: CARDIOLOGY CLINIC | Facility: CLINIC | Age: 78
End: 2023-01-04

## 2023-01-04 DIAGNOSIS — Z95.0 PRESENCE OF CARDIAC PACEMAKER: Primary | ICD-10-CM

## 2023-01-04 NOTE — PROGRESS NOTES
Results for orders placed or performed in visit on 01/04/23   Cardiac EP device report    Narrative    SJM DUAL CHAMBER PM/NOT MRI CONDITIONAL  MERLIN TRANSMISSION: BATTERY VOLTAGE ADEQUATE (2 4 YRS)  AP: 87%  : 1 6%  ALL AVAILABLE LEAD PARAMETERS WITHIN NORMAL LIMITS  2 AMS EPISODES W/ EGMS SHOWING COMP  ATRIAL PACING, AF BURDEN: 0%  PACEMAKER FUNCTIONING APPROPRIATELY    [address] [place] [address]

## 2023-01-30 ENCOUNTER — APPOINTMENT (OUTPATIENT)
Dept: LAB | Facility: HOSPITAL | Age: 78
End: 2023-01-30

## 2023-01-30 ENCOUNTER — ANTICOAG VISIT (OUTPATIENT)
Dept: CARDIOLOGY CLINIC | Facility: CLINIC | Age: 78
End: 2023-01-30

## 2023-02-02 DIAGNOSIS — N39.41 URINARY INCONTINENCE, URGE: ICD-10-CM

## 2023-02-02 RX ORDER — OXYBUTYNIN CHLORIDE 5 MG/1
TABLET, EXTENDED RELEASE ORAL
Qty: 90 TABLET | Refills: 1 | Status: SHIPPED | OUTPATIENT
Start: 2023-02-02

## 2023-02-27 ENCOUNTER — APPOINTMENT (OUTPATIENT)
Dept: LAB | Facility: HOSPITAL | Age: 78
End: 2023-02-27

## 2023-02-27 ENCOUNTER — ANTICOAG VISIT (OUTPATIENT)
Dept: CARDIOLOGY CLINIC | Facility: CLINIC | Age: 78
End: 2023-02-27

## 2023-03-01 ENCOUNTER — TELEPHONE (OUTPATIENT)
Dept: FAMILY MEDICINE CLINIC | Facility: CLINIC | Age: 78
End: 2023-03-01

## 2023-03-01 DIAGNOSIS — J31.0 RHINOSINUSITIS: Primary | ICD-10-CM

## 2023-03-01 DIAGNOSIS — J32.9 RHINOSINUSITIS: Primary | ICD-10-CM

## 2023-03-01 RX ORDER — DEXTROMETHORPHAN HYDROBROMIDE AND PROMETHAZINE HYDROCHLORIDE 15; 6.25 MG/5ML; MG/5ML
5 SOLUTION ORAL 4 TIMES DAILY PRN
Qty: 180 ML | Refills: 0 | Status: SHIPPED | OUTPATIENT
Start: 2023-03-01

## 2023-03-01 RX ORDER — AZITHROMYCIN 250 MG/1
TABLET, FILM COATED ORAL
Qty: 6 TABLET | Refills: 0 | Status: SHIPPED | OUTPATIENT
Start: 2023-03-01 | End: 2023-03-07

## 2023-03-01 NOTE — TELEPHONE ENCOUNTER
T/c from pt- reports sinus congestion/pain, dry harsh cough, sore throat for 1 week  Has tried multiple OTC meds, which have not helped  Pt is requesting Dr Amaya Barnes send in an abx for her -- does not want amoxicillin  Pt has not taken a covid test, as she reports she knows she does not have covid, she has a sinus problem because those are going around right now  Please advise

## 2023-03-01 NOTE — TELEPHONE ENCOUNTER
Spoke with pt, she is aware that a medication was sent to the pharmacy for her       Ramonita Miller/bandar  03/01/23  4:17 PM

## 2023-03-10 ENCOUNTER — RA CDI HCC (OUTPATIENT)
Dept: OTHER | Facility: HOSPITAL | Age: 78
End: 2023-03-10

## 2023-03-13 ENCOUNTER — APPOINTMENT (OUTPATIENT)
Dept: LAB | Facility: HOSPITAL | Age: 78
End: 2023-03-13

## 2023-03-13 DIAGNOSIS — E11.9 TYPE 2 DIABETES MELLITUS WITHOUT COMPLICATION, WITHOUT LONG-TERM CURRENT USE OF INSULIN (HCC): ICD-10-CM

## 2023-03-13 DIAGNOSIS — I10 ESSENTIAL HYPERTENSION: ICD-10-CM

## 2023-03-13 LAB
ALBUMIN SERPL BCP-MCNC: 4.3 G/DL (ref 3.5–5)
ALP SERPL-CCNC: 62 U/L (ref 34–104)
ALT SERPL W P-5'-P-CCNC: 13 U/L (ref 7–52)
ANION GAP SERPL CALCULATED.3IONS-SCNC: 7 MMOL/L (ref 4–13)
AST SERPL W P-5'-P-CCNC: 23 U/L (ref 13–39)
BILIRUB SERPL-MCNC: 0.39 MG/DL (ref 0.2–1)
BUN SERPL-MCNC: 26 MG/DL (ref 5–25)
CALCIUM SERPL-MCNC: 12 MG/DL (ref 8.4–10.2)
CHLORIDE SERPL-SCNC: 97 MMOL/L (ref 96–108)
CHOLEST SERPL-MCNC: 178 MG/DL
CO2 SERPL-SCNC: 32 MMOL/L (ref 21–32)
CREAT SERPL-MCNC: 0.79 MG/DL (ref 0.6–1.3)
ERYTHROCYTE [DISTWIDTH] IN BLOOD BY AUTOMATED COUNT: 13.8 % (ref 11.6–15.1)
EST. AVERAGE GLUCOSE BLD GHB EST-MCNC: 131 MG/DL
GFR SERPL CREATININE-BSD FRML MDRD: 71 ML/MIN/1.73SQ M
GLUCOSE P FAST SERPL-MCNC: 107 MG/DL (ref 65–99)
HBA1C MFR BLD: 6.2 %
HCT VFR BLD AUTO: 38.5 % (ref 34.8–46.1)
HDLC SERPL-MCNC: 37 MG/DL
HGB BLD-MCNC: 12.3 G/DL (ref 11.5–15.4)
LDLC SERPL CALC-MCNC: 95 MG/DL (ref 0–100)
MCH RBC QN AUTO: 29.6 PG (ref 26.8–34.3)
MCHC RBC AUTO-ENTMCNC: 31.9 G/DL (ref 31.4–37.4)
MCV RBC AUTO: 93 FL (ref 82–98)
NONHDLC SERPL-MCNC: 141 MG/DL
PLATELET # BLD AUTO: 458 THOUSANDS/UL (ref 149–390)
PMV BLD AUTO: 9.2 FL (ref 8.9–12.7)
POTASSIUM SERPL-SCNC: 3.4 MMOL/L (ref 3.5–5.3)
PROT SERPL-MCNC: 7.7 G/DL (ref 6.4–8.4)
RBC # BLD AUTO: 4.16 MILLION/UL (ref 3.81–5.12)
SODIUM SERPL-SCNC: 136 MMOL/L (ref 135–147)
TRIGL SERPL-MCNC: 232 MG/DL
TSH SERPL DL<=0.05 MIU/L-ACNC: 4.06 UIU/ML (ref 0.45–4.5)
WBC # BLD AUTO: 11.53 THOUSAND/UL (ref 4.31–10.16)

## 2023-03-17 ENCOUNTER — OFFICE VISIT (OUTPATIENT)
Dept: FAMILY MEDICINE CLINIC | Facility: CLINIC | Age: 78
End: 2023-03-17

## 2023-03-17 VITALS
DIASTOLIC BLOOD PRESSURE: 80 MMHG | HEART RATE: 60 BPM | WEIGHT: 152 LBS | OXYGEN SATURATION: 97 % | SYSTOLIC BLOOD PRESSURE: 138 MMHG | TEMPERATURE: 97.9 F | HEIGHT: 61 IN | BODY MASS INDEX: 28.7 KG/M2

## 2023-03-17 DIAGNOSIS — E03.9 ADULT HYPOTHYROIDISM: Primary | ICD-10-CM

## 2023-03-17 DIAGNOSIS — I48.0 PAROXYSMAL ATRIAL FIBRILLATION (HCC): ICD-10-CM

## 2023-03-17 DIAGNOSIS — I10 ESSENTIAL HYPERTENSION: ICD-10-CM

## 2023-03-17 DIAGNOSIS — M17.12 ARTHRITIS OF LEFT KNEE: ICD-10-CM

## 2023-03-17 DIAGNOSIS — E28.39 MENOPAUSE OVARIAN FAILURE: ICD-10-CM

## 2023-03-17 DIAGNOSIS — E78.2 MIXED HYPERLIPIDEMIA: ICD-10-CM

## 2023-03-17 DIAGNOSIS — E11.9 TYPE 2 DIABETES MELLITUS WITHOUT COMPLICATION, WITHOUT LONG-TERM CURRENT USE OF INSULIN (HCC): ICD-10-CM

## 2023-03-17 DIAGNOSIS — N39.41 URINARY INCONTINENCE, URGE: ICD-10-CM

## 2023-03-17 PROBLEM — H54.7 WORSENING VISION: Status: RESOLVED | Noted: 2018-07-23 | Resolved: 2023-03-17

## 2023-03-17 NOTE — ASSESSMENT & PLAN NOTE
Well-controlled, continue the glipizide, pioglitazone, check CMP, hemoglobin A1c, urine microalbumin in 6 months  Lab Results   Component Value Date    HGBA1C 6 2 (H) 03/13/2023

## 2023-03-17 NOTE — PROGRESS NOTES
Name: Salvador Tam      : 1945      MRN: [id number]  Encounter Provider: Toan Rich DO  Encounter Date: 3/17/2023   Encounter department: [facility] [address] [place] [address]  Adult hypothyroidism  Assessment & Plan:  Stable, continue levothyroxine, check TSH in 6 months  Orders:  -     TSH, 3rd generation; Future    2  Type 2 diabetes mellitus without complication, without long-term current use of insulin (HCC)  Assessment & Plan:  Well-controlled, continue the glipizide, pioglitazone, check CMP, hemoglobin A1c, urine microalbumin in 6 months  Lab Results   Component Value Date    HGBA1C 6 2 (H) 2023       Orders:  -     Comprehensive metabolic panel; Future  -     Hemoglobin A1C; Future  -     Microalbumin / creatinine urine ratio; Future    3  Essential hypertension  Assessment & Plan:  Well-controlled, continue atenolol-chlorthalidone    Orders:  -     CBC; Future    4  Paroxysmal atrial fibrillation (HCC)  Assessment & Plan:  Rate controlled, continue atenolol, Coumadin, followed by cardiology      5  Urinary incontinence, urge  Assessment & Plan:  Controlled with oxybutynin      6  Mixed hyperlipidemia  Assessment & Plan:  Controlled, continue rosuvastatin, check CMP, lipid profile in 6 months    Orders:  -     Lipid panel; Future    7  Menopause ovarian failure  -     DXA bone density spine hip and pelvis; Future; Expected date: 2023    8  Arthritis of left knee  -     Ambulatory Referral to Orthopedic Surgery; Future           Subjective      Patient comes in today for checkup, states she is doing well, no complaints  Review of Systems   Constitutional: Negative for chills, fatigue and fever  HENT: Negative for congestion, ear pain, hearing loss, postnasal drip, rhinorrhea and sore throat  Eyes: Negative for pain and visual disturbance  Respiratory: Negative for chest tightness, shortness of breath and wheezing  Cardiovascular: Negative for chest pain and leg swelling  Gastrointestinal: Negative for abdominal distention, abdominal pain, constipation, diarrhea and vomiting  Endocrine: Negative for cold intolerance and heat intolerance  Genitourinary: Negative for difficulty urinating, frequency and urgency  Musculoskeletal: Positive for arthralgias (left knee)  Negative for gait problem  Skin: Negative for color change  Neurological: Negative for dizziness, tremors, syncope, numbness and headaches  Hematological: Negative for adenopathy  Psychiatric/Behavioral: Negative for agitation, confusion and sleep disturbance  The patient is not nervous/anxious          Current Outpatient Medications on File Prior to Visit   Medication Sig   • Ascorbic Acid (VITAMIN C) 100 MG tablet Take 500 mg by mouth daily   • atenolol-chlorthalidone (TENORETIC) 50-25 mg per tablet TAKE ONE TABLET BY MOUTH EVERY DAY   • B Complex Vitamins (B COMPLEX 100 PO) Take 1 tablet by mouth daily   • Blood Glucose Monitoring Suppl (FREESTYLE LITE) RANULFO by Does not apply route 2 (two) times a day Dx E11 9   • FREESTYLE LITE test strip TEST TWO TIMES A DAY   • glipiZIDE (GLUCOTROL XL) 2 5 mg 24 hr tablet TAKE ONE TABLET BY MOUTH TWICE A DAY   • glucose blood (JASWANT CONTOUR TEST) test strip Check blood sugar twice daily, DX:E11 9   • Lancets (freestyle) lancets TEST TWO TIMES A DAY   • levothyroxine 50 mcg tablet TAKE ONE TABLET BY MOUTH EVERY DAY   • meclizine (ANTIVERT) 12 5 MG tablet Take 1 tablet (12 5 mg total) by mouth every 8 (eight) hours as needed for dizziness   • Multiple Vitamin (MULTIVITAMIN) tablet Take 1 tablet by mouth daily   • niacin 500 mg tablet Take by mouth    • oxybutynin (DITROPAN-XL) 5 mg 24 hr tablet TAKE ONE TABLET BY MOUTH EVERY DAY   • pioglitazone (ACTOS) 15 mg tablet TAKE ONE TABLET BY MOUTH EVERY DAY   • rosuvastatin (CRESTOR) 10 MG tablet TAKE ONE TABLET BY MOUTH EVERY DAY   • triamcinolone (KENALOG) 0 1 % ointment Apply topically 2 (two) times a day   • warfarin (Jantoven) 5 mg tablet TAKE ONE TO TWO TABLETS BY MOUTH EVERY DAY   • Promethazine-DM (PHENERGAN-DM) 6 25-15 mg/5 mL oral syrup Take 5 mL by mouth 4 (four) times a day as needed for cough (Patient not taking: Reported on 3/17/2023)   • [DISCONTINUED] methylPREDNISolone 4 MG tablet therapy pack Use as directed on package (Patient not taking: Reported on 3/17/2023)       Objective     /80 (BP Location: Left arm, Patient Position: Sitting, Cuff Size: Adult)   Pulse 60   Temp 97 9 °F (36 6 °C)   Ht 5' 1" (1 549 m)   Wt 68 9 kg (152 lb)   SpO2 97%   BMI 28 72 kg/m²     Physical Exam  Vitals and nursing note reviewed  Constitutional:       Appearance: She is well-developed  HENT:      Head: Normocephalic  Eyes:      General: No scleral icterus  Conjunctiva/sclera: Conjunctivae normal    Neck:      Thyroid: No thyromegaly  Cardiovascular:      Rate and Rhythm: Normal rate and regular rhythm  Pulses: no weak pulses          Dorsalis pedis pulses are 2+ on the right side and 2+ on the left side  Posterior tibial pulses are 2+ on the right side and 2+ on the left side  Heart sounds: Normal heart sounds  No murmur heard  Pulmonary:      Effort: Pulmonary effort is normal  No respiratory distress  Breath sounds: Normal breath sounds  No wheezing  Abdominal:      General: Bowel sounds are normal  There is no distension  Palpations: Abdomen is soft  Tenderness: There is no abdominal tenderness  Musculoskeletal:         General: No tenderness  Normal range of motion  Cervical back: Normal range of motion  Feet:      Right foot:      Skin integrity: No ulcer, skin breakdown, erythema, warmth, callus or dry skin  Left foot:      Skin integrity: No ulcer, skin breakdown, erythema, warmth, callus or dry skin  Lymphadenopathy:      Cervical: No cervical adenopathy     Skin:     General: Skin is warm and dry       Coloration: Skin is not pale  Findings: No rash  Neurological:      Mental Status: She is alert and oriented to person, place, and time  Cranial Nerves: No cranial nerve deficit  Psychiatric:         Behavior: Behavior normal      Patient's shoes and socks removed  Right Foot/Ankle   Right Foot Inspection  Skin Exam: skin normal and skin intact  No dry skin, no warmth, no callus, no erythema, no maceration, no abnormal color, no pre-ulcer, no ulcer and no callus  Sensory   Monofilament testing: intact    Vascular  The right DP pulse is 2+  The right PT pulse is 2+  Left Foot/Ankle  Left Foot Inspection  Skin Exam: skin normal and skin intact  No dry skin, no warmth, no erythema, no maceration, normal color, no pre-ulcer, no ulcer and no callus  Sensory   Monofilament testing: intact    Vascular  The left DP pulse is 2+  The left PT pulse is 2+       Assign Risk Category  No deformity present  No loss of protective sensation  No weak pulses  Risk: 0      Idris Balkarely, DO

## 2023-03-19 DIAGNOSIS — E03.9 ADULT HYPOTHYROIDISM: ICD-10-CM

## 2023-03-19 DIAGNOSIS — E78.2 MIXED HYPERLIPIDEMIA: ICD-10-CM

## 2023-03-19 DIAGNOSIS — E11.9 TYPE 2 DIABETES MELLITUS WITHOUT COMPLICATION, WITHOUT LONG-TERM CURRENT USE OF INSULIN (HCC): ICD-10-CM

## 2023-03-19 RX ORDER — LEVOTHYROXINE SODIUM 0.05 MG/1
TABLET ORAL
Qty: 90 TABLET | Refills: 1 | Status: SHIPPED | OUTPATIENT
Start: 2023-03-19

## 2023-03-19 RX ORDER — LANCETS 28 GAUGE
EACH MISCELLANEOUS
Qty: 100 EACH | Refills: 1 | Status: SHIPPED | OUTPATIENT
Start: 2023-03-19

## 2023-03-19 RX ORDER — BLOOD-GLUCOSE METER
KIT MISCELLANEOUS
Qty: 100 EACH | Refills: 3 | Status: SHIPPED | OUTPATIENT
Start: 2023-03-19

## 2023-03-19 RX ORDER — GLIPIZIDE 2.5 MG/1
TABLET, EXTENDED RELEASE ORAL
Qty: 60 TABLET | Refills: 0 | Status: SHIPPED | OUTPATIENT
Start: 2023-03-19

## 2023-03-19 RX ORDER — ROSUVASTATIN CALCIUM 10 MG/1
TABLET, COATED ORAL
Qty: 90 TABLET | Refills: 3 | Status: SHIPPED | OUTPATIENT
Start: 2023-03-19

## 2023-03-23 ENCOUNTER — TELEPHONE (OUTPATIENT)
Dept: FAMILY MEDICINE CLINIC | Facility: CLINIC | Age: 78
End: 2023-03-23

## 2023-03-23 NOTE — TELEPHONE ENCOUNTER
T/c from pt - pt states she saw you last week and is still having nasal & coughing issues - she is taking zyrtec but the cough med only at night because it causes her to get sleepy - pt is requesting something to help knock out everything (nasal & cough) - cough is loose and would like to get it to be productive  Pt was made aware that Dr Mauricio Trujillo is not in the office today  Please advise

## 2023-03-23 NOTE — TELEPHONE ENCOUNTER
She should get muccinex over the counter to take twice a day to loosen up the cough in addition to the medications she is taking     Notified

## 2023-03-29 ENCOUNTER — ANTICOAG VISIT (OUTPATIENT)
Dept: CARDIOLOGY CLINIC | Facility: CLINIC | Age: 78
End: 2023-03-29

## 2023-03-29 ENCOUNTER — TELEPHONE (OUTPATIENT)
Dept: FAMILY MEDICINE CLINIC | Facility: CLINIC | Age: 78
End: 2023-03-29

## 2023-03-29 ENCOUNTER — APPOINTMENT (OUTPATIENT)
Dept: LAB | Facility: HOSPITAL | Age: 78
End: 2023-03-29

## 2023-03-29 DIAGNOSIS — J31.0 RHINOSINUSITIS: Primary | ICD-10-CM

## 2023-03-29 DIAGNOSIS — J32.9 RHINOSINUSITIS: Primary | ICD-10-CM

## 2023-03-29 RX ORDER — AZITHROMYCIN 250 MG/1
TABLET, FILM COATED ORAL
Qty: 6 TABLET | Refills: 0 | Status: SHIPPED | OUTPATIENT
Start: 2023-03-29 | End: 2023-04-04

## 2023-03-29 NOTE — TELEPHONE ENCOUNTER
T/c from pt - still battling the sinus issue - she's been doing what you instructed her to take - musinex, nasal spray & cough syrup - not going away  Requesting a script that can knock this out of her  She has an appt with ortho 3/31 and would like to be a little better by then  Please advise

## 2023-03-29 NOTE — TELEPHONE ENCOUNTER
Left a detailed message for pt, new abx sent to the pharmacy for her       Ramonita Miller/bandar  03/29/23  2:09 PM

## 2023-03-31 ENCOUNTER — APPOINTMENT (OUTPATIENT)
Dept: RADIOLOGY | Facility: CLINIC | Age: 78
End: 2023-03-31

## 2023-03-31 ENCOUNTER — OFFICE VISIT (OUTPATIENT)
Dept: OBGYN CLINIC | Facility: CLINIC | Age: 78
End: 2023-03-31

## 2023-03-31 VITALS
HEART RATE: 69 BPM | HEIGHT: 61 IN | SYSTOLIC BLOOD PRESSURE: 144 MMHG | BODY MASS INDEX: 28.32 KG/M2 | DIASTOLIC BLOOD PRESSURE: 77 MMHG | WEIGHT: 150 LBS

## 2023-03-31 DIAGNOSIS — M22.2X2 PATELLOFEMORAL SYNDROME OF LEFT KNEE: ICD-10-CM

## 2023-03-31 DIAGNOSIS — R29.898 WEAKNESS OF BOTH HIPS: ICD-10-CM

## 2023-03-31 DIAGNOSIS — M62.9 HAMSTRING TIGHTNESS OF BOTH LOWER EXTREMITIES: ICD-10-CM

## 2023-03-31 DIAGNOSIS — M17.12 ARTHRITIS OF LEFT KNEE: ICD-10-CM

## 2023-03-31 DIAGNOSIS — M17.12 PRIMARY OSTEOARTHRITIS OF LEFT KNEE: ICD-10-CM

## 2023-03-31 DIAGNOSIS — M17.12 PRIMARY OSTEOARTHRITIS OF LEFT KNEE: Primary | ICD-10-CM

## 2023-03-31 RX ORDER — BUPIVACAINE HYDROCHLORIDE 2.5 MG/ML
4 INJECTION, SOLUTION INFILTRATION; PERINEURAL
Status: COMPLETED | OUTPATIENT
Start: 2023-03-31 | End: 2023-03-31

## 2023-03-31 RX ORDER — BUPIVACAINE HYDROCHLORIDE 2.5 MG/ML
1 INJECTION, SOLUTION INFILTRATION; PERINEURAL
Status: COMPLETED | OUTPATIENT
Start: 2023-03-31 | End: 2023-03-31

## 2023-03-31 RX ORDER — TRIAMCINOLONE ACETONIDE 40 MG/ML
40 INJECTION, SUSPENSION INTRA-ARTICULAR; INTRAMUSCULAR
Status: COMPLETED | OUTPATIENT
Start: 2023-03-31 | End: 2023-03-31

## 2023-03-31 RX ADMIN — TRIAMCINOLONE ACETONIDE 40 MG: 40 INJECTION, SUSPENSION INTRA-ARTICULAR; INTRAMUSCULAR at 09:09

## 2023-03-31 RX ADMIN — BUPIVACAINE HYDROCHLORIDE 1 ML: 2.5 INJECTION, SOLUTION INFILTRATION; PERINEURAL at 09:09

## 2023-03-31 RX ADMIN — BUPIVACAINE HYDROCHLORIDE 4 ML: 2.5 INJECTION, SOLUTION INFILTRATION; PERINEURAL at 09:09

## 2023-03-31 NOTE — LETTER
March 31, 2023     Heather Vinson, [address]    Patient: Joseph Romero   YOB: 1945   Date of Visit: 3/31/2023       Dear Dr Minnie Guevara: Thank you for referring Barrie Mcdaniel to me for evaluation  Below are my notes for this consultation  If you have questions, please do not hesitate to call me  I look forward to following your patient along with you  Sincerely,        [employer]IONI [employer], DO        CC: No Recipients  DIONI Easley, DO  3/31/2023 12:50 PM  Sign when Signing Visit  Assessment/Plan:  Assessment/Plan    Diagnoses and all orders for this visit:    Primary osteoarthritis of left knee  -     Ambulatory Referral to Orthopedic Surgery  -     XR knee 4+ vw left injury; Future  -     Diclofenac Sodium (VOLTAREN) 1 %; Apply 2 g topically 3 (three) times a day  -     Ambulatory Referral to Physical Therapy; Future  -     Large joint arthrocentesis: L knee    Patellofemoral syndrome of left knee  -     Cancel: Brace  -     Ambulatory Referral to Physical Therapy; Future  -     Brace    Weakness of both hips  -     Ambulatory Referral to Physical Therapy; Future    Hamstring tightness of both lower extremities  -     Ambulatory Referral to Physical Therapy; Future      77-year-old female with left knee pain more than 1 year duration  Discussed with patient physical exam, imaging studies, impression, and plan  X-rays left knee noted for degenerative changes most pronounced in the lateral compartment with medial chondrocalcinosis  Physical exam left knee noted for mild swelling  She has tenderness at the medial joint line and patella facet  She has full extension and flexion to 120 degrees  There is no appreciable collateral ligament laxity  There is positive patella inhibition and grind  There is no groin pain with JUAN and FADDIR of the hips    She has hamstring tightness both lower extremities and weakness both hips with abduction  Clinical impression is she has symptoms from combination of degenerative changes and abnormal patellofemoral mechanics  I discussed treatment regimen of anti-inflammatory, steroid injection, bracing, and formal therapy  Surgery not warranted at this time  I administered mixture of 3 cc 0 25% bupivacaine and 1 cc Kenalog to the left knee without complication  I provided patellar stabilizing brace which she may wear during physical activity  She may apply topical diclofenac gel 3 times a day for the next 10 days  She is to start physical therapy as soon as possible and do home exercises as directed  She will follow-up as needed  Subjective:   Patient ID: Josesito Xavier is a 76 y o  female  Chief Complaint   Patient presents with   • Left Knee - Pain       59-year-old female presents for evaluation left knee pain more than 1 year duration  She denies trauma or inciting event  Pain described as gradual in onset, generalized to the knee but worse at the medial aspect, nonradiating, worse with bearing weight and ambulating, bothersome at nighttime, achy and throbbing, and improved with resting or changing position  She manages symptoms with applying topical anesthetics such as Bengay  She is status post right knee arthroplasty  She attended formal therapy 1 year ago  She had follow-up with her primary care physician and was referred to orthopedic care  Knee Pain  This is a chronic problem  The current episode started more than 1 year ago  The problem occurs daily  The problem has been gradually worsening  Associated symptoms include arthralgias and joint swelling  Pertinent negatives include no abdominal pain, chest pain, chills, fever, numbness, rash, sore throat or weakness  The symptoms are aggravated by standing and walking  She has tried rest and position changes (Topical anesthetic) for the symptoms  The treatment provided mild relief             The following portions of the "patient's history were reviewed and updated as appropriate: She  has a past medical history of Carpal tunnel syndrome and Heart murmur  She  has a past surgical history that includes Colonoscopy (11/24/2007); Cataract extraction; Cholecystectomy; Knee surgery; Total abdominal hysterectomy w/ bilateral salpingoophorectomy; Replacement total knee (Right); and Eye surgery  Her family history includes Coronary artery disease in her family; Diabetes in her family; Heart attack in her family and father  She  reports that she has never smoked  She has never used smokeless tobacco  She reports current alcohol use  She reports that she does not use drugs  She has No Known Allergies       Review of Systems   Constitutional: Negative for chills and fever  HENT: Negative for sore throat  Eyes: Negative for visual disturbance  Respiratory: Negative for shortness of breath  Cardiovascular: Negative for chest pain  Gastrointestinal: Negative for abdominal pain  Genitourinary: Negative for flank pain  Musculoskeletal: Positive for arthralgias and joint swelling  Skin: Negative for rash and wound  Neurological: Negative for weakness and numbness  Hematological: Does not bruise/bleed easily  Psychiatric/Behavioral: Negative for self-injury  Objective:  Vitals:    03/31/23 0818   BP: 144/77   Pulse: 69   Weight: 68 kg (150 lb)   Height: 5' 1\" (1 549 m)     Left Ankle Exam     Muscle Strength   Dorsiflexion:  5/5   Plantar flexion:  5/5       Left Knee Exam     Muscle Strength   The patient has normal left knee strength      Range of Motion   Extension: normal   Flexion: 120     Tests   Varus: negative Valgus: negative    Other   Swelling: mild    Comments:  Positive patella inhibition and grind      Right Hip Exam     Muscle Strength   Abduction: 4/5   Flexion: 5/5     Tests   JUAN: negative    Comments:  Negative FADDIR's  Hamstring tightness      Left Hip Exam     Muscle Strength   Abduction: 4/5 " "  Flexion: 5/5     Tests   JUAN: negative    Comments:  Negative FADDIR  Hamstring tightness          Strength/Myotome Testing     Left Ankle/Foot   Dorsiflexion: 5  Plantar flexion: 5      Physical Exam  Vitals and nursing note reviewed  Constitutional:       General: She is not in acute distress  Appearance: She is well-developed  She is not ill-appearing or diaphoretic  HENT:      Head: Normocephalic  Right Ear: External ear normal       Left Ear: External ear normal    Eyes:      Conjunctiva/sclera: Conjunctivae normal    Neck:      Trachea: No tracheal deviation  Cardiovascular:      Rate and Rhythm: Normal rate  Pulmonary:      Effort: Pulmonary effort is normal  No respiratory distress  Abdominal:      General: There is no distension  Musculoskeletal:         General: Swelling and tenderness present  No deformity or signs of injury  Skin:     General: Skin is warm and dry  Coloration: Skin is not jaundiced or pale  Neurological:      Mental Status: She is alert and oriented to person, place, and time  Psychiatric:         Mood and Affect: Mood normal          Behavior: Behavior normal          Thought Content: Thought content normal          Judgment: Judgment normal          I have personally reviewed pertinent films in PACS and my interpretation is  X-rays left knee noted for degenerative changes most pronounced in the lateral compartment with medial chondrocalcinosis         Large joint arthrocentesis: L knee  Universal Protocol:  Consent: Verbal consent obtained  Risks and benefits: risks, benefits and alternatives were discussed  Consent given by: patient  Time out: Immediately prior to procedure a \"time out\" was called to verify the correct patient, procedure, equipment, support staff and site/side marked as required    Patient understanding: patient states understanding of the procedure being performed  Patient consent: the patient's understanding of the procedure " "matches consent given  Procedure consent: procedure consent matches procedure scheduled  Relevant documents: relevant documents present and verified  Test results: test results available and properly labeled  Site marked: the operative site was marked  Radiology Images displayed and confirmed   If images not available, report reviewed: imaging studies available  Required items: required blood products, implants, devices, and special equipment available  Patient identity confirmed: verbally with patient    Supporting Documentation  Indications: pain   Procedure Details  Location: knee - L knee  Preparation: Patient was prepped and draped in the usual sterile fashion  Needle gauge: 21G 2\"  Ultrasound guidance: no  Approach: anteromedial  Medications administered: 4 mL bupivacaine 0 25 %; 1 mL bupivacaine 0 25 %; 40 mg triamcinolone acetonide 40 mg/mL    Patient tolerance: patient tolerated the procedure well with no immediate complications  Dressing:  Sterile dressing applied            "

## 2023-03-31 NOTE — PROGRESS NOTES
Assessment/Plan:  Assessment/Plan    Diagnoses and all orders for this visit:    Primary osteoarthritis of left knee  -     Ambulatory Referral to Orthopedic Surgery  -     XR knee 4+ vw left injury; Future  -     Diclofenac Sodium (VOLTAREN) 1 %; Apply 2 g topically 3 (three) times a day  -     Ambulatory Referral to Physical Therapy; Future  -     Large joint arthrocentesis: L knee    Patellofemoral syndrome of left knee  -     Cancel: Brace  -     Ambulatory Referral to Physical Therapy; Future  -     Brace    Weakness of both hips  -     Ambulatory Referral to Physical Therapy; Future    Hamstring tightness of both lower extremities  -     Ambulatory Referral to Physical Therapy; Future      64-year-old female with left knee pain more than 1 year duration  Discussed with patient physical exam, imaging studies, impression, and plan  X-rays left knee noted for degenerative changes most pronounced in the lateral compartment with medial chondrocalcinosis  Physical exam left knee noted for mild swelling  She has tenderness at the medial joint line and patella facet  She has full extension and flexion to 120 degrees  There is no appreciable collateral ligament laxity  There is positive patella inhibition and grind  There is no groin pain with JUAN and FADDIR of the hips  She has hamstring tightness both lower extremities and weakness both hips with abduction  Clinical impression is she has symptoms from combination of degenerative changes and abnormal patellofemoral mechanics  I discussed treatment regimen of anti-inflammatory, steroid injection, bracing, and formal therapy  Surgery not warranted at this time  I administered mixture of 3 cc 0 25% bupivacaine and 1 cc Kenalog to the left knee without complication  I provided patellar stabilizing brace which she may wear during physical activity  She may apply topical diclofenac gel 3 times a day for the next 10 days    She is to start physical therapy as soon as possible and do home exercises as directed  She will follow-up as needed  Subjective:   Patient ID: Jhonatan Guerrero is a 76 y o  female  Chief Complaint   Patient presents with   • Left Knee - Pain       70-year-old female presents for evaluation left knee pain more than 1 year duration  She denies trauma or inciting event  Pain described as gradual in onset, generalized to the knee but worse at the medial aspect, nonradiating, worse with bearing weight and ambulating, bothersome at nighttime, achy and throbbing, and improved with resting or changing position  She manages symptoms with applying topical anesthetics such as Bengay  She is status post right knee arthroplasty  She attended formal therapy 1 year ago  She had follow-up with her primary care physician and was referred to orthopedic care  Knee Pain  This is a chronic problem  The current episode started more than 1 year ago  The problem occurs daily  The problem has been gradually worsening  Associated symptoms include arthralgias and joint swelling  Pertinent negatives include no abdominal pain, chest pain, chills, fever, numbness, rash, sore throat or weakness  The symptoms are aggravated by standing and walking  She has tried rest and position changes (Topical anesthetic) for the symptoms  The treatment provided mild relief  The following portions of the patient's history were reviewed and updated as appropriate: She  has a past medical history of Carpal tunnel syndrome and Heart murmur  She  has a past surgical history that includes Colonoscopy (11/24/2007); Cataract extraction; Cholecystectomy; Knee surgery; Total abdominal hysterectomy w/ bilateral salpingoophorectomy; Replacement total knee (Right); and Eye surgery  Her family history includes Coronary artery disease in her family; Diabetes in her family; Heart attack in her family and father  She  reports that she has never smoked   She has never used smokeless "tobacco  She reports current alcohol use  She reports that she does not use drugs  She has No Known Allergies       Review of Systems   Constitutional: Negative for chills and fever  HENT: Negative for sore throat  Eyes: Negative for visual disturbance  Respiratory: Negative for shortness of breath  Cardiovascular: Negative for chest pain  Gastrointestinal: Negative for abdominal pain  Genitourinary: Negative for flank pain  Musculoskeletal: Positive for arthralgias and joint swelling  Skin: Negative for rash and wound  Neurological: Negative for weakness and numbness  Hematological: Does not bruise/bleed easily  Psychiatric/Behavioral: Negative for self-injury  Objective:  Vitals:    03/31/23 0818   BP: 144/77   Pulse: 69   Weight: 68 kg (150 lb)   Height: 5' 1\" (1 549 m)     Left Ankle Exam     Muscle Strength   Dorsiflexion:  5/5   Plantar flexion:  5/5       Left Knee Exam     Muscle Strength   The patient has normal left knee strength  Range of Motion   Extension: normal   Flexion: 120     Tests   Varus: negative Valgus: negative    Other   Swelling: mild    Comments:  Positive patella inhibition and grind      Right Hip Exam     Muscle Strength   Abduction: 4/5   Flexion: 5/5     Tests   JUAN: negative    Comments:  Negative FADDIR's  Hamstring tightness      Left Hip Exam     Muscle Strength   Abduction: 4/5   Flexion: 5/5     Tests   JUAN: negative    Comments:  Negative FADDIR  Hamstring tightness          Strength/Myotome Testing     Left Ankle/Foot   Dorsiflexion: 5  Plantar flexion: 5      Physical Exam  Vitals and nursing note reviewed  Constitutional:       General: She is not in acute distress  Appearance: She is well-developed  She is not ill-appearing or diaphoretic  HENT:      Head: Normocephalic        Right Ear: External ear normal       Left Ear: External ear normal    Eyes:      Conjunctiva/sclera: Conjunctivae normal    Neck:      Trachea: No tracheal " "deviation  Cardiovascular:      Rate and Rhythm: Normal rate  Pulmonary:      Effort: Pulmonary effort is normal  No respiratory distress  Abdominal:      General: There is no distension  Musculoskeletal:         General: Swelling and tenderness present  No deformity or signs of injury  Skin:     General: Skin is warm and dry  Coloration: Skin is not jaundiced or pale  Neurological:      Mental Status: She is alert and oriented to person, place, and time  Psychiatric:         Mood and Affect: Mood normal          Behavior: Behavior normal          Thought Content: Thought content normal          Judgment: Judgment normal          I have personally reviewed pertinent films in PACS and my interpretation is  X-rays left knee noted for degenerative changes most pronounced in the lateral compartment with medial chondrocalcinosis         Large joint arthrocentesis: L knee  Universal Protocol:  Consent: Verbal consent obtained  Risks and benefits: risks, benefits and alternatives were discussed  Consent given by: patient  Time out: Immediately prior to procedure a \"time out\" was called to verify the correct patient, procedure, equipment, support staff and site/side marked as required  Patient understanding: patient states understanding of the procedure being performed  Patient consent: the patient's understanding of the procedure matches consent given  Procedure consent: procedure consent matches procedure scheduled  Relevant documents: relevant documents present and verified  Test results: test results available and properly labeled  Site marked: the operative site was marked  Radiology Images displayed and confirmed   If images not available, report reviewed: imaging studies available  Required items: required blood products, implants, devices, and special equipment available  Patient identity confirmed: verbally with patient    Supporting Documentation  Indications: pain   Procedure Details  Location: " "knee - L knee  Preparation: Patient was prepped and draped in the usual sterile fashion  Needle gauge: 21G 2\"  Ultrasound guidance: no  Approach: anteromedial  Medications administered: 4 mL bupivacaine 0 25 %; 1 mL bupivacaine 0 25 %; 40 mg triamcinolone acetonide 40 mg/mL    Patient tolerance: patient tolerated the procedure well with no immediate complications  Dressing:  Sterile dressing applied          "

## 2023-04-05 ENCOUNTER — REMOTE DEVICE CLINIC VISIT (OUTPATIENT)
Dept: CARDIOLOGY CLINIC | Facility: CLINIC | Age: 78
End: 2023-04-05

## 2023-04-05 DIAGNOSIS — Z95.0 PRESENCE OF CARDIAC PACEMAKER: Primary | ICD-10-CM

## 2023-04-05 NOTE — PROGRESS NOTES
Results for orders placed or performed in visit on 04/05/23   Cardiac EP device report    Narrative    S[facility] DUAL CHAMBER PM/NOT MRI CONDITIONAL  [employer] TRANSMISSION: BATTERY VOLTAGE ADEQUATE (2 2 YRS)  AP 92%  <1% (DDDR 60); ALL AVAILABLE LEAD PARAMETERS WITHIN NORMAL LIMITS  NO HIGH RATE EPISODES  9 AMS EPISODES WITH PAF ON AVAILABLE EGM'S; MAX EPISODE DURATION 26 MINS, 44 SECS; AF BURDEN <1%; PMT NOTED (NO EGM'S STORED)  HX: PAF & PATIENT TAKES WARFARIN, TENORETIC; NORMAL DEVICE FUNCTION    ES

## 2023-04-23 DIAGNOSIS — E78.2 MIXED HYPERLIPIDEMIA: ICD-10-CM

## 2023-04-23 RX ORDER — PIOGLITAZONEHYDROCHLORIDE 15 MG/1
TABLET ORAL
Qty: 90 TABLET | Refills: 1 | Status: SHIPPED | OUTPATIENT
Start: 2023-04-23

## 2023-04-28 DIAGNOSIS — N39.41 URINARY INCONTINENCE, URGE: ICD-10-CM

## 2023-04-28 DIAGNOSIS — E11.9 TYPE 2 DIABETES MELLITUS WITHOUT COMPLICATION, WITHOUT LONG-TERM CURRENT USE OF INSULIN (HCC): ICD-10-CM

## 2023-04-28 RX ORDER — OXYBUTYNIN CHLORIDE 5 MG/1
5 TABLET, EXTENDED RELEASE ORAL DAILY
Qty: 90 TABLET | Refills: 1 | Status: SHIPPED | OUTPATIENT
Start: 2023-04-28

## 2023-04-28 RX ORDER — GLIPIZIDE 2.5 MG/1
2.5 TABLET, EXTENDED RELEASE ORAL 2 TIMES DAILY
Qty: 90 TABLET | Refills: 1 | Status: SHIPPED | OUTPATIENT
Start: 2023-04-28

## 2023-05-03 ENCOUNTER — APPOINTMENT (OUTPATIENT)
Dept: LAB | Facility: HOSPITAL | Age: 78
End: 2023-05-03
Attending: INTERNAL MEDICINE

## 2023-05-03 ENCOUNTER — ANTICOAG VISIT (OUTPATIENT)
Dept: CARDIOLOGY CLINIC | Facility: CLINIC | Age: 78
End: 2023-05-03

## 2023-05-25 ENCOUNTER — ANTICOAG VISIT (OUTPATIENT)
Dept: CARDIOLOGY CLINIC | Facility: CLINIC | Age: 78
End: 2023-05-25

## 2023-05-25 ENCOUNTER — APPOINTMENT (OUTPATIENT)
Dept: LAB | Facility: HOSPITAL | Age: 78
End: 2023-05-25
Attending: INTERNAL MEDICINE

## 2023-05-26 ENCOUNTER — TELEPHONE (OUTPATIENT)
Dept: FAMILY MEDICINE CLINIC | Facility: CLINIC | Age: 78
End: 2023-05-26

## 2023-05-26 NOTE — TELEPHONE ENCOUNTER
T/c from pt -- said she's sorry, just seen message about the bone scan  Pt states that she called her insurance and they will cover the Alendronate  Other medications will be too high of a copay  Pt wondering if this would be something she could try or would it interfere with any of her other medications    (Pt aware Dr Gonzales Arguello out until 5/30)  Please advise

## 2023-05-30 DIAGNOSIS — M81.0 AGE-RELATED OSTEOPOROSIS WITHOUT CURRENT PATHOLOGICAL FRACTURE: Primary | ICD-10-CM

## 2023-05-30 DIAGNOSIS — E03.9 ADULT HYPOTHYROIDISM: ICD-10-CM

## 2023-05-30 RX ORDER — LEVOTHYROXINE SODIUM 0.05 MG/1
50 TABLET ORAL DAILY
Qty: 90 TABLET | Refills: 1 | Status: SHIPPED | OUTPATIENT
Start: 2023-05-30

## 2023-05-30 RX ORDER — ALENDRONATE SODIUM 70 MG/1
70 TABLET ORAL
Qty: 12 TABLET | Refills: 3 | Status: SHIPPED | OUTPATIENT
Start: 2023-05-30

## 2023-06-07 ENCOUNTER — TELEPHONE (OUTPATIENT)
Dept: FAMILY MEDICINE CLINIC | Facility: CLINIC | Age: 78
End: 2023-06-07

## 2023-06-07 ENCOUNTER — OFFICE VISIT (OUTPATIENT)
Dept: FAMILY MEDICINE CLINIC | Facility: CLINIC | Age: 78
End: 2023-06-07
Payer: MEDICARE

## 2023-06-07 VITALS
HEIGHT: 61 IN | BODY MASS INDEX: 29.79 KG/M2 | TEMPERATURE: 98.7 F | HEART RATE: 73 BPM | SYSTOLIC BLOOD PRESSURE: 140 MMHG | WEIGHT: 157.8 LBS | OXYGEN SATURATION: 98 % | DIASTOLIC BLOOD PRESSURE: 68 MMHG

## 2023-06-07 DIAGNOSIS — H10.33 ACUTE BACTERIAL CONJUNCTIVITIS OF BOTH EYES: Primary | ICD-10-CM

## 2023-06-07 DIAGNOSIS — N39.41 URINARY INCONTINENCE, URGE: ICD-10-CM

## 2023-06-07 PROCEDURE — 99213 OFFICE O/P EST LOW 20 MIN: CPT | Performed by: PHYSICIAN ASSISTANT

## 2023-06-07 RX ORDER — NEOMYCIN SULFATE, POLYMYXIN B SULFATE AND BACITRACIN ZINC 3.5; 10000; 4 MG/G; [USP'U]/G; [USP'U]/G
0.5 OINTMENT OPHTHALMIC 4 TIMES DAILY
Qty: 5 G | Refills: 0 | Status: SHIPPED | OUTPATIENT
Start: 2023-06-07 | End: 2023-06-12

## 2023-06-07 RX ORDER — TOLTERODINE 4 MG/1
4 CAPSULE, EXTENDED RELEASE ORAL DAILY
Qty: 30 CAPSULE | Refills: 2 | Status: SHIPPED | OUTPATIENT
Start: 2023-06-07

## 2023-06-07 NOTE — PROGRESS NOTES
Name: Mal Santiago      : 1945      MRN: [id number]  Encounter Provider: James Parkinson PA-C  Encounter Date: 2023   Encounter department: [facility] [address]     1  Acute bacterial conjunctivitis of both eyes  -     tobramycin-dexamethasone (TOBRADEX) ophthalmic ointment; Administer 0 5 inches to both eyes 3 (three) times a day    2  Urinary incontinence, urge  -     tolterodine (DETROL LA) 4 mg 24 hr capsule; Take 1 capsule (4 mg total) by mouth daily           Subjective      Eye Problem   Both eyes are affected  This is a new problem  The current episode started in the past 7 days  The problem occurs constantly  The problem has been gradually worsening  There was no injury mechanism  The patient is experiencing no pain  There is known exposure to pink eye  She does not wear contacts  Associated symptoms include an eye discharge, eye redness, itching and photophobia  Pertinent negatives include no blurred vision, double vision, fever, foreign body sensation, nausea, recent URI or vomiting  Treatments tried: OTC eye drops  The treatment provided no relief  Review of Systems   Constitutional: Negative for chills and fever  Eyes: Positive for photophobia, discharge, redness and itching  Negative for blurred vision and double vision  Gastrointestinal: Negative for nausea and vomiting         Current Outpatient Medications on File Prior to Visit   Medication Sig   • Ascorbic Acid (VITAMIN C) 100 MG tablet Take 500 mg by mouth daily   • atenolol-chlorthalidone (TENORETIC) 50-25 mg per tablet TAKE ONE TABLET BY MOUTH EVERY DAY   • B Complex Vitamins (B COMPLEX 100 PO) Take 1 tablet by mouth daily   • Blood Glucose Monitoring Suppl (FREESTYLE LITE) RANULFO by Does not apply route 2 (two) times a day Dx E11 9   • FREESTYLE LITE test strip TEST TWO TIMES A DAY   • glipiZIDE (GLUCOTROL XL) 2 5 mg 24 hr tablet Take 1 tablet (2 5 mg total) "by mouth 2 (two) times a day   • glucose blood (JASWANT CONTOUR TEST) test strip Check blood sugar twice daily, DX:E11 9   • Lancets (freestyle) lancets USE TO TEST TWO TIMES A DAY   • levothyroxine 50 mcg tablet Take 1 tablet (50 mcg total) by mouth daily   • meclizine (ANTIVERT) 12 5 MG tablet Take 1 tablet (12 5 mg total) by mouth every 8 (eight) hours as needed for dizziness   • Multiple Vitamin (MULTIVITAMIN) tablet Take 1 tablet by mouth daily   • niacin 500 mg tablet Take by mouth    • pioglitazone (ACTOS) 15 mg tablet TAKE ONE TABLET BY MOUTH EVERY DAY   • rosuvastatin (CRESTOR) 10 MG tablet TAKE ONE TABLET BY MOUTH EVERY DAY   • triamcinolone (KENALOG) 0 1 % ointment Apply topically 2 (two) times a day   • warfarin (Jantoven) 5 mg tablet TAKE ONE TO TWO TABLETS BY MOUTH EVERY DAY   • [DISCONTINUED] oxybutynin (DITROPAN-XL) 5 mg 24 hr tablet Take 1 tablet (5 mg total) by mouth daily   • alendronate (Fosamax) 70 mg tablet Take 1 tablet (70 mg total) by mouth every 7 days (Patient not taking: Reported on 6/7/2023)   • Diclofenac Sodium (VOLTAREN) 1 % Apply 2 g topically 3 (three) times a day (Patient not taking: Reported on 6/7/2023)   • Promethazine-DM (PHENERGAN-DM) 6 25-15 mg/5 mL oral syrup Take 5 mL by mouth 4 (four) times a day as needed for cough (Patient not taking: Reported on 3/17/2023)       Objective     /68 (BP Location: Left arm, Patient Position: Sitting, Cuff Size: Standard)   Pulse 73   Temp 98 7 °F (37 1 °C)   Ht 5' 1\" (1 549 m)   Wt 71 6 kg (157 lb 12 8 oz)   SpO2 98%   BMI 29 82 kg/m²     Physical Exam  HENT:      Head: Normocephalic  Eyes:      General:         Right eye: Discharge present  No foreign body or hordeolum  Left eye: No foreign body, discharge or hordeolum  Conjunctiva/sclera:      Right eye: Right conjunctiva is injected  Exudate present  No chemosis or hemorrhage  Left eye: Left conjunctiva is injected  No chemosis, exudate or hemorrhage       " Comments: Right upper and lower lids swollen and erythematous   Neurological:      Mental Status: She is alert         Byron Hansen PA-C

## 2023-06-07 NOTE — TELEPHONE ENCOUNTER
T/c from pt -- went to  medication sent in for pink eye this morning and was told it is not covered by her insurance and will cost her over 200$  Is requesting Gayle send in a different option, if possible  Please advise

## 2023-06-07 NOTE — PROGRESS NOTES
Assessment/Plan:          Diagnoses and all orders for this visit:    Acute bacterial conjunctivitis of both eyes  -     tobramycin-dexamethasone (TOBRADEX) ophthalmic ointment; Administer 0 5 inches to both eyes 3 (three) times a day    Urinary incontinence, urge  -     tolterodine (DETROL LA) 4 mg 24 hr capsule; Take 1 capsule (4 mg total) by mouth daily            Subjective:      Patient ID: Jaz Dean is a [de-identified] y o  female  HPI    The following portions of the patient's history were reviewed and updated as appropriate:   She has a past medical history of Carpal tunnel syndrome and Heart murmur  ,  does not have any pertinent problems on file  ,   has a past surgical history that includes Colonoscopy (11/24/2007); Cataract extraction; Cholecystectomy; Knee surgery; Total abdominal hysterectomy w/ bilateral salpingoophorectomy; Replacement total knee (Right); and Eye surgery  ,  family history includes Coronary artery disease in her family; Diabetes in her family; Heart attack in her family and father  ,   reports that she has never smoked  She has never used smokeless tobacco  She reports current alcohol use  She reports that she does not use drugs  ,  has No Known Allergies     Current Outpatient Medications   Medication Sig Dispense Refill   • Ascorbic Acid (VITAMIN C) 100 MG tablet Take 500 mg by mouth daily     • atenolol-chlorthalidone (TENORETIC) 50-25 mg per tablet TAKE ONE TABLET BY MOUTH EVERY DAY 90 tablet 2   • B Complex Vitamins (B COMPLEX 100 PO) Take 1 tablet by mouth daily     • Blood Glucose Monitoring Suppl (FREESTYLE LITE) RANULFO by Does not apply route 2 (two) times a day Dx E11 9 1 each 0   • FREESTYLE LITE test strip TEST TWO TIMES A  each 3   • glipiZIDE (GLUCOTROL XL) 2 5 mg 24 hr tablet Take 1 tablet (2 5 mg total) by mouth 2 (two) times a day 90 tablet 1   • glucose blood (JASWANT CONTOUR TEST) test strip Check blood sugar twice daily, DX:E11 9 200 each 3   • Lancets (freestyle) "lancets USE TO TEST TWO TIMES A  each 1   • levothyroxine 50 mcg tablet Take 1 tablet (50 mcg total) by mouth daily 90 tablet 1   • meclizine (ANTIVERT) 12 5 MG tablet Take 1 tablet (12 5 mg total) by mouth every 8 (eight) hours as needed for dizziness 30 tablet 0   • Multiple Vitamin (MULTIVITAMIN) tablet Take 1 tablet by mouth daily     • niacin 500 mg tablet Take by mouth      • pioglitazone (ACTOS) 15 mg tablet TAKE ONE TABLET BY MOUTH EVERY DAY 90 tablet 1   • rosuvastatin (CRESTOR) 10 MG tablet TAKE ONE TABLET BY MOUTH EVERY DAY 90 tablet 3   • tobramycin-dexamethasone (TOBRADEX) ophthalmic ointment Administer 0 5 inches to both eyes 3 (three) times a day 3 5 g 0   • tolterodine (DETROL LA) 4 mg 24 hr capsule Take 1 capsule (4 mg total) by mouth daily 30 capsule 2   • triamcinolone (KENALOG) 0 1 % ointment Apply topically 2 (two) times a day 30 g 0   • warfarin (Jantoven) 5 mg tablet TAKE ONE TO TWO TABLETS BY MOUTH EVERY  tablet 3   • alendronate (Fosamax) 70 mg tablet Take 1 tablet (70 mg total) by mouth every 7 days (Patient not taking: Reported on 6/7/2023) 12 tablet 3   • Diclofenac Sodium (VOLTAREN) 1 % Apply 2 g topically 3 (three) times a day (Patient not taking: Reported on 6/7/2023) 350 g 1   • Promethazine-DM (PHENERGAN-DM) 6 25-15 mg/5 mL oral syrup Take 5 mL by mouth 4 (four) times a day as needed for cough (Patient not taking: Reported on 3/17/2023) 180 mL 0     No current facility-administered medications for this visit  Review of Systems      Objective:  Vitals:    06/07/23 0946   BP: 140/68   BP Location: Left arm   Patient Position: Sitting   Cuff Size: Standard   Pulse: 73   Temp: 98 7 °F (37 1 °C)   SpO2: 98%   Weight: 71 6 kg (157 lb 12 8 oz)   Height: 5' 1\" (1 549 m)     Body mass index is 29 82 kg/m²       Physical Exam      "

## 2023-06-09 ENCOUNTER — TELEPHONE (OUTPATIENT)
Dept: FAMILY MEDICINE CLINIC | Facility: CLINIC | Age: 78
End: 2023-06-09

## 2023-06-09 NOTE — TELEPHONE ENCOUNTER
[email or web address]      Key:  [id number]  :  1945    Proceed with PA?

## 2023-06-14 ENCOUNTER — TELEPHONE (OUTPATIENT)
Dept: FAMILY MEDICINE CLINIC | Facility: CLINIC | Age: 78
End: 2023-06-14

## 2023-06-14 DIAGNOSIS — M54.9 ACUTE BACK PAIN, UNSPECIFIED BACK LOCATION, UNSPECIFIED BACK PAIN LATERALITY: Primary | ICD-10-CM

## 2023-06-14 RX ORDER — CYCLOBENZAPRINE HCL 10 MG
10 TABLET ORAL 3 TIMES DAILY PRN
Qty: 20 TABLET | Refills: 0 | Status: SHIPPED | OUTPATIENT
Start: 2023-06-14

## 2023-06-14 NOTE — TELEPHONE ENCOUNTER
T/c from pt -- reports severe muscle pain -- thinks pulled muscle that goes across her back and into her neck -- it is very hard to move  Last time this happened, pt took an old flexeril, but reports Dr Rebeca Caal told her to call the office instead next time, instead of taking old medication

## 2023-06-21 ENCOUNTER — OFFICE VISIT (OUTPATIENT)
Dept: FAMILY MEDICINE CLINIC | Facility: CLINIC | Age: 78
End: 2023-06-21

## 2023-06-21 VITALS
HEART RATE: 62 BPM | TEMPERATURE: 97.6 F | SYSTOLIC BLOOD PRESSURE: 146 MMHG | RESPIRATION RATE: 16 BRPM | OXYGEN SATURATION: 96 % | WEIGHT: 154.2 LBS | HEIGHT: 61 IN | DIASTOLIC BLOOD PRESSURE: 80 MMHG | BODY MASS INDEX: 29.11 KG/M2

## 2023-06-21 DIAGNOSIS — R42 DIZZINESS: ICD-10-CM

## 2023-06-21 DIAGNOSIS — N39.41 URINARY INCONTINENCE, URGE: Primary | ICD-10-CM

## 2023-06-21 DIAGNOSIS — N39.0 URINARY TRACT INFECTION WITHOUT HEMATURIA, SITE UNSPECIFIED: ICD-10-CM

## 2023-06-21 DIAGNOSIS — M54.50 ACUTE RIGHT-SIDED LOW BACK PAIN WITHOUT SCIATICA: ICD-10-CM

## 2023-06-21 LAB
SL AMB  POCT GLUCOSE, UA: ABNORMAL
SL AMB LEUKOCYTE ESTERASE,UA: 70
SL AMB POCT BILIRUBIN,UA: ABNORMAL
SL AMB POCT BLOOD,UA: ABNORMAL
SL AMB POCT CLARITY,UA: ABNORMAL
SL AMB POCT COLOR,UA: YELLOW
SL AMB POCT KETONES,UA: ABNORMAL
SL AMB POCT NITRITE,UA: ABNORMAL
SL AMB POCT PH,UA: 6
SL AMB POCT SPECIFIC GRAVITY,UA: 1.02
SL AMB POCT URINE PROTEIN: 15
SL AMB POCT UROBILINOGEN: ABNORMAL

## 2023-06-21 PROCEDURE — 87077 CULTURE AEROBIC IDENTIFY: CPT

## 2023-06-21 PROCEDURE — 87086 URINE CULTURE/COLONY COUNT: CPT

## 2023-06-21 PROCEDURE — 87186 SC STD MICRODIL/AGAR DIL: CPT

## 2023-06-21 RX ORDER — TOLTERODINE 2 MG/1
2 CAPSULE, EXTENDED RELEASE ORAL DAILY
Qty: 30 CAPSULE | Refills: 1 | Status: SHIPPED | OUTPATIENT
Start: 2023-06-21

## 2023-06-21 RX ORDER — CIPROFLOXACIN 500 MG/1
500 TABLET, FILM COATED ORAL EVERY 24 HOURS
Qty: 3 TABLET | Refills: 0 | Status: SHIPPED | OUTPATIENT
Start: 2023-06-21 | End: 2023-06-24

## 2023-06-21 NOTE — PROGRESS NOTES
Assessment/Plan:         Problem List Items Addressed This Visit        Other    Urinary incontinence, urge - Primary     Patient was recently switched to Detrol 4 mg daily  Due to recent fatigue, lightheadedness, right-sided flank pain, I will decrease Detrol to 2 mg daily as it most likely cause some dehydration  Educated patient regarding stop drinking water 1 hour before she goes to bed  Stay hydrated during the day  Follow-up if no improvement in 1 week or sooner if symptoms worsen  Relevant Medications    tolterodine (DETROL LA) 2 mg 24 hr capsule    Other Relevant Orders    POCT urine dip (Completed)    Urine culture   Other Visit Diagnoses     Acute right-sided low back pain without sciatica        Most likely due to UTI and dehydration  Decrease Detrol to 2 mg daily  Start Pedialyte, obtain blood work as discussed  Report ED with worsening symptoms    Relevant Orders    Comprehensive metabolic panel    CBC and differential    POCT urine dip (Completed)    Urine culture    Dizziness        Most likely due to dehydration  Please obtain blood work  Report to emergency room with worsening symptoms  Relevant Orders    Comprehensive metabolic panel    CBC and differential    Urinary tract infection without hematuria, site unspecified        Start taking antibiotic finish all the doses even if you feel better  Stay hydrated  Relevant Medications    ciprofloxacin (CIPRO) 500 mg tablet            Subjective:      Patient ID: Eladio Mcdermott is a [de-identified] y o  female  Presents to the office complaining of right-sided flank pain, fatigue, lightheadedness started about 1 to 2 weeks ago  She was started on new medication for urinary incontinence at the beginning of June  Back Pain  This is a new problem  The current episode started 1 to 4 weeks ago  The problem occurs constantly  The problem has been waxing and waning since onset   The pain is present in the gluteal  The quality of the pain is described as aching  The pain does not radiate  The pain is moderate  The pain is the same all the time  The symptoms are aggravated by sitting and twisting  Pertinent negatives include no abdominal pain, bladder incontinence, bowel incontinence, chest pain, dysuria, fever, headaches, leg pain, numbness, paresis, paresthesias, pelvic pain, perianal numbness, tingling, weakness or weight loss  She has tried muscle relaxant, NSAIDs, analgesics and bed rest for the symptoms  The treatment provided mild relief  The following portions of the patient's history were reviewed and updated as appropriate:   Past Medical History:  She has a past medical history of Carpal tunnel syndrome and Heart murmur  ,  _______________________________________________________________________  Medical Problems:  does not have any pertinent problems on file ,  _______________________________________________________________________  Past Surgical History:   has a past surgical history that includes Colonoscopy (11/24/2007); Cataract extraction; Cholecystectomy; Knee surgery; Total abdominal hysterectomy w/ bilateral salpingoophorectomy; Replacement total knee (Right); and Eye surgery  ,  _______________________________________________________________________  Family History:  family history includes Coronary artery disease in her family; Diabetes in her family; Heart attack in her family and father ,  _______________________________________________________________________  Social History:   reports that she has never smoked  She has never used smokeless tobacco  She reports current alcohol use  She reports that she does not use drugs  ,  _______________________________________________________________________  Allergies:  has No Known Allergies     _______________________________________________________________________  Current Outpatient Medications   Medication Sig Dispense Refill   • alendronate (Fosamax) 70 mg tablet Take 1 tablet (70 mg total) by mouth every 7 days 12 tablet 3   • Ascorbic Acid (VITAMIN C) 100 MG tablet Take 500 mg by mouth daily     • atenolol-chlorthalidone (TENORETIC) 50-25 mg per tablet TAKE ONE TABLET BY MOUTH EVERY DAY 90 tablet 2   • B Complex Vitamins (B COMPLEX 100 PO) Take 1 tablet by mouth daily     • ciprofloxacin (CIPRO) 500 mg tablet Take 1 tablet (500 mg total) by mouth every 24 hours for 3 days 3 tablet 0   • cyclobenzaprine (FLEXERIL) 10 mg tablet Take 1 tablet (10 mg total) by mouth 3 (three) times a day as needed for muscle spasms 20 tablet 0   • glipiZIDE (GLUCOTROL XL) 2 5 mg 24 hr tablet Take 1 tablet (2 5 mg total) by mouth 2 (two) times a day 90 tablet 1   • levothyroxine 50 mcg tablet Take 1 tablet (50 mcg total) by mouth daily 90 tablet 1   • meclizine (ANTIVERT) 12 5 MG tablet Take 1 tablet (12 5 mg total) by mouth every 8 (eight) hours as needed for dizziness 30 tablet 0   • Multiple Vitamin (MULTIVITAMIN) tablet Take 1 tablet by mouth daily     • niacin 500 mg tablet Take by mouth      • pioglitazone (ACTOS) 15 mg tablet TAKE ONE TABLET BY MOUTH EVERY DAY 90 tablet 1   • rosuvastatin (CRESTOR) 10 MG tablet TAKE ONE TABLET BY MOUTH EVERY DAY 90 tablet 3   • tolterodine (DETROL LA) 2 mg 24 hr capsule Take 1 capsule (2 mg total) by mouth daily 30 capsule 1   • triamcinolone (KENALOG) 0 1 % ointment Apply topically 2 (two) times a day 30 g 0   • warfarin (Jantoven) 5 mg tablet TAKE ONE TO TWO TABLETS BY MOUTH EVERY  tablet 3   • Blood Glucose Monitoring Suppl (FREESTYLE LITE) RANULFO by Does not apply route 2 (two) times a day Dx E11 9 1 each 0   • Diclofenac Sodium (VOLTAREN) 1 % Apply 2 g topically 3 (three) times a day (Patient not taking: Reported on 6/7/2023) 350 g 1   • FREESTYLE LITE test strip TEST TWO TIMES A  each 3   • glucose blood (JASWANT CONTOUR TEST) test strip Check blood sugar twice daily, DX:E11 9 200 each 3   • Lancets (freestyle) lancets USE TO TEST TWO TIMES A  each "1     No current facility-administered medications for this visit      _______________________________________________________________________  Review of Systems   Constitutional: Positive for fatigue  Negative for fever and weight loss  Cardiovascular: Negative for chest pain  Gastrointestinal: Negative for abdominal pain and bowel incontinence  Genitourinary: Negative for bladder incontinence, difficulty urinating, dysuria, hematuria and pelvic pain  Musculoskeletal: Positive for arthralgias and back pain  Neurological: Positive for light-headedness  Negative for tingling, weakness, numbness, headaches and paresthesias  Objective:  Vitals:    06/21/23 1252 06/21/23 1259   BP: 140/70 146/80   Patient Position: Sitting Standing   Pulse: 62    Resp: 16    Temp: 97 6 °F (36 4 °C)    SpO2: 96%    Weight: 69 9 kg (154 lb 3 2 oz)    Height: 5' 1\" (1 549 m)      Body mass index is 29 14 kg/m²  Physical Exam  Vitals and nursing note reviewed  Constitutional:       General: She is not in acute distress  Appearance: Normal appearance  She is not ill-appearing  Cardiovascular:      Rate and Rhythm: Normal rate and regular rhythm  Heart sounds: Normal heart sounds  Pulmonary:      Effort: Pulmonary effort is normal       Breath sounds: Normal breath sounds  Abdominal:      Tenderness: There is right CVA tenderness  Skin:     General: Skin is warm and dry  Neurological:      Mental Status: She is alert and oriented to person, place, and time  Psychiatric:         Mood and Affect: Mood normal          Behavior: Behavior normal          Thought Content: Thought content normal          Judgment: Judgment normal                Portions of the above record have been created with voice recognition software  Occasional wrong word or \"sound alike\" substitution may have occurred due to the inherent limitations of voice recognition software   Read the chart carefully and recognize, using " context, where substitution may have occurred

## 2023-06-21 NOTE — ASSESSMENT & PLAN NOTE
Patient was recently switched to Detrol 4 mg daily  Due to recent fatigue, lightheadedness, right-sided flank pain, I will decrease Detrol to 2 mg daily as it most likely cause some dehydration  Educated patient regarding stop drinking water 1 hour before she goes to bed  Stay hydrated during the day  Follow-up if no improvement in 1 week or sooner if symptoms worsen

## 2023-06-22 ENCOUNTER — APPOINTMENT (OUTPATIENT)
Dept: LAB | Facility: HOSPITAL | Age: 78
End: 2023-06-22
Payer: MEDICARE

## 2023-06-22 ENCOUNTER — ANTICOAG VISIT (OUTPATIENT)
Dept: CARDIOLOGY CLINIC | Facility: CLINIC | Age: 78
End: 2023-06-22

## 2023-06-22 DIAGNOSIS — R42 DIZZINESS: ICD-10-CM

## 2023-06-22 DIAGNOSIS — M54.50 ACUTE RIGHT-SIDED LOW BACK PAIN WITHOUT SCIATICA: ICD-10-CM

## 2023-06-22 LAB
ALBUMIN SERPL BCP-MCNC: 4.3 G/DL (ref 3.5–5)
ALP SERPL-CCNC: 65 U/L (ref 34–104)
ALT SERPL W P-5'-P-CCNC: 12 U/L (ref 7–52)
ANION GAP SERPL CALCULATED.3IONS-SCNC: 9 MMOL/L
AST SERPL W P-5'-P-CCNC: 21 U/L (ref 13–39)
BASOPHILS # BLD AUTO: 0.05 THOUSANDS/ÂΜL (ref 0–0.1)
BASOPHILS NFR BLD AUTO: 1 % (ref 0–1)
BILIRUB SERPL-MCNC: 0.32 MG/DL (ref 0.2–1)
BUN SERPL-MCNC: 27 MG/DL (ref 5–25)
CALCIUM SERPL-MCNC: 11.7 MG/DL (ref 8.4–10.2)
CHLORIDE SERPL-SCNC: 98 MMOL/L (ref 96–108)
CO2 SERPL-SCNC: 27 MMOL/L (ref 21–32)
CREAT SERPL-MCNC: 0.7 MG/DL (ref 0.6–1.3)
EOSINOPHIL # BLD AUTO: 0.33 THOUSAND/ÂΜL (ref 0–0.61)
EOSINOPHIL NFR BLD AUTO: 4 % (ref 0–6)
ERYTHROCYTE [DISTWIDTH] IN BLOOD BY AUTOMATED COUNT: 13.4 % (ref 11.6–15.1)
GFR SERPL CREATININE-BSD FRML MDRD: 82 ML/MIN/1.73SQ M
GLUCOSE P FAST SERPL-MCNC: 127 MG/DL (ref 65–99)
HCT VFR BLD AUTO: 36.2 % (ref 34.8–46.1)
HGB BLD-MCNC: 11.6 G/DL (ref 11.5–15.4)
IMM GRANULOCYTES # BLD AUTO: 0.02 THOUSAND/UL (ref 0–0.2)
IMM GRANULOCYTES NFR BLD AUTO: 0 % (ref 0–2)
LYMPHOCYTES # BLD AUTO: 1.93 THOUSANDS/ÂΜL (ref 0.6–4.47)
LYMPHOCYTES NFR BLD AUTO: 23 % (ref 14–44)
MCH RBC QN AUTO: 29.7 PG (ref 26.8–34.3)
MCHC RBC AUTO-ENTMCNC: 32 G/DL (ref 31.4–37.4)
MCV RBC AUTO: 93 FL (ref 82–98)
MONOCYTES # BLD AUTO: 0.61 THOUSAND/ÂΜL (ref 0.17–1.22)
MONOCYTES NFR BLD AUTO: 7 % (ref 4–12)
NEUTROPHILS # BLD AUTO: 5.38 THOUSANDS/ÂΜL (ref 1.85–7.62)
NEUTS SEG NFR BLD AUTO: 65 % (ref 43–75)
NRBC BLD AUTO-RTO: 0 /100 WBCS
PLATELET # BLD AUTO: 517 THOUSANDS/UL (ref 149–390)
PMV BLD AUTO: 9 FL (ref 8.9–12.7)
POTASSIUM SERPL-SCNC: 3.6 MMOL/L (ref 3.5–5.3)
PROT SERPL-MCNC: 7.9 G/DL (ref 6.4–8.4)
RBC # BLD AUTO: 3.91 MILLION/UL (ref 3.81–5.12)
SODIUM SERPL-SCNC: 134 MMOL/L (ref 135–147)
WBC # BLD AUTO: 8.32 THOUSAND/UL (ref 4.31–10.16)

## 2023-06-22 PROCEDURE — 85025 COMPLETE CBC W/AUTO DIFF WBC: CPT

## 2023-06-22 PROCEDURE — 80053 COMPREHEN METABOLIC PANEL: CPT

## 2023-06-22 NOTE — PROGRESS NOTES
Pt is on an antibx x 3 days  Advised to take 1/2 her dose tonight then resume reg dose and retest in 1 month

## 2023-06-23 ENCOUNTER — TELEPHONE (OUTPATIENT)
Dept: FAMILY MEDICINE CLINIC | Facility: CLINIC | Age: 78
End: 2023-06-23

## 2023-06-23 LAB — BACTERIA UR CULT: ABNORMAL

## 2023-06-23 NOTE — TELEPHONE ENCOUNTER
Called patient and she stated she is taking the pills   She only feels lightheaded but she feels okay

## 2023-06-23 NOTE — TELEPHONE ENCOUNTER
----- Message from [address] [place] [address] sent at 6/22/2023  5:41 PM EDT -----  Culture borderline for possible UTI-how is she feeling?   Thank you

## 2023-06-27 DIAGNOSIS — I10 ESSENTIAL HYPERTENSION: ICD-10-CM

## 2023-06-27 RX ORDER — ATENOLOL AND CHLORTHALIDONE TABLET 50; 25 MG/1; MG/1
TABLET ORAL
Qty: 90 TABLET | Refills: 2 | Status: SHIPPED | OUTPATIENT
Start: 2023-06-27

## 2023-07-05 ENCOUNTER — TELEPHONE (OUTPATIENT)
Dept: OTHER | Facility: OTHER | Age: 78
End: 2023-07-05

## 2023-07-05 NOTE — TELEPHONE ENCOUNTER
Patient called saying that she needs to canceled her appointment that she schedule for 7/6 at 2:30 PM. Patient is asking if the office can give her a call to reschedule her appointment.

## 2023-07-24 DIAGNOSIS — Z79.01 ANTICOAGULATION MONITORING, INR RANGE 2-3: Primary | ICD-10-CM

## 2023-07-25 ENCOUNTER — ANTICOAG VISIT (OUTPATIENT)
Dept: CARDIOLOGY CLINIC | Facility: CLINIC | Age: 78
End: 2023-07-25

## 2023-07-25 ENCOUNTER — APPOINTMENT (OUTPATIENT)
Dept: LAB | Facility: HOSPITAL | Age: 78
End: 2023-07-25
Attending: INTERNAL MEDICINE
Payer: MEDICARE

## 2023-07-25 LAB
INR PPP: 2.41 (ref 0.84–1.19)
PROTHROMBIN TIME: 25.7 SECONDS (ref 11.6–14.5)

## 2023-07-25 PROCEDURE — 85610 PROTHROMBIN TIME: CPT

## 2023-07-25 PROCEDURE — 36415 COLL VENOUS BLD VENIPUNCTURE: CPT

## 2023-08-03 ENCOUNTER — IN-CLINIC DEVICE VISIT (OUTPATIENT)
Dept: CARDIOLOGY CLINIC | Facility: CLINIC | Age: 78
End: 2023-08-03
Payer: MEDICARE

## 2023-08-03 DIAGNOSIS — Z95.0 PRESENCE OF PERMANENT CARDIAC PACEMAKER: Primary | ICD-10-CM

## 2023-08-03 PROCEDURE — 93280 PM DEVICE PROGR EVAL DUAL: CPT | Performed by: INTERNAL MEDICINE

## 2023-08-03 NOTE — PROGRESS NOTES
SJM DUAL CHAMBER PM/NOT MRI CONDITIONAL   DEVICE INTERROGATED IN THE [facility]:  BATTERY VOLTAGE ADEQUATE (1.8 YR.).  AP 90%  <1%.   ALL AVAILABLE LEAD PARAMETERS WITHIN NORMAL LIMITS.  PATIENT IS ATRIALLY DEPENDENT TODAY.  1 HVR EPISODE WITH EGM SHOWING 15 S OF PAT @ 163 BPM.  2 AT/AF EPISODES WITH LONGEST 12 S.  NO PROGRAMMING CHANGES MADE TO DEVICE PARAMETERS.  NORMAL DEVICE FUNCTION.  RG

## 2023-08-07 ENCOUNTER — EVALUATION (OUTPATIENT)
Dept: PHYSICAL THERAPY | Facility: CLINIC | Age: 78
End: 2023-08-07
Payer: MEDICARE

## 2023-08-07 DIAGNOSIS — M17.12 PRIMARY OSTEOARTHRITIS OF LEFT KNEE: Primary | ICD-10-CM

## 2023-08-07 DIAGNOSIS — R29.898 WEAKNESS OF BOTH HIPS: ICD-10-CM

## 2023-08-07 DIAGNOSIS — M22.2X2 PATELLOFEMORAL SYNDROME OF LEFT KNEE: ICD-10-CM

## 2023-08-07 DIAGNOSIS — M62.9 HAMSTRING TIGHTNESS OF BOTH LOWER EXTREMITIES: ICD-10-CM

## 2023-08-07 PROCEDURE — 97161 PT EVAL LOW COMPLEX 20 MIN: CPT | Performed by: PHYSICAL THERAPIST

## 2023-08-07 PROCEDURE — 97110 THERAPEUTIC EXERCISES: CPT | Performed by: PHYSICAL THERAPIST

## 2023-08-07 NOTE — PROGRESS NOTES
PT Evaluation     Today's date: 2023  Patient name: Daniel Lafleur  : 1945  MRN: [id number]  Referring provider: Claudetta Rowan  Dx:   Encounter Diagnosis     ICD-10-CM    1. Primary osteoarthritis of left knee  M17.12 Ambulatory Referral to Physical Therapy      2. Patellofemoral syndrome of left knee  M22.2X2 Ambulatory Referral to Physical Therapy      3. Weakness of both hips  R29.898 Ambulatory Referral to Physical Therapy      4. Hamstring tightness of both lower extremities  M62.9 Ambulatory Referral to Physical Therapy                     Assessment  Assessment details: Daniel Lafleur is a pleasant 78 y.o. female who presents with LLE weakness. The patient's greatest concerns are concern at no signs of improvement, fear of not being able to keep active and future ill health (and wanting to prevent it). No further referral appears necessary at this time based upon examination results. Primary movement impairment diagnosis of impaired quad strength. impairments have lead to participation restrictions in ADLs, household chores, and stairs. She would benefit from working with a skilled PT in order to increase participation in aforementioned participation restrictions. Primary Impairments  1. Impaired quad weakness  2. Impaired abductor strength   3. Impaired gait   4. Impaired LE functional strength     Impairments: abnormal gait, abnormal muscle firing, abnormal muscle tone, abnormal or restricted ROM, abnormal movement, activity intolerance, impaired physical strength, lacks appropriate home exercise program and poor body mechanics    Symptom irritability: lowUnderstanding of Dx/Px/POC: good   Prognosis: fair  Prognosis details: Positive prognostic indicators include positive attitude toward recovery and absence of observed red flags. Negative prognostic indicators include chronicity of symptoms and multiple concurrent orthopedic problems. Goals  ST.  Independent with HEP in 2 weeks  2. Pt will have verbal report of improvement in symptoms by >/=25% in 2 weeks     To be achieved by D/C   LT. Pt will improve FOTO score by >/= 9 points in 6 weeks  2. Pt will improve FOTO score to >/= 56 by visit # 12  3. Pt will be able to perform household chores with little to no difficulty  4. Pt will be able to cook with no difficulty   5. Pt will be able to ascend/descend stairs reciprocally  6. Pt will be able to carry laundry up stairs independently       Plan  Patient would benefit from: skilled physical therapy  Planned therapy interventions: activity modification, joint mobilization, manual therapy, motor coordination training, neuromuscular re-education, patient education, self care, therapeutic activities, therapeutic exercise, graded activity, home exercise program, behavior modification, graded exercise, functional ROM exercises, strengthening, balance and transfer training  Frequency: 2x week  Duration in weeks: 8  Plan of Care beginning date: 2023  Plan of Care expiration date: 10/2/2023  Treatment plan discussed with: patient        Subjective Evaluation    History of Present Illness  Mechanism of injury: Pt reports that she has a long standing history of L knee pain. More often she has been feeling her knee giving out. She notes that she is having difficulty walking. She would like to have more control when she is walking. She does utilize a cane but not . She is more concerned about her weakness. Patient Goals  Patient goals for therapy: increased strength  Patient goal: be able to go up and down stairs to do laundry, be able to perform transfers,   Pain  At best pain ratin  At worst pain rating: 3    Social Support  2  Interior stairs assessed: to go downstairs. 15  Lives with: adult children and young children          Objective     Concurrent Complaints  Positive for bladder dysfunction.  Negative for night pain, disturbed sleep, bowel dysfunction and saddle (S4) numbness    Additional Special Questions  Pt reports that she has had incontence      Postural Observations  Seated posture: poor  Standing posture: poor        Active Range of Motion   Left Knee   Flexion: 122 degrees   Extension: -5 degrees   Extensor lag: -10 degrees     Right Knee   Flexion: 120 degrees   Extension: 0 degrees   Extensor la degrees     Passive Range of Motion   Left Knee   Flexion: 128 degrees with pain    Strength/Myotome Testing     Left Knee   Flexion: 4-  Extension: 4-  Quadriceps contraction: poor    Right Knee   Flexion: 4  Extension: 4+  Quadriceps contraction: fair    Ambulation   Weight-Bearing Status   Weight-Bearing Status (Left): full weight bearing   Weight-Bearing Status (Right): full weight-bearing    Assistive device used: none    Observational Gait   Increased right stance time, left swing time and left step length. Decreased walking speed, left stance time, right swing time and right step length.      Additional Observational Gait Details  Valgus collapsing at the L knee              Precautions: R knee replacement, hear murmur       Manuals                                                                 Neuro Re-Ed             Quad sets c holds 5s x20            Marching on foam                                                                               Ther Ex             Pt education on HEP, POC x5 min            SLR flexion  2x5            Bridges  2x5            LAQ             SAQ             Side lying clamshells              SLR extension              Standing HR/TR             Standing hip abduction              Ther Activity             STS c foam                          Gait Training                                       Modalities

## 2023-08-10 ENCOUNTER — OFFICE VISIT (OUTPATIENT)
Dept: PHYSICAL THERAPY | Facility: CLINIC | Age: 78
End: 2023-08-10
Payer: MEDICARE

## 2023-08-10 DIAGNOSIS — M17.12 PRIMARY OSTEOARTHRITIS OF LEFT KNEE: Primary | ICD-10-CM

## 2023-08-10 DIAGNOSIS — M22.2X2 PATELLOFEMORAL SYNDROME OF LEFT KNEE: ICD-10-CM

## 2023-08-10 PROCEDURE — 97110 THERAPEUTIC EXERCISES: CPT

## 2023-08-10 PROCEDURE — 97112 NEUROMUSCULAR REEDUCATION: CPT

## 2023-08-10 NOTE — PROGRESS NOTES
Daily Note     Today's date: 8/10/2023  Patient name: Niru Pearce  : 1945  MRN: [id number]  Referring provider: Marcello Borden  Dx:   Encounter Diagnosis     ICD-10-CM    1. Primary osteoarthritis of left knee  M17.12       2. Patellofemoral syndrome of left knee  M22.2X2                      Subjective: pt reports the knee is bothering her today. Objective: See treatment diary below      Assessment: Tolerated treatment well. Patient would benefit from continued PT. Continued w/ gentle strengthening w/ good tolerance. Increased some repetitions w/ no problem. Plan: Continue per plan of care. Precautions: R knee replacement, hear murmur       Manuals 8/7 8/10                                                               Neuro Re-Ed             Quad sets c holds 5s x20 5s x20           Marching on foam                                                                               Ther Ex             Pt education on HEP, POC x5 min            SLR flexion  2x5 3x5           Bridges  2x5 3x5           LAQ  2x10 5s           SAQ  2x10 5s           Side lying clamshells   3x5           SLR extension              Standing HR/TR             Rec.  Bike for mobility and endurance  L1 6'           SLR abd  3x5           Standing hip abduction              Ther Activity             STS c foam                          Gait Training                                       Modalities

## 2023-08-11 ENCOUNTER — APPOINTMENT (OUTPATIENT)
Dept: PHYSICAL THERAPY | Facility: CLINIC | Age: 78
End: 2023-08-11
Payer: MEDICARE

## 2023-08-11 ENCOUNTER — TELEPHONE (OUTPATIENT)
Dept: FAMILY MEDICINE CLINIC | Facility: CLINIC | Age: 78
End: 2023-08-11

## 2023-08-11 DIAGNOSIS — M54.50 CHRONIC BILATERAL LOW BACK PAIN WITHOUT SCIATICA: Primary | ICD-10-CM

## 2023-08-11 DIAGNOSIS — G89.29 CHRONIC BILATERAL LOW BACK PAIN WITHOUT SCIATICA: Primary | ICD-10-CM

## 2023-08-11 NOTE — TELEPHONE ENCOUNTER
Patient asking for a script to be put in for physical therapy to have her lower back assessed/ treated (she is currently being seen for her knees)

## 2023-08-15 ENCOUNTER — OFFICE VISIT (OUTPATIENT)
Dept: PHYSICAL THERAPY | Facility: CLINIC | Age: 78
End: 2023-08-15
Payer: MEDICARE

## 2023-08-15 DIAGNOSIS — M17.12 PRIMARY OSTEOARTHRITIS OF LEFT KNEE: Primary | ICD-10-CM

## 2023-08-15 DIAGNOSIS — M22.2X2 PATELLOFEMORAL SYNDROME OF LEFT KNEE: ICD-10-CM

## 2023-08-15 DIAGNOSIS — M62.9 HAMSTRING TIGHTNESS OF BOTH LOWER EXTREMITIES: ICD-10-CM

## 2023-08-15 DIAGNOSIS — R29.898 WEAKNESS OF BOTH HIPS: ICD-10-CM

## 2023-08-15 PROCEDURE — 97110 THERAPEUTIC EXERCISES: CPT

## 2023-08-15 PROCEDURE — 97112 NEUROMUSCULAR REEDUCATION: CPT

## 2023-08-15 NOTE — PROGRESS NOTES
Daily Note     Today's date: 8/15/2023  Patient name: Terrence Ortega  : 1945  MRN: [id number]  Referring provider: Diana Verdugo  Dx:   Encounter Diagnosis     ICD-10-CM    1. Primary osteoarthritis of left knee  M17.12       2. Patellofemoral syndrome of left knee  M22.2X2       3. Weakness of both hips  R29.898       4. Hamstring tightness of both lower extremities  M62.9                      Subjective: Patient reports no sig change in pain levels or strength. Objective: See treatment diary below      Assessment: Tolerated treatment well. Able to complete charted program with no increase in pain from subjective baseline. She required tactile cue for positioning in sidelying but is unable to maintain. Increased reps and resistance as charted with no compromise in form. Patient demonstrated fatigue post treatment and would benefit from continued PT      Plan: Progress treatment as tolerated. Precautions: R knee replacement, hear murmur       Manuals 8/7 8/10 8/15                                                              Neuro Re-Ed             Quad sets c holds 5s x20  5s x20          Marching on foam                                                                               Ther Ex             Pt education on HEP, POC x5 min            SLR flexion  2x5 3x5 2x10          Bridges  2x5 3x5 2x10          LAQ  2x10 5s 2# 2x10           SAQ  2x10 5s 2# 2x10           Side lying clamshells   3x5 RTB   2x10          SLR extension              Standing HR/TR             Rec.  Bike for mobility and endurance  L1 6' L1 6'          SLR abd  3x5 2x10           Standing hip abduction              Ther Activity             STS c foam                          Gait Training                                       Modalities

## 2023-08-17 ENCOUNTER — OFFICE VISIT (OUTPATIENT)
Dept: PHYSICAL THERAPY | Facility: CLINIC | Age: 78
End: 2023-08-17
Payer: MEDICARE

## 2023-08-17 DIAGNOSIS — R29.898 WEAKNESS OF BOTH HIPS: ICD-10-CM

## 2023-08-17 DIAGNOSIS — M17.12 PRIMARY OSTEOARTHRITIS OF LEFT KNEE: Primary | ICD-10-CM

## 2023-08-17 DIAGNOSIS — M22.2X2 PATELLOFEMORAL SYNDROME OF LEFT KNEE: ICD-10-CM

## 2023-08-17 DIAGNOSIS — M62.9 HAMSTRING TIGHTNESS OF BOTH LOWER EXTREMITIES: ICD-10-CM

## 2023-08-17 PROCEDURE — 97110 THERAPEUTIC EXERCISES: CPT

## 2023-08-17 PROCEDURE — 97530 THERAPEUTIC ACTIVITIES: CPT

## 2023-08-17 NOTE — PROGRESS NOTES
Daily Note     Today's date: 2023  Patient name: Fabrizio Hernandez  : 1945  MRN: [id number]  Referring provider: Festus Coyle  Dx:   Encounter Diagnosis     ICD-10-CM    1. Primary osteoarthritis of left knee  M17.12       2. Patellofemoral syndrome of left knee  M22.2X2       3. Weakness of both hips  R29.898       4. Hamstring tightness of both lower extremities  M62.9                      Subjective: pt reports knee is doing okay today. Her back is very sore this morning. Objective: See treatment diary below      Assessment: Tolerated treatment well. Patient would benefit from continued PT. Progressed into WB strengthening w/ good tolerance. C/o lateral knee pain/weakness w/ STS. Discussed the tx for arthritis and the importance of strengthening surrounding muscularity. Plan: Continue per plan of care. Precautions: R knee replacement, hear murmur       Manuals 8/7 8/10 8/15 8/17                                                             Neuro Re-Ed             [employer] sets c holds 5s x20  5s x20          Marching on foam                                                                               Ther Ex             Pt education on HEP, POC x5 min            SLR flexion  2x5 3x5 2x10 2x10         Bridges  2x5 3x5 2x10 2x10         LAQ  2x10 5s 2# 2x10  2# 2x10          SAQ  2x10 5s 2# 2x10  2# 2x10          Side lying clamshells   3x5 RTB   2x10 NV         SLR extension              Standing HR/TR    20x         Rec.  Bike for mobility and endurance  L1 6' L1 6' L1 6'         SLR abd  3x5 2x10  2x10         Standing hip abduction     2x10         Ther Activity             STS c foam    2x10                      Gait Training                                       Modalities

## 2023-08-18 ENCOUNTER — APPOINTMENT (OUTPATIENT)
Dept: PHYSICAL THERAPY | Facility: CLINIC | Age: 78
End: 2023-08-18
Payer: MEDICARE

## 2023-08-21 NOTE — PROGRESS NOTES
PT Evaluation     Today's date: 2023  Patient name: Kari Ascencio  : 1945  MRN: [id number]  Referring provider: Sravani Epps DO  Dx:   Encounter Diagnosis     ICD-10-CM    1. Chronic bilateral low back pain without sciatica  M54.50 Ambulatory Referral to Physical Therapy    G89.29                      Assessment  Assessment details: The patient is an 79 y/o female who presents to PT with diagnosis of chronic B LBP without sciatica. She has complaints of intermittent lower back pain. she denies any radiating leg pain or LE N&T. She demonstrates deficits with decreased L/S ROM and core strength, decreased hip strength, decreased flexibility, difficulty sleeping and pain with completing her ADLs and tasks at home. She is I with all tasks though has pain when doing them, such as when bringing her laundry up the steps. She does noted difficulty with sleeping - pain can wake her up overnight or she has a hard time finding a comfortable position to sleep. Rolling in bed can cause and increase in lower back pain. TTP is noted along her lumbar PVMS. Secondary to pain and above deficits she is limited with her overall mobility and function. The patient would benefit from continued PT to address deficits and improve function. Tx to include ROM, stretching, strengthening, modalities, HEP, pt education, postural ed, lifting/body mechanics, neuro re-ed, balance/proprioception Te, MT and equipment. Impairments: abnormal or restricted ROM, activity intolerance, impaired physical strength, lacks appropriate home exercise program, pain with function, poor posture  and poor body mechanics  Other impairment: decreased flexibility  Functional limitations: difficulty sleepingUnderstanding of Dx/Px/POC: good   Prognosis: good    Goals  STGs:  1. Initiate and complete HEP with verbal cues. 2.  Improve BLE strength by 1/2 grade in 4 weeks. 3.  Decrease LBP by > 25% in 4 weeks. LTGs:  1.   Patient to be I with HEP in 12 weeks. 2.  Improve L/S ROM to [facility] t/o in 12 weeks to improve function. 3.  Decrease LBP to < or = to 3-4/10 with activity in 12 weeks to improve function. 4.  Improve BLE strength to > or = to 4+ to 5/5 t/o in 12 weeks to improve function. 5.  Postural control is improved to maximal level of function in 12 weeks. 6.  Recreational performance is improved to maximal level of function in 12 weeks. 7.  ADL performance is improved to maximal level of function in 12 weeks. 8.  Patient to report improved sleep in 12 weeks and will be able to roll in bed with increased ease. Plan  Plan details: Modalities and therapy interventions prn. Patient would benefit from: skilled physical therapy  Planned modality interventions: cryotherapy and thermotherapy: hydrocollator packs  Planned therapy interventions: manual therapy, neuromuscular re-education, patient education, postural training, self care, strengthening, stretching, therapeutic exercise, therapeutic activities, flexibility, functional ROM exercises, home exercise program, IASTM, abdominal trunk stabilization, behavior modification, balance, body mechanics training, nerve gliding and gait training  Frequency: 2x week  Duration in weeks: 12  Plan of Care beginning date: 8/23/2023  Plan of Care expiration date: 11/15/2023  Treatment plan discussed with: patient        Subjective Evaluation    History of Present Illness  Mechanism of injury: The patient states she has had LBP for quite awhile. No prior treatment for her back pain. She is currently coming to PT at our facility for her hips and knees. She had asked the doctor about starting therapy for her back and he gave her a script. She did have a bone scan in April - per patient it showed arthritis in her back. She will be going back to see the doctor in September for her follow up appointment, typically sees him every 6 months.     The patient states that her back pain is intermittent. Pain depending on activity. She notes that pain is across her lower back. She denies any radiating leg pain, denies any N&T into BLE. She notes pain around her hips with turning in bed. Patient Goals  Patient goals for therapy: increased motion, decreased pain and increased strength  Patient goal: "To help strengthen my back."    Pain  At best pain ratin  At worst pain ratin  Location: LBP - across lower back  Quality: dull ache and discomfort  Relieving factors: rest [employer])  Aggravating factors: lifting (Turning in bed, pulling laundry up the stairs)    Social Support  Steps to enter house: yes  1  Stairs in house: yes   Lives in: multiple-level home  Lives with: adult children    Employment status: not working        Objective     Concurrent Complaints  Positive for disturbed sleep. Negative for bladder dysfunction, bowel dysfunction and saddle (S4) numbness    Static Posture     Lumbar Spine   Decreased lordosis. Postural Observations  Seated posture: fair  Standing posture: fair  Correction of posture: has no consistent effect        Palpation   Left   Tenderness of the erector spinae and lumbar paraspinals. Right   Tenderness of the erector spinae and lumbar paraspinals. Tenderness     Left Hip   No tenderness in the PSIS. Right Hip   No tenderness in the PSIS. Active Range of Motion     Lumbar   Flexion: Active lumbar flexion: 8" from floor.   with pain  Extension:  with pain Restriction level: minimal  Left lateral flexion:  with pain Restriction level: moderate  Right lateral flexion:  with pain Restriction level: moderate  Left rotation:  with pain Restriction level: moderate  Right rotation:  with pain Restriction level: moderate  Left Hip   Flexion: 90 degrees with pain  Abduction: 28 degrees     Right Hip   Flexion: 90 degrees with pain  Abduction: 30 degrees     Additional Active Range of Motion Details  L SLR: 40  R SLR: 40    Strength/Myotome Testing Left Hip   Planes of Motion   Flexion: 4-  Abduction: 3+  Adduction: 4    Right Hip   Planes of Motion   Flexion: 4-  Abduction: 3+  Adduction: 4    Ambulation   Weight-Bearing Status   Assistive device used: none    Additional Weight-Bearing Status Details  Has SPC which she intermittently uses. Precautions: R knee replacement, hear murmur       Manuals 8/23       B Hams, Piriformis                                Neuro Re-Ed         MTP/LTP        PPT        PPT w/march        PPT w/knee fallouts        PPT w/SLR HEP       [place] Press        Postural Ed Posture & body mechanics, spine anatomy & pathology - ML       Ther Ex        Bridges HEP       S/L Hip Abd        Clamshells        LTR HEP       SKTC HEP       Seated PBall Stretch 3 ways                        Ther Activity                        Gait Training                        Modalities        HP/CP prn                           Access Code: [email or web address]  URL: [email or web address]  Date: 08/23/2023  Prepared by: Madalyn Turner    Exercises  - Supine Quad Set  - 2 x daily - 7 x weekly - 2 sets - 10 reps - 5 hold  - Supine Active Straight Leg Raise  - 1 x daily - 7 x weekly - 2 sets - 10 reps  - Supine Bridge  - 1 x daily - 7 x weekly - 2 sets - 10 reps  - Supine Lower Trunk Rotation  - 1 x daily - 7 x weekly - 1 sets - 10 reps - 5-10" hold  - Hooklying Single Knee to Chest Stretch  - 1 x daily - 7 x weekly - 1 sets - 10 reps - 5-10" hold

## 2023-08-22 ENCOUNTER — OFFICE VISIT (OUTPATIENT)
Dept: PHYSICAL THERAPY | Facility: CLINIC | Age: 78
End: 2023-08-22
Payer: MEDICARE

## 2023-08-22 DIAGNOSIS — M17.12 PRIMARY OSTEOARTHRITIS OF LEFT KNEE: Primary | ICD-10-CM

## 2023-08-22 DIAGNOSIS — E11.9 TYPE 2 DIABETES MELLITUS WITHOUT COMPLICATION, WITHOUT LONG-TERM CURRENT USE OF INSULIN (HCC): ICD-10-CM

## 2023-08-22 DIAGNOSIS — R29.898 WEAKNESS OF BOTH HIPS: ICD-10-CM

## 2023-08-22 DIAGNOSIS — M22.2X2 PATELLOFEMORAL SYNDROME OF LEFT KNEE: ICD-10-CM

## 2023-08-22 PROCEDURE — 97110 THERAPEUTIC EXERCISES: CPT

## 2023-08-22 RX ORDER — GLIPIZIDE 2.5 MG/1
2.5 TABLET, EXTENDED RELEASE ORAL 2 TIMES DAILY
Qty: 90 TABLET | Refills: 1 | Status: SHIPPED | OUTPATIENT
Start: 2023-08-22

## 2023-08-22 NOTE — PROGRESS NOTES
Daily Note     Today's date: 2023  Patient name: Lilly Gagnon  : 1945  MRN: [id number]  Referring provider: Alton Lozano  Dx:   Encounter Diagnosis     ICD-10-CM    1. Primary osteoarthritis of left knee  M17.12       2. Patellofemoral syndrome of left knee  M22.2X2       3. Weakness of both hips  R29.898           Start Time: 1225  Stop Time: 1250  Total time in clinic (min): 25 minutes    Subjective:  Patient reports increased walking and spending on feet since last therapy session. States her daughter has recently been in hospital.   Reports more soreness than pain. Reports BL knee     Objective: See treatment diary below      Assessment:  Patient was limited in therapy due to subjective reports requiring treatment modifications. CKC and load held performing mat program strengthening and AROM. Was able to complete without reported increase to subjective reports. Cueing, CGA, and education on log roll technique to improve bed mobility and ease with supine to sit transfer      Plan: Continue per plan of care. Progress treatment as tolerated. Precautions: R knee replacement, hear murmur       Manuals 8/7 8/10 8/15 8/17 8/22                                             Neuro Re-Ed          Quad sets c holds 5s x20  5s x20  5"x20      Marching on foam                                                             Ther Ex          Pt education on HEP, POC x5 min         SLR flexion  2x5 3x5 2x10 2x10 2x10      Bridges  2x5 3x5 2x10 2x10 x10      LAQ  2x10 5s 2# 2x10  2# 2x10  2x10      SAQ  2x10 5s 2# 2x10  2# 2x10       Side lying clamshells   3x5 RTB   2x10 NV 2x10      SLR extension           Standing HR/TR    20x      Rec.  Bike for mobility and endurance  L1 6' L1 6' L1 6' L1" x 4 mins      SLR abd  3x5 2x10  2x10 2x10     Standing hip abduction     2x10      Ther Activity          STS c foam    2x10                Gait Training                              Modalities

## 2023-08-23 ENCOUNTER — EVALUATION (OUTPATIENT)
Dept: PHYSICAL THERAPY | Facility: CLINIC | Age: 78
End: 2023-08-23
Payer: MEDICARE

## 2023-08-23 DIAGNOSIS — M54.50 CHRONIC BILATERAL LOW BACK PAIN WITHOUT SCIATICA: ICD-10-CM

## 2023-08-23 DIAGNOSIS — G89.29 CHRONIC BILATERAL LOW BACK PAIN WITHOUT SCIATICA: ICD-10-CM

## 2023-08-23 PROCEDURE — 97112 NEUROMUSCULAR REEDUCATION: CPT | Performed by: PHYSICAL THERAPIST

## 2023-08-23 PROCEDURE — 97161 PT EVAL LOW COMPLEX 20 MIN: CPT | Performed by: PHYSICAL THERAPIST

## 2023-08-23 PROCEDURE — 97535 SELF CARE MNGMENT TRAINING: CPT | Performed by: PHYSICAL THERAPIST

## 2023-08-24 ENCOUNTER — APPOINTMENT (OUTPATIENT)
Dept: LAB | Facility: HOSPITAL | Age: 78
End: 2023-08-24
Attending: INTERNAL MEDICINE
Payer: MEDICARE

## 2023-08-24 ENCOUNTER — ANTICOAG VISIT (OUTPATIENT)
Dept: CARDIOLOGY CLINIC | Facility: CLINIC | Age: 78
End: 2023-08-24

## 2023-08-25 ENCOUNTER — APPOINTMENT (OUTPATIENT)
Dept: PHYSICAL THERAPY | Facility: CLINIC | Age: 78
End: 2023-08-25
Payer: MEDICARE

## 2023-08-29 ENCOUNTER — OFFICE VISIT (OUTPATIENT)
Dept: PHYSICAL THERAPY | Facility: CLINIC | Age: 78
End: 2023-08-29
Payer: MEDICARE

## 2023-08-29 DIAGNOSIS — G89.29 CHRONIC BILATERAL LOW BACK PAIN WITHOUT SCIATICA: Primary | ICD-10-CM

## 2023-08-29 DIAGNOSIS — M54.50 CHRONIC BILATERAL LOW BACK PAIN WITHOUT SCIATICA: Primary | ICD-10-CM

## 2023-08-29 DIAGNOSIS — M17.12 PRIMARY OSTEOARTHRITIS OF LEFT KNEE: ICD-10-CM

## 2023-08-29 PROCEDURE — 97110 THERAPEUTIC EXERCISES: CPT

## 2023-08-29 NOTE — PROGRESS NOTES
Daily Note     Today's date: 2023  Patient name: Estela Richmond  : 1945  MRN: [id number]  Referring provider: Stas Escalante  Dx:   Encounter Diagnosis     ICD-10-CM    1. Chronic bilateral low back pain without sciatica  M54.50     G89.29       2. Primary osteoarthritis of left knee  M17.12           Start Time: 1023  Stop Time: 1102  Total time in clinic (min): 39 minutes    Subjective:  Patient reports no new complaints or incidents. Reports moving slow today. Reports not being able to do a lot at home      Objective: See treatment diary below      Assessment:   Patient requires cueing throughout on sequencing of exercises including positioning, mechanics, and rep/hold counts. Required manual assist to help maintain SL positioning addressing hip rotation. Was able to introduce lateral band walk and TRX assisted squat with cues and demonstration for mechanics and sequencing of movements. Plan: Continue per plan of care. Progress treatment as tolerated. Precautions: R knee replacement, hear murmur       Manuals 8/7 8/10 8/15 8/17 8/22 8/29                                            Neuro Re-Ed          Quad sets c holds 5s x20  5s x20  5"x20      Marching on foam                                                             Ther Ex         Pt education on HEP, POC x5 min         SLR flexion  2x5 3x5 2x10 2x10 2x10  2x10    Bridges  2x5 3x5 2x10 2x10 x10  GTB 3"2x10     LAQ  2x10 5s 2# 2x10  2# 2x10  2x10  2# 2x10    SAQ  2x10 5s 2# 2x10  2# 2x10       Side lying clamshells   3x5 RTB   2x10 NV 2x10  HL GTB 5"x10     SLR extension           Standing HR/TR    20x  2x10    Rec.  Bike for mobility and endurance  L1 6' L1 6' L1 6' L1" x 4 mins  L1 x 6 mins    SLR abd  3x5 2x10  2x10 2x10 2x10    Lateral Band Walk      RTB x 3 laps    TRX      DBL squats to low mat 2x10     Standing hip abduction     2x10      Ther Activity          STS c foam    2x10 Gait Training                              Modalities

## 2023-08-30 ENCOUNTER — OFFICE VISIT (OUTPATIENT)
Dept: PHYSICAL THERAPY | Facility: CLINIC | Age: 78
End: 2023-08-30
Payer: MEDICARE

## 2023-08-30 DIAGNOSIS — M17.12 PRIMARY OSTEOARTHRITIS OF LEFT KNEE: ICD-10-CM

## 2023-08-30 DIAGNOSIS — G89.29 CHRONIC BILATERAL LOW BACK PAIN WITHOUT SCIATICA: ICD-10-CM

## 2023-08-30 DIAGNOSIS — M54.50 CHRONIC BILATERAL LOW BACK PAIN WITHOUT SCIATICA: ICD-10-CM

## 2023-08-30 DIAGNOSIS — M62.9 HAMSTRING TIGHTNESS OF BOTH LOWER EXTREMITIES: Primary | ICD-10-CM

## 2023-08-30 PROCEDURE — 97112 NEUROMUSCULAR REEDUCATION: CPT

## 2023-08-30 PROCEDURE — 97110 THERAPEUTIC EXERCISES: CPT

## 2023-08-30 NOTE — PROGRESS NOTES
Daily Note     Today's date: 2023  Patient name: Mariam Chowdary  : 1945  MRN: [id number]  Referring provider: Lubna Santamaria  Dx:   Encounter Diagnosis     ICD-10-CM    1. Hamstring tightness of both lower extremities  M62.9       2. Chronic bilateral low back pain without sciatica  M54.50     G89.29       3. Primary osteoarthritis of left knee  M17.12           Start Time: 928  Stop Time:   Total time in clinic (min): 44 minutes    Subjective: patient denies any pain or soreness post last therapy session. Denies any new incidents or complaints      Objective: See treatment diary below      Assessment: patient was able to complete therapy without onset of reported LB pain. Was able to introduce LE and lumbar ROM and stretching exercises along with re-education of lumbar stabilization muscularity. Required cueing on sequencing, positioning, mechanics, and rep/hold counts. Initial tactile to ensure proper muscular recruitment with TA setting. Demonstrates fair control being add LE movements  Plan: Continue per plan of care. Progress treatment as tolerated.        Precautions: R knee replacement, hear murmur       Manuals 8/7 8/10 8/15 8/17 8/22 8/29 8/30                                           Neuro Re-Ed         Quad sets c holds 5s x20  5s x20  5"x20      Marching on foam           TA Set       10"x10    TA Set w/Hip ABD       10"x10    TA Set w/ Hip Add       10"x10    TA Set w/Alt March       2x10 ea             Ther Ex        Pt education on HEP, POC x5 min         SLR flexion  2x5 3x5 2x10 2x10 2x10  2x10    Bridges  2x5 3x5 2x10 2x10 x10  GTB 3"2x10     LAQ  2x10 5s 2# 2x10  2# 2x10  2x10  2# 2x10    SAQ  2x10 5s 2# 2x10  2# 2x10       Side lying clamshells   3x5 RTB   2x10 NV 2x10  HL GTB 5"x10     Hamstring Stretch w/strap          Modified  [employer]       5"x10 ea   LTR       5"x10   Standing HR/TR    20x  2x10 Rec. Bike for mobility and endurance  L1 6' L1 6' L1 6' L1" x 4 mins  L1 x 6 mins L1 x 6 min   SLR abd  3x5 2x10  2x10 2x10 2x10    Lateral Band Walk      RTB x 3 laps    TRX      DBL squats to low mat 2x10     Standing hip abduction     2x10      Ther Activity     8/22     STS c foam    2x10                Gait Training                              Modalities

## 2023-09-01 ENCOUNTER — APPOINTMENT (OUTPATIENT)
Dept: PHYSICAL THERAPY | Facility: CLINIC | Age: 78
End: 2023-09-01
Payer: MEDICARE

## 2023-09-05 ENCOUNTER — APPOINTMENT (OUTPATIENT)
Dept: PHYSICAL THERAPY | Facility: CLINIC | Age: 78
End: 2023-09-05
Payer: MEDICARE

## 2023-09-06 ENCOUNTER — APPOINTMENT (OUTPATIENT)
Dept: LAB | Facility: HOSPITAL | Age: 78
End: 2023-09-06
Payer: MEDICARE

## 2023-09-06 DIAGNOSIS — E78.2 MIXED HYPERLIPIDEMIA: ICD-10-CM

## 2023-09-06 DIAGNOSIS — E11.9 TYPE 2 DIABETES MELLITUS WITHOUT COMPLICATION, WITHOUT LONG-TERM CURRENT USE OF INSULIN (HCC): ICD-10-CM

## 2023-09-06 DIAGNOSIS — I10 ESSENTIAL HYPERTENSION: ICD-10-CM

## 2023-09-06 DIAGNOSIS — E03.9 ADULT HYPOTHYROIDISM: ICD-10-CM

## 2023-09-06 LAB
ALBUMIN SERPL BCP-MCNC: 4.2 G/DL (ref 3.5–5)
ALP SERPL-CCNC: 40 U/L (ref 34–104)
ALT SERPL W P-5'-P-CCNC: 12 U/L (ref 7–52)
ANION GAP SERPL CALCULATED.3IONS-SCNC: 7 MMOL/L
AST SERPL W P-5'-P-CCNC: 21 U/L (ref 13–39)
BILIRUB SERPL-MCNC: 0.43 MG/DL (ref 0.2–1)
BUN SERPL-MCNC: 29 MG/DL (ref 5–25)
CALCIUM SERPL-MCNC: 11.9 MG/DL (ref 8.4–10.2)
CHLORIDE SERPL-SCNC: 100 MMOL/L (ref 96–108)
CHOLEST SERPL-MCNC: 186 MG/DL
CO2 SERPL-SCNC: 29 MMOL/L (ref 21–32)
CREAT SERPL-MCNC: 0.8 MG/DL (ref 0.6–1.3)
CREAT UR-MCNC: 56.1 MG/DL
ERYTHROCYTE [DISTWIDTH] IN BLOOD BY AUTOMATED COUNT: 14.7 % (ref 11.6–15.1)
GFR SERPL CREATININE-BSD FRML MDRD: 69 ML/MIN/1.73SQ M
GLUCOSE P FAST SERPL-MCNC: 105 MG/DL (ref 65–99)
HCT VFR BLD AUTO: 35.1 % (ref 34.8–46.1)
HDLC SERPL-MCNC: 41 MG/DL
HGB BLD-MCNC: 11.3 G/DL (ref 11.5–15.4)
LDLC SERPL CALC-MCNC: 108 MG/DL (ref 0–100)
MCH RBC QN AUTO: 29.3 PG (ref 26.8–34.3)
MCHC RBC AUTO-ENTMCNC: 32.2 G/DL (ref 31.4–37.4)
MCV RBC AUTO: 91 FL (ref 82–98)
MICROALBUMIN UR-MCNC: 111.3 MG/L
MICROALBUMIN/CREAT 24H UR: 198 MG/G CREATININE (ref 0–30)
NONHDLC SERPL-MCNC: 145 MG/DL
PLATELET # BLD AUTO: 369 THOUSANDS/UL (ref 149–390)
PMV BLD AUTO: 9.7 FL (ref 8.9–12.7)
POTASSIUM SERPL-SCNC: 3.7 MMOL/L (ref 3.5–5.3)
PROT SERPL-MCNC: 7.4 G/DL (ref 6.4–8.4)
RBC # BLD AUTO: 3.86 MILLION/UL (ref 3.81–5.12)
SODIUM SERPL-SCNC: 136 MMOL/L (ref 135–147)
TRIGL SERPL-MCNC: 185 MG/DL
TSH SERPL DL<=0.05 MIU/L-ACNC: 3.55 UIU/ML (ref 0.45–4.5)
WBC # BLD AUTO: 8.53 THOUSAND/UL (ref 4.31–10.16)

## 2023-09-06 PROCEDURE — 36415 COLL VENOUS BLD VENIPUNCTURE: CPT

## 2023-09-06 PROCEDURE — 83036 HEMOGLOBIN GLYCOSYLATED A1C: CPT

## 2023-09-06 PROCEDURE — 84443 ASSAY THYROID STIM HORMONE: CPT

## 2023-09-06 PROCEDURE — 80061 LIPID PANEL: CPT

## 2023-09-06 PROCEDURE — 82043 UR ALBUMIN QUANTITATIVE: CPT

## 2023-09-06 PROCEDURE — 82570 ASSAY OF URINE CREATININE: CPT

## 2023-09-06 PROCEDURE — 85027 COMPLETE CBC AUTOMATED: CPT

## 2023-09-06 PROCEDURE — 80053 COMPREHEN METABOLIC PANEL: CPT

## 2023-09-07 ENCOUNTER — OFFICE VISIT (OUTPATIENT)
Dept: PHYSICAL THERAPY | Facility: CLINIC | Age: 78
End: 2023-09-07
Payer: MEDICARE

## 2023-09-07 DIAGNOSIS — M17.12 PRIMARY OSTEOARTHRITIS OF LEFT KNEE: ICD-10-CM

## 2023-09-07 DIAGNOSIS — M54.50 CHRONIC BILATERAL LOW BACK PAIN WITHOUT SCIATICA: ICD-10-CM

## 2023-09-07 DIAGNOSIS — M22.2X2 PATELLOFEMORAL SYNDROME OF LEFT KNEE: ICD-10-CM

## 2023-09-07 DIAGNOSIS — R29.898 WEAKNESS OF BOTH HIPS: ICD-10-CM

## 2023-09-07 DIAGNOSIS — G89.29 CHRONIC BILATERAL LOW BACK PAIN WITHOUT SCIATICA: ICD-10-CM

## 2023-09-07 DIAGNOSIS — M62.9 HAMSTRING TIGHTNESS OF BOTH LOWER EXTREMITIES: Primary | ICD-10-CM

## 2023-09-07 LAB
EST. AVERAGE GLUCOSE BLD GHB EST-MCNC: 137 MG/DL
HBA1C MFR BLD: 6.4 %

## 2023-09-07 PROCEDURE — 97112 NEUROMUSCULAR REEDUCATION: CPT

## 2023-09-07 PROCEDURE — 97110 THERAPEUTIC EXERCISES: CPT

## 2023-09-07 NOTE — PROGRESS NOTES
Daily Note     Today's date: 2023  Patient name: Duane Vyas  : 1945  MRN: [id number]  Referring provider: Kymberly Dey  Dx:   Encounter Diagnosis     ICD-10-CM    1. Hamstring tightness of both lower extremities  M62.9       2. Chronic bilateral low back pain without sciatica  M54.50     G89.29       3. Primary osteoarthritis of left knee  M17.12       4. Patellofemoral syndrome of left knee  M22.2X2       5. Weakness of both hips  R29.898                      Subjective: Patient reports states her back is "coming along" and denies pain at start of session. Her knee still feels more of an ache, especially in the morning but then she can move better as she gets going. Objective: See treatment diary below      Assessment: Patient doing well with charted program. Exhibits improved core activation with verbal cues only. Progressed resistance with marches no, increased pain or compromise in form. No pain or discomfort reported during session. Patient demonstrated fatigue post treatment and would benefit from continued PT      Plan: Progress treatment as tolerated.        Precautions: R knee replacement, hear murmur       Manuals 9/7  8/15 8/17 8/22 8/29 8/30                                           Neuro Re-Ed    Quad sets c holds   5s x20  5"x20      Marching on foam           TA Set 10"x10      10"x10    TA Set w/Hip ABD 10"x10      10"x10    TA Set w/ Hip Add 10"x10      10"x10    TA Set w/Alt March 2# 2x10 ea       2x10 ea             Ther Ex        Pt education on HEP, POC          SLR flexion  2x10 ea  2x10 2x10 2x10  2x10    Bridges  GTB 3" 2x10   2x10 2x10 x10  GTB 3"2x10     LAQ 2# 2x10  2# 2x10  2# 2x10  2x10  2# 2x10    SAQ   2# 2x10  2# 2x10       Side lying clamshells  HL 5" 2x10  GTB   RTB   2x10 NV 2x10  HL GTB 5"x10     Hamstring Stretch w/strap          Modified  [employer] L/R/C 5" x10 ea       5"x10 ea LTR 5"x10 ea      5"x10   Standing HR/TR    20x  2x10    Rec.  Bike for mobility and endurance L1 6 min  L1 6' L1 6' L1" x 4 mins  L1 x 6 mins L1 x 6 min   SLR abd   2x10  2x10 2x10 2x10    Lateral Band Walk RTB x2 laps      RTB x 3 laps    TRX DBL squat to low mat 2x10      DBL squats to low mat 2x10     Standing hip abduction     2x10      Ther Activity     8/22     STS c foam    2x10                Gait Training                              Modalities

## 2023-09-12 ENCOUNTER — OFFICE VISIT (OUTPATIENT)
Dept: PHYSICAL THERAPY | Facility: CLINIC | Age: 78
End: 2023-09-12
Payer: MEDICARE

## 2023-09-12 DIAGNOSIS — M62.9 HAMSTRING TIGHTNESS OF BOTH LOWER EXTREMITIES: Primary | ICD-10-CM

## 2023-09-12 DIAGNOSIS — G89.29 CHRONIC BILATERAL LOW BACK PAIN WITHOUT SCIATICA: ICD-10-CM

## 2023-09-12 DIAGNOSIS — M17.12 PRIMARY OSTEOARTHRITIS OF LEFT KNEE: ICD-10-CM

## 2023-09-12 DIAGNOSIS — M54.50 CHRONIC BILATERAL LOW BACK PAIN WITHOUT SCIATICA: ICD-10-CM

## 2023-09-12 DIAGNOSIS — R29.898 WEAKNESS OF BOTH HIPS: ICD-10-CM

## 2023-09-12 PROCEDURE — 97112 NEUROMUSCULAR REEDUCATION: CPT

## 2023-09-12 PROCEDURE — 97110 THERAPEUTIC EXERCISES: CPT

## 2023-09-12 NOTE — PROGRESS NOTES
Daily Note     Today's date: 2023  Patient name: Nate Kingston  : 1945  MRN: [id number]  Referring provider: Kenya Pedro  Dx:   Encounter Diagnosis     ICD-10-CM    1. Hamstring tightness of both lower extremities  M62.9       2. Chronic bilateral low back pain without sciatica  M54.50     G89.29       3. Primary osteoarthritis of left knee  M17.12       4. Weakness of both hips  R29.898           Start Time: 1019  Stop Time: 1117  Total time in clinic (min): 58 minutes    Subjective: patient reports soreness coming into therapy. Reports exercises and weather. Denies pain      Objective: See treatment diary below      Assessment:   Patient requires cueing throughout therapy session on sequencing, positioning, and mechanics of exercises. Introduced BL hamstring and quad stretches with cues on working into gentle stretch along with hold times. Education on role of TA setting with LE movements. Demonstrates improved mechanics and control with sit to stand being able to transition away from TRX and performing independently. Introduced leg press machine to improved BL LE strength  Plan: Continue per plan of care. Progress treatment as tolerated.        Precautions: R knee replacement, hear murmur       Manuals 9/7 9/12 8/15 8/17 8/22 8/29 8/30                                           Neuro Re-Ed    Quad sets c holds   5s x20  5"x20      Marching on foam           TA Set 10"x10 10"x10      10"x10    TA Set w/Hip ABD 10"x10 10"x10      10"x10    TA Set w/ Hip Add 10"x10 GTB 10"x10      10"x10    TA Set w/Alt March 2# 2x10 ea  2#2x10 ea      2x10 ea             Ther Ex     Pt education on HEP, POC          SLR flexion  2x10 ea W/ TA set 2x10 2x10 2x10  2x10    Bridges  GTB 3" 2x10  GTB 3" 2x10  2x10 2x10 x10  GTB 3"2x10     LAQ 2# 2x10  2# 2x10  2# 2x10  2x10  2# 2x10    SAQ   2# 2x10  2# 2x10       Side lying clamshells  HL 5" 2x10  GTB  See Neuro RTB   2x10 NV 2x10  HL GTB 5"x10     Hamstring Stretch w/strap  20"x3 ea         Modified  [employer] Stretch  20"x3 ea         Seated [employer] L/R/C 5" x10 ea  5"x10 ea     5"x10 ea   LTR 5"x10 ea  5"x10 ea     5"x10   Standing HR/TR    20x  2x10    Rec.  Bike for mobility and endurance L1 6 min L1 x 6 mins  L1 6' L1 6' L1" x 4 mins  L1 x 6 mins L1 x 6 min   SLR abd   2x10  2x10 2x10 2x10    Lateral Band Walk RTB x2 laps  RTB x 3 laps     RTB x 3 laps    Leg Press  DBL 55#x10 65# x10        Sit to Stand  Low mat 2x10         TRX DBL squat to low mat 2x10      DBL squats to low mat 2x10     Standing hip abduction     2x10      Ther Activity  9/12 8/22     STS c foam    2x10                Gait Training                              Modalities

## 2023-09-13 ENCOUNTER — OFFICE VISIT (OUTPATIENT)
Dept: CARDIOLOGY CLINIC | Facility: CLINIC | Age: 78
End: 2023-09-13
Payer: MEDICARE

## 2023-09-13 VITALS
RESPIRATION RATE: 16 BRPM | WEIGHT: 155 LBS | BODY MASS INDEX: 29.27 KG/M2 | SYSTOLIC BLOOD PRESSURE: 112 MMHG | DIASTOLIC BLOOD PRESSURE: 62 MMHG | OXYGEN SATURATION: 98 % | HEART RATE: 61 BPM | HEIGHT: 61 IN

## 2023-09-13 DIAGNOSIS — E78.2 HYPERLIPEMIA, MIXED: ICD-10-CM

## 2023-09-13 DIAGNOSIS — I10 ESSENTIAL HYPERTENSION: ICD-10-CM

## 2023-09-13 DIAGNOSIS — Z79.01 ANTICOAGULATION MONITORING, INR RANGE 2-3: ICD-10-CM

## 2023-09-13 DIAGNOSIS — I48.0 PAF (PAROXYSMAL ATRIAL FIBRILLATION) (HCC): Primary | ICD-10-CM

## 2023-09-13 PROCEDURE — 99214 OFFICE O/P EST MOD 30 MIN: CPT | Performed by: INTERNAL MEDICINE

## 2023-09-13 RX ORDER — WARFARIN SODIUM 5 MG/1
TABLET ORAL
Qty: 180 TABLET | Refills: 3 | Status: SHIPPED | OUTPATIENT
Start: 2023-09-13

## 2023-09-13 RX ORDER — DORZOLAMIDE HCL 20 MG/ML
SOLUTION/ DROPS OPHTHALMIC
COMMUNITY
Start: 2023-07-20

## 2023-09-13 NOTE — PROGRESS NOTES
[facility] [place]  [place] [id number]  Cardiology Follow Up    Kaitlin Hutchins Winter  1945  [id number]      1. PAF (paroxysmal atrial fibrillation) ([address])        2. Essential hypertension        3. Anticoagulation monitoring, INR range 2-3        4. Hyperlipemia, mixed            Chief Complaint   Patient presents with   • Follow-up       Interval History: Patient presents for follow-up visit. Patient denies any history of chest pain shortness of breath. Patient denies any history of leg edema or orthopnea PND. No history of presyncope syncope. Patient states compliance with the present list of medications. Patient denies any bleeding issues. She has knee and back issues and getting physical therapy. Patient Active Problem List   Diagnosis   • Arthralgia of both knees   • Mixed hyperlipidemia   • Essential hypertension   • Adult hypothyroidism   • Sinoatrial node dysfunction (HCC)   • Type 2 diabetes mellitus without complication, without long-term current use of insulin ([address])   • Cardiac pacemaker in situ   • Artificial knee joint present   • Paroxysmal atrial fibrillation (HCC)   • Urinary incontinence, urge   • BMI 28.0-28.9,adult   • Bee sting   • Patellofemoral syndrome of left knee     Past Medical History:   Diagnosis Date   • Carpal tunnel syndrome    • Heart murmur      Social History     Socioeconomic History   • Marital status:      Spouse name: Not on file   • Number of children: Not on file   • Years of education: Not on file   • Highest education level: Not on file   Occupational History   • Not on file   Tobacco Use   • Smoking status: Never   • Smokeless tobacco: Never   Vaping Use   • Vaping Use: Never used   Substance and Sexual Activity   • Alcohol use:  Yes   • Drug use: No   • Sexual activity: Not on file   Other Topics Concern   • Not on file   Social History Narrative    Always uses seat belt    Employed    Lives with spouse Social Determinants of Health     Financial Resource Strain: Not on file   Food Insecurity: Not on file   Transportation Needs: No Transportation Needs (5/19/2022)    PRAPARE - Transportation    • Lack of Transportation (Medical): No    • Lack of Transportation (Non-Medical):  No   Physical Activity: Not on file   Stress: Not on file   Social Connections: Not on file   Intimate Partner Violence: Not on file   Housing Stability: Unknown (5/19/2022)    Housing Stability Vital Sign    • Unable to Pay for Housing in the Last Year: No    • Number of Places Lived in the Last Year: Not on file    • Unstable Housing in the Last Year: No      Family History   Problem Relation Age of Onset   • Heart attack Father         ARRHYTHMIAS   • Coronary artery disease Family    • Diabetes Family    • Heart attack Family         ARRHYTHMIAS     Past Surgical History:   Procedure Laterality Date   • CATARACT EXTRACTION     • CHOLECYSTECTOMY     • COLONOSCOPY  11/24/2007   • EYE SURGERY     • KNEE SURGERY     • REPLACEMENT TOTAL KNEE Right    • TOTAL ABDOMINAL HYSTERECTOMY W/ BILATERAL SALPINGOOPHORECTOMY         Current Outpatient Medications:   •  alendronate (Fosamax) 70 mg tablet, Take 1 tablet (70 mg total) by mouth every 7 days, Disp: 12 tablet, Rfl: 3  •  Ascorbic Acid (VITAMIN C) 100 MG tablet, Take 500 mg by mouth daily, Disp: , Rfl:   •  atenolol-chlorthalidone (TENORETIC) 50-25 mg per tablet, TAKE ONE TABLET BY MOUTH EVERY DAY, Disp: 90 tablet, Rfl: 2  •  B Complex Vitamins (B COMPLEX 100 PO), Take 1 tablet by mouth daily, Disp: , Rfl:   •  Blood Glucose Monitoring Suppl (FREESTYLE LITE) RANULFO, by Does not apply route 2 (two) times a day Dx E11.9, Disp: 1 each, Rfl: 0  •  cyclobenzaprine (FLEXERIL) 10 mg tablet, Take 1 tablet (10 mg total) by mouth 3 (three) times a day as needed for muscle spasms, Disp: 20 tablet, Rfl: 0  •  dorzolamide (TRUSOPT) 2 % ophthalmic solution, INSTILL 1 DROP INTO THE RIGHT EYE THREE TIMES A DAY, Disp: , Rfl:   •  FREESTYLE LITE test strip, TEST TWO TIMES A DAY, Disp: 100 each, Rfl: 3  •  glipiZIDE (GLUCOTROL XL) 2.5 mg 24 hr tablet, TAKE ONE TABLET BY MOUTH TWICE A DAY, Disp: 90 tablet, Rfl: 1  •  glucose blood (JASWANT CONTOUR TEST) test strip, Check blood sugar twice daily, DX:E11.9, Disp: 200 each, Rfl: 3  •  Lancets (freestyle) lancets, USE TO TEST TWO TIMES A DAY, Disp: 100 each, Rfl: 1  •  levothyroxine 50 mcg tablet, Take 1 tablet (50 mcg total) by mouth daily, Disp: 90 tablet, Rfl: 1  •  Multiple Vitamin (MULTIVITAMIN) tablet, Take 1 tablet by mouth daily, Disp: , Rfl:   •  niacin 500 mg tablet, Take by mouth , Disp: , Rfl:   •  pioglitazone (ACTOS) 15 mg tablet, TAKE ONE TABLET BY MOUTH EVERY DAY, Disp: 90 tablet, Rfl: 1  •  rosuvastatin (CRESTOR) 10 MG tablet, TAKE ONE TABLET BY MOUTH EVERY DAY, Disp: 90 tablet, Rfl: 3  •  triamcinolone (KENALOG) 0.1 % ointment, Apply topically 2 (two) times a day, Disp: 30 g, Rfl: 0  •  warfarin (Jantoven) 5 mg tablet, TAKE ONE TO TWO TABLETS BY MOUTH EVERY DAY, Disp: 180 tablet, Rfl: 3  No Known Allergies    Labs:  Appointment on 09/06/2023   Component Date Value   • WBC 09/06/2023 8.53    • RBC 09/06/2023 3.86    • Hemoglobin 09/06/2023 11.3 (L)    • Hematocrit 09/06/2023 35.1    • MCV 09/06/2023 91    • MCH 09/06/2023 29.3    • MCHC 09/06/2023 32.2    • RDW 09/06/2023 14.7    • Platelets 17/85/5082 369    • MPV 09/06/2023 9.7    • Sodium 09/06/2023 136    • Potassium 09/06/2023 3.7    • Chloride 09/06/2023 100    • CO2 09/06/2023 29    • ANION GAP 09/06/2023 7    • BUN 09/06/2023 29 (H)    • Creatinine 09/06/2023 0.80    • Glucose, Fasting 09/06/2023 105 (H)    • Calcium 09/06/2023 11.9 (H)    • AST 09/06/2023 21    • ALT 09/06/2023 12    • Alkaline Phosphatase 09/06/2023 40    • Total Protein 09/06/2023 7.4    • Albumin 09/06/2023 4.2    • Total Bilirubin 09/06/2023 0.43    • eGFR 09/06/2023 69    • Cholesterol 09/06/2023 186    • Triglycerides 09/06/2023 185 (H)    • HDL, Direct 09/06/2023 41 (L)    • LDL Calculated 09/06/2023 108 (H)    • Non-HDL-Chol (CHOL-HDL) 09/06/2023 145    • Hemoglobin A1C 09/06/2023 6.4 (H)    • EAG 09/06/2023 137    • TSH 3RD GENERATON 09/06/2023 3.551    • Creatinine, Ur 09/06/2023 56.1    • Albumin,U,Random 09/06/2023 111.3 (H)    • Albumin Creat Ratio 09/06/2023 198 (H)    Ancillary Orders on 07/28/2023   Component Date Value   • Protime 08/24/2023 29.0 (H)    • INR 08/24/2023 2.63 (H)    Orders Only on 07/24/2023   Component Date Value   • Protime 07/25/2023 25.7 (H)    • INR 07/25/2023 2.41 (H)      Imaging: No results found. Review of Systems:  Review of Systems   REVIEW OF SYSTEMS:  Constitutional:  Denies fever or chills   Eyes:  Denies change in visual acuity   HENT:  Denies nasal congestion or sore throat   Respiratory:  Denies cough or shortness of breath   Cardiovascular:  Denies chest pain or edema   GI:  Denies abdominal pain, nausea, vomiting, bloody stools or diarrhea   :  Denies dysuria, frequency, difficulty in micturition and nocturia  Musculoskeletal: DJD  Neurologic:  Denies headache, focal weakness or sensory changes   Endocrine:  Denies polyuria or polydipsia   Lymphatic:  Denies swollen glands   Psychiatric:  Denies depression or anxiety    Physical Exam:    /62 (BP Location: Left arm, Patient Position: Sitting, Cuff Size: Standard)   Pulse 61   Resp 16   Ht 5' 1" (1.549 m)   Wt 70.3 kg (155 lb)   SpO2 98%   BMI 29.29 kg/m²     Physical Exam   PHYSICAL EXAM:  General:  Patient is not in acute distress   Head: Normocephalic, Atraumatic. HEENT:  Both pupils normal-size atraumatic, normocephalic, nonicteric  Neck:  JVP not raised. Trachea central. No carotid bruit  Respiratory:  normal breath sounds no crackles. no rhonchi  Cardiovascular:  Regular rate and rhythm no S3 no murmurs  GI:  Abdomen soft nontender. No organomegaly.    Lymphatic:  No cervical or inguinal lymphadenopathy  Neurologic:  Patient is awake alert, oriented . Grossly nonfocal  Extremities no edema    Pacemaker interrogation data reviewed. Battery life approximately 1.8 years. Reviewed with patient and family. Discussion/Summary:  Patient with multiple medical problems who seems to be doing reasonably well from cardiac standpoint. Previous studies reviewed with patient. Medications reviewed and possible side effects discussed. concepts of cardiovascular disease , signs and symptoms of heart disease. Dietary and risk factor modification reinforced. All questions answered. Safety measures reviewed. Patient advised to report any problems prompting medical attention. Risks and benefits  and alternatives of anticoagulation to prevent thromboembolic risk from atrial fibrillation discussed at length. Patient to report any bleeding issues. Target INR 2-3. Symptoms watch her from cardiac standpoint which would indicate the need for further cardiac evaluation discussed with patient and family. Medications reviewed. Follow-up in 6 months or earlier as needed. Follow-up with primary care physician. Patient had a few questions which were answered.

## 2023-09-14 ENCOUNTER — OFFICE VISIT (OUTPATIENT)
Dept: PHYSICAL THERAPY | Facility: CLINIC | Age: 78
End: 2023-09-14
Payer: MEDICARE

## 2023-09-14 DIAGNOSIS — M17.12 PRIMARY OSTEOARTHRITIS OF LEFT KNEE: ICD-10-CM

## 2023-09-14 DIAGNOSIS — G89.29 CHRONIC BILATERAL LOW BACK PAIN WITHOUT SCIATICA: ICD-10-CM

## 2023-09-14 DIAGNOSIS — R29.898 WEAKNESS OF BOTH HIPS: ICD-10-CM

## 2023-09-14 DIAGNOSIS — M22.2X2 PATELLOFEMORAL SYNDROME OF LEFT KNEE: Primary | ICD-10-CM

## 2023-09-14 DIAGNOSIS — M54.50 CHRONIC BILATERAL LOW BACK PAIN WITHOUT SCIATICA: ICD-10-CM

## 2023-09-14 DIAGNOSIS — M62.9 HAMSTRING TIGHTNESS OF BOTH LOWER EXTREMITIES: ICD-10-CM

## 2023-09-14 PROCEDURE — 97112 NEUROMUSCULAR REEDUCATION: CPT

## 2023-09-14 PROCEDURE — 97140 MANUAL THERAPY 1/> REGIONS: CPT

## 2023-09-14 PROCEDURE — 97110 THERAPEUTIC EXERCISES: CPT

## 2023-09-14 NOTE — PROGRESS NOTES
Daily Note     Today's date: 2023  Patient name: Estela Richmond  : 1945  MRN: [id number]  Referring provider: Stas Escalante  Dx:   Encounter Diagnosis     ICD-10-CM    1. Patellofemoral syndrome of left knee  M22.2X2       2. Hamstring tightness of both lower extremities  M62.9       3. Chronic bilateral low back pain without sciatica  M54.50     G89.29       4. Primary osteoarthritis of left knee  M17.12       5. Weakness of both hips  R29.898                      Subjective: Patient states that she has stiffness in R hamstring prior to start of session. She states that last night when amb for STS she had mod pain in quad in hip flexor. She states that she is not sure what caused the pain. Objective: See treatment diary below      Assessment: Patient educated on supine TE prior to sitting at EOB followed by seated LAQ with holds to reduce muscle sxs and prep LEs for BL loading post sustained resting position. Education also issued on glute sets in standing to assist in easing muscle sxs in standing. Session initiated with stretching post bike to ease hip flexor and HS tightness. Gentle STM performed after consent received. MHP added to HF during supine exercise which was effective in decreasing adductor tightness. Some activities held 2* to increase hip flexor tightness. Patient would benefit from continued PT to improve function. Plan: Continue per plan of care.       Precautions: R knee replacement, hear murmur       Manuals    STM   Gentle hip add, quads                                     Neuro Re-Ed    Quad sets c holds     5"x20      Marching on foam           TA Set 10"x10 10"x10  During activities    10"x10    TA Set w/Hip ABD 10"x10 10"x10  10x10"    10"x10    TA Set w/ Hip Add 10"x10 GTB 10"x10  20x10" blue ball    10"x10    TA Set w/Alt March 2# 2x10 ea  2#2x10 ea  x15 0#    2x10 ea             Ther Ex   8/30   Pt education on HEP, POC          SLR flexion  2x10 ea W/ TA set HELD  2x10  2x10    Bridges  GTB 3" 2x10  GTB 3" 2x10  GTB 3" 2x10   x10  GTB 3"2x10     LAQ 2# 2x10    2x10  2# 2x10    SAQ          Side lying clamshells  HL 5" 2x10  GTB  See Neuro   2x10  HL GTB 5"x10     Hamstring Stretch w/strap  20"x3 ea  30"x3       Modified  Kalyan Stretch  20"x3 ea  30"x3 ea        Seated [place] L/R/C 5" x10 ea  5"x10 ea 5"x10 ea    5"x10 ea   LTR 5"x10 ea  5"x10 ea 10"x10 ea    5"x10   Standing HR/TR      2x10    Rec.  Bike for mobility and endurance L1 6 min L1 x 6 mins  L0, 6 min  L1" x 4 mins  L1 x 6 mins L1 x 6 min   SLR abd     2x10 2x10    Lateral Band Walk RTB x2 laps  RTB x 3 laps  HELD   RTB x 3 laps    Leg Press  DBL 55#x10 65# x10 HELD       Sit to Stand  Low mat 2x10  HELD       TRX DBL squat to low mat 2x10      DBL squats to low mat 2x10     Standing hip abduction           Ther Activity  9/12 8/22     STS c foam                    Gait Training                              Modalities          modalities             MHP during supine TE

## 2023-09-15 ENCOUNTER — OFFICE VISIT (OUTPATIENT)
Dept: FAMILY MEDICINE CLINIC | Facility: CLINIC | Age: 78
End: 2023-09-15
Payer: MEDICARE

## 2023-09-15 VITALS
HEIGHT: 61 IN | OXYGEN SATURATION: 98 % | SYSTOLIC BLOOD PRESSURE: 124 MMHG | HEART RATE: 75 BPM | TEMPERATURE: 98 F | WEIGHT: 155 LBS | DIASTOLIC BLOOD PRESSURE: 68 MMHG | BODY MASS INDEX: 29.27 KG/M2

## 2023-09-15 DIAGNOSIS — E11.9 TYPE 2 DIABETES MELLITUS WITHOUT COMPLICATION, WITHOUT LONG-TERM CURRENT USE OF INSULIN (HCC): ICD-10-CM

## 2023-09-15 DIAGNOSIS — N39.41 URINARY INCONTINENCE, URGE: ICD-10-CM

## 2023-09-15 DIAGNOSIS — I10 ESSENTIAL HYPERTENSION: ICD-10-CM

## 2023-09-15 DIAGNOSIS — I48.0 PAROXYSMAL ATRIAL FIBRILLATION (HCC): ICD-10-CM

## 2023-09-15 DIAGNOSIS — E03.9 ADULT HYPOTHYROIDISM: Primary | ICD-10-CM

## 2023-09-15 PROBLEM — T63.441A BEE STING: Status: RESOLVED | Noted: 2022-08-15 | Resolved: 2023-09-15

## 2023-09-15 PROCEDURE — 99214 OFFICE O/P EST MOD 30 MIN: CPT | Performed by: FAMILY MEDICINE

## 2023-09-15 RX ORDER — SODIUM CHLORIDE 20 MG/ML
SOLUTION OPHTHALMIC
COMMUNITY
Start: 2023-07-31 | End: 2023-09-15 | Stop reason: SINTOL

## 2023-09-15 RX ORDER — GLIPIZIDE 2.5 MG/1
2.5 TABLET, EXTENDED RELEASE ORAL DAILY
Qty: 90 TABLET | Refills: 1 | Status: SHIPPED | OUTPATIENT
Start: 2023-09-15

## 2023-09-15 RX ORDER — TOLTERODINE 2 MG/1
2 CAPSULE, EXTENDED RELEASE ORAL DAILY
Qty: 30 CAPSULE | Refills: 3
Start: 2023-09-15

## 2023-09-15 NOTE — ASSESSMENT & PLAN NOTE
We will decrease her glipizide to once daily due to the morning low blood sugars.   Check CMP, hemoglobin A1c in 6 months  Lab Results   Component Value Date    HGBA1C 6.4 (H) 09/06/2023

## 2023-09-15 NOTE — ASSESSMENT & PLAN NOTE
Restart the Detrol, she is to let us know if the side effects are bothersome and she would like to change.

## 2023-09-15 NOTE — PROGRESS NOTES
Name: Siomara Chan      : 1945      MRN: [id number]  Encounter Provider: Mitch Chamorro DO  Encounter Date: 9/15/2023   Encounter department: [address] [place] [address] [facility]     1. Adult hypothyroidism  Assessment & Plan:  Controlled, clinically euthyroid, continue levothyroxine. Orders:  -     TSH, 3rd generation; Future    2. Type 2 diabetes mellitus without complication, without long-term current use of insulin ([address])  Assessment & Plan: We will decrease her glipizide to once daily due to the morning low blood sugars. Check CMP, hemoglobin A1c in 6 months  Lab Results   Component Value Date    HGBA1C 6.4 (H) 2023       Orders:  -     glipiZIDE (GLUCOTROL XL) 2.5 mg 24 hr tablet; Take 1 tablet (2.5 mg total) by mouth daily  -     Comprehensive metabolic panel; Future  -     Lipid panel; Future  -     Hemoglobin A1C; Future    3. Essential hypertension  Assessment & Plan:  Controlled, continue the atenolol-chlorthalidone, recheck her blood pressure in the office in 6 months    Orders:  -     CBC; Future    4. Paroxysmal atrial fibrillation (HCC)  Assessment & Plan:  Rate controlled, continue her Coumadin, this is monitored by her cardiologist      5. Urinary incontinence, urge  Assessment & Plan:  Restart the Detrol, she is to let us know if the side effects are bothersome and she would like to change. Orders:  -     tolterodine (DETROL LA) 2 mg 24 hr capsule; Take 1 capsule (2 mg total) by mouth daily           Subjective      Patient comes in today for checkup, she does state that she has noticed her blood sugars running down into the low 80s in the morning. She is taking glipizide twice daily. She does state that she stopped taking the Detrol due to dry mouth. She states that it worked well for her urinary symptoms. Otherwise she is doing well. She did get her blood work done, and this was reviewed with her today.     Review of Systems Constitutional: Negative for chills, fatigue and fever. HENT: Negative for congestion, ear pain, hearing loss, postnasal drip, rhinorrhea and sore throat. Eyes: Negative for pain and visual disturbance. Respiratory: Negative for chest tightness, shortness of breath and wheezing. Cardiovascular: Negative for chest pain and leg swelling. Gastrointestinal: Negative for abdominal distention, abdominal pain, constipation, diarrhea and vomiting. Endocrine: Negative for cold intolerance and heat intolerance. Genitourinary: Negative for difficulty urinating, frequency and urgency. Musculoskeletal: Negative for arthralgias and gait problem. Skin: Negative for color change. Neurological: Negative for dizziness, tremors, syncope, numbness and headaches. Hematological: Negative for adenopathy. Psychiatric/Behavioral: Negative for agitation, confusion and sleep disturbance. The patient is not nervous/anxious.         Current Outpatient Medications on File Prior to Visit   Medication Sig   • alendronate (Fosamax) 70 mg tablet Take 1 tablet (70 mg total) by mouth every 7 days   • Ascorbic Acid (VITAMIN C) 100 MG tablet Take 500 mg by mouth daily   • atenolol-chlorthalidone (TENORETIC) 50-25 mg per tablet TAKE ONE TABLET BY MOUTH EVERY DAY   • B Complex Vitamins (B COMPLEX 100 PO) Take 1 tablet by mouth daily   • Blood Glucose Monitoring Suppl (FREESTYLE LITE) RANULFO by Does not apply route 2 (two) times a day Dx E11.9   • cyclobenzaprine (FLEXERIL) 10 mg tablet Take 1 tablet (10 mg total) by mouth 3 (three) times a day as needed for muscle spasms   • dorzolamide (TRUSOPT) 2 % ophthalmic solution INSTILL 1 DROP INTO THE RIGHT EYE THREE TIMES A DAY   • FREESTYLE LITE test strip TEST TWO TIMES A DAY   • glucose blood (JASWANT CONTOUR TEST) test strip Check blood sugar twice daily, DX:E11.9   • Lancets (freestyle) lancets USE TO TEST TWO TIMES A DAY   • levothyroxine 50 mcg tablet Take 1 tablet (50 mcg total) by mouth daily   • Multiple Vitamin (MULTIVITAMIN) tablet Take 1 tablet by mouth daily   • niacin 500 mg tablet Take by mouth    • pioglitazone (ACTOS) 15 mg tablet TAKE ONE TABLET BY MOUTH EVERY DAY   • rosuvastatin (CRESTOR) 10 MG tablet TAKE ONE TABLET BY MOUTH EVERY DAY   • triamcinolone (KENALOG) 0.1 % ointment Apply topically 2 (two) times a day   • warfarin (Jantoven) 5 mg tablet TAKE ONE TO TWO TABLETS BY MOUTH EVERY DAY   • [DISCONTINUED] glipiZIDE (GLUCOTROL XL) 2.5 mg 24 hr tablet TAKE ONE TABLET BY MOUTH TWICE A DAY   • [DISCONTINUED] Jorge Luis 128 2 % hypertonic ophthalmic solution instill 1 drop into both eyes four times a day       Objective     /68 (BP Location: Left arm, Patient Position: Sitting, Cuff Size: Adult)   Pulse 75   Temp 98 °F (36.7 °C) (Tympanic)   Ht 5' 1" (1.549 m)   Wt 70.3 kg (155 lb)   SpO2 98%   BMI 29.29 kg/m²     Physical Exam  Vitals and nursing note reviewed. Constitutional:       Appearance: She is well-developed. HENT:      Head: Normocephalic. Eyes:      General: No scleral icterus. Conjunctiva/sclera: Conjunctivae normal.   Neck:      Thyroid: No thyromegaly. Cardiovascular:      Rate and Rhythm: Normal rate and regular rhythm. Heart sounds: Normal heart sounds. No murmur heard. Pulmonary:      Effort: Pulmonary effort is normal. No respiratory distress. Breath sounds: Normal breath sounds. No wheezing. Abdominal:      General: Bowel sounds are normal. There is no distension. Palpations: Abdomen is soft. Tenderness: There is no abdominal tenderness. Musculoskeletal:         General: No tenderness. Normal range of motion. Cervical back: Normal range of motion. Lymphadenopathy:      Cervical: No cervical adenopathy. Skin:     General: Skin is warm and dry. Coloration: Skin is not pale. Findings: No rash. Neurological:      Mental Status: She is alert and oriented to person, place, and time. Cranial Nerves:  No cranial nerve deficit.    Psychiatric:         Behavior: Behavior normal.       Harvinder Cross DO

## 2023-09-15 NOTE — ASSESSMENT & PLAN NOTE
Controlled, continue the atenolol-chlorthalidone, recheck her blood pressure in the office in 6 months

## 2023-09-19 ENCOUNTER — OFFICE VISIT (OUTPATIENT)
Dept: PHYSICAL THERAPY | Facility: CLINIC | Age: 78
End: 2023-09-19
Payer: MEDICARE

## 2023-09-19 DIAGNOSIS — M22.2X2 PATELLOFEMORAL SYNDROME OF LEFT KNEE: Primary | ICD-10-CM

## 2023-09-19 DIAGNOSIS — G89.29 CHRONIC BILATERAL LOW BACK PAIN WITHOUT SCIATICA: ICD-10-CM

## 2023-09-19 DIAGNOSIS — M62.9 HAMSTRING TIGHTNESS OF BOTH LOWER EXTREMITIES: ICD-10-CM

## 2023-09-19 DIAGNOSIS — M54.50 CHRONIC BILATERAL LOW BACK PAIN WITHOUT SCIATICA: ICD-10-CM

## 2023-09-19 DIAGNOSIS — R29.898 WEAKNESS OF BOTH HIPS: ICD-10-CM

## 2023-09-19 PROCEDURE — 97112 NEUROMUSCULAR REEDUCATION: CPT

## 2023-09-19 PROCEDURE — 97110 THERAPEUTIC EXERCISES: CPT

## 2023-09-19 NOTE — PROGRESS NOTES
Daily Note     Today's date: 2023  Patient name: Aditya Norman  : 1945  MRN: [id number]  Referring provider: Jeff Sandoval  Dx:   Encounter Diagnosis     ICD-10-CM    1. Patellofemoral syndrome of left knee  M22.2X2       2. Hamstring tightness of both lower extremities  M62.9       3. Chronic bilateral low back pain without sciatica  M54.50     G89.29       4. Weakness of both hips  R29.898           Start Time: 1015  Stop Time: 1109  Total time in clinic (min): 54 minutes    Subjective:  Patient reports overall improvement. Denies any new incidents or complaints. Objective: See treatment diary below      Assessment:  Patient required cueing throughout on sequencing of exercises along with rep and hold counts. Was able to resume CKC strengthening with cues on corrective lateral band walk mechanics along with regression of load with leg press machine. Plan: Continue per plan of care. Progress treatment as tolerated.        Precautions: R knee replacement, hear murmur       Manuals    STM   Gentle hip add, quads                                     Neuro Re-Ed    Quad sets c holds     5"x20      Marching on foam           TA Set 10"x10 10"x10  During activities    10"x10    TA Set w/Hip ABD 10"x10 10"x10  10x10" GTB 10'x10    10"x10    TA Set w/ Hip Add 10"x10 GTB 10"x10  20x10" blue ball    10"x10    TA Set w/Alt March 2# 2x10 ea  2#2x10 ea  x15 0#    2x10 ea   TA Set w/SLR    2x10 ea       Ther Ex     Pt education on HEP, POC          SLR flexion  2x10 ea W/ TA set HELD neuro 2x10  2x10    Bridges  GTB 3" 2x10  GTB 3" 2x10  GTB 3" 2x10  GTB 3"2x10 x10  GTB 3"2x10     LAQ 2# 2x10    2x10  2# 2x10    SAQ          Side lying clamshells  HL 5" 2x10  GTB  See Neuro   2x10  HL GTB 5"x10     Hamstring Stretch w/strap  20"x3 ea  30"x3 30"x3 ea      Modified  Kalyan Stretch  20"x3 ea  30"x3 ea  30"x3 ea       Seated [employer] L/R/C 5" x10 ea  5"x10 ea 5"x10 ea 5"x10    5"x10 ea   LTR 5"x10 ea  5"x10 ea 10"x10 ea 5"x10 e   5"x10   Standing HR/TR      2x10    Rec.  Bike for mobility and endurance L1 6 min L1 x 6 mins  L0, 6 min L1 x 6 min L1" x 4 mins  L1 x 6 mins L1 x 6 min   SLR abd     2x10 2x10    Lateral Band Walk RTB x2 laps  RTB x 3 laps  HELD GTB x 4 laps  RTB x 3 laps    Leg Press  DBL 55#x10 65# x10 HELD DBL 45# 2x10       Sit to Stand  Low mat 2x10  HELD       TRX DBL squat to low mat 2x10      DBL squats to low mat 2x10     Standing hip abduction           Ther Activity  9/12 8/22     STS c foam                    Gait Training                              Modalities          modalities             MHP during supine TE

## 2023-09-21 ENCOUNTER — APPOINTMENT (OUTPATIENT)
Dept: PHYSICAL THERAPY | Facility: CLINIC | Age: 78
End: 2023-09-21
Payer: MEDICARE

## 2023-09-22 DIAGNOSIS — E78.2 MIXED HYPERLIPIDEMIA: ICD-10-CM

## 2023-09-22 DIAGNOSIS — E11.9 TYPE 2 DIABETES MELLITUS WITHOUT COMPLICATION, WITHOUT LONG-TERM CURRENT USE OF INSULIN (HCC): ICD-10-CM

## 2023-09-22 DIAGNOSIS — M54.9 ACUTE BACK PAIN, UNSPECIFIED BACK LOCATION, UNSPECIFIED BACK PAIN LATERALITY: ICD-10-CM

## 2023-09-22 RX ORDER — LANCETS 28 GAUGE
EACH MISCELLANEOUS
Qty: 100 EACH | Refills: 1 | Status: SHIPPED | OUTPATIENT
Start: 2023-09-22

## 2023-09-22 RX ORDER — CYCLOBENZAPRINE HCL 10 MG
10 TABLET ORAL 3 TIMES DAILY PRN
Qty: 20 TABLET | Refills: 0 | Status: SHIPPED | OUTPATIENT
Start: 2023-09-22

## 2023-09-22 RX ORDER — PIOGLITAZONEHYDROCHLORIDE 15 MG/1
TABLET ORAL
Qty: 90 TABLET | Refills: 1 | Status: SHIPPED | OUTPATIENT
Start: 2023-09-22

## 2023-09-25 DIAGNOSIS — E03.9 ADULT HYPOTHYROIDISM: ICD-10-CM

## 2023-09-25 RX ORDER — LEVOTHYROXINE SODIUM 0.05 MG/1
50 TABLET ORAL DAILY
Qty: 90 TABLET | Refills: 1 | Status: SHIPPED | OUTPATIENT
Start: 2023-09-25

## 2023-09-26 ENCOUNTER — OFFICE VISIT (OUTPATIENT)
Dept: PHYSICAL THERAPY | Facility: CLINIC | Age: 78
End: 2023-09-26
Payer: MEDICARE

## 2023-09-26 DIAGNOSIS — M17.12 PRIMARY OSTEOARTHRITIS OF LEFT KNEE: ICD-10-CM

## 2023-09-26 DIAGNOSIS — G89.29 CHRONIC BILATERAL LOW BACK PAIN WITHOUT SCIATICA: ICD-10-CM

## 2023-09-26 DIAGNOSIS — M62.9 HAMSTRING TIGHTNESS OF BOTH LOWER EXTREMITIES: ICD-10-CM

## 2023-09-26 DIAGNOSIS — M54.50 CHRONIC BILATERAL LOW BACK PAIN WITHOUT SCIATICA: ICD-10-CM

## 2023-09-26 DIAGNOSIS — M22.2X2 PATELLOFEMORAL SYNDROME OF LEFT KNEE: Primary | ICD-10-CM

## 2023-09-26 PROCEDURE — 97112 NEUROMUSCULAR REEDUCATION: CPT

## 2023-09-26 PROCEDURE — 97110 THERAPEUTIC EXERCISES: CPT

## 2023-09-26 NOTE — PROGRESS NOTES
Daily Note     Today's date: 2023  Patient name: Tara Brandt  : 1945  MRN: [id number]  Referring provider: Chnag Finch  Dx:   Encounter Diagnosis     ICD-10-CM    1. Patellofemoral syndrome of left knee  M22.2X2       2. Hamstring tightness of both lower extremities  M62.9       3. Chronic bilateral low back pain without sciatica  M54.50     G89.29       4. Primary osteoarthritis of left knee  M17.12           Start Time: 1017  Stop Time: 1108  Total time in clinic (min): 51 minutes    Subjective: patient reports overall improvement. Reports occasional episodes of LB and knee pain with patient stating arthritic in nature. Objective: See treatment diary below      Assessment:   Patient requires cueing throughout on sequencing of exercises along with reps/holds, positioning, and mechanics demonstrating poor carryover from previous therapy session. Was able to resume 2# load with alt march demonstrating good control with initial cue on maintaining TA set. Demonstrates good control and posturing with sit to stand post initial cues. Plan: Continue per plan of care. Progress treatment as tolerated.        Precautions: R knee replacement, hear murmur       Manuals    STM   Gentle hip add, quads                                     Neuro Re-Ed    Quad sets c holds          Marching on foam           TA Set 10"x10 10"x10  During activities    10"x10    TA Set w/Hip ABD 10"x10 10"x10  10x10" GTB 10'x10  GTB 10"x10   10"x10    TA Set w/ Hip Add 10"x10 GTB 10"x10  20x10" blue ball    10"x10    TA Set w/Alt March 2# 2x10 ea  2#2x10 ea  x15 0#  2# 2x10 ea   2x10 ea   TA Set w/SLR    2x10 ea  2x10 ea     Ther Ex     Pt education on HEP, POC          SLR flexion  2x10 ea W/ TA set HELD neuro  2x10    Bridges  GTB 3" 2x10  GTB 3" 2x10  GTB 3" 2x10  GTB 3"2x10 GTB 3" 2x10  GTB 3"2x10     LAQ 2# 2x10 2# 2x10    SAQ          Side lying clamshells  HL 5" 2x10  GTB  See Neuro    HL GTB 5"x10     Hamstring Stretch w/strap  20"x3 ea  30"x3 30"x3 ea 30"x3 ea      Modified  Kalyan Stretch  20"x3 ea  30"x3 ea  30"x3 ea  30"x3 ea      Seated [employer] L/R/C 5" x10 ea  5"x10 ea 5"x10 ea 5"x10  5"x10   5"x10 ea   LTR 5"x10 ea  5"x10 ea 10"x10 ea 5"x10 e 5"x10 ea  5"x10   Standing HR/TR      2x10    Rec.  Bike for mobility and endurance L1 6 min L1 x 6 mins  L0, 6 min L1 x 6 min L1 x 6 mins  L1 x 6 mins L1 x 6 min   SLR abd      2x10    Lateral Band Walk RTB x2 laps  RTB x 3 laps  HELD GTB x 4 laps  RTB x 3 laps    Leg Press  DBL 55#x10 65# x10 HELD DBL 45# 2x10       Sit to Stand  Low mat 2x10  HELD Low mat 2x10       TRX DBL squat to low mat 2x10      DBL squats to low mat 2x10     Standing hip abduction           Ther Activity  9/12        STS c foam                    Gait Training                              Modalities          modalities             MHP during supine TE

## 2023-09-28 ENCOUNTER — EVALUATION (OUTPATIENT)
Dept: PHYSICAL THERAPY | Facility: CLINIC | Age: 78
End: 2023-09-28
Payer: MEDICARE

## 2023-09-28 ENCOUNTER — ANTICOAG VISIT (OUTPATIENT)
Dept: CARDIOLOGY CLINIC | Facility: CLINIC | Age: 78
End: 2023-09-28

## 2023-09-28 ENCOUNTER — APPOINTMENT (OUTPATIENT)
Dept: LAB | Facility: HOSPITAL | Age: 78
End: 2023-09-28
Attending: INTERNAL MEDICINE
Payer: MEDICARE

## 2023-09-28 DIAGNOSIS — R29.898 WEAKNESS OF BOTH HIPS: ICD-10-CM

## 2023-09-28 DIAGNOSIS — M17.12 PRIMARY OSTEOARTHRITIS OF LEFT KNEE: ICD-10-CM

## 2023-09-28 DIAGNOSIS — M54.50 CHRONIC BILATERAL LOW BACK PAIN WITHOUT SCIATICA: ICD-10-CM

## 2023-09-28 DIAGNOSIS — M22.2X2 PATELLOFEMORAL SYNDROME OF LEFT KNEE: Primary | ICD-10-CM

## 2023-09-28 DIAGNOSIS — M62.9 HAMSTRING TIGHTNESS OF BOTH LOWER EXTREMITIES: ICD-10-CM

## 2023-09-28 DIAGNOSIS — G89.29 CHRONIC BILATERAL LOW BACK PAIN WITHOUT SCIATICA: ICD-10-CM

## 2023-09-28 PROCEDURE — 97110 THERAPEUTIC EXERCISES: CPT

## 2023-09-28 PROCEDURE — 97164 PT RE-EVAL EST PLAN CARE: CPT

## 2023-09-28 NOTE — PROGRESS NOTES
PT Evaluation     Today's date: 2023  Patient name: Luis Fernando Cain  : 1945  MRN: [id number]  Referring provider: Cynthia Wilkins  Dx:   Encounter Diagnosis     ICD-10-CM    1. Patellofemoral syndrome of left knee  M22.2X2       2. Hamstring tightness of both lower extremities  M62.9       3. Weakness of both hips  R29.898       4. Chronic bilateral low back pain without sciatica  M54.50     G89.29       5. Primary osteoarthritis of left knee  M17.12           Start Time: 1020  Stop Time: 1100  Total time in clinic (min): 40 minutes    Assessment  Assessment details: Patient is a  79 y/o who presents to therapy with complaints of L knee/hip pain and low back pain and has completed 13 visits to date with improved FOTO score of 57 from 56 for the low back and a 51 for the knee. Patient demonstrates subjective/objective improvement since starting PT such as pain and strength. Patient continues to present with deficits that include range of motion, strength, and activity tolerance. Patient will benefit from continued skilled PT intervention to address the aforementioned impairments, achieve goals, maximize function, and improve quality of life. Pt is in agreement with this plan. Impairments: abnormal gait, abnormal muscle firing, abnormal muscle tone, abnormal or restricted ROM, abnormal movement, activity intolerance, impaired physical strength, lacks appropriate home exercise program and poor body mechanics    Symptom irritability: lowUnderstanding of Dx/Px/POC: good   Prognosis: fair    Goals  KNEE  ST. Independent with HEP in 2 weeks  2. Pt will have verbal report of improvement in symptoms by >/=25% in 2 weeks- progressing    To be achieved by D/C   LT. Pt will improve FOTO score by >/= 9 points in 6 weeks -progressing  2. Pt will improve FOTO score to >/= 56 by visit # 12 -progressing  3. Pt will be able to perform household chores with little to no difficulty -progressing  4.  Pt will be able to cook with no difficulty -progressing  5. Pt will be able to ascend/descend stairs reciprocally -progressing   6. Pt will be able to carry laundry up stairs independently -progressing      LOW BACK  Goals  STGs:  1. Initiate and complete HEP with verbal cues. -progressing  2. Improve BLE strength by 1/2 grade in 4 weeks. -progressing  3. Decrease LBP by > 25% in 4 weeks. -progressing   LTGs:  1. Patient to be I with HEP in 12 weeks. -progressing  2. Improve L/S ROM to [facility] t/o in 12 weeks to improve function. -progressing  3. Decrease LBP to < or = to 3-4/10 with activity in 12 weeks to improve function. -progressing  4. Improve BLE strength to > or = to 4+ to 5/5 t/o in 12 weeks to improve function. -progressing  5. Postural control is improved to maximal level of function in 12 weeks. -progressing  6. Recreational performance is improved to maximal level of function in 12 weeks. -progressing  7. ADL performance is improved to maximal level of function in 12 weeks. -progressing  8. Patient to report improved sleep in 12 weeks and will be able to roll in bed with increased ease. -progressing      Plan  Patient would benefit from: skilled physical therapy  Planned therapy interventions: activity modification, joint mobilization, manual therapy, motor coordination training, neuromuscular re-education, patient education, self care, therapeutic activities, therapeutic exercise, graded activity, home exercise program, behavior modification, graded exercise, functional ROM exercises, strengthening, balance and transfer training  Frequency: 2x week  Duration in weeks: 8  Plan of Care beginning date: 8/7/2023  Plan of Care expiration date: 10/2/2023  Treatment plan discussed with: patient        Subjective Evaluation    History of Present Illness  Mechanism of injury: RE-EVAL (9/28): Pt reports occasionally she gets pain in the front of the knee.  Pt reports the knee used to give out occasionally and that has eased up since starting therapy. Pt reports she feels a bit stronger in the L knee but she is still careful with what she does. Pt reports occasional back pain since starting therapy. Pt reports if she does her stretches/exercises before she gets out of bed she doesn't have much pain. Pt reports she is still careful with the back as well, but she is able to do more at home without onset of pain. Pt reports that she has a long standing history of L knee pain. More often she has been feeling her knee giving out. She notes that she is having difficulty walking. She would like to have more control when she is walking. She does utilize a cane but not . She is more concerned about her weakness. Patient Goals  Patient goals for therapy: increased strength  Patient goal: be able to go up and down stairs to do laundry, be able to perform transfers,   Pain  Current pain ratin  At best pain ratin  At worst pain ratin  Location: knee-5 back-4   Quality: pressure    Social Support  2  Interior stairs assessed: to go downstairs. 15  Lives with: adult children and young children          Objective     Concurrent Complaints      Additional Special Questions  Pt reports that she has had incontence      Postural Observations  Seated posture: poor  Standing posture: poor        Active Range of Motion   Left Knee   Flexion: 120 degrees   Extension: -3 degrees   Extensor lag: -10 degrees     Right Knee   Flexion: 120 degrees   Extension: 0 degrees   Extensor la degrees     Passive Range of Motion   Left Knee   Flexion: 130 degrees     Strength/Myotome Testing     Left Knee   Flexion: 4  Extension: 4    Right Knee   Flexion: 4  Extension: 4    Ambulation   Weight-Bearing Status   Weight-Bearing Status (Left): full weight bearing   Weight-Bearing Status (Right): full weight-bearing    Assistive device used: none    Observational Gait   Increased right stance time, left swing time and left step length. Decreased walking speed, left stance time, right swing time and right step length.      Additional Observational Gait Details  Valgus collapsing at the L knee     General Comments:      Lumbar Comments  Active Range of Motion     Lumbar   Flexion: minimal restriction  Extension: Restriction level: minimal  Left lateral flexion:  Restriction level: moderate  Right lateral flexion: Restriction level: moderate  Left rotation: Restriction level: moderate  Right rotation: Restriction level: moderate    Left Hip   Flexion: 95 degrees  Abduction: 28 degrees     Right Hip   Flexion: 95 degrees  Abduction: 30 degrees     Strength/Myotome Testing     Left Hip   Planes of Motion   Flexion: 4  Abduction: 4-   Adduction: 4    Right Hip   Planes of Motion   Flexion: 4  Abduction: 4-  Adduction: 4    Knee Comments  FOTO lumbar: IE- 64 RE-57    FOTO knee: IE- 52 RE- 51      Flowsheet Rows    Flowsheet Row Most Recent Value   PT/OT G-Codes    Current Score 51   Projected Score 57             Precautions: R knee replacement, hear murmur         POC Expires Knee 10/26/23  Lumbar 11/15/23         RE-EVAL 10/26/23         Manuals 9/7 9/12 9/14 9/19 9/26 9/30 8/30   STM   Gentle hip add, quads                                     Neuro Re-Ed 9/7 9/12 9/14 9/19 9/26 8/30   Quad sets c holds          Marching on foam           TA Set 10"x10 10"x10  During activities    10"x10    TA Set w/Hip ABD 10"x10 10"x10  10x10" GTB 10'x10  GTB 10"x10   10"x10    TA Set w/ Hip Add 10"x10 GTB 10"x10  20x10" blue ball    10"x10    TA Set w/Alt March 2# 2x10 ea  2#2x10 ea  x15 0#  2# 2x10 ea   2x10 ea   TA Set w/SLR    2x10 ea  2x10 ea     Ther Ex  9/12 9/19 9/26 8/30   Pt education on HEP, POC      RE-EVAL/Updated FOTO    SLR flexion  2x10 ea W/ TA set HELD neuro      Bridges  GTB 3" 2x10  GTB 3" 2x10  GTB 3" 2x10  GTB 3"2x10 GTB 3" 2x10      LAQ 2# 2x10         SAQ          Side lying clamshells  HL 5" 2x10  GTB  See Neuro        Hamstring Stretch w/strap  20"x3 ea  30"x3 30"x3 ea 30"x3 ea      Modified  Kalyan Stretch  20"x3 ea  30"x3 ea  30"x3 ea  30"x3 ea      Seated Listar L/R/C 5" x10 ea  5"x10 ea 5"x10 ea 5"x10  5"x10   5"x10 ea   LTR 5"x10 ea  5"x10 ea 10"x10 ea 5"x10 e 5"x10 ea  5"x10   Standing HR/TR          Rec.  Bike for mobility and endurance L1 6 min L1 x 6 mins  L0, 6 min L1 x 6 min L1 x 6 mins  L1 x 6min  L1 x 6 min   SLR abd          Lateral Band Walk RTB x2 laps  RTB x 3 laps  HELD GTB x 4 laps      Leg Press  DBL 55#x10 65# x10 HELD DBL 45# 2x10       Sit to Stand  Low mat 2x10  HELD Low mat 2x10       TRX DBL squat to low mat 2x10          Standing hip abduction           Ther Activity  9/12        STS c foam                    Gait Training                              Modalities          modalities             MHP during supine TE

## 2023-10-03 ENCOUNTER — OFFICE VISIT (OUTPATIENT)
Dept: PHYSICAL THERAPY | Facility: CLINIC | Age: 78
End: 2023-10-03
Payer: MEDICARE

## 2023-10-03 DIAGNOSIS — G89.29 CHRONIC BILATERAL LOW BACK PAIN WITHOUT SCIATICA: ICD-10-CM

## 2023-10-03 DIAGNOSIS — R29.898 WEAKNESS OF BOTH HIPS: ICD-10-CM

## 2023-10-03 DIAGNOSIS — M17.12 PRIMARY OSTEOARTHRITIS OF LEFT KNEE: ICD-10-CM

## 2023-10-03 DIAGNOSIS — M62.9 HAMSTRING TIGHTNESS OF BOTH LOWER EXTREMITIES: ICD-10-CM

## 2023-10-03 DIAGNOSIS — M54.50 CHRONIC BILATERAL LOW BACK PAIN WITHOUT SCIATICA: ICD-10-CM

## 2023-10-03 DIAGNOSIS — M22.2X2 PATELLOFEMORAL SYNDROME OF LEFT KNEE: Primary | ICD-10-CM

## 2023-10-03 PROCEDURE — 97112 NEUROMUSCULAR REEDUCATION: CPT

## 2023-10-03 PROCEDURE — 97110 THERAPEUTIC EXERCISES: CPT

## 2023-10-03 NOTE — PROGRESS NOTES
Daily Note     Today's date: 10/3/2023  Patient name: Jensen Morgan  : 1945  MRN: [id number]  Referring provider: Carlos Manuel Mg  Dx: No diagnosis found. Subjective: pt reports feeling overall achy today. Objective: See treatment diary below      Assessment: Tolerated treatment well. Patient would benefit from continued PT. Continued w/ outlined POC w/ good tolerance. Cueing for proper TA contraction t/o session in order to maintain good core control. Plan: Continue per plan of care. Precautions: R knee replacement, hear murmur         POC Expires Knee 10/26/23  Lumbar 11/15/23         RE-EVAL 10/26/23         Manuals 9/7 9/12 9/14 9/19 9/26 9/30 10/3   STM   Gentle hip add, quads                                     Neuro Re-Ed      Quad sets c holds          Marching on foam           TA Set 10"x10 10"x10  During activities       TA Set w/Hip ABD 10"x10 10"x10  10x10" GTB 10'x10  GTB 10"x10   GTB 10"x10    TA Set w/ Hip Add 10"x10 GTB 10"x10  20x10" blue ball       TA Set w/Alt March 2# 2x10 ea  2#2x10 ea  x15 0#  2# 2x10 ea   2# 2x10 ea    TA Set w/SLR    2x10 ea  2x10 ea  2x10 ea   Ther Ex       Pt education on HEP, POC      RE-EVAL/Updated FOTO    SLR flexion  2x10 ea W/ TA set HELD neuro      Bridges  GTB 3" 2x10  GTB 3" 2x10  GTB 3" 2x10  GTB 3"2x10 GTB 3" 2x10   GTB 3" 2x10    LAQ 2# 2x10         SAQ          Side lying clamshells  HL 5" 2x10  GTB  See Neuro        Hamstring Stretch w/strap  20"x3 ea  30"x3 30"x3 ea 30"x3 ea   30"x3 ea    Modified  Kalyan Stretch  20"x3 ea  30"x3 ea  30"x3 ea  30"x3 ea   30"x3 ea    Seated Ball Roll Out L/R/C 5" x10 ea  5"x10 ea 5"x10 ea 5"x10  5"x10      LTR 5"x10 ea  5"x10 ea 10"x10 ea 5"x10 e 5"x10 ea  5"x10 ea   Standing HR/TR          Rec.  Bike for mobility and endurance L1 6 min L1 x 6 mins  L0, 6 min L1 x 6 min L1 x 6 mins  L1 x 6min  L1 x 6min    SLR abd          Lateral Band Walk RTB x2 laps  RTB x 3 laps  HELD GTB x 4 laps      Leg Press  DBL 55#x10 65# x10 HELD DBL 45# 2x10       Sit to Stand  Low mat 2x10  HELD Low mat 2x10       TRX DBL squat to low mat 2x10          Standing hip abduction           Ther Activity  9/12        STS c foam                    Gait Training                              Modalities          modalities             MHP during supine TE

## 2023-10-05 ENCOUNTER — APPOINTMENT (OUTPATIENT)
Dept: PHYSICAL THERAPY | Facility: CLINIC | Age: 78
End: 2023-10-05
Payer: MEDICARE

## 2023-10-10 ENCOUNTER — TELEPHONE (OUTPATIENT)
Dept: FAMILY MEDICINE CLINIC | Facility: CLINIC | Age: 78
End: 2023-10-10

## 2023-10-10 ENCOUNTER — APPOINTMENT (OUTPATIENT)
Dept: PHYSICAL THERAPY | Facility: CLINIC | Age: 78
End: 2023-10-10
Payer: MEDICARE

## 2023-10-10 NOTE — TELEPHONE ENCOUNTER
She could use afrin nasal spray over the counter for no more than 3 days to help with the congestion.

## 2023-10-10 NOTE — TELEPHONE ENCOUNTER
T/c from pt -- called to report to Dr Drew Daniel that she was diagnosed with covid on 10/4 at a local urgent care. Since then has been having a lot of yellow mucus when she blows her nose. Would like to know if there is something she can be/should be taking.

## 2023-10-12 ENCOUNTER — APPOINTMENT (OUTPATIENT)
Dept: PHYSICAL THERAPY | Facility: CLINIC | Age: 78
End: 2023-10-12
Payer: MEDICARE

## 2023-10-13 NOTE — TELEPHONE ENCOUNTER
Detail Level: Zone Pt reports muccinex DM " Dries her out"   tylenol sinus - and its helping her very little Detail Level: Detailed Detail Level: Generalized

## 2023-10-13 NOTE — TELEPHONE ENCOUNTER
T/c from pt -- called with update    She has a very bad hoarse throat and cough with phlegm. It is very difficult to talk. Can not come to office for appt, as her only transportation is not available today. Is requesting medication/advisement.

## 2023-10-16 ENCOUNTER — APPOINTMENT (EMERGENCY)
Dept: RADIOLOGY | Facility: HOSPITAL | Age: 78
End: 2023-10-16
Payer: MEDICARE

## 2023-10-16 ENCOUNTER — HOSPITAL ENCOUNTER (EMERGENCY)
Facility: HOSPITAL | Age: 78
Discharge: HOME | End: 2023-10-16
Attending: EMERGENCY MEDICINE | Admitting: EMERGENCY MEDICINE
Payer: MEDICARE

## 2023-10-16 VITALS
TEMPERATURE: 98.2 F | HEART RATE: 76 BPM | DIASTOLIC BLOOD PRESSURE: 84 MMHG | OXYGEN SATURATION: 98 % | RESPIRATION RATE: 18 BRPM | SYSTOLIC BLOOD PRESSURE: 164 MMHG

## 2023-10-16 DIAGNOSIS — R05.9 COUGH: Primary | ICD-10-CM

## 2023-10-16 PROCEDURE — 71046 X-RAY EXAM CHEST 2 VIEWS: CPT

## 2023-10-16 NOTE — DISCHARGE INSTRUCTIONS
Use Flonase nasal spray daily. Use honey, Juaquin's vapor rub, and humidifier for cough. Please follow-up with your family doctor. Return to the ER with any worsening symptoms.

## 2023-10-16 NOTE — ED PROVIDER NOTES
History  Chief Complaint   Patient presents with    Cough     Patient c/o cough that started two weeks ago. Patient tested positive on October 4th for Covid. Patient states"she can't shake the cough". Patient denies fever but reports chills. 77yo female with a history of atrial fibrillation on Coumadin, hypertension, hyperlipidemia, and type 2 diabetes presenting with her daughter for evaluation of a cough. Cough has been ongoing for about 2 weeks. She states the cough gradually worsens throughout the day into the nighttime. The cough is intermittently productive. She feels like she has phlegm in her chest that she is not able to expectorate. She has been using Tylenol, Mucinex, and a cough syrup which she believes has codeine in it without improvement. She tested positive for COVID on 10/4/2023 but the cough is persistent. She states she is here to make sure she does not have pneumonia or bronchitis. She denies any fevers. No leg swelling, shortness of breath, chest pain. History provided by:  Patient   used: No        Prior to Admission Medications   Prescriptions Last Dose Informant Patient Reported? Taking?    Ascorbic Acid (VITAMIN C) 100 MG tablet  Self Yes No   Sig: Take 500 mg by mouth daily   B Complex Vitamins (B COMPLEX 100 PO)  Self Yes No   Sig: Take 1 tablet by mouth daily   Blood Glucose Monitoring Suppl (FREESTYLE LITE) RANULFO  Self No No   Sig: by Does not apply route 2 (two) times a day Dx E11.9   FREESTYLE LITE test strip  Self No No   Sig: TEST TWO TIMES A DAY   Lancets (freestyle) lancets   No No   Sig: USE TO TEST TWICE DAILY   Multiple Vitamin (MULTIVITAMIN) tablet  Self Yes No   Sig: Take 1 tablet by mouth daily   alendronate (Fosamax) 70 mg tablet  Self No No   Sig: Take 1 tablet (70 mg total) by mouth every 7 days   atenolol-chlorthalidone (TENORETIC) 50-25 mg per tablet  Self No No   Sig: TAKE ONE TABLET BY MOUTH EVERY DAY   cyclobenzaprine (FLEXERIL) 10 mg tablet   No No   Sig: TAKE ONE TABLET BY MOUTH THREE TIMES A DAY AS NEEDED FOR MUSCLE SPASMS   dorzolamide (TRUSOPT) 2 % ophthalmic solution  Self Yes No   Sig: INSTILL 1 DROP INTO THE RIGHT EYE THREE TIMES A DAY   glipiZIDE (GLUCOTROL XL) 2.5 mg 24 hr tablet   No No   Sig: Take 1 tablet (2.5 mg total) by mouth daily   glucose blood (JASWANT CONTOUR TEST) test strip  Self No No   Sig: Check blood sugar twice daily, DX:E11.9   levothyroxine 50 mcg tablet   No No   Sig: TAKE ONE TABLET BY MOUTH EVERY DAY   niacin 500 mg tablet  Self Yes No   Sig: Take by mouth    pioglitazone (ACTOS) 15 mg tablet   No No   Sig: TAKE ONE TABLET BY MOUTH EVERY DAY   rosuvastatin (CRESTOR) 10 MG tablet  Self No No   Sig: TAKE ONE TABLET BY MOUTH EVERY DAY   tolterodine (DETROL LA) 2 mg 24 hr capsule   No No   Sig: Take 1 capsule (2 mg total) by mouth daily   triamcinolone (KENALOG) 0.1 % ointment  Self No No   Sig: Apply topically 2 (two) times a day   warfarin (Jantoven) 5 mg tablet   No No   Sig: TAKE ONE TO TWO TABLETS BY MOUTH EVERY DAY      Facility-Administered Medications: None       Past Medical History:   Diagnosis Date    Carpal tunnel syndrome     Heart murmur        Past Surgical History:   Procedure Laterality Date    CATARACT EXTRACTION      CHOLECYSTECTOMY      COLONOSCOPY  11/24/2007    EYE SURGERY      KNEE SURGERY      REPLACEMENT TOTAL KNEE Right     TOTAL ABDOMINAL HYSTERECTOMY W/ BILATERAL SALPINGOOPHORECTOMY         Family History   Problem Relation Age of Onset    Heart attack Father         ARRHYTHMIAS    Coronary artery disease Family     Diabetes Family     Heart attack Family         ARRHYTHMIAS     I have reviewed and agree with the history as documented.     E-Cigarette/Vaping    E-Cigarette Use Never User      E-Cigarette/Vaping Substances    Nicotine No     THC No     CBD No     Flavoring No     Other No     Unknown No      Social History     Tobacco Use    Smoking status: Never    Smokeless tobacco: Never Vaping Use    Vaping Use: Never used   Substance Use Topics    Alcohol use: Yes    Drug use: No       Review of Systems   Constitutional:  Negative for chills and fever. HENT:  Negative for drooling and voice change. Eyes:  Negative for discharge and redness. Respiratory:  Positive for cough. Negative for shortness of breath and stridor. Cardiovascular:  Negative for chest pain and leg swelling. Gastrointestinal:  Negative for nausea and vomiting. Musculoskeletal:  Negative for neck pain and neck stiffness. Skin:  Negative for color change and rash. Neurological:  Negative for seizures and syncope. Psychiatric/Behavioral:  Negative for confusion. The patient is not nervous/anxious. All other systems reviewed and are negative. Physical Exam  Physical Exam  Vitals and nursing note reviewed. Constitutional:       General: She is not in acute distress. Appearance: She is well-developed. She is not diaphoretic. HENT:      Head: Normocephalic and atraumatic. Right Ear: External ear normal.      Left Ear: External ear normal.      Nose: Nose normal.   Eyes:      General: No scleral icterus. Right eye: No discharge. Left eye: No discharge. Conjunctiva/sclera: Conjunctivae normal.   Cardiovascular:      Rate and Rhythm: Normal rate and regular rhythm. Heart sounds: Normal heart sounds. No murmur heard. Pulmonary:      Effort: Pulmonary effort is normal. No respiratory distress. Breath sounds: Normal breath sounds. No stridor. No wheezing or rales. Musculoskeletal:         General: No deformity. Normal range of motion. Cervical back: Normal range of motion and neck supple. Right lower leg: No edema. Left lower leg: No edema. Lymphadenopathy:      Cervical: No cervical adenopathy. Skin:     General: Skin is warm and dry. Neurological:      Mental Status: She is alert. She is not disoriented. GCS: GCS eye subscore is 4.  GCS verbal subscore is 5. GCS motor subscore is 6. Psychiatric:         Behavior: Behavior normal.         Vital Signs  ED Triage Vitals   Temperature Pulse Respirations Blood Pressure SpO2   10/16/23 1107 10/16/23 1107 10/16/23 1107 10/16/23 1108 10/16/23 1107   98.2 °F (36.8 °C) 77 20 (!) 175/100 97 %      Temp src Heart Rate Source Patient Position - Orthostatic VS BP Location FiO2 (%)   -- 10/16/23 1326 10/16/23 1326 10/16/23 1326 --    Monitor Sitting Right arm       Pain Score       10/16/23 1107       No Pain           Vitals:    10/16/23 1107 10/16/23 1108 10/16/23 1326   BP:  (!) 175/100 164/84   Pulse: 77  76   Patient Position - Orthostatic VS:   Sitting         Visual Acuity      ED Medications  Medications - No data to display    Diagnostic Studies  Results Reviewed       None                   XR chest 2 views   ED Interpretation by Dave Vicente PA-C (10/16 1250)   No acute consolidation      Final Result by Harry Tran MD (10/16 1449)      No acute cardiopulmonary disease. Workstation performed: [id number]                    Procedures  Procedures         ED Course                     SBIRT 20yo+      Flowsheet Row Most Recent Value   Initial Alcohol Screen: US AUDIT-C     1. How often do you have a drink containing alcohol? 0 Filed at: 10/16/2023 1108   2. How many drinks containing alcohol do you have on a typical day you are drinking? 0 Filed at: 10/16/2023 1108   3a. Male UNDER 65: How often do you have five or more drinks on one occasion? 0 Filed at: 10/16/2023 1108   3b. FEMALE Any Age, or MALE 65+: How often do you have 4 or more drinks on one occassion? 0 Filed at: 10/16/2023 1108   Audit-C Score 0 Filed at: 10/16/2023 1108   RUDY: How many times in the past year have you. .. Used an illegal drug or used a prescription medication for non-medical reasons?  Never Filed at: 10/16/2023 1108                      Medical Decision Making  78yoF presenting for cough x 2 weeks. Tested positive for COVID on 10/4/23. Here with persistent cough. No fevers. No CP/SOB. She is afebrile and hemodynamically stable. Oxygen saturation is 97% on room air. She is well appearing in no distress. Lungs CTA and respirations non-labored. CXR obtained which appears normal. No indication for further workup. Discussed with patient that cough can linger for several weeks. Supportive care discussed including honey, Juaquin's, and humidifier. Advised close PCP follow-up. ED return precautions discussed. Patient expressed understanding and is agreeable to plan. Patient discharged in stable condition. Amount and/or Complexity of Data Reviewed  Radiology: ordered and independent interpretation performed. Disposition  Final diagnoses:   Cough     Time reflects when diagnosis was documented in both MDM as applicable and the Disposition within this note       Time User Action Codes Description Comment    10/16/2023  1:00 PM [address], [address] [R05.9] Cough           ED Disposition       ED Disposition   Discharge    Condition   Stable    Date/Time   Mon Oct 16, 2023 [address] discharge to home/self care.                    Follow-up Information       Follow up With Specialties Details Why Contact Info Additional Information    Jovita Devi DO Family Medicine Schedule an appointment as soon as possible for a visit   [address] [place] [address] Emergency Department Emergency Medicine  If symptoms worsen [address] [facility] Emergency Department, [place], [place], [address]            Discharge Medication List as of 10/16/2023  1:01 PM        CONTINUE these medications which have NOT CHANGED    Details   alendronate (Fosamax) 70 mg tablet Take 1 tablet (70 mg total) by mouth every 7 days, Starting Tue 5/30/2023, Normal      Ascorbic Acid (VITAMIN C) 100 MG tablet Take 500 mg by mouth daily, Historical Med      atenolol-chlorthalidone (TENORETIC) 50-25 mg per tablet TAKE ONE TABLET BY MOUTH EVERY DAY, Normal      B Complex Vitamins (B COMPLEX 100 PO) Take 1 tablet by mouth daily, Starting Tue 8/15/2017, Historical Med      Blood Glucose Monitoring Suppl (FREESTYLE LITE) RANULFO by Does not apply route 2 (two) times a day Dx E11.9, Starting u 4/2/2020, Normal      cyclobenzaprine (FLEXERIL) 10 mg tablet TAKE ONE TABLET BY MOUTH THREE TIMES A DAY AS NEEDED FOR MUSCLE SPASMS, Starting Fri 9/22/2023, Normal      dorzolamide (TRUSOPT) 2 % ophthalmic solution INSTILL 1 DROP INTO THE RIGHT EYE THREE TIMES A DAY, Historical Med      !! FREESTYLE LITE test strip TEST TWO TIMES A DAY, Normal      glipiZIDE (GLUCOTROL XL) 2.5 mg 24 hr tablet Take 1 tablet (2.5 mg total) by mouth daily, Starting Fri 9/15/2023, Normal      !! glucose blood (JASWANT CONTOUR TEST) test strip Check blood sugar twice daily, DX:E11.9, Normal      Lancets (freestyle) lancets USE TO TEST TWICE DAILY, Normal      levothyroxine 50 mcg tablet TAKE ONE TABLET BY MOUTH EVERY DAY, Starting Mon 9/25/2023, Normal      Multiple Vitamin (MULTIVITAMIN) tablet Take 1 tablet by mouth daily, Historical Med      niacin 500 mg tablet Take by mouth , Historical Med      pioglitazone (ACTOS) 15 mg tablet TAKE ONE TABLET BY MOUTH EVERY DAY, Normal      rosuvastatin (CRESTOR) 10 MG tablet TAKE ONE TABLET BY MOUTH EVERY DAY, Normal      tolterodine (DETROL LA) 2 mg 24 hr capsule Take 1 capsule (2 mg total) by mouth daily, Starting Fri 9/15/2023, No Print      triamcinolone (KENALOG) 0.1 % ointment Apply topically 2 (two) times a day, Starting Mon 8/15/2022, Normal      warfarin (Jantoven) 5 mg tablet TAKE ONE TO TWO TABLETS BY MOUTH EVERY DAY, Normal       !! - Potential duplicate medications found. Please discuss with provider.           No discharge procedures on file.    PDMP Review       None            ED Provider  Electronically Signed by             Pilar Dash PA-C  10/16/23 7735

## 2023-10-23 DIAGNOSIS — N39.41 URINARY INCONTINENCE, URGE: ICD-10-CM

## 2023-10-23 RX ORDER — TOLTERODINE 2 MG/1
2 CAPSULE, EXTENDED RELEASE ORAL DAILY
Qty: 30 CAPSULE | Refills: 0 | Status: SHIPPED | OUTPATIENT
Start: 2023-10-23

## 2023-10-24 ENCOUNTER — OFFICE VISIT (OUTPATIENT)
Dept: PHYSICAL THERAPY | Facility: CLINIC | Age: 78
End: 2023-10-24
Payer: MEDICARE

## 2023-10-24 DIAGNOSIS — M17.12 PRIMARY OSTEOARTHRITIS OF LEFT KNEE: ICD-10-CM

## 2023-10-24 DIAGNOSIS — M62.9 HAMSTRING TIGHTNESS OF BOTH LOWER EXTREMITIES: ICD-10-CM

## 2023-10-24 DIAGNOSIS — M22.2X2 PATELLOFEMORAL SYNDROME OF LEFT KNEE: Primary | ICD-10-CM

## 2023-10-24 DIAGNOSIS — G89.29 CHRONIC BILATERAL LOW BACK PAIN WITHOUT SCIATICA: ICD-10-CM

## 2023-10-24 DIAGNOSIS — M54.50 CHRONIC BILATERAL LOW BACK PAIN WITHOUT SCIATICA: ICD-10-CM

## 2023-10-24 PROCEDURE — 97110 THERAPEUTIC EXERCISES: CPT

## 2023-10-24 PROCEDURE — 97112 NEUROMUSCULAR REEDUCATION: CPT

## 2023-10-24 NOTE — PROGRESS NOTES
Daily Note     Today's date: 10/24/2023  Patient name: Lisy Villalobos  : 1945  MRN: [id number]  Referring provider: Marques Bacon  Dx:   Encounter Diagnosis     ICD-10-CM    1. Patellofemoral syndrome of left knee  M22.2X2       2. Primary osteoarthritis of left knee  M17.12       3. Chronic bilateral low back pain without sciatica  M54.50     G89.29       4. Hamstring tightness of both lower extremities  M62.9           Start Time: 1145  Stop Time: 1224  Total time in clinic (min): 39 minutes    Subjective: patient reports no new complaints or incidents. Reports good and bad days. Has been away from therapy due to not feeling well      Objective: See treatment diary below      Assessment:  patient was able to complete therapy within tolerance. Was able to resume stretching, strengthening and re-ed of TA muscularity. Cueing on sequencing and mechanics of exercises required. Reports fatigue post  Plan: Continue per plan of care. Progress treatment as tolerated.        Precautions: R knee replacement, hear murmur         POC Expires Knee 10/26/23  Lumbar 11/15/23         RE-EVAL 10/26/23         Manuals 10/24 9/12 9/14 9/19 9/26 9/30 10/3   STM   Gentle hip add, quads                                     Neuro Re-Ed 10/24 9/12 9/14 9/19 9/26     Quad sets c holds          Marching on foam           TA Set 10"x10  10"x10  During activities       TA Set w/Hip ABD  10"x10  10x10" GTB 10'x10  GTB 10"x10   GTB 10"x10    TA Set w/ Hip Add  GTB 10"x10  20x10" blue ball       TA Set w/Alt March 2x10 ea  2#2x10 ea  x15 0#  2# 2x10 ea   2# 2x10 ea    TA Set w/SLR 2x10 ea   2x10 ea  2x10 ea  2x10 ea   Ther Ex 10/24 9/12  9/19 9/26     Pt education on HEP, POC      RE-EVAL/Updated FOTO    SLR flexion   W/ TA set HELD neuro      Bridges  GTB 3" 2x10  GTB 3" 2x10  GTB 3" 2x10  GTB 3"2x10 GTB 3" 2x10   GTB 3" 2x10    LAQ          SAQ          Side lying clamshells   See Neuro        Hamstring Stretch w/strap 30"x3 20"x3 ea  30"x3 30"x3 ea 30"x3 ea   30"x3 ea    Modified  Kalyan Stretch 30"x3 20"x3 ea  30"x3 ea  30"x3 ea  30"x3 ea   30"x3 ea    Seated Ball Roll Out L/R/C  5"x10 ea 5"x10 ea 5"x10  5"x10      LTR 5"x10 ea   5"x10 ea 10"x10 ea 5"x10 e 5"x10 ea  5"x10 ea   Standing HR/TR          Rec.  Bike for mobility and endurance L1 x 6 mins  L1 x 6 mins  L0, 6 min L1 x 6 min L1 x 6 mins  L1 x 6min  L1 x 6min    SLR abd          Lateral Band Walk  RTB x 3 laps  HELD GTB x 4 laps      Leg Press  DBL 55#x10 65# x10 HELD DBL 45# 2x10       Sit to Stand  Low mat 2x10  HELD Low mat 2x10       TRX          Standing hip abduction           Ther Activity  9/12        STS c foam                    Gait Training                              Modalities          modalities             MHP during supine TE

## 2023-10-26 ENCOUNTER — APPOINTMENT (OUTPATIENT)
Dept: PHYSICAL THERAPY | Facility: CLINIC | Age: 78
End: 2023-10-26
Payer: MEDICARE

## 2023-10-28 ENCOUNTER — APPOINTMENT (OUTPATIENT)
Dept: LAB | Facility: HOSPITAL | Age: 78
End: 2023-10-28
Payer: MEDICARE

## 2023-10-30 ENCOUNTER — ANTICOAG VISIT (OUTPATIENT)
Dept: CARDIOLOGY CLINIC | Facility: CLINIC | Age: 78
End: 2023-10-30

## 2023-10-31 ENCOUNTER — EVALUATION (OUTPATIENT)
Dept: PHYSICAL THERAPY | Facility: CLINIC | Age: 78
End: 2023-10-31
Payer: MEDICARE

## 2023-10-31 DIAGNOSIS — G89.29 CHRONIC BILATERAL LOW BACK PAIN WITHOUT SCIATICA: ICD-10-CM

## 2023-10-31 DIAGNOSIS — M17.12 PRIMARY OSTEOARTHRITIS OF LEFT KNEE: ICD-10-CM

## 2023-10-31 DIAGNOSIS — R29.898 WEAKNESS OF BOTH HIPS: ICD-10-CM

## 2023-10-31 DIAGNOSIS — M54.50 CHRONIC BILATERAL LOW BACK PAIN WITHOUT SCIATICA: ICD-10-CM

## 2023-10-31 DIAGNOSIS — M22.2X2 PATELLOFEMORAL SYNDROME OF LEFT KNEE: Primary | ICD-10-CM

## 2023-10-31 PROCEDURE — 97110 THERAPEUTIC EXERCISES: CPT

## 2023-10-31 NOTE — PROGRESS NOTES
PT RE-EVAL KNEE  PT DISCHARGE BACK    Collection of Subjective/Objective data performed by Michael Zambrano PTA. Assessment, POC, and Goal attainment performed by Genia Officer, ELEN. Today's date: 10/31/2023  Patient name: Estela Richmond  : 1945  MRN: [id number]  Referring provider: Stas Escalante  Dx:   Encounter Diagnosis     ICD-10-CM    1. Patellofemoral syndrome of left knee  M22.2X2       2. Weakness of both hips  R29.898       3. Primary osteoarthritis of left knee  M17.12       4. Chronic bilateral low back pain without sciatica  M54.50     G89.29                      Assessment  Assessment details: Patient is a 77 y/o female who presents to therapy with complaints of L knee pain. Patient demonstrates subjective/objective improvement since starting PT such as strength and mobility. Patient continues to present with deficits that include strength, and activity tolerance. Patient has shown improvements in low back pain and feels she can manage it at home with HEP. Patient will benefit from continued skilled PT intervention to address the aforementioned impairments, achieve goals, maximize function, and improve quality of life. Pt is in agreement with this plan. Impairments: abnormal gait, abnormal muscle firing, abnormal muscle tone, abnormal or restricted ROM, abnormal movement, activity intolerance, impaired physical strength, lacks appropriate home exercise program and poor body mechanics    Symptom irritability: lowUnderstanding of Dx/Px/POC: good   Prognosis: fair    Goals  KNEE  ST. Independent with HEP in 2 weeks-ONGOING  2. Pt will have verbal report of improvement in symptoms by >/=25% in 2 weeks- progressing    To be achieved by D/C   LT. Pt will improve FOTO score by >/= 9 points in 6 weeks -MET  2. Pt will improve FOTO score to >/= 56 by visit # 12 -progressing--MET  3.  Pt will be able to perform household chores with little to no difficulty -progressing  4. Pt will be able to cook with no difficulty -progressing  5. Pt will be able to ascend/descend stairs reciprocally -progressing   6. Pt will be able to carry laundry up stairs independently -progressing      LOW BACK  Goals  STGs:  1. Initiate and complete HEP with verbal cues. -MET  2. Improve BLE strength by 1/2 grade in 4 weeks. -MET  3. Decrease LBP by > 25% in 4 weeks. -MET  LTGs:  1. Patient to be I with HEP in 12 weeks. -MET  2. Improve L/S ROM to [facility] t/o in 12 weeks to improve function. -MET  3. Decrease LBP to < or = to 3-4/10 with activity in 12 weeks to improve function. -MET  4. Improve BLE strength to > or = to 4+ to 5/5 t/o in 12 weeks to improve function. -MET  5. Postural control is improved to maximal level of function in 12 weeks. -MET  6. Recreational performance is improved to maximal level of function in 12 weeks. -MET  7. ADL performance is improved to maximal level of function in 12 weeks. -MET  8. Patient to report improved sleep in 12 weeks and will be able to roll in bed with increased ease. -MET      Plan  Plan details: D/c back to HEP  Continue with knee for 4 weeks   Patient would benefit from: skilled physical therapy  Planned therapy interventions: activity modification, joint mobilization, manual therapy, motor coordination training, neuromuscular re-education, patient education, self care, therapeutic activities, therapeutic exercise, graded activity, home exercise program, behavior modification, graded exercise, functional ROM exercises, strengthening, balance and transfer training  Frequency: 2x week  Duration in weeks: 4  Plan of Care beginning date: 10/31/2023  Plan of Care expiration date: 11/28/2023  Treatment plan discussed with: patient        Subjective Evaluation    History of Present Illness  Mechanism of injury: Batsheva Rivas (10/31):  Pt has been seen for b/l LBP and L knee pain for 16 visits to date and reports overall progress.  Pt reports 90% improvement in back and 70% improvement in knee since starting therapy. Pt is not limited in anything functionally pertaining to her back but continues to have deficits w/ negotiating stairs and prolonged ambulation in the knee. Pt notes that she still uses SPC depending on the situation. Pt reports majority of knee pain is surrounding patella and in medial knee. RE-EVAL (9/28): Pt reports occasionally she gets pain in the front of the knee. Pt reports the knee used to give out occasionally and that has eased up since starting therapy. Pt reports she feels a bit stronger in the L knee but she is still careful with what she does. Pt reports occasional back pain since starting therapy. Pt reports if she does her stretches/exercises before she gets out of bed she doesn't have much pain. Pt reports she is still careful with the back as well, but she is able to do more at home without onset of pain. Pt reports that she has a long standing history of L knee pain. More often she has been feeling her knee giving out. She notes that she is having difficulty walking. She would like to have more control when she is walking. She does utilize a cane but not . She is more concerned about her weakness. Patient Goals  Patient goals for therapy: increased strength  Patient goal: be able to go up and down stairs to do laundry, be able to perform transfers,   Pain  Pain scale: 0/10 Back; 7/10 Knee. Pain scale at lowest: 0/10 Back; 6/10 Knee. Pain scale at highest: 0/10 Back; 9/10 Knee. Location: knee-5 back-4   Quality: pressure    Social Support  2  Interior stairs assessed: to go downstairs.    15  Lives with: adult children and young children          Objective     Concurrent Complaints      Additional Special Questions  pt reports that she has had incontence      Postural Observations  Seated posture: poor  Standing posture: poor      Active Range of Motion   Left Knee   Flexion: 120 degrees   Extension: 0 degrees Extensor lag: -5 degrees     Right Knee   Flexion: 120 degrees   Extension: 0 degrees   Extensor la degrees     Passive Range of Motion   Left Knee   Flexion: 130 degrees     Strength/Myotome Testing     Left Knee   Flexion: 4+  Extension: 4+    Right Knee   Flexion: 4+  Extension: 4+    Ambulation   Weight-Bearing Status   Weight-Bearing Status (Left): full weight bearing   Weight-Bearing Status (Right): full weight-bearing    Assistive device used: none    Observational Gait   Increased right stance time, left swing time and left step length. Decreased walking speed, left stance time, right swing time and right step length. Additional Observational Gait Details  Valgus collapsing at the L knee     General Comments:      Lumbar Comments  Active Range of Motion     Lumbar   Flexion: minimal restriction  Extension: Restriction level: minimal  Left lateral flexion:  Restriction level: minimal  Right lateral flexion: Restriction level: minimal  Left rotation: Restriction level:  WNL  Right rotation: Restriction level: WNL    Left Hip   Flexion: 100 degrees  Abduction: 28 degrees     Right Hip   Flexion: 100 degrees  Abduction: 30 degrees     Strength/Myotome Testing     Left Hip   Planes of Motion   Flexion: 4+  Abduction: 4   Adduction: 4+    Right Hip   Planes of Motion   Flexion: 4+  Abduction: 4  Adduction: 4+      Knee Comments  FOTO lumbar: IE- 56 RE-57 RE- 83    FOTO knee: IE- 46 RE- 51 RE- 74               Precautions: R knee replacement, hear murmur         POC Expires Knee 10/26/23  Lumbar 11/15/23         RE-EVAL 10/26/23         Manuals 10/24 10/31  9/19 9/26 9/30 10/3   STM                                        Neuro Re-Ed 10/24   9/19 9/26     Quad sets c holds          Marching on foam           TA Set 10"x10          TA Set w/Hip ABD    GTB 10'x10  GTB 10"x10   GTB 10"x10    TA Set w/ Hip Add          TA Set w/Alt March 2x10 ea     2# 2x10 ea   2# 2x10 ea    TA Set w/SLR 2x10 ea   2x10 ea 2x10 ea  2x10 ea   Ther Ex 10/24   9/19 9/26     Pt education on HEP, POC  FOTO; Subjective/Objective Measurements    RE-EVAL/Updated FOTO    SLR flexion     neuro      Bridges  GTB 3" 2x10    GTB 3"2x10 GTB 3" 2x10   GTB 3" 2x10    LAQ          SAQ          Side lying clamshells           Hamstring Stretch w/strap 30"x3   30"x3 ea 30"x3 ea   30"x3 ea    Modified  Kalyan Stretch 30"x3   30"x3 ea  30"x3 ea   30"x3 ea    Seated Ball Roll Out L/R/C    5"x10  5"x10      LTR 5"x10 ea    5"x10 e 5"x10 ea  5"x10 ea   Standing HR/TR          Rec.  Bike for mobility and endurance L1 x 6 mins  L1 x 6 mins   L1 x 6 min L1 x 6 mins  L1 x 6min  L1 x 6min    SLR abd          Lateral Band Walk    GTB x 4 laps      Leg Press    DBL 45# 2x10       Sit to Stand    Low mat 2x10       TRX          Standing hip abduction           Ther Activity          STS c foam                    Gait Training                              Modalities          modalities

## 2023-11-02 ENCOUNTER — OFFICE VISIT (OUTPATIENT)
Dept: PHYSICAL THERAPY | Facility: CLINIC | Age: 78
End: 2023-11-02
Payer: MEDICARE

## 2023-11-02 DIAGNOSIS — M22.2X2 PATELLOFEMORAL SYNDROME OF LEFT KNEE: Primary | ICD-10-CM

## 2023-11-02 DIAGNOSIS — R29.898 WEAKNESS OF BOTH HIPS: ICD-10-CM

## 2023-11-02 DIAGNOSIS — M17.12 PRIMARY OSTEOARTHRITIS OF LEFT KNEE: ICD-10-CM

## 2023-11-02 DIAGNOSIS — M62.9 HAMSTRING TIGHTNESS OF BOTH LOWER EXTREMITIES: ICD-10-CM

## 2023-11-02 PROCEDURE — 97112 NEUROMUSCULAR REEDUCATION: CPT

## 2023-11-02 PROCEDURE — 97110 THERAPEUTIC EXERCISES: CPT

## 2023-11-02 NOTE — PROGRESS NOTES
Daily Note     Today's date: 2023  Patient name: Aditya Norman  : 1945  MRN: [id number]  Referring provider: Jeff Sandoval  Dx:   Encounter Diagnosis     ICD-10-CM    1. Patellofemoral syndrome of left knee  M22.2X2       2. Hamstring tightness of both lower extremities  M62.9       3. Weakness of both hips  R29.898       4. Primary osteoarthritis of left knee  M17.12                      Subjective: pt reports pain in knee at night. Objective: See treatment diary below      Assessment: Tolerated treatment well. Patient would benefit from continued PT. Initiated POC for knee mobility and strength w/ good tolerance. Plan: Continue per plan of care. Precautions: R knee replacement, hear murmur         POC Expires Knee 23         RE-EVAL 23         Manuals 10/24 10/31 11/2   9/30 10/3                                           Neuro Re-Ed 10/24  11/2       Quad sets c holds   10" 10x       SLR flexion   2x10       SLR abd   2x10       Bridges    2x10 3"       Clamshells   Rtb 2x10                           Ther Ex 10/24         Pt education on HEP, POC  FOTO; Subjective/Objective Measurements    RE-EVAL/Updated FOTO    Standing HR/TR   2x10       Rec.  Bike for mobility and endurance L1 x 6 mins  L1 x 6 mins  L1 x 6 mins    L1 x 6min  L1 x 6min    Lateral Band Walk          Leg Press          TRX          Standing hip abduction           Ther Activity          STS c foam                     Gait Training                              Modalities

## 2023-11-07 ENCOUNTER — APPOINTMENT (OUTPATIENT)
Dept: PHYSICAL THERAPY | Facility: CLINIC | Age: 78
End: 2023-11-07
Payer: MEDICARE

## 2023-11-07 ENCOUNTER — REMOTE DEVICE CLINIC VISIT (OUTPATIENT)
Dept: CARDIOLOGY CLINIC | Facility: CLINIC | Age: 78
End: 2023-11-07
Payer: MEDICARE

## 2023-11-07 DIAGNOSIS — Z95.0 PRESENCE OF PERMANENT CARDIAC PACEMAKER: Primary | ICD-10-CM

## 2023-11-07 PROCEDURE — 93296 REM INTERROG EVL PM/IDS: CPT | Performed by: INTERNAL MEDICINE

## 2023-11-07 PROCEDURE — 93294 REM INTERROG EVL PM/LDLS PM: CPT | Performed by: INTERNAL MEDICINE

## 2023-11-07 NOTE — PROGRESS NOTES
Results for orders placed or performed in visit on 11/07/23   Cardiac EP device report    Narrative    SJM DUAL CHAMBER PM/NOT MRI CONDITIONAL  MERLIN TRANSMISSION:BATTERY VOLTAGE ADEQUATE. (1.5 YRS) AP 91%  <1%. ALL AVAILABLE LEAD PARAMETERS WITHIN NORMAL LIMITS. NO SIGNIFICANT HIGH RATE EPISODES. 3 AMS EPISODES DETECTED, <20 SEC. NORMAL DEVICE FUNCTION. ----REN

## 2023-11-09 ENCOUNTER — OFFICE VISIT (OUTPATIENT)
Dept: PHYSICAL THERAPY | Facility: CLINIC | Age: 78
End: 2023-11-09
Payer: MEDICARE

## 2023-11-09 DIAGNOSIS — M62.9 HAMSTRING TIGHTNESS OF BOTH LOWER EXTREMITIES: ICD-10-CM

## 2023-11-09 DIAGNOSIS — R29.898 WEAKNESS OF BOTH HIPS: ICD-10-CM

## 2023-11-09 DIAGNOSIS — M22.2X2 PATELLOFEMORAL SYNDROME OF LEFT KNEE: Primary | ICD-10-CM

## 2023-11-09 DIAGNOSIS — M17.12 PRIMARY OSTEOARTHRITIS OF LEFT KNEE: ICD-10-CM

## 2023-11-09 PROCEDURE — 97112 NEUROMUSCULAR REEDUCATION: CPT

## 2023-11-09 PROCEDURE — 97110 THERAPEUTIC EXERCISES: CPT

## 2023-11-09 NOTE — PROGRESS NOTES
Daily Note     Today's date: 2023  Patient name: Nilda Calderón  : 1945  MRN: [id number]  Referring provider: Edna Longoria  Dx:   Encounter Diagnosis     ICD-10-CM    1. Patellofemoral syndrome of left knee  M22.2X2       2. Hamstring tightness of both lower extremities  M62.9       3. Weakness of both hips  R29.898       4. Primary osteoarthritis of left knee  M17.12                      Subjective: pt reports her knees are bothering her today. Objective: See treatment diary below      Assessment: Tolerated treatment well. Patient would benefit from continued PT. Continued w/ current POC as listed below w/ good tolerance. Plan: Continue per plan of care. Precautions: R knee replacement, hear murmur         POC Expires Knee 23         RE-EVAL 23         Manuals 10/24 10/31 11/2 11/9  9/30 10/3                                           Neuro Re-Ed 10/24  11/2       Quad sets c holds   10" 10x 10" 10x      SLR flexion   2x10 2x10      SLR abd   2x10 2x10      Bridges    2x10 3" 3" 2x10      Clamshells   Rtb 2x10 Rtb 2x10                          Ther Ex 10/24         Pt education on HEP, POC  FOTO; Subjective/Objective Measurements    RE-EVAL/Updated FOTO    Standing HR/TR   2x10 2x10      Rec.  Bike for mobility and endurance L1 x 6 mins  L1 x 6 mins  L1 x 6 mins  L1 x 6 min  L1 x 6min  L1 x 6min    Lateral Band Walk          Leg Press          TRX          Step ups    6" 2x10      Ther Activity          STS c foam                     Gait Training                              Modalities

## 2023-11-14 ENCOUNTER — OFFICE VISIT (OUTPATIENT)
Dept: PHYSICAL THERAPY | Facility: CLINIC | Age: 78
End: 2023-11-14
Payer: MEDICARE

## 2023-11-14 DIAGNOSIS — M22.2X2 PATELLOFEMORAL SYNDROME OF LEFT KNEE: Primary | ICD-10-CM

## 2023-11-14 DIAGNOSIS — M62.9 HAMSTRING TIGHTNESS OF BOTH LOWER EXTREMITIES: ICD-10-CM

## 2023-11-14 DIAGNOSIS — R29.898 WEAKNESS OF BOTH HIPS: ICD-10-CM

## 2023-11-14 DIAGNOSIS — M17.12 PRIMARY OSTEOARTHRITIS OF LEFT KNEE: ICD-10-CM

## 2023-11-14 PROCEDURE — 97110 THERAPEUTIC EXERCISES: CPT

## 2023-11-14 PROCEDURE — 97112 NEUROMUSCULAR REEDUCATION: CPT

## 2023-11-14 NOTE — PROGRESS NOTES
Daily Note     Today's date: 2023  Patient name: Lilly Gagnon  : 1945  MRN: [id number]  Referring provider: Alton Lozano  Dx:   Encounter Diagnosis     ICD-10-CM    1. Patellofemoral syndrome of left knee  M22.2X2       2. Hamstring tightness of both lower extremities  M62.9       3. Weakness of both hips  R29.898       4. Primary osteoarthritis of left knee  M17.12           Start Time: 1015  Stop Time: 1055  Total time in clinic (min): 40 minutes    Subjective: Pt notes she is a little sore this morning. Objective: See treatment diary below      Assessment: Pt able to complete POC with no pain. Cueing provided for correct form and performance of exercises. Tolerated treatment well. Patient demonstrated fatigue post treatment and would benefit from continued PT      Plan: Continue per plan of care. Progress treatment as tolerated. Precautions: R knee replacement, hear murmur         POC Expires Knee 23         RE-EVAL 23         Manuals 10/24 10/31 11/2 11/9 11/14 9/30 10/3                                           Neuro Re-Ed 10/24  11/2  11/14     Quad sets c holds   10" 10x 10" 10x 10" 10x      SLR flexion   2x10 2x10 2x10      SLR abd   2x10 2x10 2x10      Bridges    2x10 3" 3" 2x10 3" 2x10     Clamshells   Rtb 2x10 Rtb 2x10 Rtb 2x10                          Ther Ex 10/24    11/14     Pt education on HEP, POC  FOTO; Subjective/Objective Measurements    RE-EVAL/Updated FOTO    Standing HR/TR   2x10 2x10 2x10      Rec.  Bike for mobility and endurance L1 x 6 mins  L1 x 6 mins  L1 x 6 mins  L1 x 6 min 6 min  L1 x 6min  L1 x 6min    Lateral Band Walk          Leg Press          TRX          Step ups    6" 2x10 6" 2x10      Ther Activity          STS c foam                     Gait Training                              Modalities

## 2023-11-16 ENCOUNTER — OFFICE VISIT (OUTPATIENT)
Dept: PHYSICAL THERAPY | Facility: CLINIC | Age: 78
End: 2023-11-16
Payer: MEDICARE

## 2023-11-16 DIAGNOSIS — R29.898 WEAKNESS OF BOTH HIPS: ICD-10-CM

## 2023-11-16 DIAGNOSIS — M22.2X2 PATELLOFEMORAL SYNDROME OF LEFT KNEE: Primary | ICD-10-CM

## 2023-11-16 DIAGNOSIS — M62.9 HAMSTRING TIGHTNESS OF BOTH LOWER EXTREMITIES: ICD-10-CM

## 2023-11-16 PROCEDURE — 97530 THERAPEUTIC ACTIVITIES: CPT

## 2023-11-16 PROCEDURE — 97112 NEUROMUSCULAR REEDUCATION: CPT

## 2023-11-16 PROCEDURE — 97110 THERAPEUTIC EXERCISES: CPT

## 2023-11-16 NOTE — PROGRESS NOTES
Daily Note     Today's date: 2023  Patient name: Edgard Stephens  : 1945  MRN: [id number]  Referring provider: Michela Angelucci  Dx:   Encounter Diagnosis     ICD-10-CM    1. Patellofemoral syndrome of left knee  M22.2X2       2. Hamstring tightness of both lower extremities  M62.9       3. Weakness of both hips  R29.898                      Subjective: Pt reports no new complaints upon arrival.       Objective: See treatment diary below      Assessment: Tolerated treatment well. Patient would benefit from continued PT. Reviewed and issued HEP. Plan: Continue per plan of care. Precautions: R knee replacement, hear murmur         POC Expires Knee 23         RE-EVAL 23         Manuals 10/24 10/31 11/2 11/9 11/14 11/16                                            Neuro Re-Ed 10/24  11/2  11/14     Quad sets c holds   10" 10x 10" 10x 10" 10x  10" 10x    SLR flexion   2x10 2x10 2x10  2x10    SLR abd   2x10 2x10 2x10  2x10    Bridges    2x10 3" 3" 2x10 3" 2x10 3" 2x10 rtb    Clamshells   Rtb 2x10 Rtb 2x10 Rtb 2x10  Rtb 2x10                         Ther Ex 10/24    11/14     Pt education on HEP, POC  FOTO; Subjective/Objective Measurements        Standing HR/TR   2x10 2x10 2x10  2x10    Rec.  Bike for mobility and endurance L1 x 6 mins  L1 x 6 mins  L1 x 6 mins  L1 x 6 min 6 min  6'    Lateral Band Walk          Leg Press          TRX          Step ups    6" 2x10 6" 2x10  6" 2x10    Ther Activity          STS c foam                     Gait Training                              Modalities

## 2023-11-21 ENCOUNTER — APPOINTMENT (OUTPATIENT)
Dept: PHYSICAL THERAPY | Facility: CLINIC | Age: 78
End: 2023-11-21
Payer: MEDICARE

## 2023-11-28 ENCOUNTER — EVALUATION (OUTPATIENT)
Dept: PHYSICAL THERAPY | Facility: CLINIC | Age: 78
End: 2023-11-28
Payer: MEDICARE

## 2023-11-28 DIAGNOSIS — R29.898 WEAKNESS OF BOTH HIPS: ICD-10-CM

## 2023-11-28 DIAGNOSIS — M17.12 PRIMARY OSTEOARTHRITIS OF LEFT KNEE: ICD-10-CM

## 2023-11-28 DIAGNOSIS — M22.2X2 PATELLOFEMORAL SYNDROME OF LEFT KNEE: Primary | ICD-10-CM

## 2023-11-28 DIAGNOSIS — M62.9 HAMSTRING TIGHTNESS OF BOTH LOWER EXTREMITIES: ICD-10-CM

## 2023-11-28 PROCEDURE — 97110 THERAPEUTIC EXERCISES: CPT

## 2023-11-28 NOTE — PROGRESS NOTES
PT-Discharge    Collection of Subjective/Objective data performed by Del Galicia PTA. Assessment, POC, and Goal attainment performed by Blanco Evans DPT. Today's date: 2023  Patient name: Viry Victoria  : 1945  MRN: [id number]  Referring provider: Dami Parirsh  Dx:   Encounter Diagnosis     ICD-10-CM    1. Patellofemoral syndrome of left knee  M22.2X2       2. Hamstring tightness of both lower extremities  M62.9       3. Weakness of both hips  R29.898       4. Primary osteoarthritis of left knee  M17.12                      Assessment  Assessment details: Patient is an 78year old female who presents to therapy with complaints of L knee pain. Patient demonstrates subjective/objective improvement since starting PT such as strength, range of motion, and mobility. She has an improved FOTO score. Pt has met all her goals and feels comfortable continuing at home. HEP updated and reviewed with patient. Impairments: abnormal gait, abnormal muscle firing, abnormal muscle tone, abnormal or restricted ROM, abnormal movement, activity intolerance, impaired physical strength, lacks appropriate home exercise program and poor body mechanics    Symptom irritability: lowUnderstanding of Dx/Px/POC: good   Prognosis: fair    Goals  KNEE  ST. Independent with HEP in 2 weeks-MET  2. Pt will have verbal report of improvement in symptoms by >/=25% in 2 weeks- MET    To be achieved by D/C   LT. Pt will improve FOTO score by >/= 9 points in 6 weeks -MET  2. Pt will improve FOTO score to >/= 56 by visit # 12 -progressing--MET  3. Pt will be able to perform household chores with little to no difficulty -MET  4. Pt will be able to cook with no difficulty -MET  5. Pt will be able to ascend/descend stairs reciprocally -MET  6.  Pt will be able to carry laundry up stairs independently -MET      Plan  Plan details: D/c to HEP   Patient would benefit from: skilled physical therapy  Planned therapy interventions: activity modification, joint mobilization, manual therapy, motor coordination training, neuromuscular re-education, patient education, self care, therapeutic activities, therapeutic exercise, graded activity, home exercise program, behavior modification, graded exercise, functional ROM exercises, strengthening, balance and transfer training  Frequency: 2x week  Duration in weeks: 4  Plan of Care beginning date: 10/31/2023  Plan of Care expiration date: 11/28/2023  Treatment plan discussed with: patient        Subjective Evaluation    History of Present Illness  Mechanism of injury: RE-EVAL (11/28/23): Pt reports 80% improvement since starting therapy and states she would like to continue the exercises at home. Pt reports pain has improved but knows she will have to continually work on HEP due to knowing "it is arthritis" in the knees. Pt reports compliance with HEP. RE-EVAL (10/31):  Pt has been seen for b/l LBP and L knee pain for 16 visits to date and reports overall progress. Pt reports 90% improvement in back and 70% improvement in knee since starting therapy. Pt is not limited in anything functionally pertaining to her back but continues to have deficits w/ negotiating stairs and prolonged ambulation in the knee. Pt notes that she still uses SPC depending on the situation. Pt reports majority of knee pain is surrounding patella and in medial knee. RE-EVAL (9/28): Pt reports occasionally she gets pain in the front of the knee. Pt reports the knee used to give out occasionally and that has eased up since starting therapy. Pt reports she feels a bit stronger in the L knee but she is still careful with what she does. Pt reports occasional back pain since starting therapy. Pt reports if she does her stretches/exercises before she gets out of bed she doesn't have much pain. Pt reports she is still careful with the back as well, but she is able to do more at home without onset of pain. Pt reports that she has a long standing history of L knee pain. More often she has been feeling her knee giving out. She notes that she is having difficulty walking. She would like to have more control when she is walking. She does utilize a cane but not . She is more concerned about her weakness. Patient Goals  Patient goals for therapy: increased strength  Patient goal: be able to go up and down stairs to do laundry, be able to perform transfers,   Pain  Current pain ratin  At best pain ratin  At worst pain ratin  Quality: pressure    Social Support  2  Interior stairs assessed: to go downstairs. 12  Lives with: adult children and young children          Objective     Active Range of Motion   Left Knee   Flexion: 120 degrees   Extension: 0 degrees   Extensor lag: -2 degrees     Right Knee   Flexion: 120 degrees   Extension: 0 degrees   Extensor la degrees     Passive Range of Motion   Left Knee   Flexion: 130 degrees     Strength/Myotome Testing     Left Knee   Flexion: 4+  Extension: 5    Right Knee   Flexion: 4+  Extension: 5    Ambulation   Weight-Bearing Status   Weight-Bearing Status (Left): full weight bearing   Weight-Bearing Status (Right): full weight-bearing    Assistive device used: none    Observational Gait   Increased right stance time, left swing time and left step length. Decreased walking speed, left stance time, right swing time and right step length.      Additional Observational Gait Details  Valgus collapsing at the L knee     General Comments:      Knee Comments  FOTO knee: IE- 46 RE- 46 RE- 74 RE-84        Precautions: R knee replacement, hear murmur         POC Expires Knee 23         RE-EVAL 23         Manuals 10/24 10/31 11/2 11/9 11/14 11/16 11/28                                           Neuro Re-Ed 10/24  11/2  11/14     Quad sets c holds   10" 10x 10" 10x 10" 10x  10" 10x    SLR flexion   2x10 2x10 2x10  2x10    SLR abd   2x10 2x10 2x10  2x10 Bridges    2x10 3" 3" 2x10 3" 2x10 3" 2x10 rtb    Clamshells   Rtb 2x10 Rtb 2x10 Rtb 2x10  Rtb 2x10                         Ther Ex 10/24    11/14     Pt education on HEP, POC  FOTO; Subjective/Objective Measurements     FOTO; Subjective/Objective Measurements taken, updated HEp   Standing HR/TR   2x10 2x10 2x10  2x10    Rec.  Bike for mobility and endurance L1 x 6 mins  L1 x 6 mins  L1 x 6 mins  L1 x 6 min 6 min  6' 6'   Lateral Band Walk          Leg Press          TRX          Step ups    6" 2x10 6" 2x10  6" 2x10    Ther Activity          STS c foam                     Gait Training                              Modalities

## 2023-11-30 ENCOUNTER — ANTICOAG VISIT (OUTPATIENT)
Dept: CARDIOLOGY CLINIC | Facility: CLINIC | Age: 78
End: 2023-11-30

## 2023-11-30 ENCOUNTER — APPOINTMENT (OUTPATIENT)
Dept: PHYSICAL THERAPY | Facility: CLINIC | Age: 78
End: 2023-11-30
Payer: MEDICARE

## 2023-11-30 ENCOUNTER — APPOINTMENT (OUTPATIENT)
Dept: LAB | Facility: HOSPITAL | Age: 78
End: 2023-11-30
Payer: MEDICARE

## 2023-12-14 ENCOUNTER — TELEPHONE (OUTPATIENT)
Dept: FAMILY MEDICINE CLINIC | Facility: CLINIC | Age: 78
End: 2023-12-14

## 2023-12-14 DIAGNOSIS — N39.41 URINARY INCONTINENCE, URGE: ICD-10-CM

## 2023-12-14 RX ORDER — TOLTERODINE 4 MG/1
4 CAPSULE, EXTENDED RELEASE ORAL DAILY
Qty: 90 CAPSULE | Refills: 1 | Status: SHIPPED | OUTPATIENT
Start: 2023-12-14

## 2023-12-14 NOTE — TELEPHONE ENCOUNTER
T/c from pt -- states that she was informed by Dr Andrew Foote to call back to let him know which medication strength is better for her Urinary issues. Pt states she feels the Tolterodine 4mg works the best for her. Asking if Dr Andrew Foote can send in a refill for the 4mg.      Please advise

## 2023-12-27 ENCOUNTER — ANTICOAG VISIT (OUTPATIENT)
Dept: CARDIOLOGY CLINIC | Facility: CLINIC | Age: 78
End: 2023-12-27

## 2023-12-27 ENCOUNTER — APPOINTMENT (OUTPATIENT)
Dept: LAB | Facility: HOSPITAL | Age: 78
End: 2023-12-27
Payer: MEDICARE

## 2024-01-28 ENCOUNTER — APPOINTMENT (OUTPATIENT)
Dept: RADIOLOGY | Facility: HOSPITAL | Age: 79
End: 2024-01-28
Payer: MEDICARE

## 2024-01-28 LAB
BASOPHILS # BLD AUTO: 0.06 THOUSANDS/ÂΜL (ref 0–0.1)
BASOPHILS NFR BLD AUTO: 1 % (ref 0–1)
EOSINOPHIL # BLD AUTO: 0.25 THOUSAND/ÂΜL (ref 0–0.61)
EOSINOPHIL NFR BLD AUTO: 2 % (ref 0–6)
ERYTHROCYTE [DISTWIDTH] IN BLOOD BY AUTOMATED COUNT: 14.3 % (ref 11.6–15.1)
HCT VFR BLD AUTO: 35.1 % (ref 34.8–46.1)
HGB BLD-MCNC: 11.3 G/DL (ref 11.5–15.4)
IMM GRANULOCYTES # BLD AUTO: 0.04 THOUSAND/UL (ref 0–0.2)
IMM GRANULOCYTES NFR BLD AUTO: 0 % (ref 0–2)
LYMPHOCYTES # BLD AUTO: 2.26 THOUSANDS/ÂΜL (ref 0.6–4.47)
LYMPHOCYTES NFR BLD AUTO: 19 % (ref 14–44)
MCH RBC QN AUTO: 29.7 PG (ref 26.8–34.3)
MCHC RBC AUTO-ENTMCNC: 32.2 G/DL (ref 31.4–37.4)
MCV RBC AUTO: 92 FL (ref 82–98)
MONOCYTES # BLD AUTO: 0.85 THOUSAND/ÂΜL (ref 0.17–1.22)
MONOCYTES NFR BLD AUTO: 7 % (ref 4–12)
NEUTROPHILS # BLD AUTO: 8.17 THOUSANDS/ÂΜL (ref 1.85–7.62)
NEUTS SEG NFR BLD AUTO: 71 % (ref 43–75)
NRBC BLD AUTO-RTO: 0 /100 WBCS
PLATELET # BLD AUTO: 383 THOUSANDS/UL (ref 149–390)
PMV BLD AUTO: 9.3 FL (ref 8.9–12.7)
RBC # BLD AUTO: 3.8 MILLION/UL (ref 3.81–5.12)
WBC # BLD AUTO: 11.63 THOUSAND/UL (ref 4.31–10.16)

## 2024-01-28 PROCEDURE — 99285 EMERGENCY DEPT VISIT HI MDM: CPT

## 2024-01-28 PROCEDURE — 84484 ASSAY OF TROPONIN QUANT: CPT | Performed by: EMERGENCY MEDICINE

## 2024-01-28 PROCEDURE — 85025 COMPLETE CBC W/AUTO DIFF WBC: CPT | Performed by: EMERGENCY MEDICINE

## 2024-01-28 PROCEDURE — 36415 COLL VENOUS BLD VENIPUNCTURE: CPT

## 2024-01-28 PROCEDURE — 71046 X-RAY EXAM CHEST 2 VIEWS: CPT

## 2024-01-28 PROCEDURE — 85730 THROMBOPLASTIN TIME PARTIAL: CPT

## 2024-01-28 PROCEDURE — 85610 PROTHROMBIN TIME: CPT

## 2024-01-28 PROCEDURE — 80053 COMPREHEN METABOLIC PANEL: CPT | Performed by: EMERGENCY MEDICINE

## 2024-01-28 PROCEDURE — 93005 ELECTROCARDIOGRAM TRACING: CPT

## 2024-01-29 ENCOUNTER — HOSPITAL ENCOUNTER (EMERGENCY)
Facility: HOSPITAL | Age: 79
Discharge: HOME | End: 2024-01-29
Attending: EMERGENCY MEDICINE
Payer: MEDICARE

## 2024-01-29 VITALS
HEIGHT: 61 IN | DIASTOLIC BLOOD PRESSURE: 82 MMHG | TEMPERATURE: 97.9 F | OXYGEN SATURATION: 97 % | SYSTOLIC BLOOD PRESSURE: 179 MMHG | BODY MASS INDEX: 29.45 KG/M2 | HEART RATE: 61 BPM | RESPIRATION RATE: 18 BRPM | WEIGHT: 156 LBS

## 2024-01-29 DIAGNOSIS — R07.9 CHEST PAIN: Primary | ICD-10-CM

## 2024-01-29 LAB
2HR DELTA HS TROPONIN: 2 NG/L
4HR DELTA HS TROPONIN: 2 NG/L
ALBUMIN SERPL BCP-MCNC: 4.2 G/DL (ref 3.5–5)
ALP SERPL-CCNC: 41 U/L (ref 34–104)
ALT SERPL W P-5'-P-CCNC: 13 U/L (ref 7–52)
ANION GAP SERPL CALCULATED.3IONS-SCNC: 8 MMOL/L
APTT PPP: 61 SECONDS (ref 23–37)
AST SERPL W P-5'-P-CCNC: 22 U/L (ref 13–39)
BILIRUB SERPL-MCNC: 0.33 MG/DL (ref 0.2–1)
BUN SERPL-MCNC: 27 MG/DL (ref 5–25)
CALCIUM SERPL-MCNC: 11.1 MG/DL (ref 8.4–10.2)
CARDIAC TROPONIN I PNL SERPL HS: 10 NG/L
CARDIAC TROPONIN I PNL SERPL HS: 10 NG/L
CARDIAC TROPONIN I PNL SERPL HS: 8 NG/L
CHLORIDE SERPL-SCNC: 101 MMOL/L (ref 96–108)
CO2 SERPL-SCNC: 28 MMOL/L (ref 21–32)
CREAT SERPL-MCNC: 0.81 MG/DL (ref 0.6–1.3)
GFR SERPL CREATININE-BSD FRML MDRD: 68 ML/MIN/1.73SQ M
GLUCOSE SERPL-MCNC: 122 MG/DL (ref 65–140)
INR PPP: 2.1 (ref 0.84–1.19)
POTASSIUM SERPL-SCNC: 3.1 MMOL/L (ref 3.5–5.3)
PROT SERPL-MCNC: 7.4 G/DL (ref 6.4–8.4)
PROTHROMBIN TIME: 24.4 SECONDS (ref 11.6–14.5)
SODIUM SERPL-SCNC: 137 MMOL/L (ref 135–147)

## 2024-01-29 PROCEDURE — 99285 EMERGENCY DEPT VISIT HI MDM: CPT

## 2024-01-29 PROCEDURE — 36415 COLL VENOUS BLD VENIPUNCTURE: CPT | Performed by: EMERGENCY MEDICINE

## 2024-01-29 PROCEDURE — 96365 THER/PROPH/DIAG IV INF INIT: CPT

## 2024-01-29 PROCEDURE — 96366 THER/PROPH/DIAG IV INF ADDON: CPT

## 2024-01-29 PROCEDURE — 84484 ASSAY OF TROPONIN QUANT: CPT | Performed by: EMERGENCY MEDICINE

## 2024-01-29 PROCEDURE — 96375 TX/PRO/DX INJ NEW DRUG ADDON: CPT

## 2024-01-29 RX ORDER — POTASSIUM CHLORIDE 14.9 MG/ML
20 INJECTION INTRAVENOUS ONCE
Status: COMPLETED | OUTPATIENT
Start: 2024-01-29 | End: 2024-01-29

## 2024-01-29 RX ADMIN — MORPHINE SULFATE 2 MG: 2 INJECTION, SOLUTION INTRAMUSCULAR; INTRAVENOUS at 01:58

## 2024-01-29 RX ADMIN — SODIUM CHLORIDE 1000 ML: 0.9 INJECTION, SOLUTION INTRAVENOUS at 01:30

## 2024-01-29 RX ADMIN — POTASSIUM CHLORIDE 20 MEQ: 14.9 INJECTION, SOLUTION INTRAVENOUS at 01:30

## 2024-01-29 NOTE — ED PROVIDER NOTES
History  Chief Complaint   Patient presents with    Chest Pain     Pt arrives via hospital wheelchair with a c/o left sided chest pain that started around 2030 tonight, after reaching to pick something up. Denies hx of same. Pt has pacemaker that was placed on left side in 2019.     The patient is a 78 y.o. female with a history of atrial fibrillation on Coumadin, hypertension, hyperlipidemia, and type 2 diabetes who presents to [facility] with a chief complaint of left sided chest wall pain. Symptoms began tonight when she bent down to  a box of 6 dozen eggs and have been constant since onset. Her pain is currently rated as a 6/10 in severity and described as sharp without radiation. Symptoms are aggravated with movement and alleviating factors include resting. The patient denies fever, chills, night sweats, shortness of breath, cough, wheezing, recent falls or trauma, headache, blurred vision, dizziness, lightheadedness, PND, orthopnea. She reports taking nothing prior to arrival. No other reported symptoms at this time.  Patient denies allergies to anything  Patient reports that given her age she is concerned about her heart.      History provided by:  Patient   used: No    Chest Pain  Associated symptoms: no abdominal pain, no back pain, no cough, no fever, no palpitations, no shortness of breath and not vomiting        Prior to Admission Medications   Prescriptions Last Dose Informant Patient Reported? Taking?   Ascorbic Acid (VITAMIN C) 100 MG tablet  Self Yes No   Sig: Take 500 mg by mouth daily   B Complex Vitamins (B COMPLEX 100 PO)  Self Yes No   Sig: Take 1 tablet by mouth daily   Blood Glucose Monitoring Suppl (FREESTYLE LITE) RANULFO  Self No No   Sig: by Does not apply route 2 (two) times a day Dx E11.9   FREESTYLE LITE test strip  Self No No   Sig: TEST TWO TIMES A DAY   Lancets (freestyle) lancets   No No   Sig: USE TO TEST TWICE DAILY   Multiple Vitamin  (MULTIVITAMIN) tablet  Self Yes No   Sig: Take 1 tablet by mouth daily   alendronate (Fosamax) 70 mg tablet  Self No No   Sig: Take 1 tablet (70 mg total) by mouth every 7 days   atenolol-chlorthalidone (TENORETIC) 50-25 mg per tablet  Self No No   Sig: TAKE ONE TABLET BY MOUTH EVERY DAY   cyclobenzaprine (FLEXERIL) 10 mg tablet   No No   Sig: TAKE ONE TABLET BY MOUTH THREE TIMES A DAY AS NEEDED FOR MUSCLE SPASMS   dorzolamide (TRUSOPT) 2 % ophthalmic solution  Self Yes No   Sig: INSTILL 1 DROP INTO THE RIGHT EYE THREE TIMES A DAY   glipiZIDE (GLUCOTROL XL) 2.5 mg 24 hr tablet   No No   Sig: Take 1 tablet (2.5 mg total) by mouth daily   glucose blood (JASWANT CONTOUR TEST) test strip  Self No No   Sig: Check blood sugar twice daily, DX:E11.9   levothyroxine 50 mcg tablet   No No   Sig: TAKE ONE TABLET BY MOUTH EVERY DAY   niacin 500 mg tablet  Self Yes No   Sig: Take by mouth    pioglitazone (ACTOS) 15 mg tablet   No No   Sig: TAKE ONE TABLET BY MOUTH EVERY DAY   rosuvastatin (CRESTOR) 10 MG tablet  Self No No   Sig: TAKE ONE TABLET BY MOUTH EVERY DAY   tolterodine (DETROL LA) 4 mg 24 hr capsule   No No   Sig: Take 1 capsule (4 mg total) by mouth daily   triamcinolone (KENALOG) 0.1 % ointment  Self No No   Sig: Apply topically 2 (two) times a day   warfarin (Jantoven) 5 mg tablet   No No   Sig: TAKE ONE TO TWO TABLETS BY MOUTH EVERY DAY      Facility-Administered Medications: None       Past Medical History:   Diagnosis Date    Carpal tunnel syndrome     Heart murmur        Past Surgical History:   Procedure Laterality Date    CATARACT EXTRACTION      CHOLECYSTECTOMY      COLONOSCOPY  11/24/2007    EYE SURGERY      KNEE SURGERY      REPLACEMENT TOTAL KNEE Right     TOTAL ABDOMINAL HYSTERECTOMY W/ BILATERAL SALPINGOOPHORECTOMY         Family History   Problem Relation Age of Onset    Heart attack Father         ARRHYTHMIAS    Coronary artery disease Family     Diabetes Family     Heart attack Family         ARRHYTHMIAS      I have reviewed and agree with the history as documented.    E-Cigarette/Vaping    E-Cigarette Use Never User      E-Cigarette/Vaping Substances    Nicotine No     THC No     CBD No     Flavoring No     Other No     Unknown No      Social History     Tobacco Use    Smoking status: Never    Smokeless tobacco: Never   Vaping Use    Vaping status: Never Used   Substance Use Topics    Alcohol use: Yes    Drug use: No       Review of Systems   Constitutional:  Negative for chills and fever.   HENT:  Negative for ear pain and sore throat.    Eyes:  Negative for pain and visual disturbance.   Respiratory:  Negative for cough and shortness of breath.    Cardiovascular:  Positive for chest pain. Negative for palpitations.   Gastrointestinal:  Negative for abdominal pain and vomiting.   Genitourinary:  Negative for dysuria and hematuria.   Musculoskeletal:  Negative for arthralgias and back pain.   Skin:  Negative for color change and rash.   Neurological:  Negative for seizures and syncope.   All other systems reviewed and are negative.      Physical Exam  Physical Exam  Vitals reviewed.   HENT:      Head: Normocephalic and atraumatic.   Eyes:      Extraocular Movements: Extraocular movements intact.      Pupils: Pupils are equal, round, and reactive to light.   Cardiovascular:      Rate and Rhythm: Normal rate.      Pulses:           Carotid pulses are 2+ on the right side and 2+ on the left side.       Radial pulses are 2+ on the right side and 2+ on the left side.      Heart sounds: Normal heart sounds.   Pulmonary:      Effort: Pulmonary effort is normal. No tachypnea.      Breath sounds: No decreased breath sounds, wheezing, rhonchi or rales.   Abdominal:      Palpations: Abdomen is soft.      Tenderness: There is no abdominal tenderness.   Musculoskeletal:         General: Normal range of motion.      Cervical back: Normal range of motion.   Skin:     General: Skin is warm and dry.      Capillary Refill: Capillary  refill takes less than 2 seconds.   Neurological:      Mental Status: She is alert and oriented to person, place, and time.         Vital Signs  ED Triage Vitals   Temperature Pulse Respirations Blood Pressure SpO2   01/28/24 2329 01/28/24 2329 01/28/24 2329 01/28/24 2329 01/28/24 2329   97.9 °F (36.6 °C) 62 18 (!) 181/79 98 %      Temp Source Heart Rate Source Patient Position - Orthostatic VS BP Location FiO2 (%)   01/28/24 2329 01/28/24 2329 01/28/24 2329 01/28/24 2329 --   Temporal Monitor Sitting Right arm       Pain Score       01/29/24 0030       No Pain           Vitals:    01/28/24 2329 01/29/24 0030 01/29/24 0201   BP: (!) 181/79 (!) 186/77 (!) 179/82   Pulse: 62 61    Patient Position - Orthostatic VS: Sitting Sitting          Visual Acuity      ED Medications  Medications   potassium chloride 20 mEq IVPB (premix) (0 mEq Intravenous Stopped 1/29/24 0521)   sodium chloride 0.9 % bolus 1,000 mL (0 mL Intravenous Stopped 1/29/24 0521)   morphine injection 2 mg (2 mg Intravenous Given 1/29/24 0158)       Diagnostic Studies  Results Reviewed       Procedure Component Value Units Date/Time    HS Troponin I 4hr [[id number]]  (Normal) Collected: 01/29/24 0348    Lab Status: Final result Specimen: Blood from Arm, Left Updated: 01/29/24 0418     hs TnI 4hr 10 ng/L      Delta 4hr hsTnI 2 ng/L     HS Troponin I 2hr [[id number]]  (Normal) Collected: 01/29/24 0221    Lab Status: Final result Specimen: Blood from Arm, Right Updated: 01/29/24 0251     hs TnI 2hr 10 ng/L      Delta 2hr hsTnI 2 ng/L     Protime-INR [[id number]]  (Abnormal) Collected: 01/28/24 2337    Lab Status: Final result Specimen: Blood from Arm, Right Updated: 01/29/24 0043     Protime 24.4 seconds      INR 2.10    APTT [[id number]]  (Abnormal) Collected: 01/28/24 2337    Lab Status: Final result Specimen: Blood from Arm, Right Updated: 01/29/24 0043     PTT 61 seconds     Comprehensive metabolic panel [[id number]]  (Abnormal) Collected: 01/28/24 0962     Lab Status: Final result Specimen: Blood from Arm, Right Updated: 01/29/24 0014     Sodium 137 mmol/L      Potassium 3.1 mmol/L      Chloride 101 mmol/L      CO2 28 mmol/L      ANION GAP 8 mmol/L      BUN 27 mg/dL      Creatinine 0.81 mg/dL      Glucose 122 mg/dL      Calcium 11.1 mg/dL      AST 22 U/L      ALT 13 U/L      Alkaline Phosphatase 41 U/L      Total Protein 7.4 g/dL      Albumin 4.2 g/dL      Total Bilirubin 0.33 mg/dL      eGFR 68 ml/min/1.73sq m     Narrative:      National Kidney Disease Foundation guidelines for Chronic Kidney Disease (CKD):     Stage 1 with normal or high GFR (GFR > 90 mL/min/1.73 square meters)    Stage 2 Mild CKD (GFR = 60-89 mL/min/1.73 square meters)    Stage 3A Moderate CKD (GFR = 45-59 mL/min/1.73 square meters)    Stage 3B Moderate CKD (GFR = 30-44 mL/min/1.73 square meters)    Stage 4 Severe CKD (GFR = 15-29 mL/min/1.73 square meters)    Stage 5 End Stage CKD (GFR <15 mL/min/1.73 square meters)  Note: GFR calculation is accurate only with a steady state creatinine    HS Troponin 0hr (reflex protocol) [[id number]]  (Normal) Collected: 01/28/24 2337    Lab Status: Final result Specimen: Blood from Arm, Right Updated: 01/29/24 0005     hs TnI 0hr 8 ng/L     CBC and differential [[id number]]  (Abnormal) Collected: 01/28/24 2337    Lab Status: Final result Specimen: Blood from Arm, Right Updated: 01/28/24 2344     WBC 11.63 Thousand/uL      RBC 3.80 Million/uL      Hemoglobin 11.3 g/dL      Hematocrit 35.1 %      MCV 92 fL      MCH 29.7 pg      MCHC 32.2 g/dL      RDW 14.3 %      MPV 9.3 fL      Platelets 383 Thousands/uL      nRBC 0 /100 WBCs      Neutrophils Relative 71 %      Immat GRANS % 0 %      Lymphocytes Relative 19 %      Monocytes Relative 7 %      Eosinophils Relative 2 %      Basophils Relative 1 %      Neutrophils Absolute 8.17 Thousands/µL      Immature Grans Absolute 0.04 Thousand/uL      Lymphocytes Absolute 2.26 Thousands/µL      Monocytes Absolute 0.85  Thousand/µL      Eosinophils Absolute 0.25 Thousand/µL      Basophils Absolute 0.06 Thousands/µL                    XR chest pa & lateral   ED Interpretation by Gualberto Palomo PA-C (01/29 0124)   Cardiac pacemaker visualized, no acute cardiopulmonary disease                 Procedures  Procedures         ED Course  ED Course as of 01/29/24 0550   Mon Jan 29, 2024   0006 hs TnI 0hr: 8   0044 POCT INR(!): 2.10  Patient takes warfarin     0252 Delta 2hr hsTnI: 2   0422 Delta 4hr hsTnI: 2             HEART Risk Score      Flowsheet Row Most Recent Value   Heart Score Risk Calculator    History 0 Filed at: 01/29/2024 0236   ECG 1 Filed at: 01/29/2024 0236   Age 2 Filed at: 01/29/2024 0236   Risk Factors 1 Filed at: 01/29/2024 0236   Troponin 0 Filed at: 01/29/2024 0236   HEART Score 4 Filed at: 01/29/2024 0236                          SBIRT 20yo+      Flowsheet Row Most Recent Value   Initial Alcohol Screen: US AUDIT-C     1. How often do you have a drink containing alcohol? 0 Filed at: 01/28/2024 2331   2. How many drinks containing alcohol do you have on a typical day you are drinking?  0 Filed at: 01/28/2024 2331   3a. Male UNDER 65: How often do you have five or more drinks on one occasion? 0 Filed at: 01/28/2024 2331   Audit-C Score 0 Filed at: 01/28/2024 2331   RUDY: How many times in the past year have you...    Used an illegal drug or used a prescription medication for non-medical reasons? Never Filed at: 01/28/2024 2331                      Medical Decision Making  Exam without evidence of volume overload so doubt heart failure. EKG without signs of active ischemia. Given the timing of pain to ER presentation, initial troponin 8 delta troponin 2 4 hr trop was 2 so doubt NSTEMI. Presentation not consistent with acute PE patient resp 16, HR 61, no shortness of breath and no pleuritic component, pneumothorax (not visualized on chest xr), thoracic aortic dissection, pericarditis, tamponade, pneumonia (no infectious  symptoms, clear chest xr), myocarditis (no recent illness, neg trop). HEART score: 4 so plan to discharge patient home with PCP and her cardiologist follow up.  Patient has inciting event, movement pain, and pain reproducible with palpation, most likely musculoskeletal pain. Educated patient on red flag symptoms and emergent chest and heart etiologies. Discussed using heat/ice and tylenol with resting. Given strict parameters. Patient understands and agrees with treatment plan and follow up.     Problems Addressed:  Chest pain: acute illness or injury    Amount and/or Complexity of Data Reviewed  Labs: ordered. Decision-making details documented in ED Course.  Radiology: independent interpretation performed.    Risk  Prescription drug management.             Disposition  Final diagnoses:   Chest pain     Time reflects when diagnosis was documented in both MDM as applicable and the Disposition within this note       Time User Action Codes Description Comment    1/29/2024  3:01 AM Gualberto Palomo Add [R07.9] Chest pain           ED Disposition       ED Disposition   Discharge    Condition   Stable    Date/Time   Mon Jan 29, 2024 0417    Comment   Florina Pace discharge to home/self care.                   Follow-up Information       Follow up With Specialties Details Why Contact Info Additional Information    Benji Bose,  Family Medicine Schedule an appointment as soon as possible for a visit   [address]  [facility] [address]  [phone]       [facility] Emergency Medicine  If symptoms worsen [address] [facility] [id number] [phone] [facility], [address] [place], [place], [address]            Discharge Medication List as of 1/29/2024  4:17 AM        CONTINUE these medications which have NOT CHANGED    Details   alendronate (Fosamax) 70 mg tablet Take 1 tablet (70 mg total)  by mouth every 7 days, Starting Tue 5/30/2023, Normal      Ascorbic Acid (VITAMIN C) 100 MG tablet Take 500 mg by mouth daily, Historical Med      atenolol-chlorthalidone (TENORETIC) 50-25 mg per tablet TAKE ONE TABLET BY MOUTH EVERY DAY, Normal      B Complex Vitamins (B COMPLEX 100 PO) Take 1 tablet by mouth daily, Starting Tue 8/15/2017, Historical Med      Blood Glucose Monitoring Suppl (FREESTYLE LITE) RANULFO by Does not apply route 2 (two) times a day Dx E11.9, Starting Thu 4/2/2020, Normal      cyclobenzaprine (FLEXERIL) 10 mg tablet TAKE ONE TABLET BY MOUTH THREE TIMES A DAY AS NEEDED FOR MUSCLE SPASMS, Starting Fri 9/22/2023, Normal      dorzolamide (TRUSOPT) 2 % ophthalmic solution INSTILL 1 DROP INTO THE RIGHT EYE THREE TIMES A DAY, Historical Med      !! FREESTYLE LITE test strip TEST TWO TIMES A DAY, Normal      glipiZIDE (GLUCOTROL XL) 2.5 mg 24 hr tablet Take 1 tablet (2.5 mg total) by mouth daily, Starting Fri 9/15/2023, Normal      !! glucose blood (JASWANT CONTOUR TEST) test strip Check blood sugar twice daily, DX:E11.9, Normal      Lancets (freestyle) lancets USE TO TEST TWICE DAILY, Normal      levothyroxine 50 mcg tablet TAKE ONE TABLET BY MOUTH EVERY DAY, Starting Mon 9/25/2023, Normal      Multiple Vitamin (MULTIVITAMIN) tablet Take 1 tablet by mouth daily, Historical Med      niacin 500 mg tablet Take by mouth , Historical Med      pioglitazone (ACTOS) 15 mg tablet TAKE ONE TABLET BY MOUTH EVERY DAY, Normal      rosuvastatin (CRESTOR) 10 MG tablet TAKE ONE TABLET BY MOUTH EVERY DAY, Normal      tolterodine (DETROL LA) 4 mg 24 hr capsule Take 1 capsule (4 mg total) by mouth daily, Starting Thu 12/14/2023, Normal      triamcinolone (KENALOG) 0.1 % ointment Apply topically 2 (two) times a day, Starting Mon 8/15/2022, Normal      warfarin (Jantoven) 5 mg tablet TAKE ONE TO TWO TABLETS BY MOUTH EVERY DAY, Normal       !! - Potential duplicate medications found. Please discuss with provider.           No discharge procedures on file.    PDMP Review       None            ED Provider  Electronically Signed by             Gualberto Palomo PA-C  01/29/24 0606

## 2024-01-29 NOTE — DISCHARGE INSTRUCTIONS
Follow up with your PCP. Take tylenol for pain. Use ice/heat, rest. If any symptoms worsen or new symptoms appear return to the ER.

## 2024-01-30 ENCOUNTER — ANTICOAG VISIT (OUTPATIENT)
Dept: CARDIOLOGY CLINIC | Facility: CLINIC | Age: 79
End: 2024-01-30

## 2024-01-31 LAB
ATRIAL RATE: 63 BPM
ATRIAL RATE: 68 BPM
P AXIS: 83 DEGREES
P AXIS: 87 DEGREES
PR INTERVAL: 216 MS
PR INTERVAL: 220 MS
QRS AXIS: 58 DEGREES
QRS AXIS: 61 DEGREES
QRSD INTERVAL: 100 MS
QRSD INTERVAL: 96 MS
QT INTERVAL: 424 MS
QT INTERVAL: 446 MS
QTC INTERVAL: 450 MS
QTC INTERVAL: 456 MS
T WAVE AXIS: 64 DEGREES
T WAVE AXIS: 65 DEGREES
VENTRICULAR RATE: 63 BPM
VENTRICULAR RATE: 68 BPM

## 2024-02-01 ENCOUNTER — OFFICE VISIT (OUTPATIENT)
Dept: FAMILY MEDICINE CLINIC | Facility: CLINIC | Age: 79
End: 2024-02-01
Payer: MEDICARE

## 2024-02-01 VITALS
BODY MASS INDEX: 29.3 KG/M2 | HEIGHT: 61 IN | HEART RATE: 72 BPM | TEMPERATURE: 97.4 F | OXYGEN SATURATION: 96 % | DIASTOLIC BLOOD PRESSURE: 62 MMHG | SYSTOLIC BLOOD PRESSURE: 122 MMHG | WEIGHT: 155.2 LBS

## 2024-02-01 DIAGNOSIS — R07.89 CHEST WALL PAIN: ICD-10-CM

## 2024-02-01 DIAGNOSIS — I10 ESSENTIAL HYPERTENSION: Primary | ICD-10-CM

## 2024-02-01 DIAGNOSIS — E11.9 TYPE 2 DIABETES MELLITUS WITHOUT COMPLICATION, WITHOUT LONG-TERM CURRENT USE OF INSULIN (HCC): ICD-10-CM

## 2024-02-01 PROCEDURE — 99214 OFFICE O/P EST MOD 30 MIN: CPT | Performed by: FAMILY MEDICINE

## 2024-02-01 NOTE — ASSESSMENT & PLAN NOTE
Controlled per last A1c hypoglycemia continue current medications  Lab Results   Component Value Date    HGBA1C 6.4 (H) 09/06/2023

## 2024-02-06 ENCOUNTER — REMOTE DEVICE CLINIC VISIT (OUTPATIENT)
Dept: CARDIOLOGY CLINIC | Facility: CLINIC | Age: 79
End: 2024-02-06
Payer: MEDICARE

## 2024-02-06 DIAGNOSIS — Z95.0 PRESENCE OF PERMANENT CARDIAC PACEMAKER: Primary | ICD-10-CM

## 2024-02-06 PROCEDURE — 93294 REM INTERROG EVL PM/LDLS PM: CPT | Performed by: INTERNAL MEDICINE

## 2024-02-06 PROCEDURE — 93296 REM INTERROG EVL PM/IDS: CPT | Performed by: INTERNAL MEDICINE

## 2024-02-06 NOTE — PROGRESS NOTES
Results for orders placed or performed in visit on 02/06/24   Cardiac EP device report    Narrative    SJM DUAL CHAMBER PM/NOT MRI CONDITIONAL  MERLIN TRANSMISSION:BATTERY VOLTAGE ADEQUATE. (1.3 YRS) AP 93%  <1%. ALL AVAILABLE LEAD PARAMETERS WITHIN NORMAL LIMITS. 1 VHR EPISODE DETECTED ADDRESS IN ALERT. 9 AMS EPISODES DETECTED LONGEST 48 MIN. PATIENT IS ON WARFARIN. NORMAL DEVICE FUNCTION.----REN

## 2024-02-16 DIAGNOSIS — E11.9 TYPE 2 DIABETES MELLITUS WITHOUT COMPLICATION, WITHOUT LONG-TERM CURRENT USE OF INSULIN (HCC): ICD-10-CM

## 2024-02-16 RX ORDER — BLOOD-GLUCOSE METER
KIT MISCELLANEOUS 2 TIMES DAILY
Qty: 100 EACH | Refills: 3 | Status: SHIPPED | OUTPATIENT
Start: 2024-02-16

## 2024-03-01 ENCOUNTER — APPOINTMENT (OUTPATIENT)
Dept: LAB | Facility: HOSPITAL | Age: 79
End: 2024-03-01
Attending: INTERNAL MEDICINE
Payer: MEDICARE

## 2024-03-01 ENCOUNTER — ANTICOAG VISIT (OUTPATIENT)
Dept: CARDIOLOGY CLINIC | Facility: CLINIC | Age: 79
End: 2024-03-01

## 2024-03-01 DIAGNOSIS — E03.9 ADULT HYPOTHYROIDISM: ICD-10-CM

## 2024-03-01 DIAGNOSIS — I10 ESSENTIAL HYPERTENSION: ICD-10-CM

## 2024-03-01 DIAGNOSIS — E11.9 TYPE 2 DIABETES MELLITUS WITHOUT COMPLICATION, WITHOUT LONG-TERM CURRENT USE OF INSULIN (HCC): ICD-10-CM

## 2024-03-01 LAB
ALBUMIN SERPL BCP-MCNC: 4.3 G/DL (ref 3.5–5)
ALP SERPL-CCNC: 40 U/L (ref 34–104)
ALT SERPL W P-5'-P-CCNC: 15 U/L (ref 7–52)
ANION GAP SERPL CALCULATED.3IONS-SCNC: 5 MMOL/L
AST SERPL W P-5'-P-CCNC: 23 U/L (ref 13–39)
BILIRUB SERPL-MCNC: 0.34 MG/DL (ref 0.2–1)
BUN SERPL-MCNC: 29 MG/DL (ref 5–25)
CALCIUM SERPL-MCNC: 12 MG/DL (ref 8.4–10.2)
CHLORIDE SERPL-SCNC: 99 MMOL/L (ref 96–108)
CHOLEST SERPL-MCNC: 167 MG/DL
CO2 SERPL-SCNC: 32 MMOL/L (ref 21–32)
CREAT SERPL-MCNC: 0.7 MG/DL (ref 0.6–1.3)
ERYTHROCYTE [DISTWIDTH] IN BLOOD BY AUTOMATED COUNT: 13.9 % (ref 11.6–15.1)
EST. AVERAGE GLUCOSE BLD GHB EST-MCNC: 137 MG/DL
GFR SERPL CREATININE-BSD FRML MDRD: 82 ML/MIN/1.73SQ M
GLUCOSE P FAST SERPL-MCNC: 112 MG/DL (ref 65–99)
HBA1C MFR BLD: 6.4 %
HCT VFR BLD AUTO: 35.2 % (ref 34.8–46.1)
HDLC SERPL-MCNC: 41 MG/DL
HGB BLD-MCNC: 11.5 G/DL (ref 11.5–15.4)
LDLC SERPL CALC-MCNC: 97 MG/DL (ref 0–100)
MCH RBC QN AUTO: 29.9 PG (ref 26.8–34.3)
MCHC RBC AUTO-ENTMCNC: 32.7 G/DL (ref 31.4–37.4)
MCV RBC AUTO: 92 FL (ref 82–98)
NONHDLC SERPL-MCNC: 126 MG/DL
PLATELET # BLD AUTO: 400 THOUSANDS/UL (ref 149–390)
PMV BLD AUTO: 9 FL (ref 8.9–12.7)
POTASSIUM SERPL-SCNC: 3.9 MMOL/L (ref 3.5–5.3)
PROT SERPL-MCNC: 7.4 G/DL (ref 6.4–8.4)
RBC # BLD AUTO: 3.84 MILLION/UL (ref 3.81–5.12)
SODIUM SERPL-SCNC: 136 MMOL/L (ref 135–147)
TRIGL SERPL-MCNC: 145 MG/DL
TSH SERPL DL<=0.05 MIU/L-ACNC: 3.07 UIU/ML (ref 0.45–4.5)
WBC # BLD AUTO: 11.48 THOUSAND/UL (ref 4.31–10.16)

## 2024-03-01 PROCEDURE — 85027 COMPLETE CBC AUTOMATED: CPT

## 2024-03-01 PROCEDURE — 83036 HEMOGLOBIN GLYCOSYLATED A1C: CPT

## 2024-03-01 PROCEDURE — 80053 COMPREHEN METABOLIC PANEL: CPT

## 2024-03-01 PROCEDURE — 84443 ASSAY THYROID STIM HORMONE: CPT

## 2024-03-01 PROCEDURE — 80061 LIPID PANEL: CPT

## 2024-03-12 ENCOUNTER — RA CDI HCC (OUTPATIENT)
Dept: OTHER | Facility: HOSPITAL | Age: 79
End: 2024-03-12

## 2024-03-12 ENCOUNTER — OFFICE VISIT (OUTPATIENT)
Dept: OBGYN CLINIC | Facility: CLINIC | Age: 79
End: 2024-03-12
Payer: MEDICARE

## 2024-03-12 VITALS
SYSTOLIC BLOOD PRESSURE: 138 MMHG | HEART RATE: 71 BPM | BODY MASS INDEX: 29.64 KG/M2 | DIASTOLIC BLOOD PRESSURE: 74 MMHG | HEIGHT: 61 IN | WEIGHT: 157 LBS

## 2024-03-12 DIAGNOSIS — M22.2X1 PATELLOFEMORAL SYNDROME OF BOTH KNEES: ICD-10-CM

## 2024-03-12 DIAGNOSIS — R29.898 WEAKNESS OF BOTH HIPS: ICD-10-CM

## 2024-03-12 DIAGNOSIS — M22.2X2 PATELLOFEMORAL SYNDROME OF BOTH KNEES: ICD-10-CM

## 2024-03-12 DIAGNOSIS — Z96.651 S/P TOTAL KNEE ARTHROPLASTY, RIGHT: ICD-10-CM

## 2024-03-12 DIAGNOSIS — M17.12 PRIMARY OSTEOARTHRITIS OF LEFT KNEE: Primary | ICD-10-CM

## 2024-03-12 DIAGNOSIS — M62.9 HAMSTRING TIGHTNESS OF BOTH LOWER EXTREMITIES: ICD-10-CM

## 2024-03-12 PROCEDURE — 20610 DRAIN/INJ JOINT/BURSA W/O US: CPT | Performed by: FAMILY MEDICINE

## 2024-03-12 PROCEDURE — 99213 OFFICE O/P EST LOW 20 MIN: CPT | Performed by: FAMILY MEDICINE

## 2024-03-12 RX ORDER — METHYLPREDNISOLONE ACETATE 40 MG/ML
1 INJECTION, SUSPENSION INTRA-ARTICULAR; INTRALESIONAL; INTRAMUSCULAR; SOFT TISSUE
Status: COMPLETED | OUTPATIENT
Start: 2024-03-12 | End: 2024-03-12

## 2024-03-12 RX ORDER — BUPIVACAINE HYDROCHLORIDE 2.5 MG/ML
1 INJECTION, SOLUTION INFILTRATION; PERINEURAL
Status: COMPLETED | OUTPATIENT
Start: 2024-03-12 | End: 2024-03-12

## 2024-03-12 RX ORDER — BUPIVACAINE HYDROCHLORIDE 2.5 MG/ML
4 INJECTION, SOLUTION INFILTRATION; PERINEURAL
Status: COMPLETED | OUTPATIENT
Start: 2024-03-12 | End: 2024-03-12

## 2024-03-12 RX ADMIN — METHYLPREDNISOLONE ACETATE 1 ML: 40 INJECTION, SUSPENSION INTRA-ARTICULAR; INTRALESIONAL; INTRAMUSCULAR; SOFT TISSUE at 09:30

## 2024-03-12 RX ADMIN — BUPIVACAINE HYDROCHLORIDE 4 ML: 2.5 INJECTION, SOLUTION INFILTRATION; PERINEURAL at 09:30

## 2024-03-12 RX ADMIN — BUPIVACAINE HYDROCHLORIDE 1 ML: 2.5 INJECTION, SOLUTION INFILTRATION; PERINEURAL at 09:30

## 2024-03-12 NOTE — PROGRESS NOTES
Assessment/Plan:  Assessment/Plan   Diagnoses and all orders for this visit:    Primary osteoarthritis of left knee  -     Ambulatory Referral to Orthopedic Surgery; Future  -     Ambulatory Referral to Physical Therapy; Future  -     Large joint arthrocentesis: L knee    Patellofemoral syndrome of both knees  -     Ambulatory Referral to Physical Therapy; Future    S/P total knee arthroplasty, right  -     Ambulatory Referral to Orthopedic Surgery; Future    Weakness of both hips  -     Ambulatory Referral to Physical Therapy; Future    Hamstring tightness of both lower extremities  -     Ambulatory Referral to Physical Therapy; Future        78-year-old female with osteoarthritis of the left knee with left knee pain more than 2 years duration.  Discussed the patient physical exam, impression, and plan.  Physical exam left knee noted for mild swelling.  She has tenderness medial and lateral joint line.  She has full extension and flexion to 110 degrees.  Right knee status post knee arthroplasty.  He has full extension and flexion to 110 degrees.  Clinical impression is that she has symptoms from a normal patella for mechanics with degenerative changes in the left knee.  Patient was given option to consider evaluation by orthopedic surgeon which she declines at this time.  She elected to repeat steroid injection.  I advised that injection may cause elevation in blood sugar that may last for 1 week.  I administered mixture 3 cc 0.25% bupivacaine and 1 cc Depo-Medrol to the left knee without complication.  I advised patient that referral for another course of therapy was placed in the system however she may hold off until summertime.  She will follow-up as needed.      Subjective:   Patient ID: Florina Pace is a 78 y.o. female.  Chief Complaint   Patient presents with    Left Knee - Follow-up    Right Knee - Follow-up        78-year-old female with osteoarthritis of the left knee following up for left knee pain more  "than 2 years duration.  She was last seen by me nearly 1 year ago at which point she was given steroid injection to the left knee.  She states that following steroid injection she had significant relief lasting for quite some time.  She has been increasing pain described as generalized to the knee but worse at the medial and lateral aspects, achy and pulling, worse with physical activity, and improved with resting.  She is also status post right knee arthroplasty and sometimes experiences sharp pain at the anterior medial peripatellar aspect.    Knee Pain  This is a chronic problem. The current episode started more than 1 year ago. The problem occurs daily. The problem has been gradually worsening. Associated symptoms include arthralgias and joint swelling. Pertinent negatives include no numbness or weakness. The symptoms are aggravated by standing and walking. She has tried rest and position changes for the symptoms. The treatment provided mild relief.               Review of Systems   Musculoskeletal:  Positive for arthralgias and joint swelling.   Neurological:  Negative for weakness and numbness.       Objective:  Vitals:    03/12/24 0930   BP: 138/74   Pulse: 71   Weight: 71.2 kg (157 lb)   Height: 5' 1\" (1.549 m)      Right Knee Exam     Muscle Strength   The patient has normal right knee strength.    Range of Motion   Extension:  normal   Flexion:  110       Left Knee Exam     Muscle Strength   The patient has normal left knee strength.    Tenderness   The patient is experiencing tenderness in the lateral joint line and medial joint line.    Range of Motion   Extension:  normal   Flexion:  110             Physical Exam  Vitals and nursing note reviewed.   Constitutional:       Appearance: Normal appearance. She is well-developed. She is not ill-appearing or diaphoretic.   HENT:      Head: Normocephalic and atraumatic.      Right Ear: External ear normal.      Left Ear: External ear normal.   Eyes:      " "Conjunctiva/sclera: Conjunctivae normal.   Neck:      Trachea: No tracheal deviation.   Cardiovascular:      Rate and Rhythm: Normal rate.   Pulmonary:      Effort: Pulmonary effort is normal. No respiratory distress.   Abdominal:      General: There is no distension.   Musculoskeletal:         General: Swelling and tenderness present.   Skin:     General: Skin is warm and dry.      Coloration: Skin is not jaundiced or pale.   Neurological:      Mental Status: She is alert and oriented to person, place, and time.   Psychiatric:         Mood and Affect: Mood normal.         Behavior: Behavior normal.         Thought Content: Thought content normal.         Judgment: Judgment normal.         Large joint arthrocentesis: L knee  Universal Protocol:  Consent: Verbal consent obtained.  Risks and benefits: risks, benefits and alternatives were discussed  Consent given by: patient  Time out: Immediately prior to procedure a \"time out\" was called to verify the correct patient, procedure, equipment, support staff and site/side marked as required.  Patient understanding: patient states understanding of the procedure being performed  Patient consent: the patient's understanding of the procedure matches consent given  Procedure consent: procedure consent matches procedure scheduled  Relevant documents: relevant documents present and verified  Test results: test results available and properly labeled  Site marked: the operative site was marked  Radiology Images displayed and confirmed. If images not available, report reviewed: imaging studies available  Required items: required blood products, implants, devices, and special equipment available  Patient identity confirmed: verbally with patient  Supporting Documentation  Indications: pain   Procedure Details  Location: knee - L knee  Preparation: Patient was prepped and draped in the usual sterile fashion  Needle gauge: 21G 2\"  Approach: anterolateral  Medications administered: 4 " mL bupivacaine 0.25 %; 1 mL bupivacaine 0.25 %; 1 mL methylPREDNISolone acetate 40 mg/mL    Patient tolerance: patient tolerated the procedure well with no immediate complications  Dressing:  Sterile dressing applied

## 2024-03-15 ENCOUNTER — OFFICE VISIT (OUTPATIENT)
Dept: FAMILY MEDICINE CLINIC | Facility: CLINIC | Age: 79
End: 2024-03-15
Payer: MEDICARE

## 2024-03-15 VITALS
HEIGHT: 61 IN | OXYGEN SATURATION: 98 % | BODY MASS INDEX: 29.07 KG/M2 | SYSTOLIC BLOOD PRESSURE: 122 MMHG | HEART RATE: 60 BPM | WEIGHT: 154 LBS | DIASTOLIC BLOOD PRESSURE: 72 MMHG

## 2024-03-15 DIAGNOSIS — I48.0 PAROXYSMAL ATRIAL FIBRILLATION (HCC): ICD-10-CM

## 2024-03-15 DIAGNOSIS — E03.9 ADULT HYPOTHYROIDISM: ICD-10-CM

## 2024-03-15 DIAGNOSIS — Z00.00 HEALTH CARE MAINTENANCE: Primary | ICD-10-CM

## 2024-03-15 DIAGNOSIS — I49.5 SINOATRIAL NODE DYSFUNCTION (HCC): ICD-10-CM

## 2024-03-15 DIAGNOSIS — I10 ESSENTIAL HYPERTENSION: ICD-10-CM

## 2024-03-15 DIAGNOSIS — E78.2 MIXED HYPERLIPIDEMIA: ICD-10-CM

## 2024-03-15 DIAGNOSIS — E11.9 TYPE 2 DIABETES MELLITUS WITHOUT COMPLICATION, WITHOUT LONG-TERM CURRENT USE OF INSULIN (HCC): ICD-10-CM

## 2024-03-15 DIAGNOSIS — N39.41 URINARY INCONTINENCE, URGE: ICD-10-CM

## 2024-03-15 PROCEDURE — G0439 PPPS, SUBSEQ VISIT: HCPCS | Performed by: FAMILY MEDICINE

## 2024-03-15 PROCEDURE — 99214 OFFICE O/P EST MOD 30 MIN: CPT | Performed by: FAMILY MEDICINE

## 2024-03-15 RX ORDER — OXYBUTYNIN CHLORIDE 5 MG/1
5 TABLET ORAL 2 TIMES DAILY
Qty: 60 TABLET | Refills: 3 | Status: SHIPPED | OUTPATIENT
Start: 2024-03-15

## 2024-03-15 NOTE — ASSESSMENT & PLAN NOTE
Very well-controlled, continue the glipizide and metformin, will discontinue the pioglitazone, check CMP, hemoglobin A1c in 6 months  Lab Results   Component Value Date    HGBA1C 6.4 (H) 03/01/2024

## 2024-03-15 NOTE — PATIENT INSTRUCTIONS
Medicare Preventive Visit Patient Instructions  Thank you for completing your Welcome to Medicare Visit or Medicare Annual Wellness Visit today. Your next wellness visit will be due in one year (3/16/2025).  The screening/preventive services that you may require over the next 5-10 years are detailed below. Some tests may not apply to you based off risk factors and/or age. Screening tests ordered at today's visit but not completed yet may show as past due. Also, please note that scanned in results may not display below.  Preventive Screenings:  Service Recommendations Previous Testing/Comments   Colorectal Cancer Screening  * Colonoscopy    * Fecal Occult Blood Test (FOBT)/Fecal Immunochemical Test (FIT)  * Fecal DNA/Cologuard Test  * Flexible Sigmoidoscopy Age: 45-75 years old   Colonoscopy: every 10 years (may be performed more frequently if at higher risk)  OR  FOBT/FIT: every 1 year  OR  Cologuard: every 3 years  OR  Sigmoidoscopy: every 5 years  Screening may be recommended earlier than age 45 if at higher risk for colorectal cancer. Also, an individualized decision between you and your healthcare provider will decide whether screening between the ages of 76-85 would be appropriate. Colonoscopy: 11/24/2007  FOBT/FIT: Not on file  Cologuard: Not on file  Sigmoidoscopy: Not on file          Breast Cancer Screening Age: 40+ years old  Frequency: every 1-2 years  Not required if history of left and right mastectomy Mammogram: Not on file        Cervical Cancer Screening Between the ages of 21-29, pap smear recommended once every 3 years.   Between the ages of 30-65, can perform pap smear with HPV co-testing every 5 years.   Recommendations may differ for women with a history of total hysterectomy, cervical cancer, or abnormal pap smears in past. Pap Smear: Not on file        Hepatitis C Screening Once for adults born between 1945 and 1965  More frequently in patients at high risk for Hepatitis C Hep C Antibody: Not  on file        Diabetes Screening 1-2 times per year if you're at risk for diabetes or have pre-diabetes Fasting glucose: 112 mg/dL (3/1/2024)  A1C: 6.4 % (3/1/2024)      Cholesterol Screening Once every 5 years if you don't have a lipid disorder. May order more often based on risk factors. Lipid panel: 03/01/2024          Other Preventive Screenings Covered by Medicare:  Abdominal Aortic Aneurysm (AAA) Screening: covered once if your at risk. You're considered to be at risk if you have a family history of AAA.  Lung Cancer Screening: covers low dose CT scan once per year if you meet all of the following conditions: (1) Age 55-77; (2) No signs or symptoms of lung cancer; (3) Current smoker or have quit smoking within the last 15 years; (4) You have a tobacco smoking history of at least 20 pack years (packs per day multiplied by number of years you smoked); (5) You get a written order from a healthcare provider.  Glaucoma Screening: covered annually if you're considered high risk: (1) You have diabetes OR (2) Family history of glaucoma OR (3)  aged 50 and older OR (4)  American aged 65 and older  Osteoporosis Screening: covered every 2 years if you meet one of the following conditions: (1) You're estrogen deficient and at risk for osteoporosis based off medical history and other findings; (2) Have a vertebral abnormality; (3) On glucocorticoid therapy for more than 3 months; (4) Have primary hyperparathyroidism; (5) On osteoporosis medications and need to assess response to drug therapy.   Last bone density test (DXA Scan): 04/12/2023.  HIV Screening: covered annually if you're between the age of 15-65. Also covered annually if you are younger than 15 and older than 65 with risk factors for HIV infection. For pregnant patients, it is covered up to 3 times per pregnancy.    Immunizations:  Immunization Recommendations   Influenza Vaccine Annual influenza vaccination during flu season is  recommended for all persons aged >= 6 months who do not have contraindications   Pneumococcal Vaccine   * Pneumococcal conjugate vaccine = PCV13 (Prevnar 13), PCV15 (Vaxneuvance), PCV20 (Prevnar 20)  * Pneumococcal polysaccharide vaccine = PPSV23 (Pneumovax) Adults 19-63 yo with certain risk factors or if 65+ yo  If never received any pneumonia vaccine: recommend Prevnar 20 (PCV20)  Give PCV20 if previously received 1 dose of PCV13 or PPSV23   Hepatitis B Vaccine 3 dose series if at intermediate or high risk (ex: diabetes, end stage renal disease, liver disease)   Respiratory syncytial virus (RSV) Vaccine - COVERED BY MEDICARE PART D  * RSVPreF3 (Arexvy) CDC recommends that adults 60 years of age and older may receive a single dose of RSV vaccine using shared clinical decision-making (SCDM)   Tetanus (Td) Vaccine - COST NOT COVERED BY MEDICARE PART B Following completion of primary series, a booster dose should be given every 10 years to maintain immunity against tetanus. Td may also be given as tetanus wound prophylaxis.   Tdap Vaccine - COST NOT COVERED BY MEDICARE PART B Recommended at least once for all adults. For pregnant patients, recommended with each pregnancy.   Shingles Vaccine (Shingrix) - COST NOT COVERED BY MEDICARE PART B  2 shot series recommended in those 19 years and older who have or will have weakened immune systems or those 50 years and older     Health Maintenance Due:  There are no preventive care reminders to display for this patient.  Immunizations Due:      Topic Date Due   • Pneumococcal Vaccine: 65+ Years (3 of 3 - PPSV23 or PCV20) 02/06/2019   • Influenza Vaccine (1) 09/01/2023   • COVID-19 Vaccine (4 - 2023-24 season) 09/01/2023     Advance Directives   What are advance directives?  Advance directives are legal documents that state your wishes and plans for medical care. These plans are made ahead of time in case you lose your ability to make decisions for yourself. Advance directives  can apply to any medical decision, such as the treatments you want, and if you want to donate organs.   What are the types of advance directives?  There are many types of advance directives, and each state has rules about how to use them. You may choose a combination of any of the following:  Living will:  This is a written record of the treatment you want. You can also choose which treatments you do not want, which to limit, and which to stop at a certain time. This includes surgery, medicine, IV fluid, and tube feedings.   Durable power of  for healthcare (DPAHC):  This is a written record that states who you want to make healthcare choices for you when you are unable to make them for yourself. This person, called a proxy, is usually a family member or a friend. You may choose more than 1 proxy.  Do not resuscitate (DNR) order:  A DNR order is used in case your heart stops beating or you stop breathing. It is a request not to have certain forms of treatment, such as CPR. A DNR order may be included in other types of advance directives.  Medical directive:  This covers the care that you want if you are in a coma, near death, or unable to make decisions for yourself. You can list the treatments you want for each condition. Treatment may include pain medicine, surgery, blood transfusions, dialysis, IV or tube feedings, and a ventilator (breathing machine).  Values history:  This document has questions about your views, beliefs, and how you feel and think about life. This information can help others choose the care that you would choose.  Why are advance directives important?  An advance directive helps you control your care. Although spoken wishes may be used, it is better to have your wishes written down. Spoken wishes can be misunderstood, or not followed. Treatments may be given even if you do not want them. An advance directive may make it easier for your family to make difficult choices about your care.    Weight Management   Why it is important to manage your weight:  Being overweight increases your risk of health conditions such as heart disease, high blood pressure, type 2 diabetes, and certain types of cancer. It can also increase your risk for osteoarthritis, sleep apnea, and other respiratory problems. Aim for a slow, steady weight loss. Even a small amount of weight loss can lower your risk of health problems.  How to lose weight safely:  A safe and healthy way to lose weight is to eat fewer calories and get regular exercise. You can lose up about 1 pound a week by decreasing the number of calories you eat by 500 calories each day.   Healthy meal plan for weight management:  A healthy meal plan includes a variety of foods, contains fewer calories, and helps you stay healthy. A healthy meal plan includes the following:  Eat whole-grain foods more often.  A healthy meal plan should contain fiber. Fiber is the part of grains, fruits, and vegetables that is not broken down by your body. Whole-grain foods are healthy and provide extra fiber in your diet. Some examples of whole-grain foods are whole-wheat breads and pastas, oatmeal, brown rice, and bulgur.  Eat a variety of vegetables every day.  Include dark, leafy greens such as spinach, kale, olga greens, and mustard greens. Eat yellow and orange vegetables such as carrots, sweet potatoes, and winter squash.   Eat a variety of fruits every day.  Choose fresh or canned fruit (canned in its own juice or light syrup) instead of juice. Fruit juice has very little or no fiber.  Eat low-fat dairy foods.  Drink fat-free (skim) milk or 1% milk. Eat fat-free yogurt and low-fat cottage cheese. Try low-fat cheeses such as mozzarella and other reduced-fat cheeses.  Choose meat and other protein foods that are low in fat.  Choose beans or other legumes such as split peas or lentils. Choose fish, skinless poultry (chicken or turkey), or lean cuts of red meat (beef or  pork). Before you cook meat or poultry, cut off any visible fat.   Use less fat and oil.  Try baking foods instead of frying them. Add less fat, such as margarine, sour cream, regular salad dressing and mayonnaise to foods. Eat fewer high-fat foods. Some examples of high-fat foods include french fries, doughnuts, ice cream, and cakes.  Eat fewer sweets.  Limit foods and drinks that are high in sugar. This includes candy, cookies, regular soda, and sweetened drinks.  Exercise:  Exercise at least 30 minutes per day on most days of the week. Some examples of exercise include walking, biking, dancing, and swimming. You can also fit in more physical activity by taking the stairs instead of the elevator or parking farther away from stores. Ask your healthcare provider about the best exercise plan for you.      © Copyright [employer] 2018 Information is for End User's use only and may not be sold, redistributed or otherwise used for commercial purposes. All illustrations and images included in CareNotes® are the copyrighted property of [employer] or [employer]

## 2024-03-15 NOTE — PROGRESS NOTES
Assessment and Plan:     Problem List Items Addressed This Visit     Mixed hyperlipidemia     Controlled, continue rosuvastatin         Essential hypertension     Controlled, continue continue atenolol-chlorthalidone         Relevant Orders    CBC    Comprehensive metabolic panel    Adult hypothyroidism     Clinically euthyroid, continue levothyroxine, check TSH in 6 months         Relevant Orders    TSH, 3rd generation    Sinoatrial node dysfunction (HCC)     Status post pacer, she is doing well with this, follow-up with her cardiologist as scheduled         Type 2 diabetes mellitus without complication, without long-term current use of insulin (HCC)     Very well-controlled, continue the glipizide and metformin, will discontinue the pioglitazone, check CMP, hemoglobin A1c in 6 months  Lab Results   Component Value Date    HGBA1C 6.4 (H) 03/01/2024          Relevant Orders    Hemoglobin A1C    Lipid panel    Paroxysmal atrial fibrillation (HCC)     Rate controlled, continue atenolol, Coumadin which is managed by her cardiologist         Urinary incontinence, urge     Discontinue the Detrol, trial of oxybutynin 5 mg 1 tablet twice daily         Relevant Medications    oxybutynin (DITROPAN) 5 mg tablet   Other Visit Diagnoses     Health care maintenance    -  Primary           Preventive health issues were discussed with patient, and age appropriate screening tests were ordered as noted in patient's After Visit Summary.  Personalized health advice and appropriate referrals for health education or preventive services given if needed, as noted in patient's After Visit Summary.     History of Present Illness:     Patient presents for a Medicare Wellness Visit    Patient was in today for checkup, she does complain of several episodes of back pain that she had after she started taking the Fosamax.  She states she discontinued the Fosamax on her own and the symptoms resolved.  She does complain of worsening incontinence  of urine and leaking.  She feels that Detrol is not working as well as it had in the past.       Patient Care Team:  Benji Bose DO as PCP - General     Review of Systems:     Review of Systems   Constitutional:  Negative for chills, fatigue and fever.   HENT:  Negative for congestion, ear pain, hearing loss, postnasal drip, rhinorrhea and sore throat.    Eyes:  Negative for pain and visual disturbance.   Respiratory:  Negative for chest tightness, shortness of breath and wheezing.    Cardiovascular:  Negative for chest pain and leg swelling.   Gastrointestinal:  Negative for abdominal distention, abdominal pain, constipation, diarrhea and vomiting.   Endocrine: Negative for cold intolerance and heat intolerance.   Genitourinary:  Negative for difficulty urinating, frequency and urgency.        Urgent continence   Musculoskeletal:  Positive for back pain. Negative for arthralgias and gait problem.   Skin:  Negative for color change.   Neurological:  Negative for dizziness, tremors, syncope, numbness and headaches.   Hematological:  Negative for adenopathy.   Psychiatric/Behavioral:  Negative for agitation, confusion and sleep disturbance. The patient is not nervous/anxious.         Problem List:     Patient Active Problem List   Diagnosis   • Arthralgia of both knees   • Mixed hyperlipidemia   • Essential hypertension   • Adult hypothyroidism   • Sinoatrial node dysfunction (HCC)   • Type 2 diabetes mellitus without complication, without long-term current use of insulin (HCC)   • Cardiac pacemaker in situ   • Artificial knee joint present   • Paroxysmal atrial fibrillation (HCC)   • Urinary incontinence, urge   • BMI 28.0-28.9,adult   • Patellofemoral syndrome of left knee      Past Medical and Surgical History:     Past Medical History:   Diagnosis Date   • Carpal tunnel syndrome    • Heart murmur      Past Surgical History:   Procedure Laterality Date   • CATARACT EXTRACTION     • CHOLECYSTECTOMY     •  COLONOSCOPY  11/24/2007   • EYE SURGERY     • KNEE SURGERY     • REPLACEMENT TOTAL KNEE Right    • TOTAL ABDOMINAL HYSTERECTOMY W/ BILATERAL SALPINGOOPHORECTOMY        Family History:     Family History   Problem Relation Age of Onset   • Heart attack Father         ARRHYTHMIAS   • Coronary artery disease Family    • Diabetes Family    • Heart attack Family         ARRHYTHMIAS      Social History:     Social History     Socioeconomic History   • Marital status:      Spouse name: None   • Number of children: None   • Years of education: None   • Highest education level: None   Occupational History   • None   Tobacco Use   • Smoking status: Never   • Smokeless tobacco: Never   Vaping Use   • Vaping status: Never Used   Substance and Sexual Activity   • Alcohol use: Yes   • Drug use: No   • Sexual activity: None   Other Topics Concern   • None   Social History Narrative    Always uses seat belt    Employed    Lives with spouse      Social Determinants of Health     Financial Resource Strain: Not on file   Food Insecurity: No Food Insecurity (3/15/2024)    Hunger Vital Sign    • Worried About Running Out of Food in the Last Year: Never true    • Ran Out of Food in the Last Year: Never true   Transportation Needs: No Transportation Needs (3/15/2024)    PRAPARE - Transportation    • Lack of Transportation (Medical): No    • Lack of Transportation (Non-Medical): No   Physical Activity: Not on file   Stress: Not on file   Social Connections: Not on file   Intimate Partner Violence: Not on file   Housing Stability: High Risk (3/15/2024)    Housing Stability Vital Sign    • Unable to Pay for Housing in the Last Year: Yes    • Number of Places Lived in the Last Year: 1    • Unstable Housing in the Last Year: No      Medications and Allergies:     Current Outpatient Medications   Medication Sig Dispense Refill   • Ascorbic Acid (VITAMIN C) 100 MG tablet Take 500 mg by mouth daily     • atenolol-chlorthalidone (TENORETIC)  50-25 mg per tablet TAKE ONE TABLET BY MOUTH EVERY DAY 90 tablet 2   • B Complex Vitamins (B COMPLEX 100 PO) Take 1 tablet by mouth daily     • Blood Glucose Monitoring Suppl (FREESTYLE LITE) RANULFO by Does not apply route 2 (two) times a day Dx E11.9 1 each 0   • cyclobenzaprine (FLEXERIL) 10 mg tablet TAKE ONE TABLET BY MOUTH THREE TIMES A DAY AS NEEDED FOR MUSCLE SPASMS 20 tablet 0   • dorzolamide (TRUSOPT) 2 % ophthalmic solution INSTILL 1 DROP INTO THE RIGHT EYE THREE TIMES A DAY     • FREESTYLE LITE test strip TEST TWICE DAILY 100 each 3   • glipiZIDE (GLUCOTROL XL) 2.5 mg 24 hr tablet Take 1 tablet (2.5 mg total) by mouth daily 90 tablet 1   • glucose blood (JASWANT CONTOUR TEST) test strip Check blood sugar twice daily, DX:E11.9 200 each 3   • Lancets (freestyle) lancets USE TO TEST TWICE DAILY 100 each 1   • levothyroxine 50 mcg tablet TAKE ONE TABLET BY MOUTH EVERY DAY 90 tablet 1   • Multiple Vitamin (MULTIVITAMIN) tablet Take 1 tablet by mouth daily     • niacin 500 mg tablet Take by mouth      • oxybutynin (DITROPAN) 5 mg tablet Take 1 tablet (5 mg total) by mouth 2 (two) times a day 60 tablet 3   • rosuvastatin (CRESTOR) 10 MG tablet TAKE ONE TABLET BY MOUTH EVERY DAY 90 tablet 3   • triamcinolone (KENALOG) 0.1 % ointment Apply topically 2 (two) times a day 30 g 0   • warfarin (Jantoven) 5 mg tablet TAKE ONE TO TWO TABLETS BY MOUTH EVERY  tablet 3     No current facility-administered medications for this visit.     No Known Allergies   Immunizations:     Immunization History   Administered Date(s) Administered   • COVID-19 MODERNA VACC 0.25 ML IM BOOSTER 01/12/2022   • COVID-19 MODERNA VACC 0.5 ML IM 01/26/2021, 02/24/2021   • H1N1, All Formulations 11/11/2009   • Influenza Split High Dose Preservative Free IM 10/03/2018, 10/03/2019   • Influenza, high dose seasonal 0.7 mL 10/23/2020, 10/06/2021   • Influenza, seasonal, injectable 10/01/2015, 10/31/2016, 10/04/2017   • Pneumococcal Conjugate  13-Valent 02/06/2018   • Pneumococcal Polysaccharide PPV23 08/07/2006      Health Maintenance:     There are no preventive care reminders to display for this patient.      Topic Date Due   • Pneumococcal Vaccine: 65+ Years (3 of 3 - PPSV23 or PCV20) 02/06/2019   • Influenza Vaccine (1) 09/01/2023   • COVID-19 Vaccine (4 - 2023-24 season) 09/01/2023      Medicare Screening Tests and Risk Assessments:     Florina is here for her Subsequent Wellness visit. Last Medicare Wellness visit information reviewed, patient interviewed and updates made to the record today.      Health Risk Assessment:   Patient rates overall health as good. Patient feels that their physical health rating is same. Patient is very satisfied with their life. Eyesight was rated as same. Hearing was rated as same. Patient feels that their emotional and mental health rating is same. Patients states they are never, rarely angry. Patient states they are sometimes unusually tired/fatigued. Pain experienced in the last 7 days has been none. Patient states that she has experienced no weight loss or gain in last 6 months.     Depression Screening:   PHQ-2 Score: 0      Fall Risk Screening:   In the past year, patient has experienced: no history of falling in past year      Urinary Incontinence Screening:   Patient has not leaked urine accidently in the last six months.     Home Safety:  Patient does not have trouble with stairs inside or outside of their home. Patient has working smoke alarms and has working carbon monoxide detector. Home safety hazards include: none.     Nutrition:   Current diet is Regular.     Medications:   Patient is not currently taking any over-the-counter supplements. Patient is able to manage medications.     Activities of Daily Living (ADLs)/Instrumental Activities of Daily Living (IADLs):   Walk and transfer into and out of bed and chair?: Yes  Dress and groom yourself?: Yes    Bathe or shower yourself?: Yes    Feed yourself?  "Yes  Do your laundry/housekeeping?: Yes  Manage your money, pay your bills and track your expenses?: Yes  Make your own meals?: Yes    Do your own shopping?: Yes    Previous Hospitalizations:   Any hospitalizations or ED visits within the last 12 months?: Yes    How many hospitalizations have you had in the last year?: 1-2    Advance Care Planning:   Living will: Yes    Durable POA for healthcare: Yes    Advanced directive: Yes      Cognitive Screening:   Provider or family/friend/caregiver concerned regarding cognition?: No    PREVENTIVE SCREENINGS      Cardiovascular Screening:    General: Screening Not Indicated and History Lipid Disorder      Diabetes Screening:     General: Screening Not Indicated and History Diabetes      Colorectal Cancer Screening:     General: Screening Not Indicated      Breast Cancer Screening:     General: Patient Declines      Cervical Cancer Screening:    General: Screening Not Indicated      Osteoporosis Screening:    General: Screening Not Indicated and History Osteoporosis      Abdominal Aortic Aneurysm (AAA) Screening:        General: Screening Not Indicated      Lung Cancer Screening:     General: Screening Not Indicated      Hepatitis C Screening:    General: Screening Not Indicated    Screening, Brief Intervention, and Referral to Treatment (SBIRT)    Screening  Typical number of drinks in a day: 0  Typical number of drinks in a week: 0  Interpretation: Low risk drinking behavior.    Single Item Drug Screening:  How often have you used an illegal drug (including marijuana) or a prescription medication for non-medical reasons in the past year? never    Single Item Drug Screen Score: 0  Interpretation: Negative screen for possible drug use disorder    Other Counseling Topics:   Car/seat belt/driving safety.     No results found.     Physical Exam:     /72 (BP Location: Left arm, Patient Position: Sitting, Cuff Size: Standard)   Pulse 60   Ht 5' 1\" (1.549 m)   Wt 69.9 kg " (154 lb)   SpO2 98%   BMI 29.10 kg/m²     Physical Exam  Constitutional:       Appearance: She is well-developed.   HENT:      Head: Normocephalic and atraumatic.      Right Ear: External ear normal.      Left Ear: External ear normal.      Nose: Nose normal.      Mouth/Throat:      Pharynx: Oropharynx is clear.   Eyes:      Extraocular Movements: Extraocular movements intact.      Conjunctiva/sclera: Conjunctivae normal.      Pupils: Pupils are equal, round, and reactive to light.   Neck:      Thyroid: No thyromegaly.   Cardiovascular:      Rate and Rhythm: Normal rate and regular rhythm.      Heart sounds: Normal heart sounds. No murmur heard.  Pulmonary:      Effort: Pulmonary effort is normal.   Abdominal:      General: Bowel sounds are normal. There is no distension.      Palpations: Abdomen is soft.   Musculoskeletal:         General: Normal range of motion.      Cervical back: Normal range of motion and neck supple.   Skin:     General: Skin is warm.   Neurological:      Mental Status: She is alert and oriented to person, place, and time.   Psychiatric:         Mood and Affect: Mood normal.          Benji Bose DO

## 2024-03-23 DIAGNOSIS — I10 ESSENTIAL HYPERTENSION: ICD-10-CM

## 2024-03-23 DIAGNOSIS — E78.2 MIXED HYPERLIPIDEMIA: ICD-10-CM

## 2024-03-25 RX ORDER — ROSUVASTATIN CALCIUM 10 MG/1
TABLET, COATED ORAL
Qty: 90 TABLET | Refills: 3 | Status: SHIPPED | OUTPATIENT
Start: 2024-03-25

## 2024-03-25 RX ORDER — ATENOLOL AND CHLORTHALIDONE TABLET 50; 25 MG/1; MG/1
TABLET ORAL
Qty: 90 TABLET | Refills: 3 | Status: SHIPPED | OUTPATIENT
Start: 2024-03-25

## 2024-03-28 ENCOUNTER — APPOINTMENT (OUTPATIENT)
Dept: LAB | Facility: HOSPITAL | Age: 79
End: 2024-03-28
Attending: INTERNAL MEDICINE
Payer: MEDICARE

## 2024-03-28 ENCOUNTER — ANTICOAG VISIT (OUTPATIENT)
Dept: CARDIOLOGY CLINIC | Facility: CLINIC | Age: 79
End: 2024-03-28

## 2024-04-11 DIAGNOSIS — E11.9 TYPE 2 DIABETES MELLITUS WITHOUT COMPLICATION, WITHOUT LONG-TERM CURRENT USE OF INSULIN (HCC): ICD-10-CM

## 2024-04-11 RX ORDER — LANCETS 28 GAUGE
EACH MISCELLANEOUS
Qty: 100 EACH | Refills: 1 | Status: SHIPPED | OUTPATIENT
Start: 2024-04-11

## 2024-04-11 RX ORDER — GLIPIZIDE 2.5 MG/1
2.5 TABLET, EXTENDED RELEASE ORAL DAILY
Qty: 90 TABLET | Refills: 1 | Status: SHIPPED | OUTPATIENT
Start: 2024-04-11

## 2024-05-01 ENCOUNTER — ANTICOAG VISIT (OUTPATIENT)
Dept: CARDIOLOGY CLINIC | Facility: CLINIC | Age: 79
End: 2024-05-01

## 2024-05-01 ENCOUNTER — APPOINTMENT (OUTPATIENT)
Dept: LAB | Facility: HOSPITAL | Age: 79
End: 2024-05-01
Payer: MEDICARE

## 2024-05-01 DIAGNOSIS — Z79.01 ANTICOAGULATION MONITORING, INR RANGE 2-3: Primary | ICD-10-CM

## 2024-05-07 ENCOUNTER — REMOTE DEVICE CLINIC VISIT (OUTPATIENT)
Dept: CARDIOLOGY CLINIC | Facility: CLINIC | Age: 79
End: 2024-05-07
Payer: MEDICARE

## 2024-05-07 DIAGNOSIS — Z95.0 PRESENCE OF CARDIAC PACEMAKER: Primary | ICD-10-CM

## 2024-05-07 PROCEDURE — 93296 REM INTERROG EVL PM/IDS: CPT | Performed by: INTERNAL MEDICINE

## 2024-05-07 PROCEDURE — 93294 REM INTERROG EVL PM/LDLS PM: CPT | Performed by: INTERNAL MEDICINE

## 2024-05-07 NOTE — PROGRESS NOTES
Results for orders placed or performed in visit on 05/07/24   Cardiac EP device report    Narrative    SJM DUAL CHAMBER PM/NOT MRI CONDITIONAL  MERLIN TRANSMISSION: BATTERY VOLTAGE ADEQUATE (1.1 YRS). AP: 92%. : <1%. ALL AVAILABLE LEAD PARAMETERS WITHIN NORMAL LIMITS. 7 AMS EPISODES W/ AVAIL EGMS SHOWING AF, PAT, MAX DURATION 4 MINS 24 SECS. AF BURDEN: <1%. PT TAKES WARFARIN. EF: >60% (ECHO 12/23/19). NORMAL DEVICE FUNCTION. CH

## 2024-05-08 ENCOUNTER — TELEPHONE (OUTPATIENT)
Age: 79
End: 2024-05-08

## 2024-05-08 NOTE — TELEPHONE ENCOUNTER
Patient called and still has a lingering cough and post nasal drip she has been taking mucinex and cough medicine for over a week and it hasn't helped.     She was wondering if there is something else that could be prescribed for her. She has an upcoming wedding and would just like to feel better.     She would like someone to reach out to her, she stated if she doesn't answer it's ok to leave a voicemail.     Please advise, Thank you!

## 2024-05-09 ENCOUNTER — OFFICE VISIT (OUTPATIENT)
Dept: FAMILY MEDICINE CLINIC | Facility: CLINIC | Age: 79
End: 2024-05-09
Payer: MEDICARE

## 2024-05-09 ENCOUNTER — TELEPHONE (OUTPATIENT)
Age: 79
End: 2024-05-09

## 2024-05-09 VITALS
HEIGHT: 61 IN | BODY MASS INDEX: 27.75 KG/M2 | DIASTOLIC BLOOD PRESSURE: 70 MMHG | WEIGHT: 147 LBS | OXYGEN SATURATION: 97 % | HEART RATE: 64 BPM | SYSTOLIC BLOOD PRESSURE: 116 MMHG

## 2024-05-09 DIAGNOSIS — J06.9 ACUTE URI: Primary | ICD-10-CM

## 2024-05-09 PROCEDURE — G2211 COMPLEX E/M VISIT ADD ON: HCPCS | Performed by: FAMILY MEDICINE

## 2024-05-09 PROCEDURE — 99213 OFFICE O/P EST LOW 20 MIN: CPT | Performed by: FAMILY MEDICINE

## 2024-05-09 RX ORDER — METHYLPREDNISOLONE 4 MG/1
TABLET ORAL
Qty: 21 EACH | Refills: 0 | Status: SHIPPED | OUTPATIENT
Start: 2024-05-09

## 2024-05-09 RX ORDER — AZITHROMYCIN 250 MG/1
TABLET, FILM COATED ORAL
Qty: 6 TABLET | Refills: 0 | Status: SHIPPED | OUTPATIENT
Start: 2024-05-09 | End: 2024-05-13

## 2024-05-09 NOTE — TELEPHONE ENCOUNTER
Patient called to make sure that she should take both medications that the doctor prescribed today to her, I spoke to Yara on the clinical team and she said that the prescriptions can be both taken.

## 2024-05-09 NOTE — PROGRESS NOTES
Assessment/Plan:         Problem List Items Addressed This Visit       Acute URI - Primary    Relevant Medications    azithromycin (ZITHROMAX) 250 mg tablet    methylPREDNISolone 4 MG tablet therapy pack         Subjective:      Patient ID: Florina Pace is a 78 y.o. female.    Patient comes in with a 1 week history of cough and congestion.        The following portions of the patient's history were reviewed and updated as appropriate:   Past Medical History:  She has a past medical history of Carpal tunnel syndrome and Heart murmur.,  _______________________________________________________________________  Medical Problems:  does not have any pertinent problems on file.,  _______________________________________________________________________  Past Surgical History:   has a past surgical history that includes Colonoscopy (11/24/2007); Cataract extraction; Cholecystectomy; Knee surgery; Total abdominal hysterectomy w/ bilateral salpingoophorectomy; Replacement total knee (Right); and Eye surgery.,  _______________________________________________________________________  Family History:  family history includes Coronary artery disease in her family; Diabetes in her family; Heart attack in her family and father.,  _______________________________________________________________________  Social History:   reports that she has never smoked. She has never used smokeless tobacco. She reports current alcohol use. She reports that she does not use drugs.,  _______________________________________________________________________  Allergies:  has No Known Allergies..  _______________________________________________________________________  Current Outpatient Medications   Medication Sig Dispense Refill    Ascorbic Acid (VITAMIN C) 100 MG tablet Take 500 mg by mouth daily      atenolol-chlorthalidone (TENORETIC) 50-25 mg per tablet TAKE ONE TABLET BY MOUTH EVERY DAY 90 tablet 3    azithromycin (ZITHROMAX) 250 mg tablet Take 2  "tablets today then 1 tablet daily x 4 days 6 tablet 0    B Complex Vitamins (B COMPLEX 100 PO) Take 1 tablet by mouth daily      Blood Glucose Monitoring Suppl (FREESTYLE LITE) RANULFO by Does not apply route 2 (two) times a day Dx E11.9 1 each 0    cyclobenzaprine (FLEXERIL) 10 mg tablet TAKE ONE TABLET BY MOUTH THREE TIMES A DAY AS NEEDED FOR MUSCLE SPASMS 20 tablet 0    dorzolamide (TRUSOPT) 2 % ophthalmic solution INSTILL 1 DROP INTO THE RIGHT EYE THREE TIMES A DAY      FREESTYLE LITE test strip TEST TWICE DAILY 100 each 3    glipiZIDE (GLUCOTROL XL) 2.5 mg 24 hr tablet TAKE ONE TABLET BY MOUTH EVERY DAY 90 tablet 1    glucose blood (JASWANT CONTOUR TEST) test strip Check blood sugar twice daily, DX:E11.9 200 each 3    Lancets (freestyle) lancets USE TO TEST TWO TIMES A  each 1    levothyroxine 50 mcg tablet TAKE ONE TABLET BY MOUTH EVERY DAY 90 tablet 1    methylPREDNISolone 4 MG tablet therapy pack Use as directed on package 21 each 0    Multiple Vitamin (MULTIVITAMIN) tablet Take 1 tablet by mouth daily      niacin 500 mg tablet Take by mouth       oxybutynin (DITROPAN) 5 mg tablet Take 1 tablet (5 mg total) by mouth 2 (two) times a day 60 tablet 3    rosuvastatin (CRESTOR) 10 MG tablet TAKE ONE TABLET BY MOUTH EVERY DAY 90 tablet 3    triamcinolone (KENALOG) 0.1 % ointment Apply topically 2 (two) times a day 30 g 0    warfarin (Jantoven) 5 mg tablet TAKE ONE TO TWO TABLETS BY MOUTH EVERY  tablet 3     No current facility-administered medications for this visit.     _______________________________________________________________________  Review of Systems   Constitutional: Negative.    HENT:  Positive for congestion and postnasal drip.    Respiratory:  Positive for cough.    Gastrointestinal: Negative.          Objective:  Vitals:    05/09/24 1158   BP: 116/70   Pulse: 64   SpO2: 97%   Weight: 66.7 kg (147 lb)   Height: 5' 1\" (1.549 m)     Body mass index is 27.78 kg/m².     Physical " Exam  Constitutional:       Appearance: Normal appearance. She is well-developed.   HENT:      Head: Normocephalic and atraumatic.      Right Ear: Tympanic membrane, ear canal and external ear normal.      Left Ear: Tympanic membrane, ear canal and external ear normal.      Mouth/Throat:      Pharynx: Posterior oropharyngeal erythema present.   Eyes:      Pupils: Pupils are equal, round, and reactive to light.   Neck:      Thyroid: No thyromegaly.   Cardiovascular:      Rate and Rhythm: Normal rate and regular rhythm.      Heart sounds: Normal heart sounds.   Pulmonary:      Effort: Pulmonary effort is normal.      Breath sounds: Normal breath sounds.   Musculoskeletal:         General: Normal range of motion.      Cervical back: Neck supple.   Lymphadenopathy:      Cervical: No cervical adenopathy.   Skin:     General: Skin is warm and dry.   Neurological:      Mental Status: She is alert.   Psychiatric:         Mood and Affect: Mood normal.         Behavior: Behavior normal.

## 2024-05-30 DIAGNOSIS — E03.9 ADULT HYPOTHYROIDISM: ICD-10-CM

## 2024-05-31 RX ORDER — LEVOTHYROXINE SODIUM 0.05 MG/1
50 TABLET ORAL DAILY
Qty: 90 TABLET | Refills: 1 | Status: SHIPPED | OUTPATIENT
Start: 2024-05-31

## 2024-06-06 ENCOUNTER — LAB (OUTPATIENT)
Dept: LAB | Facility: HOSPITAL | Age: 79
End: 2024-06-06
Payer: MEDICARE

## 2024-06-06 ENCOUNTER — ANTICOAG VISIT (OUTPATIENT)
Dept: CARDIOLOGY CLINIC | Facility: CLINIC | Age: 79
End: 2024-06-06

## 2024-06-06 DIAGNOSIS — Z79.01 ANTICOAGULATION MONITORING, INR RANGE 2-3: ICD-10-CM

## 2024-06-06 LAB
INR PPP: 2.49 (ref 0.84–1.19)
PROTHROMBIN TIME: 27.9 SECONDS (ref 11.6–14.5)

## 2024-06-06 PROCEDURE — 85610 PROTHROMBIN TIME: CPT

## 2024-06-06 PROCEDURE — 36415 COLL VENOUS BLD VENIPUNCTURE: CPT

## 2024-06-08 PROBLEM — J06.9 ACUTE URI: Status: RESOLVED | Noted: 2024-05-09 | Resolved: 2024-06-08

## 2024-06-14 ENCOUNTER — TELEPHONE (OUTPATIENT)
Age: 79
End: 2024-06-14

## 2024-06-14 DIAGNOSIS — E11.9 TYPE 2 DIABETES MELLITUS WITHOUT COMPLICATION, WITHOUT LONG-TERM CURRENT USE OF INSULIN (HCC): Primary | ICD-10-CM

## 2024-06-14 RX ORDER — LANCETS 33 GAUGE
EACH MISCELLANEOUS
Qty: 100 EACH | Refills: 3 | Status: SHIPPED | OUTPATIENT
Start: 2024-06-14

## 2024-06-14 RX ORDER — BLOOD SUGAR DIAGNOSTIC
STRIP MISCELLANEOUS
Qty: 100 EACH | Refills: 3 | Status: SHIPPED | OUTPATIENT
Start: 2024-06-14

## 2024-06-14 RX ORDER — BLOOD-GLUCOSE METER
KIT MISCELLANEOUS
Qty: 1 KIT | Refills: 0 | Status: SHIPPED | OUTPATIENT
Start: 2024-06-14

## 2024-06-14 NOTE — TELEPHONE ENCOUNTER
Pt called stating ever since she was on a z-pack back in May her sugars have been elevated. Said she usually runs around 105 but lately they have been reading 143-169.  Pt states she can usually feel when her sugar is elevated and not having any symptoms but did increase her glipizide 2.5 mg from 1 tablet daily to 2 tablets daily.      Said her freestyle lite meter is old and wondering if it may be the machine giving her the high readings and glucose really not that high.  Asking if you want to order her a new meter?    Please advise

## 2024-06-14 NOTE — TELEPHONE ENCOUNTER
Her blood sugar probably went up because she was sick, I do not want her to increase her glipizide.  Will send in a new meter.

## 2024-07-11 ENCOUNTER — LAB (OUTPATIENT)
Dept: LAB | Facility: HOSPITAL | Age: 79
End: 2024-07-11
Payer: MEDICARE

## 2024-07-11 ENCOUNTER — ANTICOAG VISIT (OUTPATIENT)
Dept: CARDIOLOGY CLINIC | Facility: CLINIC | Age: 79
End: 2024-07-11

## 2024-07-11 DIAGNOSIS — Z79.01 ANTICOAGULATION MONITORING, INR RANGE 2-3: ICD-10-CM

## 2024-07-11 LAB
INR PPP: 2.22 (ref 0.84–1.19)
PROTHROMBIN TIME: 25.5 SECONDS (ref 11.6–14.5)

## 2024-07-11 PROCEDURE — 85610 PROTHROMBIN TIME: CPT

## 2024-07-11 PROCEDURE — 36415 COLL VENOUS BLD VENIPUNCTURE: CPT

## 2024-08-09 ENCOUNTER — IN-CLINIC DEVICE VISIT (OUTPATIENT)
Dept: CARDIOLOGY CLINIC | Facility: CLINIC | Age: 79
End: 2024-08-09
Payer: MEDICARE

## 2024-08-09 DIAGNOSIS — Z95.0 PRESENCE OF PERMANENT CARDIAC PACEMAKER: Primary | ICD-10-CM

## 2024-08-09 PROCEDURE — 93280 PM DEVICE PROGR EVAL DUAL: CPT | Performed by: INTERNAL MEDICINE

## 2024-08-09 NOTE — PROGRESS NOTES
BEAN DC PM/NOT MRI CONDITIONAL   DEVICE INTERROGATED IN THE [facility] OFFICE:  BATTERY VOLTAGE NEARING DENISE (11.7 MONTHS).  WILL SCHEDULE MONTHLY BATTERY CHECKS.  AP 90%  <1%.  ALL AVAILABLE LEAD PARAMETERS WITHIN NORMAL LIMITS.  PATIEJNT IS ATRIALLY DEPENDENT TODAY.   2 HVR EPISODES; EGM SHOWS PAT @ 145 BPM.  35 AMS EPISODES FOR AT/AF WITH LONGEST 1 H 38 M.  PATIENT TAKES WARFARIN.  NO PROGRAMMING CHANGES MADE TO DEVICE PARAMETERS.  NORMAL DEVICE FUNCTION.  RG

## 2024-08-10 ENCOUNTER — APPOINTMENT (OUTPATIENT)
Dept: LAB | Facility: HOSPITAL | Age: 79
End: 2024-08-10
Payer: MEDICARE

## 2024-08-10 DIAGNOSIS — Z79.01 ANTICOAGULATION MONITORING, INR RANGE 2-3: ICD-10-CM

## 2024-08-10 LAB
INR PPP: 2.6 (ref 0.85–1.19)
PROTHROMBIN TIME: 28.5 SECONDS (ref 12.3–15)

## 2024-08-10 PROCEDURE — 36415 COLL VENOUS BLD VENIPUNCTURE: CPT

## 2024-08-10 PROCEDURE — 85610 PROTHROMBIN TIME: CPT

## 2024-08-12 ENCOUNTER — ANTICOAG VISIT (OUTPATIENT)
Dept: CARDIOLOGY CLINIC | Facility: CLINIC | Age: 79
End: 2024-08-12

## 2024-08-26 DIAGNOSIS — M25.562 CHRONIC PAIN OF LEFT KNEE: Primary | ICD-10-CM

## 2024-08-26 DIAGNOSIS — G89.29 CHRONIC PAIN OF LEFT KNEE: Primary | ICD-10-CM

## 2024-08-26 DIAGNOSIS — G89.29 CHRONIC PAIN OF RIGHT KNEE: ICD-10-CM

## 2024-08-26 DIAGNOSIS — M25.561 CHRONIC PAIN OF RIGHT KNEE: ICD-10-CM

## 2024-08-27 ENCOUNTER — OFFICE VISIT (OUTPATIENT)
Dept: CARDIOLOGY CLINIC | Facility: CLINIC | Age: 79
End: 2024-08-27
Payer: MEDICARE

## 2024-08-27 VITALS
SYSTOLIC BLOOD PRESSURE: 120 MMHG | OXYGEN SATURATION: 97 % | HEIGHT: 61 IN | WEIGHT: 146 LBS | HEART RATE: 60 BPM | DIASTOLIC BLOOD PRESSURE: 68 MMHG | RESPIRATION RATE: 16 BRPM | BODY MASS INDEX: 27.56 KG/M2

## 2024-08-27 DIAGNOSIS — E11.65 TYPE 2 DIABETES MELLITUS WITH HYPERGLYCEMIA, WITHOUT LONG-TERM CURRENT USE OF INSULIN (HCC): ICD-10-CM

## 2024-08-27 DIAGNOSIS — E78.2 MIXED HYPERLIPIDEMIA: ICD-10-CM

## 2024-08-27 DIAGNOSIS — I48.0 PAROXYSMAL ATRIAL FIBRILLATION (HCC): Primary | ICD-10-CM

## 2024-08-27 DIAGNOSIS — I10 ESSENTIAL HYPERTENSION: ICD-10-CM

## 2024-08-27 DIAGNOSIS — Z95.0 CARDIAC PACEMAKER IN SITU: ICD-10-CM

## 2024-08-27 DIAGNOSIS — I49.5 SINOATRIAL NODE DYSFUNCTION (HCC): ICD-10-CM

## 2024-08-27 PROCEDURE — 99214 OFFICE O/P EST MOD 30 MIN: CPT

## 2024-08-27 NOTE — PROGRESS NOTES
[facility]   Office Visit    Florina Pace 79 y.o. female MRN: [id number]    08/27/24        Assessment/Plan:  1.  Chest pain  Evaluated at ED in January 2024 for episode of chest pain.  EKG was without acute ischemic abnormalities and troponins negative at that time.  Denies reoccurrence of chest pain since.  Discussed consideration for further ischemic evaluation.  Patient wishes to continue symptom monitoring and will notify our office for reoccurrence or any new/worsening symptoms.    2.  Paroxysmal atrial fibrillation  HR is well-controlled, continue atenolol-chlorthalidone.  [id number] 5, continue Coumadin.  Follows with the Coumadin clinic, goal INR 2-3.  Denies palpitations or bleeding events.    3.  SSS s/p SJM DC PPM  Device nearing DENISE (11.7 months), device clinic plans for monthly device checks.    4.  Hypertension  BP is well-controlled.  Continue atenolol-chlorthalidone.  Encourage ambulatory monitoring and low-sodium diet.    5.  Hyperlipidemia  LDL at goal.  Continue Crestor and dietary control.    6.  NSVT  Continue atenolol-chlorthalidone    7.  T2DM, A1c 6.4        Interval history: Florina Pace is a pleasant 79 y.o. year old female with history of paroxysmal atrial fibrillation on Coumadin, SSS s/p SJM DC PPM, hypertension, hyperlipidemia, NSVT, T2DM with history of paroxysmal atrial fibrillation, SSS s/p SJM DC PPM, hypertension, hyperlipidemia, and NSVT who presents office visit.  Since time of last visit with cardiology, patient was evaluated by emergency department in January 2024 for episode of sharp nonradiating left-sided chest pain which occurred when picking up a box of eggs.  EKG at that time was without acute ischemic abnormalities and troponins were found to be negative.  Patient denies any reoccurrence of chest pain since.  States she has been well overall otherwise from a cardiac standpoint.  Endorses strict compliance to all medications.  Denies adverse effects to  "current medications.  Endorses knee/shoulder pain which she plans to discuss further at upcoming appointment with orthopedics.  Denies any additional complaints at this time.  Follows with the device clinic who plans for monthly device checks as device is nearing DENISE.  Follows with Dr. Lawler as primary cardiologist.  Follows with Dr. Lawler as primary cardiologist.        Review of Systems:  Review of Systems   Constitutional:  Negative for chills and fever.   HENT:  Negative for ear pain and sore throat.    Eyes:  Negative for pain and visual disturbance.   Respiratory:  Negative for cough and shortness of breath.    Cardiovascular:  Negative for chest pain, palpitations and leg swelling.   Gastrointestinal:  Negative for abdominal pain and vomiting.   Genitourinary:  Negative for dysuria and hematuria.   Musculoskeletal:  Positive for arthralgias. Negative for back pain.   Skin:  Negative for color change and rash.   Neurological:  Negative for seizures and syncope.   All other systems reviewed and are negative.      PHYSICAL EXAM:  Vitals:   Vitals:    08/27/24 1114   BP: 120/68   BP Location: Left arm   Patient Position: Sitting   Cuff Size: Standard   Pulse: 60   Resp: 16   SpO2: 97%   Weight: 66.2 kg (146 lb)   Height: 5' 1\" (1.549 m)        Physical Exam:  GEN: Alert and oriented x 3, in no acute distress.  Well appearing and well nourished.  Ambulates with assistance of cane.  HEENT: Sclera anicteric, conjunctivae pink, mucous membranes moist. Oropharynx clear.   NECK: Supple, no carotid bruits, no significant JVD. Trachea midline, no thyromegaly.   HEART: Regular rhythm, normal S1 and S2, no murmurs, clicks, gallops or rubs. PMI nondisplaced, no thrills.   LUNGS: Clear to auscultation bilaterally; no wheezes, rales, or rhonchi. No increased work of breathing or signs of respiratory distress.   ABDOMEN: Soft, nontender, nondistended, normoactive bowel sounds.   EXTREMITIES: Skin warm and well " perfused, no clubbing, cyanosis, or edema.  NEURO: No focal findings. Normal speech. Mood and affect normal.   SKIN: Normal without suspicious lesions on exposed skin.    Follow up: 6 months or sooner as needed    No Known Allergies      Current Outpatient Medications:     Ascorbic Acid (VITAMIN C) 100 MG tablet, Take 1,000 mg by mouth daily, Disp: , Rfl:     atenolol-chlorthalidone (TENORETIC) 50-25 mg per tablet, TAKE ONE TABLET BY MOUTH EVERY DAY, Disp: 90 tablet, Rfl: 3    B Complex Vitamins (B COMPLEX 100 PO), Take 1 tablet by mouth daily, Disp: , Rfl:     Blood Glucose Monitoring Suppl (FREESTYLE LITE) RANULFO, by Does not apply route 2 (two) times a day Dx E11.9, Disp: 1 each, Rfl: 0    Blood Glucose Monitoring Suppl (OneTouch Verio Reflect) w/Device KIT, Check blood sugars once daily. Please substitute with appropriate alternative as covered by patient's insurance. Dx: E11.65, Disp: 1 kit, Rfl: 0    cyclobenzaprine (FLEXERIL) 10 mg tablet, TAKE ONE TABLET BY MOUTH THREE TIMES A DAY AS NEEDED FOR MUSCLE SPASMS, Disp: 20 tablet, Rfl: 0    dorzolamide (TRUSOPT) 2 % ophthalmic solution, INSTILL 1 DROP INTO THE RIGHT EYE THREE TIMES A DAY, Disp: , Rfl:     FREESTYLE LITE test strip, TEST TWICE DAILY, Disp: 100 each, Rfl: 3    glipiZIDE (GLUCOTROL XL) 2.5 mg 24 hr tablet, TAKE ONE TABLET BY MOUTH EVERY DAY, Disp: 90 tablet, Rfl: 1    glucose blood (JASWANT CONTOUR TEST) test strip, Check blood sugar twice daily, DX:E11.9, Disp: 200 each, Rfl: 3    glucose blood (OneTouch Verio) test strip, Check blood sugars once daily. Please substitute with appropriate alternative as covered by patient's insurance. Dx: E11.65, Disp: 100 each, Rfl: 3    Lancets (freestyle) lancets, USE TO TEST TWO TIMES A DAY, Disp: 100 each, Rfl: 1    levothyroxine 50 mcg tablet, TAKE ONE TABLET BY MOUTH EVERY DAY, Disp: 90 tablet, Rfl: 1    Multiple Vitamin (MULTIVITAMIN) tablet, Take 1 tablet by mouth daily, Disp: , Rfl:     niacin 500 mg tablet,  Take by mouth , Disp: , Rfl:     OneTouch Delica Lancets 33G MISC, Check blood sugars once daily. Please substitute with appropriate alternative as covered by patient's insurance. Dx: E11.65, Disp: 100 each, Rfl: 3    oxybutynin (DITROPAN) 5 mg tablet, Take 1 tablet (5 mg total) by mouth 2 (two) times a day, Disp: 60 tablet, Rfl: 3    rosuvastatin (CRESTOR) 10 MG tablet, TAKE ONE TABLET BY MOUTH EVERY DAY, Disp: 90 tablet, Rfl: 3    triamcinolone (KENALOG) 0.1 % ointment, Apply topically 2 (two) times a day, Disp: 30 g, Rfl: 0    warfarin (Jantoven) 5 mg tablet, TAKE ONE TO TWO TABLETS BY MOUTH EVERY DAY, Disp: 180 tablet, Rfl: 3    methylPREDNISolone 4 MG tablet therapy pack, Use as directed on package (Patient not taking: Reported on 8/27/2024), Disp: 21 each, Rfl: 0    Past Medical History:   Diagnosis Date    Carpal tunnel syndrome     Heart murmur        Family History   Problem Relation Age of Onset    Heart attack Father         ARRHYTHMIAS    Coronary artery disease Family     Diabetes Family     Heart attack Family         ARRHYTHMIAS       Past Medical History:   Diagnosis Date    Carpal tunnel syndrome     Heart murmur        Past Surgical History:   Procedure Laterality Date    CATARACT EXTRACTION      CHOLECYSTECTOMY      COLONOSCOPY  11/24/2007    EYE SURGERY      KNEE SURGERY      REPLACEMENT TOTAL KNEE Right     TOTAL ABDOMINAL HYSTERECTOMY W/ BILATERAL SALPINGOOPHORECTOMY         Social History     Socioeconomic History    Marital status:      Spouse name: Not on file    Number of children: Not on file    Years of education: Not on file    Highest education level: Not on file   Occupational History    Not on file   Tobacco Use    Smoking status: Never    Smokeless tobacco: Never   Vaping Use    Vaping status: Never Used   Substance and Sexual Activity    Alcohol use: Yes    Drug use: No    Sexual activity: Not on file   Other Topics Concern    Not on file   Social History Narrative    Always  "uses seat belt    Employed    Lives with spouse      Social Determinants of Health     Financial Resource Strain: Not on file   Food Insecurity: No Food Insecurity (3/15/2024)    Hunger Vital Sign     Worried About Running Out of Food in the Last Year: Never true     Ran Out of Food in the Last Year: Never true   Transportation Needs: No Transportation Needs (3/15/2024)    PRAPARE - Transportation     Lack of Transportation (Medical): No     Lack of Transportation (Non-Medical): No   Physical Activity: Not on file   Stress: Not on file   Social Connections: Not on file   Intimate Partner Violence: Not on file   Housing Stability: High Risk (3/15/2024)    Housing Stability Vital Sign     Unable to Pay for Housing in the Last Year: Yes     Number of Times Moved in the Last Year: 1     Homeless in the Last Year: No         LABORATORY RESULTS:    Lab Results   Component Value Date    WBC 11.48 (H) 03/01/2024    HGB 11.5 03/01/2024    HCT 35.2 03/01/2024    MCV 92 03/01/2024     (H) 03/01/2024     Lab Results   Component Value Date    CALCIUM 12.0 (H) 03/01/2024    K 3.9 03/01/2024    CO2 32 03/01/2024    CL 99 03/01/2024    BUN 29 (H) 03/01/2024    CREATININE 0.70 03/01/2024     Lab Results   Component Value Date    HGBA1C 6.4 (H) 03/01/2024       Lipid Profile:   No results found for: \"CHOL\"  Lab Results   Component Value Date    HDL 41 (L) 03/01/2024    HDL 41 (L) 09/06/2023    HDL 37 (L) 03/13/2023     Lab Results   Component Value Date    LDLCALC 97 03/01/2024    LDLCALC 108 (H) 09/06/2023    LDLCALC 95 03/13/2023     Lab Results   Component Value Date    TRIG 145 03/01/2024    TRIG 185 (H) 09/06/2023    TRIG 232 (H) 03/13/2023       The ASCVD Risk score (Myra DK, et al., 2019) failed to calculate for the following reasons:    The 2019 ASCVD risk score is only valid for ages 40 to 79    1. Paroxysmal atrial fibrillation (HCC)        2. Sinoatrial node dysfunction (HCC)        3. Cardiac pacemaker in situ   "      4. Essential hypertension        5. Mixed hyperlipidemia        6. Type 2 diabetes mellitus with hyperglycemia, without long-term current use of insulin (HCC)            Imaging: I have personally reviewed pertinent reports.        Recommend aggressive risk factor modification and therapeutic lifestyle changes.  Low-salt, low-calorie, low-fat, low-cholesterol diet with regular exercise and to optimize weight.    Discussed concepts of atherosclerosis, including signs and symptoms of cardiac disease.    Medications reviewed and possible side effects discussed.  Previous studies were reviewed.    Safety measures were reviewed.  All questions and concerns addressed.  Patient was advised to report any problems requiring medical attention.    Follow-up with PCP and appropriate specialist and lab work as discussed.    Return for follow up visit as scheduled or earlier, if needed.  Thank you for allowing me to participate in the care and evaluation of your patient.  Should you have any questions, please feel free to contact me.    Leonardo Rosales PA-C  8/27/2024,12:05 PM

## 2024-08-29 ENCOUNTER — OFFICE VISIT (OUTPATIENT)
Dept: OBGYN CLINIC | Facility: CLINIC | Age: 79
End: 2024-08-29
Payer: MEDICARE

## 2024-08-29 ENCOUNTER — APPOINTMENT (OUTPATIENT)
Dept: RADIOLOGY | Facility: CLINIC | Age: 79
End: 2024-08-29
Payer: MEDICARE

## 2024-08-29 VITALS
DIASTOLIC BLOOD PRESSURE: 64 MMHG | BODY MASS INDEX: 27.56 KG/M2 | SYSTOLIC BLOOD PRESSURE: 152 MMHG | HEIGHT: 61 IN | OXYGEN SATURATION: 97 % | HEART RATE: 60 BPM | WEIGHT: 146 LBS

## 2024-08-29 DIAGNOSIS — M70.50 PES ANSERINE BURSITIS: Primary | ICD-10-CM

## 2024-08-29 DIAGNOSIS — M25.562 CHRONIC PAIN OF LEFT KNEE: ICD-10-CM

## 2024-08-29 DIAGNOSIS — G89.29 CHRONIC PAIN OF LEFT KNEE: ICD-10-CM

## 2024-08-29 DIAGNOSIS — M17.12 PRIMARY OSTEOARTHRITIS OF LEFT KNEE: ICD-10-CM

## 2024-08-29 DIAGNOSIS — G89.29 CHRONIC PAIN OF RIGHT KNEE: ICD-10-CM

## 2024-08-29 DIAGNOSIS — M54.12 CERVICAL RADICULOPATHY: ICD-10-CM

## 2024-08-29 DIAGNOSIS — M25.561 CHRONIC PAIN OF RIGHT KNEE: ICD-10-CM

## 2024-08-29 PROCEDURE — 99214 OFFICE O/P EST MOD 30 MIN: CPT | Performed by: ORTHOPAEDIC SURGERY

## 2024-08-29 PROCEDURE — 73562 X-RAY EXAM OF KNEE 3: CPT

## 2024-08-29 RX ORDER — MULTIVIT-MIN/IRON/FOLIC ACID/K 18-600-40
1 CAPSULE ORAL DAILY
COMMUNITY

## 2024-08-29 RX ORDER — GABAPENTIN 300 MG/1
300 CAPSULE ORAL
Qty: 30 CAPSULE | Refills: 1 | Status: SHIPPED | OUTPATIENT
Start: 2024-08-29

## 2024-08-29 RX ORDER — PHENOL 1.4 %
600 AEROSOL, SPRAY (ML) MUCOUS MEMBRANE DAILY
COMMUNITY

## 2024-08-29 NOTE — PROGRESS NOTES
Orthopaedics Office Visit - New Patient Visit    ASSESSMENT/PLAN:    Assessment:   Right pes anserine bursitis, history of TKA in 2016 with Dr Pinon  Left knee osteoarthritis - chronic, exacerbated in recent months    Plan:   X-rays obtained and reviewed in the office today  The patient was referred to Dr. Gregorio to discuss left TKA  She was referred  to spine and pain management for her neck   She was referred to outpatient physical therapy for cervical radiculopathy  Rx med management: She was prescribed gabapentin to pharmacy on file  The patient will follow up with me as needed    To Do Next Visit:  F/u with Dr. Gregorio    _____________________________________________________  CHIEF COMPLAINT:  Chief Complaint   Patient presents with    Left Knee - Pain     Referral from Dr Garzon     Right Knee - Pain    Right Arm - Pain         SUBJECTIVE:  Florina Pace is a 79 y.o. female who presents for initial evaluation of bilateral knee pain. Patient previously treated by Dr. Garzon with corticosteroid injections. Most of her pain is at night or while ambulating.  The patient completed physical therapy for several months last year. She takes Tylenol arthritis for symptom management.     She has a history of right total knee arthroplasty in 2016 by Dr. Pinon. She is complaining of night pain in her right knee, more medially. She did well after her surgery. Pain returned approximately one year ago.     The patient is also complaining of pain shooting down her right arm. She feels tingling and numbness in her fingers and sharp, burning pain in her elbow.     PAST MEDICAL HISTORY:  Past Medical History:   Diagnosis Date    Carpal tunnel syndrome     Heart murmur        PAST SURGICAL HISTORY:  Past Surgical History:   Procedure Laterality Date    CATARACT EXTRACTION      CHOLECYSTECTOMY      COLONOSCOPY  11/24/2007    EYE SURGERY      KNEE SURGERY      REPLACEMENT TOTAL KNEE Right     TOTAL ABDOMINAL  HYSTERECTOMY W/ BILATERAL SALPINGOOPHORECTOMY         FAMILY HISTORY:  Family History   Problem Relation Age of Onset    Heart attack Father         ARRHYTHMIAS    Coronary artery disease Family     Diabetes Family     Heart attack Family         ARRHYTHMIAS       SOCIAL HISTORY:  Social History     Tobacco Use    Smoking status: Never    Smokeless tobacco: Never   Vaping Use    Vaping status: Never Used   Substance Use Topics    Alcohol use: Yes    Drug use: No       MEDICATIONS:    Current Outpatient Medications:     Ascorbic Acid (VITAMIN C) 100 MG tablet, Take 1,000 mg by mouth daily, Disp: , Rfl:     atenolol-chlorthalidone (TENORETIC) 50-25 mg per tablet, TAKE ONE TABLET BY MOUTH EVERY DAY, Disp: 90 tablet, Rfl: 3    B Complex Vitamins (B COMPLEX 100 PO), Take 1 tablet by mouth daily, Disp: , Rfl:     Blood Glucose Monitoring Suppl (FREESTYLE LITE) RANULFO, by Does not apply route 2 (two) times a day Dx E11.9, Disp: 1 each, Rfl: 0    Blood Glucose Monitoring Suppl (OneTouch Verio Reflect) w/Device KIT, Check blood sugars once daily. Please substitute with appropriate alternative as covered by patient's insurance. Dx: E11.65, Disp: 1 kit, Rfl: 0    calcium carbonate (Calcium 600) 600 MG tablet, Take 600 mg by mouth daily, Disp: , Rfl:     Cholecalciferol (Vitamin D) 50 MCG (2000 UT) CAPS, Take 1 capsule by mouth in the morning, Disp: , Rfl:     cyclobenzaprine (FLEXERIL) 10 mg tablet, TAKE ONE TABLET BY MOUTH THREE TIMES A DAY AS NEEDED FOR MUSCLE SPASMS, Disp: 20 tablet, Rfl: 0    dorzolamide (TRUSOPT) 2 % ophthalmic solution, INSTILL 1 DROP INTO THE RIGHT EYE THREE TIMES A DAY, Disp: , Rfl:     FREESTYLE LITE test strip, TEST TWICE DAILY, Disp: 100 each, Rfl: 3    glipiZIDE (GLUCOTROL XL) 2.5 mg 24 hr tablet, TAKE ONE TABLET BY MOUTH EVERY DAY, Disp: 90 tablet, Rfl: 1    glucose blood (JASWANT CONTOUR TEST) test strip, Check blood sugar twice daily, DX:E11.9, Disp: 200 each, Rfl: 3    glucose blood (OneTouch  "Verio) test strip, Check blood sugars once daily. Please substitute with appropriate alternative as covered by patient's insurance. Dx: E11.65, Disp: 100 each, Rfl: 3    Lancets (freestyle) lancets, USE TO TEST TWO TIMES A DAY, Disp: 100 each, Rfl: 1    levothyroxine 50 mcg tablet, TAKE ONE TABLET BY MOUTH EVERY DAY, Disp: 90 tablet, Rfl: 1    Multiple Vitamin (MULTIVITAMIN) tablet, Take 1 tablet by mouth daily, Disp: , Rfl:     niacin 500 mg tablet, Take by mouth , Disp: , Rfl:     OneTouch Delica Lancets 33G MISC, Check blood sugars once daily. Please substitute with appropriate alternative as covered by patient's insurance. Dx: E11.65, Disp: 100 each, Rfl: 3    oxybutynin (DITROPAN) 5 mg tablet, Take 1 tablet (5 mg total) by mouth 2 (two) times a day, Disp: 60 tablet, Rfl: 3    rosuvastatin (CRESTOR) 10 MG tablet, TAKE ONE TABLET BY MOUTH EVERY DAY, Disp: 90 tablet, Rfl: 3    triamcinolone (KENALOG) 0.1 % ointment, Apply topically 2 (two) times a day, Disp: 30 g, Rfl: 0    warfarin (Jantoven) 5 mg tablet, TAKE ONE TO TWO TABLETS BY MOUTH EVERY DAY, Disp: 180 tablet, Rfl: 3    methylPREDNISolone 4 MG tablet therapy pack, Use as directed on package (Patient not taking: Reported on 8/27/2024), Disp: 21 each, Rfl: 0    ALLERGIES:  No Known Allergies    REVIEW OF SYSTEMS:  MSK: as noted in HPI  Neuro: WNL  Pertinent items are otherwise noted in HPI.  A comprehensive review of systems was otherwise negative.    LABS:  HgA1c:   Lab Results   Component Value Date    HGBA1C 6.4 (H) 03/01/2024     BMP:   Lab Results   Component Value Date    CALCIUM 12.0 (H) 03/01/2024    K 3.9 03/01/2024    CO2 32 03/01/2024    CL 99 03/01/2024    BUN 29 (H) 03/01/2024    CREATININE 0.70 03/01/2024     CBC: No components found for: \"CBC\"    _____________________________________________________  PHYSICAL EXAMINATION:  Vital signs: /64   Pulse 60   Ht 5' 1\" (1.549 m)   Wt 66.2 kg (146 lb)   SpO2 97%   BMI 27.59 kg/m²   General: " No acute distress, awake and alert  Psychiatric: Mood and affect appear appropriate  HEENT: Trachea Midline, No torticollis, no apparent facial trauma  Cardiovascular: No audible murmurs; Extremities appear perfused  Pulmonary: No audible wheezing or stridor  Skin: No open lesions; see further details (if any) below    MUSCULOSKELETAL EXAMINATION:  Extremities:    Right Knee  Range of motion from 0 to 110.    There is no crepitus with range of motion.   There is no effusion.    There is tenderness over the pes anserine.    There is 5/5 quadriceps strength and equal tone.    The patient is able to perform a straight leg raise.    Varus stress testing reveals no instability at 0 and 30 degrees   Valgus stress testing reveals no instability at 0 and 30 degrees  The patient is neurovascular intact distally.    Left Knee  Range of motion from 0 to 110.    There is mild crepitus with range of motion.   There is no effusion.    There is tenderness over the medial joint line.    There is 5/5 quadriceps strength and equal tone.    The patient is able to perform a straight leg raise.    Varus stress testing reveals no instability at 0 and 30 degrees   Valgus stress testing reveals no instability at 0 and 30 degrees  The patient is neurovascular intact distally.    Cervical Spine:   There is no midline tenderness.    There is no paraspinal hypertonicity and tenderness.    Sensation intact to light touch C5 through T1 dermatomes left upper extremity.   Sensation intact to light touch C5 through T1 dermatomes right upper extremity.    Left Motor: 5/5 biceps, 5/5 triceps, 5/5 wrist extension, 5/5 finger flexion, 5/5 finger abduction   Right Motor: 5/5 biceps, 5/5 triceps, 5/5 wrist extension, 5/5 finger flexion, 5/5 finger abduction       _____________________________________________________  STUDIES REVIEWED:  I personally reviewed the images obtained in office today and my independent interpretation is as follows:    X-rays of  the right knee obtained 8/29/2024 demonstrates orthopedic hardware intact without evidence of loosening or failure.    X-rays of the left knee obtained 8/29/2024 demonstrates severe tricompartmental degenerative changes    PROCEDURES PERFORMED:  Procedures  None      Scribe Attestation      I,:  Emily Cleary am acting as a scribe while in the presence of the attending physician.:       I,:  Brent Soliman MD personally performed the services described in this documentation    as scribed in my presence.:

## 2024-09-04 ENCOUNTER — TELEPHONE (OUTPATIENT)
Age: 79
End: 2024-09-04

## 2024-09-04 NOTE — TELEPHONE ENCOUNTER
Patient is going to get labs done soon. Patient says she has been feeling extra tired lately. Wants to know if there is any additional labs that she can get done to determine why she is feeling so fatigued? Please advise.      Florina Pace: [phone]

## 2024-09-05 ENCOUNTER — TELEPHONE (OUTPATIENT)
Age: 79
End: 2024-09-05

## 2024-09-05 NOTE — TELEPHONE ENCOUNTER
Benji Bose, DO   to [facility] [address] [facility]     9/5/24  8:07 AM  Note     The labs that are ordered will give us a place to start.

## 2024-09-06 ENCOUNTER — APPOINTMENT (OUTPATIENT)
Dept: LAB | Facility: HOSPITAL | Age: 79
End: 2024-09-06
Payer: MEDICARE

## 2024-09-06 ENCOUNTER — ANTICOAG VISIT (OUTPATIENT)
Dept: CARDIOLOGY CLINIC | Facility: CLINIC | Age: 79
End: 2024-09-06

## 2024-09-06 DIAGNOSIS — E11.9 TYPE 2 DIABETES MELLITUS WITHOUT COMPLICATION, WITHOUT LONG-TERM CURRENT USE OF INSULIN (HCC): ICD-10-CM

## 2024-09-06 DIAGNOSIS — E03.9 ADULT HYPOTHYROIDISM: ICD-10-CM

## 2024-09-06 DIAGNOSIS — Z79.01 ANTICOAGULATION MONITORING, INR RANGE 2-3: ICD-10-CM

## 2024-09-06 DIAGNOSIS — I10 ESSENTIAL HYPERTENSION: ICD-10-CM

## 2024-09-06 LAB
ALBUMIN SERPL BCG-MCNC: 4.4 G/DL (ref 3.5–5)
ALP SERPL-CCNC: 51 U/L (ref 34–104)
ALT SERPL W P-5'-P-CCNC: 17 U/L (ref 7–52)
ANION GAP SERPL CALCULATED.3IONS-SCNC: 7 MMOL/L (ref 4–13)
AST SERPL W P-5'-P-CCNC: 25 U/L (ref 13–39)
BILIRUB SERPL-MCNC: 0.45 MG/DL (ref 0.2–1)
BUN SERPL-MCNC: 25 MG/DL (ref 5–25)
CALCIUM SERPL-MCNC: 11.5 MG/DL (ref 8.4–10.2)
CHLORIDE SERPL-SCNC: 95 MMOL/L (ref 96–108)
CHOLEST SERPL-MCNC: 171 MG/DL
CO2 SERPL-SCNC: 32 MMOL/L (ref 21–32)
CREAT SERPL-MCNC: 0.73 MG/DL (ref 0.6–1.3)
ERYTHROCYTE [DISTWIDTH] IN BLOOD BY AUTOMATED COUNT: 13.2 % (ref 11.6–15.1)
EST. AVERAGE GLUCOSE BLD GHB EST-MCNC: 146 MG/DL
GFR SERPL CREATININE-BSD FRML MDRD: 77 ML/MIN/1.73SQ M
GLUCOSE P FAST SERPL-MCNC: 135 MG/DL (ref 65–99)
HBA1C MFR BLD: 6.7 %
HCT VFR BLD AUTO: 39.9 % (ref 34.8–46.1)
HDLC SERPL-MCNC: 44 MG/DL
HGB BLD-MCNC: 12.5 G/DL (ref 11.5–15.4)
INR PPP: 2.97 (ref 0.85–1.19)
LDLC SERPL CALC-MCNC: 89 MG/DL (ref 0–100)
MCH RBC QN AUTO: 29.3 PG (ref 26.8–34.3)
MCHC RBC AUTO-ENTMCNC: 31.3 G/DL (ref 31.4–37.4)
MCV RBC AUTO: 93 FL (ref 82–98)
NONHDLC SERPL-MCNC: 127 MG/DL
PLATELET # BLD AUTO: 400 THOUSANDS/UL (ref 149–390)
PMV BLD AUTO: 10.3 FL (ref 8.9–12.7)
POTASSIUM SERPL-SCNC: 3.4 MMOL/L (ref 3.5–5.3)
PROT SERPL-MCNC: 7.5 G/DL (ref 6.4–8.4)
PROTHROMBIN TIME: 31.5 SECONDS (ref 12.3–15)
RBC # BLD AUTO: 4.27 MILLION/UL (ref 3.81–5.12)
SODIUM SERPL-SCNC: 134 MMOL/L (ref 135–147)
TRIGL SERPL-MCNC: 189 MG/DL
TSH SERPL DL<=0.05 MIU/L-ACNC: 4.75 UIU/ML (ref 0.45–4.5)
WBC # BLD AUTO: 9.78 THOUSAND/UL (ref 4.31–10.16)

## 2024-09-06 PROCEDURE — 83036 HEMOGLOBIN GLYCOSYLATED A1C: CPT

## 2024-09-06 PROCEDURE — 85027 COMPLETE CBC AUTOMATED: CPT

## 2024-09-06 PROCEDURE — 84443 ASSAY THYROID STIM HORMONE: CPT

## 2024-09-06 PROCEDURE — 36415 COLL VENOUS BLD VENIPUNCTURE: CPT

## 2024-09-06 PROCEDURE — 80053 COMPREHEN METABOLIC PANEL: CPT

## 2024-09-06 PROCEDURE — 80061 LIPID PANEL: CPT

## 2024-09-06 PROCEDURE — 85610 PROTHROMBIN TIME: CPT

## 2024-09-10 ENCOUNTER — REMOTE DEVICE CLINIC VISIT (OUTPATIENT)
Dept: CARDIOLOGY CLINIC | Facility: CLINIC | Age: 79
End: 2024-09-10

## 2024-09-10 DIAGNOSIS — Z95.0 PRESENCE OF PERMANENT CARDIAC PACEMAKER: Primary | ICD-10-CM

## 2024-09-10 PROCEDURE — RECHECK: Performed by: INTERNAL MEDICINE

## 2024-09-10 NOTE — PROGRESS NOTES
Results for orders placed or performed in visit on 09/10/24   Cardiac EP device report    Narrative    SJM DC PM/NOT MRI CONDITIONAL  MERLIN TRANSMISSION: N/BBATTERY VOLTAGE NEARING DENISE.  WILL SCHEDULE MONTHLY BATTERY CHECKS. (11.1 MONS) AP 67%  6%. ALL AVAILABLE LEAD PARAMETERS WITHIN NORMAL LIMITS. 22 VHR EPISODES DETECTED, RVR NOTED. 101 AMS EPISODES DETECTED. PATIENT IS ON WARFARIN. NORMAL DEVICE FUNCTION.---REN

## 2024-09-12 ENCOUNTER — TELEPHONE (OUTPATIENT)
Age: 79
End: 2024-09-12

## 2024-09-12 DIAGNOSIS — M54.9 ACUTE BACK PAIN, UNSPECIFIED BACK LOCATION, UNSPECIFIED BACK PAIN LATERALITY: ICD-10-CM

## 2024-09-12 RX ORDER — CYCLOBENZAPRINE HCL 10 MG
10 TABLET ORAL 3 TIMES DAILY PRN
Qty: 20 TABLET | Refills: 0 | Status: SHIPPED | OUTPATIENT
Start: 2024-09-12

## 2024-09-12 NOTE — TELEPHONE ENCOUNTER
Pt called in and is wondering if Dr Bose can refill her Flexeril again. She is having a lot of pain again in her lower back which is causing issues with sitting and laying down. And she doesn't want to be taking the gabapentin. Please advise and notify.

## 2024-09-12 NOTE — TELEPHONE ENCOUNTER
Patient called again asking about the Flexeril.     Advised patient although Dr. Bose is out of the office he did receive the message and sent over a script for the Flexeril.

## 2024-09-20 DIAGNOSIS — E03.9 ADULT HYPOTHYROIDISM: ICD-10-CM

## 2024-09-20 RX ORDER — LEVOTHYROXINE SODIUM 50 UG/1
50 TABLET ORAL DAILY
Qty: 90 TABLET | Refills: 1 | Status: SHIPPED | OUTPATIENT
Start: 2024-09-20

## 2024-09-23 ENCOUNTER — OFFICE VISIT (OUTPATIENT)
Dept: OBGYN CLINIC | Facility: CLINIC | Age: 79
End: 2024-09-23
Payer: MEDICARE

## 2024-09-23 VITALS
HEART RATE: 80 BPM | WEIGHT: 144.4 LBS | BODY MASS INDEX: 27.26 KG/M2 | HEIGHT: 61 IN | DIASTOLIC BLOOD PRESSURE: 83 MMHG | SYSTOLIC BLOOD PRESSURE: 136 MMHG

## 2024-09-23 DIAGNOSIS — Z96.651 S/P TOTAL KNEE ARTHROPLASTY, RIGHT: Primary | ICD-10-CM

## 2024-09-23 DIAGNOSIS — M25.561 RIGHT KNEE PAIN, UNSPECIFIED CHRONICITY: ICD-10-CM

## 2024-09-23 DIAGNOSIS — M17.12 PRIMARY OSTEOARTHRITIS OF LEFT KNEE: ICD-10-CM

## 2024-09-23 PROCEDURE — 99214 OFFICE O/P EST MOD 30 MIN: CPT | Performed by: ORTHOPAEDIC SURGERY

## 2024-09-23 NOTE — PROGRESS NOTES
Patient Name:  Florina Pace  MRN:  [id number]    Assessment & Plan     1. S/P total knee arthroplasty, right  -     CBC and differential; Future  -     Sedimentation rate, automated; Future  -     C-reactive protein; Future  -     Ambulatory Referral to Orthopedic Surgery; Future  2. Primary osteoarthritis of left knee  -     Ambulatory Referral to Orthopedic Surgery  3. Right knee pain, unspecified chronicity  -     CBC and differential; Future  -     Sedimentation rate, automated; Future  -     C-reactive protein; Future  -     Ambulatory Referral to Orthopedic Surgery; Future      Bilateral knee pain, Left knee osteoarthritis and Right knee pain s/p TKA 2016 with Dr Pinon  X-ray reviewed in office today with patient  In regard to Left knee, reviewed nonoperative management. Advised patient with her persistent Right knee pain, would be hesitant to proceed with total knee arthroplasty for the Left knee. Encouraged patient to continue OTC medications, CSI vs visco injection, outpatient PT to work on gait and strengthening.  In regard to the Right knee pain, placed order for labs to evaluate for acute inflammation/infection due to worsening pain over the past several months, though no overt sign of infection present in the office today. Patient would benefit from follow up with Dr Crawley for evaluation and continued management of bilateral knees. Advised patient of risks of total knee arthroplasty including infection, stiffness, persistent pain.   Advised patient she may bend down and kneel pending tolerance. Would avoid kneeling secondary to recent worsening of pain and patient's stiffness.  Follow up with Dr Crawley     Chief Complaint     Left knee pain    History of the Present Illness     Florina Pace is a 79 y.o. female with bilateral knee pain. Left knee pain ongoing for a few years, but worsening over the past few months. She was previously seeing Dr Garzon for injection therapy and recently had  "appointment with Dr Soliman.  Patient is s/p Right total knee arthroplasty 2016 with Dr Pinon. She has not recently followed up with operating surgeon. Patient reports she did well after surgery, but her pain started about a few months ago. She admits to recent stiffness of the right knee. She admits her \"hamstring is swollen\" because she went on a trip yesterday. She admits both knees feel swollen \"up at the top\". Patient's last left knee CSI with Dr Garzon 03/12/2024. Patient has history of diabetes with most recent HGBA1c 6.7 performed 09/06/2024. She has been using Tylenol and CBD cream.  She denies any fevers or chills.    Review of Systems     Review of Systems   Constitutional:  Negative for chills and fever.   HENT:  Negative for congestion.    Respiratory:  Negative for cough, chest tightness and shortness of breath.    Cardiovascular:  Negative for chest pain and palpitations.   Gastrointestinal:  Negative for abdominal pain.   Endocrine: Negative for cold intolerance and heat intolerance.   Neurological:  Negative for syncope.   Psychiatric/Behavioral:  Negative for confusion.        Physical Exam     /83   Pulse 80   Ht 5' 1\" (1.549 m)   Wt 65.5 kg (144 lb 6.4 oz)   BMI 27.28 kg/m²     Right Knee  Range of motion from 0 to 85 degrees with pain at terminal flexion.    There is trace effusion.    There is tenderness over the pes anserinus bursa.    There is 5/5 quadriceps strength and preserved tone.    The patient is able to perform a straight leg raise.    Varus stress testing reveals no pain or instability at 0 and 30 degrees   Valgus stress testing reveals no pain or instability at 0 and 30 degrees  The patient is neurovascular intact distally.      Left Knee  Range of motion from 0 to 100 degrees without pain.    There is no crepitus with range of motion.   There is no effusion.    There is tenderness over the anteromedial and anterolateral joint line.    There is 4+/5 quadriceps " strength and preserved tone.    The patient is able to perform a straight leg raise.    Varus stress testing reveals no pain or instability at 0 and 30 degrees   Valgus stress testing reveals no pain or instability at 0 and 30 degrees  The patient is neurovascular intact distally.      Eyes:  Anicteric sclerae.  Neck:  Supple.  Lungs:  Normal respiratory effort.  Cardiovascular:  Capillary refill is less than 2 seconds.  Skin:  Intact without erythema.  Neurologic:  Sensation grossly intact to light touch.  Psychiatric:  Mood and affect are appropriate.    Data Review     I have personally reviewed pertinent films in PACS, and my interpretation follows:    X-rays taken 08/29/2024 of Left knee demonstrate  moderate tricompartmental degenerative changes most noted in lateral compartment.       X-ray taken 08/29/2024 of Right knee demonstrate total knee prosthesis well-fixed with no obvious loosening or lucency. No fracture present.    Past Medical History:   Diagnosis Date    Carpal tunnel syndrome     Heart murmur        Past Surgical History:   Procedure Laterality Date    CATARACT EXTRACTION      CHOLECYSTECTOMY      COLONOSCOPY  11/24/2007    EYE SURGERY      JOINT REPLACEMENT      KNEE SURGERY      REPLACEMENT TOTAL KNEE Right 2016    TOTAL ABDOMINAL HYSTERECTOMY W/ BILATERAL SALPINGOOPHORECTOMY         No Known Allergies    Current Outpatient Medications on File Prior to Visit   Medication Sig Dispense Refill    Ascorbic Acid (VITAMIN C) 100 MG tablet Take 1,000 mg by mouth daily      atenolol-chlorthalidone (TENORETIC) 50-25 mg per tablet TAKE ONE TABLET BY MOUTH EVERY DAY 90 tablet 3    B Complex Vitamins (B COMPLEX 100 PO) Take 1 tablet by mouth daily      Blood Glucose Monitoring Suppl (FREESTYLE LITE) RANULFO by Does not apply route 2 (two) times a day Dx E11.9 1 each 0    Blood Glucose Monitoring Suppl (OneTouch Verio Reflect) w/Device KIT Check blood sugars once daily. Please substitute with appropriate  alternative as covered by patient's insurance. Dx: E11.65 1 kit 0    calcium carbonate (Calcium 600) 600 MG tablet Take 600 mg by mouth daily      Cholecalciferol (Vitamin D) 50 MCG (2000 UT) CAPS Take 1 capsule by mouth in the morning      cyclobenzaprine (FLEXERIL) 10 mg tablet Take 1 tablet (10 mg total) by mouth 3 (three) times a day as needed for muscle spasms 20 tablet 0    dorzolamide (TRUSOPT) 2 % ophthalmic solution INSTILL 1 DROP INTO THE RIGHT EYE THREE TIMES A DAY      FREESTYLE LITE test strip TEST TWICE DAILY 100 each 3    gabapentin (Neurontin) 300 mg capsule Take 1 capsule (300 mg total) by mouth daily at bedtime 30 capsule 1    glipiZIDE (GLUCOTROL XL) 2.5 mg 24 hr tablet TAKE ONE TABLET BY MOUTH EVERY DAY 90 tablet 1    glucose blood (JASWANT CONTOUR TEST) test strip Check blood sugar twice daily, DX:E11.9 200 each 3    glucose blood (OneTouch Verio) test strip Check blood sugars once daily. Please substitute with appropriate alternative as covered by patient's insurance. Dx: E11.65 100 each 3    Lancets (freestyle) lancets USE TO TEST TWO TIMES A  each 1    levothyroxine 50 mcg tablet TAKE ONE TABLET BY MOUTH EVERY DAY 90 tablet 1    Multiple Vitamin (MULTIVITAMIN) tablet Take 1 tablet by mouth daily      niacin 500 mg tablet Take by mouth       OneTouch Delica Lancets 33G MISC Check blood sugars once daily. Please substitute with appropriate alternative as covered by patient's insurance. Dx: E11.65 100 each 3    oxybutynin (DITROPAN) 5 mg tablet Take 1 tablet (5 mg total) by mouth 2 (two) times a day 60 tablet 3    rosuvastatin (CRESTOR) 10 MG tablet TAKE ONE TABLET BY MOUTH EVERY DAY 90 tablet 3    triamcinolone (KENALOG) 0.1 % ointment Apply topically 2 (two) times a day 30 g 0    warfarin (Jantoven) 5 mg tablet TAKE ONE TO TWO TABLETS BY MOUTH EVERY  tablet 3    methylPREDNISolone 4 MG tablet therapy pack Use as directed on package (Patient not taking: Reported on 8/27/2024) 21  each 0     No current facility-administered medications on file prior to visit.       Social History     Tobacco Use    Smoking status: Never    Smokeless tobacco: Never   Vaping Use    Vaping status: Never Used   Substance Use Topics    Alcohol use: Yes    Drug use: No       Family History   Problem Relation Age of Onset    Heart attack Father         ARRHYTHMIAS    Coronary artery disease Family     Diabetes Family     Heart attack Family         ARRHYTHMIAS             Procedures Performed     Procedures  None       Karri Gregorio DO

## 2024-10-04 ENCOUNTER — OFFICE VISIT (OUTPATIENT)
Dept: FAMILY MEDICINE CLINIC | Facility: CLINIC | Age: 79
End: 2024-10-04
Payer: MEDICARE

## 2024-10-04 VITALS
OXYGEN SATURATION: 98 % | HEIGHT: 61 IN | SYSTOLIC BLOOD PRESSURE: 100 MMHG | HEART RATE: 83 BPM | DIASTOLIC BLOOD PRESSURE: 70 MMHG | BODY MASS INDEX: 27.56 KG/M2 | WEIGHT: 146 LBS

## 2024-10-04 DIAGNOSIS — E03.9 ADULT HYPOTHYROIDISM: ICD-10-CM

## 2024-10-04 DIAGNOSIS — I10 ESSENTIAL HYPERTENSION: Primary | ICD-10-CM

## 2024-10-04 DIAGNOSIS — N39.41 URINARY INCONTINENCE, URGE: ICD-10-CM

## 2024-10-04 DIAGNOSIS — I48.0 PAROXYSMAL ATRIAL FIBRILLATION (HCC): ICD-10-CM

## 2024-10-04 DIAGNOSIS — E11.9 TYPE 2 DIABETES MELLITUS WITHOUT COMPLICATION, WITHOUT LONG-TERM CURRENT USE OF INSULIN (HCC): ICD-10-CM

## 2024-10-04 PROCEDURE — 99214 OFFICE O/P EST MOD 30 MIN: CPT | Performed by: FAMILY MEDICINE

## 2024-10-04 PROCEDURE — G2211 COMPLEX E/M VISIT ADD ON: HCPCS | Performed by: FAMILY MEDICINE

## 2024-10-04 RX ORDER — GLIPIZIDE 2.5 MG/1
2.5 TABLET, EXTENDED RELEASE ORAL DAILY
Qty: 90 TABLET | Refills: 1 | Status: SHIPPED | OUTPATIENT
Start: 2024-10-04

## 2024-10-04 RX ORDER — OXYBUTYNIN CHLORIDE 5 MG/1
5 TABLET ORAL 2 TIMES DAILY
Qty: 60 TABLET | Refills: 5 | Status: SHIPPED | OUTPATIENT
Start: 2024-10-04

## 2024-10-04 NOTE — PROGRESS NOTES
Ambulatory Visit  Name: Florina Pace      : 1945      MRN: [id number]  Encounter Provider: Benji Bose DO  Encounter Date: 10/4/2024   Encounter department: [facility] [address]      Assessment & Plan  Essential hypertension  Controlled, continue the atenolol-chlorthalidone       Adult hypothyroidism  Stable, continue her levothyroxine, check TSH in 6 months  Orders:    TSH, 3rd generation; Future    Type 2 diabetes mellitus without complication, without long-term current use of insulin (HCC)  Controlled, continue her glipizide, recheck her blood work for hemoglobin A1c in 6 months  Lab Results   Component Value Date    HGBA1C 6.7 (H) 2024       Orders:    CBC; Future    Comprehensive metabolic panel; Future    Hemoglobin A1C; Future    Albumin / creatinine urine ratio; Future    Paroxysmal atrial fibrillation (HCC)  Rate controlled, continue her atenolol, anticoagulation with Coumadin, this is managed by her cardiologist.            History of Present Illness     Patient comes in today for checkup, states she doing well, no specific complaints.  She did get her blood work done, and is reviewed with her today.      Review of Systems   Constitutional:  Negative for chills, fatigue and fever.   HENT:  Negative for congestion, ear pain, hearing loss, postnasal drip, rhinorrhea and sore throat.    Eyes:  Negative for pain and visual disturbance.   Respiratory:  Negative for chest tightness, shortness of breath and wheezing.    Cardiovascular:  Negative for chest pain and leg swelling.   Gastrointestinal:  Negative for abdominal distention, abdominal pain, constipation, diarrhea and vomiting.   Endocrine: Negative for cold intolerance and heat intolerance.   Genitourinary:  Negative for difficulty urinating, frequency and urgency.   Musculoskeletal:  Negative for arthralgias and gait problem.   Skin:  Negative for color change.   Neurological:  Negative for  "dizziness, tremors, syncope, numbness and headaches.   Hematological:  Negative for adenopathy.   Psychiatric/Behavioral:  Negative for agitation, confusion and sleep disturbance. The patient is not nervous/anxious.      Objective     /70 (BP Location: Left arm, Patient Position: Sitting, Cuff Size: Standard)   Pulse 83   Ht 5' 1\" (1.549 m)   Wt 66.2 kg (146 lb)   SpO2 98%   BMI 27.59 kg/m²     Physical Exam  Constitutional:       Appearance: She is well-developed.   HENT:      Head: Normocephalic and atraumatic.      Right Ear: External ear normal.      Left Ear: External ear normal.      Nose: Nose normal.      Mouth/Throat:      Pharynx: Oropharynx is clear.   Eyes:      Extraocular Movements: Extraocular movements intact.      Conjunctiva/sclera: Conjunctivae normal.      Pupils: Pupils are equal, round, and reactive to light.   Neck:      Thyroid: No thyromegaly.   Cardiovascular:      Rate and Rhythm: Normal rate and regular rhythm.      Pulses: no weak pulses.           Dorsalis pedis pulses are 2+ on the right side and 2+ on the left side.        Posterior tibial pulses are 2+ on the right side and 2+ on the left side.      Heart sounds: Normal heart sounds. No murmur heard.  Pulmonary:      Effort: Pulmonary effort is normal.   Abdominal:      General: Bowel sounds are normal. There is no distension.      Palpations: Abdomen is soft.   Musculoskeletal:         General: Normal range of motion.      Cervical back: Normal range of motion and neck supple.   Feet:      Right foot:      Skin integrity: No ulcer, skin breakdown, erythema, warmth, callus or dry skin.      Left foot:      Skin integrity: No ulcer, skin breakdown, erythema, warmth, callus or dry skin.   Skin:     General: Skin is warm.   Neurological:      Mental Status: She is alert and oriented to person, place, and time.   Psychiatric:         Mood and Affect: Mood normal.     Patient's shoes and socks removed.    Right Foot/Ankle "   Right Foot Inspection  Skin Exam: skin normal and skin intact. No dry skin, no warmth, no callus, no erythema, no maceration, no abnormal color, no pre-ulcer, no ulcer and no callus.     Toe Exam: ROM and strength within normal limits.     Sensory   Monofilament testing: intact    Vascular  The right DP pulse is 2+. The right PT pulse is 2+.     Left Foot/Ankle  Left Foot Inspection  Skin Exam: skin normal and skin intact. No dry skin, no warmth, no erythema, no maceration, normal color, no pre-ulcer, no ulcer and no callus.     Toe Exam: ROM and strength within normal limits.     Sensory   Monofilament testing: intact    Vascular  The left DP pulse is 2+. The left PT pulse is 2+.     Assign Risk Category  No deformity present  No loss of protective sensation  No weak pulses  Risk: 0       Benji Bose DO

## 2024-10-04 NOTE — ASSESSMENT & PLAN NOTE
Stable, continue her levothyroxine, check TSH in 6 months  Orders:    TSH, 3rd generation; Future

## 2024-10-04 NOTE — ASSESSMENT & PLAN NOTE
Rate controlled, continue her atenolol, anticoagulation with Coumadin, this is managed by her cardiologist.

## 2024-10-04 NOTE — ASSESSMENT & PLAN NOTE
Controlled, continue her glipizide, recheck her blood work for hemoglobin A1c in 6 months  Lab Results   Component Value Date    HGBA1C 6.7 (H) 09/06/2024       Orders:    CBC; Future    Comprehensive metabolic panel; Future    Hemoglobin A1C; Future    Albumin / creatinine urine ratio; Future

## 2024-10-10 ENCOUNTER — ANTICOAG VISIT (OUTPATIENT)
Dept: CARDIOLOGY CLINIC | Facility: CLINIC | Age: 79
End: 2024-10-10

## 2024-10-10 ENCOUNTER — LAB (OUTPATIENT)
Dept: LAB | Facility: HOSPITAL | Age: 79
End: 2024-10-10
Payer: MEDICARE

## 2024-10-10 DIAGNOSIS — Z79.01 ANTICOAGULATION MONITORING, INR RANGE 2-3: ICD-10-CM

## 2024-10-10 LAB
INR PPP: 2.91 (ref 0.85–1.19)
PROTHROMBIN TIME: 31 SECONDS (ref 12.3–15)

## 2024-10-10 PROCEDURE — 85610 PROTHROMBIN TIME: CPT

## 2024-10-10 PROCEDURE — 36415 COLL VENOUS BLD VENIPUNCTURE: CPT

## 2024-11-13 ENCOUNTER — TELEPHONE (OUTPATIENT)
Age: 79
End: 2024-11-13

## 2024-11-13 ENCOUNTER — REMOTE DEVICE CLINIC VISIT (OUTPATIENT)
Dept: CARDIOLOGY CLINIC | Facility: CLINIC | Age: 79
End: 2024-11-13
Payer: MEDICARE

## 2024-11-13 ENCOUNTER — RESULTS FOLLOW-UP (OUTPATIENT)
Dept: CARDIOLOGY CLINIC | Facility: CLINIC | Age: 79
End: 2024-11-13

## 2024-11-13 DIAGNOSIS — J32.9 RHINOSINUSITIS: Primary | ICD-10-CM

## 2024-11-13 DIAGNOSIS — Z95.0 CARDIAC PACEMAKER IN SITU: Primary | ICD-10-CM

## 2024-11-13 PROCEDURE — 93296 REM INTERROG EVL PM/IDS: CPT | Performed by: INTERNAL MEDICINE

## 2024-11-13 PROCEDURE — 93294 REM INTERROG EVL PM/LDLS PM: CPT | Performed by: INTERNAL MEDICINE

## 2024-11-13 RX ORDER — AZITHROMYCIN 250 MG/1
TABLET, FILM COATED ORAL
Qty: 6 TABLET | Refills: 0 | Status: SHIPPED | OUTPATIENT
Start: 2024-11-13 | End: 2024-11-17

## 2024-11-13 NOTE — PROGRESS NOTES
Results for orders placed or performed in visit on 11/13/24   Cardiac EP device report    Narrative    SJM DC PM/NOT MRI CONDITIONAL  MERLIN TRANSMISSION: BATTERY VOLTAGE NEARING DENISE (3 MOS). WILL SCHEDULE MONTHLY BATTERY CHECKS. AP<1%, -21%. ALL AVAILABLE LEAD PARAMETERS WITHIN NORMAL LIMITS. 1 AMS EPISODE IN PROGRESS. AT/AF BURDEN>99%. PT ON WARFARIN & TENORETIC. NORMAL DEVICE FUNCTION. GV

## 2024-11-13 NOTE — TELEPHONE ENCOUNTER
Patient called stating she has been taking the OTC sinus medication and cough meds w/codeine but not working and making her very dry.     She still has the cough and nasal issue    [facility] [place]

## 2024-11-14 ENCOUNTER — APPOINTMENT (OUTPATIENT)
Dept: LAB | Facility: HOSPITAL | Age: 79
End: 2024-11-14
Payer: MEDICARE

## 2024-11-14 ENCOUNTER — RESULTS FOLLOW-UP (OUTPATIENT)
Dept: CARDIOLOGY CLINIC | Facility: CLINIC | Age: 79
End: 2024-11-14

## 2024-11-14 ENCOUNTER — ANTICOAG VISIT (OUTPATIENT)
Dept: CARDIOLOGY CLINIC | Facility: CLINIC | Age: 79
End: 2024-11-14

## 2024-11-14 DIAGNOSIS — Z79.01 ANTICOAGULATION MONITORING, INR RANGE 2-3: ICD-10-CM

## 2024-11-14 LAB
INR PPP: 2.92 (ref 0.85–1.19)
PROTHROMBIN TIME: 31.1 SECONDS (ref 12.3–15)

## 2024-11-14 PROCEDURE — 36415 COLL VENOUS BLD VENIPUNCTURE: CPT

## 2024-11-14 PROCEDURE — 85610 PROTHROMBIN TIME: CPT

## 2024-11-14 NOTE — RESULT ENCOUNTER NOTE
Normal device function  Device nearing DENISE.  Schedule monthly checks.  Known history of atrial fibrillation.  Patient is on warfarin.

## 2024-12-07 ENCOUNTER — APPOINTMENT (OUTPATIENT)
Dept: LAB | Facility: HOSPITAL | Age: 79
End: 2024-12-07
Payer: MEDICARE

## 2024-12-07 DIAGNOSIS — Z79.01 ANTICOAGULATION MONITORING, INR RANGE 2-3: ICD-10-CM

## 2024-12-07 LAB
INR PPP: 3.96 (ref 0.85–1.19)
PROTHROMBIN TIME: 39.1 SECONDS (ref 12.3–15)

## 2024-12-07 PROCEDURE — 85610 PROTHROMBIN TIME: CPT

## 2024-12-07 PROCEDURE — 36415 COLL VENOUS BLD VENIPUNCTURE: CPT

## 2024-12-09 ENCOUNTER — ANTICOAG VISIT (OUTPATIENT)
Dept: CARDIOLOGY CLINIC | Facility: CLINIC | Age: 79
End: 2024-12-09

## 2024-12-09 ENCOUNTER — TELEPHONE (OUTPATIENT)
Age: 79
End: 2024-12-09

## 2024-12-09 NOTE — TELEPHONE ENCOUNTER
Patient called to let Dr. Bose know she got both her Flu shot and RSV on 12/7 at [facility].

## 2024-12-17 ENCOUNTER — REMOTE DEVICE CLINIC VISIT (OUTPATIENT)
Dept: CARDIOLOGY CLINIC | Facility: CLINIC | Age: 79
End: 2024-12-17

## 2024-12-17 DIAGNOSIS — I48.0 PAF (PAROXYSMAL ATRIAL FIBRILLATION) (HCC): ICD-10-CM

## 2024-12-17 DIAGNOSIS — Z95.0 PRESENCE OF CARDIAC PACEMAKER: Primary | ICD-10-CM

## 2024-12-17 PROCEDURE — RECHECK: Performed by: INTERNAL MEDICINE

## 2024-12-17 RX ORDER — WARFARIN SODIUM 5 MG/1
TABLET ORAL
Qty: 180 TABLET | Refills: 3 | Status: SHIPPED | OUTPATIENT
Start: 2024-12-17

## 2024-12-18 ENCOUNTER — RESULTS FOLLOW-UP (OUTPATIENT)
Dept: CARDIOLOGY CLINIC | Facility: CLINIC | Age: 79
End: 2024-12-18

## 2024-12-18 NOTE — PROGRESS NOTES
BEAN DC PM/NOT MRI CONDITIONAL   NB MERLIN TRANSMISSION: BATTERY VOLTAGE NEARING DENISE (2.1 MONTHS).  WILL SCHEDULE MONTHLY BATTERY CHECKS.  AP <1%.  28%. ALL AVAILABLE LEAD PARAMETERS WITHIN NORMAL LIMITS. PT CURRENTLY IN AF W/ 100% AT/AF BURDEN. PT IS ON WARFARIN & TENORETIC. NORMAL DEVICE FUNCTION. CJC/ RG

## 2024-12-19 ENCOUNTER — APPOINTMENT (OUTPATIENT)
Dept: LAB | Facility: HOSPITAL | Age: 79
End: 2024-12-19
Attending: INTERNAL MEDICINE
Payer: MEDICARE

## 2024-12-19 DIAGNOSIS — Z79.01 ANTICOAGULATION MONITORING, INR RANGE 2-3: ICD-10-CM

## 2024-12-19 LAB
INR PPP: 3.53 (ref 0.85–1.19)
PROTHROMBIN TIME: 35.9 SECONDS (ref 12.3–15)

## 2024-12-19 PROCEDURE — 36415 COLL VENOUS BLD VENIPUNCTURE: CPT

## 2024-12-19 PROCEDURE — 85610 PROTHROMBIN TIME: CPT

## 2024-12-20 ENCOUNTER — ANTICOAG VISIT (OUTPATIENT)
Dept: CARDIOLOGY CLINIC | Facility: CLINIC | Age: 79
End: 2024-12-20

## 2025-01-10 ENCOUNTER — APPOINTMENT (OUTPATIENT)
Dept: LAB | Facility: HOSPITAL | Age: 80
End: 2025-01-10
Attending: INTERNAL MEDICINE
Payer: MEDICARE

## 2025-01-10 ENCOUNTER — ANTICOAG VISIT (OUTPATIENT)
Dept: CARDIOLOGY CLINIC | Facility: CLINIC | Age: 80
End: 2025-01-10

## 2025-01-10 DIAGNOSIS — Z79.01 ANTICOAGULATION MONITORING, INR RANGE 2-3: ICD-10-CM

## 2025-01-10 LAB
INR PPP: 3.3 (ref 0.85–1.19)
PROTHROMBIN TIME: 34.1 SECONDS (ref 12.3–15)

## 2025-01-10 PROCEDURE — 85610 PROTHROMBIN TIME: CPT

## 2025-01-10 PROCEDURE — 36415 COLL VENOUS BLD VENIPUNCTURE: CPT

## 2025-01-14 ENCOUNTER — REMOTE DEVICE CLINIC VISIT (OUTPATIENT)
Dept: CARDIOLOGY CLINIC | Facility: CLINIC | Age: 80
End: 2025-01-14

## 2025-01-14 DIAGNOSIS — Z95.0 PRESENCE OF PERMANENT CARDIAC PACEMAKER: Primary | ICD-10-CM

## 2025-01-14 PROCEDURE — RECHECK: Performed by: INTERNAL MEDICINE

## 2025-01-14 NOTE — PROGRESS NOTES
BEAN DC PM/NOT MRI CONDITIONAL   NB MERLIN TRANSMISSION:  BATTERY VOLTAGE NEARING DENISE (<3 MONTHS).  WILL SCHEDULE MONTHLY BATTERY CHECKS.  AP <1%  29%.  ALL LEAD PARAMETERS WITHIN NORMAL LIMITS.  2 HVR EPISODES; EGMS SHOW AF/RVR (9 M 54 S @ 139 BPM, 40 S @ 152 BPM).  AF IN PROGRESS SINCE 9/12/24, ON WARFARIN.  NORMAL DEVICE FUNCTION.  RG

## 2025-01-15 ENCOUNTER — RESULTS FOLLOW-UP (OUTPATIENT)
Dept: CARDIOLOGY CLINIC | Facility: CLINIC | Age: 80
End: 2025-01-15

## 2025-01-30 ENCOUNTER — APPOINTMENT (OUTPATIENT)
Dept: LAB | Facility: HOSPITAL | Age: 80
End: 2025-01-30
Payer: MEDICARE

## 2025-01-30 ENCOUNTER — ANTICOAG VISIT (OUTPATIENT)
Dept: CARDIOLOGY CLINIC | Facility: CLINIC | Age: 80
End: 2025-01-30

## 2025-01-30 DIAGNOSIS — Z79.01 ANTICOAGULATION MONITORING, INR RANGE 2-3: ICD-10-CM

## 2025-01-30 LAB
INR PPP: 2.25 (ref 0.85–1.19)
PROTHROMBIN TIME: 25.6 SECONDS (ref 12.3–15)

## 2025-01-30 PROCEDURE — 85610 PROTHROMBIN TIME: CPT

## 2025-01-30 PROCEDURE — 36415 COLL VENOUS BLD VENIPUNCTURE: CPT

## 2025-02-14 ENCOUNTER — REMOTE DEVICE CLINIC VISIT (OUTPATIENT)
Dept: CARDIOLOGY CLINIC | Facility: CLINIC | Age: 80
End: 2025-02-14
Payer: MEDICARE

## 2025-02-14 ENCOUNTER — RESULTS FOLLOW-UP (OUTPATIENT)
Dept: CARDIOLOGY CLINIC | Facility: CLINIC | Age: 80
End: 2025-02-14

## 2025-02-14 DIAGNOSIS — Z95.0 PRESENCE OF PERMANENT CARDIAC PACEMAKER: Primary | ICD-10-CM

## 2025-02-14 PROCEDURE — 93296 REM INTERROG EVL PM/IDS: CPT | Performed by: INTERNAL MEDICINE

## 2025-02-14 PROCEDURE — 93294 REM INTERROG EVL PM/LDLS PM: CPT | Performed by: INTERNAL MEDICINE

## 2025-02-14 NOTE — PROGRESS NOTES
BEAN DC PM/NOT MRI CONDITIONAL   MERLIN TRANSMISSION:  BATTERY VOLTAGE NEARING DENISE (3.8 MONTHS).  WILL SCHEDULE MONTHLY BATTERY CHECKS.  AP <1%  45% (MODE SWITCHED).  ALL LEAD PARAMETERS WITHIN NORMAL LIMITS.  NO NEW HIGH RATE EPISODES.  AF IN PROGRESS SINCE 9/12/24, ON WARFARIN.  NORMAL DEVICE FUNCTION.  RG

## 2025-02-17 ENCOUNTER — APPOINTMENT (OUTPATIENT)
Dept: LAB | Facility: HOSPITAL | Age: 80
End: 2025-02-17
Payer: MEDICARE

## 2025-02-17 ENCOUNTER — ANTICOAG VISIT (OUTPATIENT)
Dept: CARDIOLOGY CLINIC | Facility: CLINIC | Age: 80
End: 2025-02-17

## 2025-02-17 ENCOUNTER — OFFICE VISIT (OUTPATIENT)
Dept: CARDIOLOGY CLINIC | Facility: CLINIC | Age: 80
End: 2025-02-17
Payer: MEDICARE

## 2025-02-17 VITALS
SYSTOLIC BLOOD PRESSURE: 124 MMHG | RESPIRATION RATE: 16 BRPM | WEIGHT: 140 LBS | BODY MASS INDEX: 26.43 KG/M2 | DIASTOLIC BLOOD PRESSURE: 76 MMHG | HEIGHT: 61 IN | HEART RATE: 78 BPM | OXYGEN SATURATION: 98 %

## 2025-02-17 DIAGNOSIS — Z79.01 ANTICOAGULATION MONITORING, INR RANGE 2-3: ICD-10-CM

## 2025-02-17 DIAGNOSIS — I48.0 PAROXYSMAL ATRIAL FIBRILLATION (HCC): ICD-10-CM

## 2025-02-17 DIAGNOSIS — E11.9 TYPE 2 DIABETES MELLITUS WITHOUT COMPLICATION, WITHOUT LONG-TERM CURRENT USE OF INSULIN (HCC): ICD-10-CM

## 2025-02-17 DIAGNOSIS — I10 ESSENTIAL HYPERTENSION: Primary | ICD-10-CM

## 2025-02-17 LAB
INR PPP: 1.86 (ref 0.85–1.19)
PROTHROMBIN TIME: 22.2 SECONDS (ref 12.3–15)

## 2025-02-17 PROCEDURE — 36415 COLL VENOUS BLD VENIPUNCTURE: CPT

## 2025-02-17 PROCEDURE — 99214 OFFICE O/P EST MOD 30 MIN: CPT | Performed by: INTERNAL MEDICINE

## 2025-02-17 PROCEDURE — 85610 PROTHROMBIN TIME: CPT

## 2025-02-17 NOTE — ASSESSMENT & PLAN NOTE
Blood pressures are stable.  Importance of salt restriction and compliance with medication discussed with patient.

## 2025-02-17 NOTE — ASSESSMENT & PLAN NOTE
Lab Results   Component Value Date    HGBA1C 6.7 (H) 09/06/2024   Patient will follow-up with primary care physician for the same.

## 2025-02-17 NOTE — ASSESSMENT & PLAN NOTE
This patient has history of paroxysmal atrial fibrillation and tachybradycardia syndrome status post pacemaker.  Patient is regularly followed by the pacemaker clinic.  Device is close to DENISE.  The patient will be scheduled for monthly checks.  Estimated battery life 3.8 months from last check.    Risks and benefits  and alternatives of anticoagulation to prevent thromboembolic risk from atrial fibrillation discussed at length.  Patient to report any bleeding issues.  [facility] INR 2-3

## 2025-02-28 DIAGNOSIS — E11.9 TYPE 2 DIABETES MELLITUS WITHOUT COMPLICATION, WITHOUT LONG-TERM CURRENT USE OF INSULIN (HCC): ICD-10-CM

## 2025-02-28 DIAGNOSIS — M54.9 ACUTE BACK PAIN, UNSPECIFIED BACK LOCATION, UNSPECIFIED BACK PAIN LATERALITY: ICD-10-CM

## 2025-02-28 DIAGNOSIS — E03.9 ADULT HYPOTHYROIDISM: ICD-10-CM

## 2025-02-28 RX ORDER — GLIPIZIDE 2.5 MG/1
2.5 TABLET, EXTENDED RELEASE ORAL DAILY
Qty: 90 TABLET | Refills: 1 | Status: SHIPPED | OUTPATIENT
Start: 2025-02-28

## 2025-02-28 RX ORDER — LEVOTHYROXINE SODIUM 50 UG/1
50 TABLET ORAL DAILY
Qty: 90 TABLET | Refills: 1 | Status: SHIPPED | OUTPATIENT
Start: 2025-02-28

## 2025-02-28 RX ORDER — CYCLOBENZAPRINE HCL 10 MG
10 TABLET ORAL 3 TIMES DAILY PRN
Qty: 20 TABLET | Refills: 0 | Status: SHIPPED | OUTPATIENT
Start: 2025-02-28

## 2025-03-03 ENCOUNTER — TELEPHONE (OUTPATIENT)
Age: 80
End: 2025-03-03

## 2025-03-03 NOTE — TELEPHONE ENCOUNTER
PA for cyclobenzaprine (FLEXERIL) 10 mg tablet SUBMITTED to     via    [x]CMM-KEY: [id number]  []Surescripts-Case ID #   []Availity-Auth ID # NDC #   []Faxed to plan   []Other website   []Phone call Case ID #     [x]PA sent as URGENT    All office notes, labs and other pertaining documents and studies sent. Clinical questions answered. Awaiting determination from insurance company.     Turnaround time for your insurance to make a decision on your Prior Authorization can take 7-21 business days.

## 2025-03-04 NOTE — TELEPHONE ENCOUNTER
PA for cyclobenzaprine (FLEXERIL) 10 mg tablet  APPROVED     Date(s) approved 02/17/2025 until further notice        Patient advised by          []MyChart Message  []Phone call   []LMOM  []L/M to call office as no active Communication consent on file  []Unable to leave detailed message as VM not approved on Communication consent       Pharmacy advised by    [x]Fax  []Phone call  []Secure Chat    Approval letter scanned into Media Yes

## 2025-03-05 ENCOUNTER — ANTICOAG VISIT (OUTPATIENT)
Dept: CARDIOLOGY CLINIC | Facility: CLINIC | Age: 80
End: 2025-03-05

## 2025-03-05 ENCOUNTER — APPOINTMENT (OUTPATIENT)
Dept: LAB | Facility: HOSPITAL | Age: 80
End: 2025-03-05
Payer: MEDICARE

## 2025-03-05 DIAGNOSIS — Z79.01 ANTICOAGULATION MONITORING, INR RANGE 2-3: ICD-10-CM

## 2025-03-05 LAB
INR PPP: 1.94 (ref 0.85–1.19)
PROTHROMBIN TIME: 22.9 SECONDS (ref 12.3–15)

## 2025-03-05 PROCEDURE — 36415 COLL VENOUS BLD VENIPUNCTURE: CPT

## 2025-03-05 PROCEDURE — 85610 PROTHROMBIN TIME: CPT

## 2025-03-10 DIAGNOSIS — E78.2 MIXED HYPERLIPIDEMIA: ICD-10-CM

## 2025-03-10 RX ORDER — ROSUVASTATIN CALCIUM 10 MG/1
10 TABLET, COATED ORAL DAILY
Qty: 90 TABLET | Refills: 1 | Status: SHIPPED | OUTPATIENT
Start: 2025-03-10

## 2025-03-14 ENCOUNTER — RESULTS FOLLOW-UP (OUTPATIENT)
Dept: NON INVASIVE DIAGNOSTICS | Facility: HOSPITAL | Age: 80
End: 2025-03-14

## 2025-03-14 ENCOUNTER — REMOTE DEVICE CLINIC VISIT (OUTPATIENT)
Dept: CARDIOLOGY CLINIC | Facility: CLINIC | Age: 80
End: 2025-03-14

## 2025-03-14 DIAGNOSIS — Z95.0 CARDIAC PACEMAKER IN SITU: Primary | ICD-10-CM

## 2025-03-14 PROCEDURE — RECHECK: Performed by: INTERNAL MEDICINE

## 2025-03-14 NOTE — PROGRESS NOTES
Results for orders placed or performed in visit on 03/14/25   Cardiac EP device report    Narrative    SJM DC PM/NOT MRI CONDITIONAL  MERLIN TRANSMISSION TO CHECK BATTERY STATUS- NB: BATTERY VOLTAGE NEARING DENISE (<3 MOS). WILL SCHEDULE MONTHLY BATTERY CHECKS. AP<1%, -43% (>40% DDDR@60PPM). ALL AVAILABLE LEAD PARAMETERS WITHIN NORMAL LIMITS. 1 AMS EPISODE IN PROGRESS. AT/AF BURDEN>99%. PT ON WARFARIN & TENORETIC. NORMAL DEVICE FUNCTION. GV

## 2025-04-02 ENCOUNTER — ANTICOAG VISIT (OUTPATIENT)
Dept: CARDIOLOGY CLINIC | Facility: CLINIC | Age: 80
End: 2025-04-02

## 2025-04-02 ENCOUNTER — APPOINTMENT (OUTPATIENT)
Dept: LAB | Facility: HOSPITAL | Age: 80
End: 2025-04-02
Payer: MEDICARE

## 2025-04-02 DIAGNOSIS — Z79.01 ANTICOAGULATION MONITORING, INR RANGE 2-3: ICD-10-CM

## 2025-04-02 DIAGNOSIS — E11.9 TYPE 2 DIABETES MELLITUS WITHOUT COMPLICATION, WITHOUT LONG-TERM CURRENT USE OF INSULIN (HCC): ICD-10-CM

## 2025-04-02 DIAGNOSIS — E03.9 ADULT HYPOTHYROIDISM: ICD-10-CM

## 2025-04-02 LAB
ALBUMIN SERPL BCG-MCNC: 4.5 G/DL (ref 3.5–5)
ALP SERPL-CCNC: 52 U/L (ref 34–104)
ALT SERPL W P-5'-P-CCNC: 15 U/L (ref 7–52)
ANION GAP SERPL CALCULATED.3IONS-SCNC: 6 MMOL/L (ref 4–13)
AST SERPL W P-5'-P-CCNC: 21 U/L (ref 13–39)
BILIRUB SERPL-MCNC: 0.53 MG/DL (ref 0.2–1)
BUN SERPL-MCNC: 20 MG/DL (ref 5–25)
CALCIUM SERPL-MCNC: 11.9 MG/DL (ref 8.4–10.2)
CHLORIDE SERPL-SCNC: 98 MMOL/L (ref 96–108)
CO2 SERPL-SCNC: 32 MMOL/L (ref 21–32)
CREAT SERPL-MCNC: 0.68 MG/DL (ref 0.6–1.3)
CREAT UR-MCNC: 49 MG/DL
ERYTHROCYTE [DISTWIDTH] IN BLOOD BY AUTOMATED COUNT: 13.1 % (ref 11.6–15.1)
EST. AVERAGE GLUCOSE BLD GHB EST-MCNC: 169 MG/DL
GFR SERPL CREATININE-BSD FRML MDRD: 82 ML/MIN/1.73SQ M
GLUCOSE P FAST SERPL-MCNC: 146 MG/DL (ref 65–99)
HBA1C MFR BLD: 7.5 %
HCT VFR BLD AUTO: 39.6 % (ref 34.8–46.1)
HGB BLD-MCNC: 12.6 G/DL (ref 11.5–15.4)
INR PPP: 2.23 (ref 0.85–1.19)
MCH RBC QN AUTO: 28.8 PG (ref 26.8–34.3)
MCHC RBC AUTO-ENTMCNC: 31.8 G/DL (ref 31.4–37.4)
MCV RBC AUTO: 91 FL (ref 82–98)
MICROALBUMIN UR-MCNC: 206.2 MG/L
MICROALBUMIN/CREAT 24H UR: 421 MG/G CREATININE (ref 0–30)
PLATELET # BLD AUTO: 373 THOUSANDS/UL (ref 149–390)
PMV BLD AUTO: 9.6 FL (ref 8.9–12.7)
POTASSIUM SERPL-SCNC: 3.7 MMOL/L (ref 3.5–5.3)
PROT SERPL-MCNC: 7.5 G/DL (ref 6.4–8.4)
PROTHROMBIN TIME: 25.4 SECONDS (ref 12.3–15)
RBC # BLD AUTO: 4.37 MILLION/UL (ref 3.81–5.12)
SODIUM SERPL-SCNC: 136 MMOL/L (ref 135–147)
TSH SERPL DL<=0.05 MIU/L-ACNC: 3.85 UIU/ML (ref 0.45–4.5)
WBC # BLD AUTO: 10.03 THOUSAND/UL (ref 4.31–10.16)

## 2025-04-02 PROCEDURE — 80053 COMPREHEN METABOLIC PANEL: CPT

## 2025-04-02 PROCEDURE — 36415 COLL VENOUS BLD VENIPUNCTURE: CPT

## 2025-04-02 PROCEDURE — 83036 HEMOGLOBIN GLYCOSYLATED A1C: CPT

## 2025-04-02 PROCEDURE — 85610 PROTHROMBIN TIME: CPT

## 2025-04-02 PROCEDURE — 85027 COMPLETE CBC AUTOMATED: CPT

## 2025-04-02 PROCEDURE — 82570 ASSAY OF URINE CREATININE: CPT

## 2025-04-02 PROCEDURE — 82043 UR ALBUMIN QUANTITATIVE: CPT

## 2025-04-02 PROCEDURE — 84443 ASSAY THYROID STIM HORMONE: CPT

## 2025-04-10 ENCOUNTER — RA CDI HCC (OUTPATIENT)
Dept: OTHER | Facility: HOSPITAL | Age: 80
End: 2025-04-10

## 2025-04-14 ENCOUNTER — REMOTE DEVICE CLINIC VISIT (OUTPATIENT)
Dept: CARDIOLOGY CLINIC | Facility: CLINIC | Age: 80
End: 2025-04-14

## 2025-04-14 DIAGNOSIS — Z95.0 CARDIAC PACEMAKER IN SITU: Primary | ICD-10-CM

## 2025-04-14 PROCEDURE — RECHECK: Performed by: INTERNAL MEDICINE

## 2025-04-14 NOTE — PROGRESS NOTES
Results for orders placed or performed in visit on 04/14/25   Cardiac EP device report    Narrative    SJM DC PM/NOT MRI CONDITIONAL  NB/BAT-MERLIN TRANSMISSION: PRESENTING EGRAM AF /VS@ 84 BPM. BATTERY VOLTAGE NEARING DENISE 2.71V (DENISE 2.60V). WILL SCHEDULE MONTHLY BATTERY CHECKS. AP 0%  41%. VENT AUTOCAP AT MAX OUTPUT. ALL OTHER  AVAILABLE LEAD PARAMETERS & TRENDS WITHIN NORMAL LIMITS. 100% AF BURDEN; PT ON WARFARIN. NORMAL DEVICE FUNCTION. NC

## 2025-04-16 ENCOUNTER — RESULTS FOLLOW-UP (OUTPATIENT)
Dept: NON INVASIVE DIAGNOSTICS | Facility: HOSPITAL | Age: 80
End: 2025-04-16

## 2025-05-02 ENCOUNTER — OFFICE VISIT (OUTPATIENT)
Dept: FAMILY MEDICINE CLINIC | Facility: CLINIC | Age: 80
End: 2025-05-02
Payer: MEDICARE

## 2025-05-02 VITALS
WEIGHT: 139 LBS | HEART RATE: 73 BPM | BODY MASS INDEX: 26.24 KG/M2 | TEMPERATURE: 98.1 F | OXYGEN SATURATION: 98 % | HEIGHT: 61 IN | DIASTOLIC BLOOD PRESSURE: 78 MMHG | SYSTOLIC BLOOD PRESSURE: 114 MMHG

## 2025-05-02 DIAGNOSIS — E78.2 MIXED HYPERLIPIDEMIA: ICD-10-CM

## 2025-05-02 DIAGNOSIS — Z99.89 DEPENDENCE ON CANE: ICD-10-CM

## 2025-05-02 DIAGNOSIS — E11.9 TYPE 2 DIABETES MELLITUS WITHOUT COMPLICATION, WITHOUT LONG-TERM CURRENT USE OF INSULIN (HCC): Primary | ICD-10-CM

## 2025-05-02 DIAGNOSIS — Z00.00 MEDICARE ANNUAL WELLNESS VISIT, SUBSEQUENT: ICD-10-CM

## 2025-05-02 DIAGNOSIS — I10 ESSENTIAL HYPERTENSION: ICD-10-CM

## 2025-05-02 DIAGNOSIS — E03.9 ADULT HYPOTHYROIDISM: ICD-10-CM

## 2025-05-02 DIAGNOSIS — M19.90 ARTHRITIS: ICD-10-CM

## 2025-05-02 LAB
LEFT EYE DIABETIC RETINOPATHY: ABNORMAL
LEFT EYE IMAGE QUALITY: ABNORMAL
LEFT EYE MACULAR EDEMA: ABNORMAL
LEFT EYE OTHER RETINOPATHY: ABNORMAL
RIGHT EYE DIABETIC RETINOPATHY: ABNORMAL
RIGHT EYE IMAGE QUALITY: ABNORMAL
RIGHT EYE MACULAR EDEMA: ABNORMAL
RIGHT EYE OTHER RETINOPATHY: ABNORMAL
SEVERITY (EYE EXAM): ABNORMAL

## 2025-05-02 PROCEDURE — G2211 COMPLEX E/M VISIT ADD ON: HCPCS | Performed by: FAMILY MEDICINE

## 2025-05-02 PROCEDURE — 99214 OFFICE O/P EST MOD 30 MIN: CPT | Performed by: FAMILY MEDICINE

## 2025-05-02 PROCEDURE — G0439 PPPS, SUBSEQ VISIT: HCPCS | Performed by: FAMILY MEDICINE

## 2025-05-02 NOTE — ASSESSMENT & PLAN NOTE
Will continue current therapy, repeat A1c in 6 months if continues to increase will adjust her medications.  Lab Results   Component Value Date    HGBA1C 7.5 (H) 04/02/2025       Orders:  •  Hemoglobin A1C; Future  •  IRIS Diabetic eye exam

## 2025-05-02 NOTE — ASSESSMENT & PLAN NOTE
Controlled, continue the atenolol-chlorthalidone  Orders:  •  CBC; Future  •  Comprehensive metabolic panel; Future

## 2025-05-02 NOTE — PATIENT INSTRUCTIONS
Medicare Preventive Visit Patient Instructions  Thank you for completing your Welcome to Medicare Visit or Medicare Annual Wellness Visit today. Your next wellness visit will be due in one year (5/3/2026).  The screening/preventive services that you may require over the next 5-10 years are detailed below. Some tests may not apply to you based off risk factors and/or age. Screening tests ordered at today's visit but not completed yet may show as past due. Also, please note that scanned in results may not display below.  Preventive Screenings:  Service Recommendations Previous Testing/Comments   Colorectal Cancer Screening  * Colonoscopy    * Fecal Occult Blood Test (FOBT)/Fecal Immunochemical Test (FIT)  * Fecal DNA/Cologuard Test  * Flexible Sigmoidoscopy Age: 45-75 years old   Colonoscopy: every 10 years (may be performed more frequently if at higher risk)  OR  FOBT/FIT: every 1 year  OR  Cologuard: every 3 years  OR  Sigmoidoscopy: every 5 years  Screening may be recommended earlier than age 45 if at higher risk for colorectal cancer. Also, an individualized decision between you and your healthcare provider will decide whether screening between the ages of 76-85 would be appropriate. Colonoscopy: 11/24/2007  FOBT/FIT: Not on file  Cologuard: Not on file  Sigmoidoscopy: Not on file          Breast Cancer Screening Age: 40+ years old  Frequency: every 1-2 years  Not required if history of left and right mastectomy Mammogram: Not on file        Cervical Cancer Screening Between the ages of 21-29, pap smear recommended once every 3 years.   Between the ages of 30-65, can perform pap smear with HPV co-testing every 5 years.   Recommendations may differ for women with a history of total hysterectomy, cervical cancer, or abnormal pap smears in past. Pap Smear: Not on file    Screening Not Indicated   Hepatitis C Screening Once for adults born between 1945 and 1965  More frequently in patients at high risk for Hepatitis C  Hep C Antibody: Not on file        Diabetes Screening 1-2 times per year if you're at risk for diabetes or have pre-diabetes Fasting glucose: 146 mg/dL (4/2/2025)  A1C: 7.5 % (4/2/2025)  Screening Not Indicated  History Diabetes   Cholesterol Screening Once every 5 years if you don't have a lipid disorder. May order more often based on risk factors. Lipid panel: 09/06/2024    Screening Not Indicated  History Lipid Disorder     Other Preventive Screenings Covered by Medicare:  Abdominal Aortic Aneurysm (AAA) Screening: covered once if your at risk. You're considered to be at risk if you have a family history of AAA.  Lung Cancer Screening: covers low dose CT scan once per year if you meet all of the following conditions: (1) Age 55-77; (2) No signs or symptoms of lung cancer; (3) Current smoker or have quit smoking within the last 15 years; (4) You have a tobacco smoking history of at least 20 pack years (packs per day multiplied by number of years you smoked); (5) You get a written order from a healthcare provider.  Glaucoma Screening: covered annually if you're considered high risk: (1) You have diabetes OR (2) Family history of glaucoma OR (3)  aged 50 and older OR (4)  American aged 65 and older  Osteoporosis Screening: covered every 2 years if you meet one of the following conditions: (1) You're estrogen deficient and at risk for osteoporosis based off medical history and other findings; (2) Have a vertebral abnormality; (3) On glucocorticoid therapy for more than 3 months; (4) Have primary hyperparathyroidism; (5) On osteoporosis medications and need to assess response to drug therapy.   Last bone density test (DXA Scan): 04/12/2023.  HIV Screening: covered annually if you're between the age of 15-65. Also covered annually if you are younger than 15 and older than 65 with risk factors for HIV infection. For pregnant patients, it is covered up to 3 times per  pregnancy.    Immunizations:  Immunization Recommendations   Influenza Vaccine Annual influenza vaccination during flu season is recommended for all persons aged >= 6 months who do not have contraindications   Pneumococcal Vaccine   * Pneumococcal conjugate vaccine = PCV13 (Prevnar 13), PCV15 (Vaxneuvance), PCV20 (Prevnar 20)  * Pneumococcal polysaccharide vaccine = PPSV23 (Pneumovax) Adults 19-65 yo with certain risk factors or if 65+ yo  If never received any pneumonia vaccine: recommend Prevnar 20 (PCV20)  Give PCV20 if previously received 1 dose of PCV13 or PPSV23   Hepatitis B Vaccine 3 dose series if at intermediate or high risk (ex: diabetes, end stage renal disease, liver disease)   Respiratory syncytial virus (RSV) Vaccine - COVERED BY MEDICARE PART D  * RSVPreF3 (Arexvy) CDC recommends that adults 60 years of age and older may receive a single dose of RSV vaccine using shared clinical decision-making (SCDM)   Tetanus (Td) Vaccine - COST NOT COVERED BY MEDICARE PART B Following completion of primary series, a booster dose should be given every 10 years to maintain immunity against tetanus. Td may also be given as tetanus wound prophylaxis.   Tdap Vaccine - COST NOT COVERED BY MEDICARE PART B Recommended at least once for all adults. For pregnant patients, recommended with each pregnancy.   Shingles Vaccine (Shingrix) - COST NOT COVERED BY MEDICARE PART B  2 shot series recommended in those 19 years and older who have or will have weakened immune systems or those 50 years and older     Health Maintenance Due:  There are no preventive care reminders to display for this patient.  Immunizations Due:      Topic Date Due   • Pneumococcal Vaccine: 65+ Years (3 of 3 - PCV20 or PCV21) 02/06/2023   • COVID-19 Vaccine (4 - 2024-25 season) 09/01/2024     Advance Directives   What are advance directives?  Advance directives are legal documents that state your wishes and plans for medical care. These plans are made ahead  of time in case you lose your ability to make decisions for yourself. Advance directives can apply to any medical decision, such as the treatments you want, and if you want to donate organs.   What are the types of advance directives?  There are many types of advance directives, and each state has rules about how to use them. You may choose a combination of any of the following:  Living will:  This is a written record of the treatment you want. You can also choose which treatments you do not want, which to limit, and which to stop at a certain time. This includes surgery, medicine, IV fluid, and tube feedings.   Durable power of  for healthcare (DPAHC):  This is a written record that states who you want to make healthcare choices for you when you are unable to make them for yourself. This person, called a proxy, is usually a family member or a friend. You may choose more than 1 proxy.  Do not resuscitate (DNR) order:  A DNR order is used in case your heart stops beating or you stop breathing. It is a request not to have certain forms of treatment, such as CPR. A DNR order may be included in other types of advance directives.  Medical directive:  This covers the care that you want if you are in a coma, near death, or unable to make decisions for yourself. You can list the treatments you want for each condition. Treatment may include pain medicine, surgery, blood transfusions, dialysis, IV or tube feedings, and a ventilator (breathing machine).  Values history:  This document has questions about your views, beliefs, and how you feel and think about life. This information can help others choose the care that you would choose.  Why are advance directives important?  An advance directive helps you control your care. Although spoken wishes may be used, it is better to have your wishes written down. Spoken wishes can be misunderstood, or not followed. Treatments may be given even if you do not want them. An advance  directive may make it easier for your family to make difficult choices about your care.   Fall Prevention    Fall prevention  includes ways to make your home and other areas safer. It also includes ways you can move more carefully to prevent a fall. Health conditions that cause changes in your blood pressure, vision, or muscle strength and coordination may increase your risk for falls. Medicines may also increase your risk for falls if they make you dizzy, weak, or sleepy.   Fall prevention tips:   Stand or sit up slowly.    Use assistive devices as directed.    Wear shoes that fit well and have soles that .    Wear a personal alarm.    Stay active.    Manage your medical conditions.    Home Safety Tips:  Add items to prevent falls in the bathroom.    Keep paths clear.    Install bright lights in your home.    Keep items you use often on shelves within reach.    Paint or place reflective tape on the edges of your stairs.    Weight Management   Why it is important to manage your weight:  Being overweight increases your risk of health conditions such as heart disease, high blood pressure, type 2 diabetes, and certain types of cancer. It can also increase your risk for osteoarthritis, sleep apnea, and other respiratory problems. Aim for a slow, steady weight loss. Even a small amount of weight loss can lower your risk of health problems.  How to lose weight safely:  A safe and healthy way to lose weight is to eat fewer calories and get regular exercise. You can lose up about 1 pound a week by decreasing the number of calories you eat by 500 calories each day.   Healthy meal plan for weight management:  A healthy meal plan includes a variety of foods, contains fewer calories, and helps you stay healthy. A healthy meal plan includes the following:  Eat whole-grain foods more often.  A healthy meal plan should contain fiber. Fiber is the part of grains, fruits, and vegetables that is not broken down by your body.  Whole-grain foods are healthy and provide extra fiber in your diet. Some examples of whole-grain foods are whole-wheat breads and pastas, oatmeal, brown rice, and bulgur.  Eat a variety of vegetables every day.  Include dark, leafy greens such as spinach, kale, olga greens, and mustard greens. Eat yellow and orange vegetables such as carrots, sweet potatoes, and winter squash.   Eat a variety of fruits every day.  Choose fresh or canned fruit (canned in its own juice or light syrup) instead of juice. Fruit juice has very little or no fiber.  Eat low-fat dairy foods.  Drink fat-free (skim) milk or 1% milk. Eat fat-free yogurt and low-fat cottage cheese. Try low-fat cheeses such as mozzarella and other reduced-fat cheeses.  Choose meat and other protein foods that are low in fat.  Choose beans or other legumes such as split peas or lentils. Choose fish, skinless poultry (chicken or turkey), or lean cuts of red meat (beef or pork). Before you cook meat or poultry, cut off any visible fat.   Use less fat and oil.  Try baking foods instead of frying them. Add less fat, such as margarine, sour cream, regular salad dressing and mayonnaise to foods. Eat fewer high-fat foods. Some examples of high-fat foods include french fries, doughnuts, ice cream, and cakes.  Eat fewer sweets.  Limit foods and drinks that are high in sugar. This includes candy, cookies, regular soda, and sweetened drinks.  Exercise:  Exercise at least 30 minutes per day on most days of the week. Some examples of exercise include walking, biking, dancing, and swimming. You can also fit in more physical activity by taking the stairs instead of the elevator or parking farther away from stores. Ask your healthcare provider about the best exercise plan for you.      © Copyright [employer] 2018 Information is for End User's use only and may not be sold, redistributed or otherwise used for commercial purposes. All illustrations and images included in  CareNotes® are the copyrighted property of [employer] Inc. or [employer]

## 2025-05-02 NOTE — PROGRESS NOTES
Name: Florina Pace      : 1945      MRN: [id number]  Encounter Provider: Benji Bose DO  Encounter Date: 2025   Encounter department: [facility] [address]  :  Assessment & Plan  Medicare annual wellness visit, subsequent         Type 2 diabetes mellitus without complication, without long-term current use of insulin (HCC)  Will continue current therapy, repeat A1c in 6 months if continues to increase will adjust her medications.  Lab Results   Component Value Date    HGBA1C 7.5 (H) 2025       Orders:  •  Hemoglobin A1C; Future  •  IRIS Diabetic eye exam    Adult hypothyroidism  Stable, continue levothyroxine  Orders:  •  TSH, 3rd generation; Future    Essential hypertension  Controlled, continue the atenolol-chlorthalidone  Orders:  •  CBC; Future  •  Comprehensive metabolic panel; Future    Mixed hyperlipidemia  Doing well with rosuvastatin, we will check her lipid profile yearly       Dependence on cane  She is using a cane for safety with ambulation, continue this       Arthritis    Orders:  •  Diclofenac Sodium (VOLTAREN) 1 %; Apply 2 g topically 4 (four) times a day       Preventive health issues were discussed with patient, and age appropriate screening tests were ordered as noted in patient's After Visit Summary. Personalized health advice and appropriate referrals for health education or preventive services given if needed, as noted in patient's After Visit Summary.    History of Present Illness     Patient comes in today for a checkup.  She states that she fell back in November and hurt her right elbow and right shoulder.  She did not seek any medical attention.  She is occasionally bothered by some discomfort in those areas when she raises her right arm.  Otherwise she is doing well.  She did get her blood work done, and this was reviewed with her today.       Patient Care Team:  Benji Bose DO as PCP - General    Review of Systems    Constitutional:  Negative for chills, fatigue and fever.   HENT:  Negative for congestion, ear pain, hearing loss, postnasal drip, rhinorrhea and sore throat.    Eyes:  Negative for pain and visual disturbance.   Respiratory:  Negative for chest tightness, shortness of breath and wheezing.    Cardiovascular:  Negative for chest pain and leg swelling.   Gastrointestinal:  Negative for abdominal distention, abdominal pain, constipation, diarrhea and vomiting.   Endocrine: Negative for cold intolerance and heat intolerance.   Genitourinary:  Negative for difficulty urinating, frequency and urgency.   Musculoskeletal:  Positive for arthralgias. Negative for gait problem.   Skin:  Negative for color change.   Neurological:  Negative for dizziness, tremors, syncope, numbness and headaches.   Hematological:  Negative for adenopathy.   Psychiatric/Behavioral:  Negative for agitation, confusion and sleep disturbance. The patient is not nervous/anxious.      Medical History Reviewed by provider this encounter:  Tobacco  Allergies  Meds  Problems  Med Hx  Surg Hx  Fam Hx       Annual Wellness Visit Questionnaire   Florina is here for her Subsequent Wellness visit. Last Medicare Wellness visit information reviewed, patient interviewed and updates made to the record today.      Health Risk Assessment:   Patient rates overall health as good. Patient feels that their physical health rating is same. Patient is very satisfied with their life. Eyesight was rated as same. Hearing was rated as same. Patient feels that their emotional and mental health rating is same. Patients states they are never, rarely angry. Patient states they are often unusually tired/fatigued. Pain experienced in the last 7 days has been none. Patient states that she has experienced no weight loss or gain in last 6 months.     Depression Screening:   PHQ-2 Score: 0      Fall Risk Screening:   In the past year, patient has experienced: history of falling  in past year    Number of falls: 1  Injured during fall?: Yes    Feels unsteady when standing or walking?: Yes    Worried about falling?: Yes      Urinary Incontinence Screening:   Patient has not leaked urine accidently in the last six months.     Home Safety:  Patient has trouble with stairs inside or outside of their home. Home safety hazards include: none.     Nutrition:   Current diet is Regular.     Medications:   Patient is not currently taking any over-the-counter supplements. Patient is able to manage medications.     Activities of Daily Living (ADLs)/Instrumental Activities of Daily Living (IADLs):   Walk and transfer into and out of bed and chair?: Yes  Dress and groom yourself?: Yes    Bathe or shower yourself?: Yes    Feed yourself? Yes  Do your laundry/housekeeping?: Yes  Manage your money, pay your bills and track your expenses?: Yes  Make your own meals?: Yes    Do your own shopping?: Yes    Previous Hospitalizations:   Any hospitalizations or ED visits within the last 12 months?: No      Advance Care Planning:   Living will: Yes    Durable POA for healthcare: Yes    Advanced directive: Yes      Cognitive Screening:   Provider or family/friend/caregiver concerned regarding cognition?: No    Preventive Screenings      Cardiovascular Screening:    General: Screening Not Indicated and History Lipid Disorder      Diabetes Screening:     General: Screening Not Indicated and History Diabetes      Colorectal Cancer Screening:     General: Screening Not Indicated      Breast Cancer Screening:     General: Patient Declines      Cervical Cancer Screening:    General: Screening Not Indicated      Osteoporosis Screening:    General: Patient Declines      Abdominal Aortic Aneurysm (AAA) Screening:        General: Screening Not Indicated      Lung Cancer Screening:     General: Screening Not Indicated      Hepatitis C Screening:    General: Screening Not Indicated    Immunizations:  - Immunizations due: Prevnar 20  "and Zoster (Shingrix)    Screening, Brief Intervention, and Referral to Treatment (SBIRT)     Screening  Typical number of drinks in a day: 0  Typical number of drinks in a week: 0  Interpretation: Low risk drinking behavior.    Single Item Drug Screening:  How often have you used an illegal drug (including marijuana) or a prescription medication for non-medical reasons in the past year? never    Single Item Drug Screen Score: 0  Interpretation: Negative screen for possible drug use disorder    Social Drivers of Health     Food Insecurity: No Food Insecurity (5/2/2025)    Hunger Vital Sign    • Worried About Running Out of Food in the Last Year: Never true    • Ran Out of Food in the Last Year: Never true   Transportation Needs: No Transportation Needs (5/2/2025)    PRAPARE - Transportation    • Lack of Transportation (Medical): No    • Lack of Transportation (Non-Medical): No   Housing Stability: Low Risk  (5/2/2025)    Housing Stability Vital Sign    • Unable to Pay for Housing in the Last Year: No    • Number of Times Moved in the Last Year: 0    • Homeless in the Last Year: No   Utilities: Not At Risk (5/2/2025)    [facility] Utilities    • Threatened with loss of utilities: No     No results found.    Objective   /78 (BP Location: Left arm, Patient Position: Sitting, Cuff Size: Standard)   Pulse 73   Temp 98.1 °F (36.7 °C) (Temporal)   Ht 5' 1\" (1.549 m)   Wt 63 kg (139 lb)   SpO2 98%   BMI 26.26 kg/m²     Physical Exam  Constitutional:       Appearance: She is well-developed.   HENT:      Head: Normocephalic and atraumatic.      Right Ear: External ear normal.      Left Ear: External ear normal.      Nose: Nose normal.      Mouth/Throat:      Pharynx: Oropharynx is clear.   Eyes:      Extraocular Movements: Extraocular movements intact.      Conjunctiva/sclera: Conjunctivae normal.      Pupils: Pupils are equal, round, and reactive to light.   Neck:      Thyroid: No thyromegaly.   Cardiovascular:      " Rate and Rhythm: Normal rate and regular rhythm.      Heart sounds: Normal heart sounds. No murmur heard.  Pulmonary:      Effort: Pulmonary effort is normal.   Abdominal:      General: Bowel sounds are normal. There is no distension.      Palpations: Abdomen is soft.   Musculoskeletal:         General: Normal range of motion.      Cervical back: Normal range of motion and neck supple.   Skin:     General: Skin is warm.   Neurological:      Mental Status: She is alert and oriented to person, place, and time.   Psychiatric:         Mood and Affect: Mood normal.

## 2025-05-12 ENCOUNTER — TELEPHONE (OUTPATIENT)
Age: 80
End: 2025-05-12

## 2025-05-12 NOTE — TELEPHONE ENCOUNTER
Patient is coughing up yellow mucus and sinus pain. She has no car and doesn't know how to do a virtual. She wanted to know what to take for her issues    Please call her at [phone]

## 2025-05-17 ENCOUNTER — TELEPHONE (OUTPATIENT)
Dept: OTHER | Facility: OTHER | Age: 80
End: 2025-05-17

## 2025-05-17 DIAGNOSIS — Z79.01 ANTICOAGULANT LONG-TERM USE: Primary | ICD-10-CM

## 2025-05-17 DIAGNOSIS — I48.0 PAROXYSMAL ATRIAL FIBRILLATION (HCC): ICD-10-CM

## 2025-05-17 NOTE — TELEPHONE ENCOUNTER
Patient called in stating she went to get labs done and there was not a prescription to have INR done in the system. Patient would like a call back when this is done. Please follow up and advise. Thank you in advance.

## 2025-05-21 ENCOUNTER — APPOINTMENT (OUTPATIENT)
Dept: LAB | Facility: HOSPITAL | Age: 80
End: 2025-05-21
Payer: MEDICARE

## 2025-05-21 ENCOUNTER — RESULTS FOLLOW-UP (OUTPATIENT)
Dept: CARDIOLOGY CLINIC | Facility: CLINIC | Age: 80
End: 2025-05-21

## 2025-05-21 ENCOUNTER — ANTICOAG VISIT (OUTPATIENT)
Dept: CARDIOLOGY CLINIC | Facility: CLINIC | Age: 80
End: 2025-05-21

## 2025-05-21 DIAGNOSIS — I48.0 PAROXYSMAL ATRIAL FIBRILLATION (HCC): ICD-10-CM

## 2025-05-21 DIAGNOSIS — Z79.01 ANTICOAGULANT LONG-TERM USE: ICD-10-CM

## 2025-05-21 LAB
INR PPP: 2.86 (ref 0.85–1.19)
PROTHROMBIN TIME: 30.7 SECONDS (ref 12.3–15)

## 2025-05-21 PROCEDURE — 85610 PROTHROMBIN TIME: CPT

## 2025-05-21 PROCEDURE — 36415 COLL VENOUS BLD VENIPUNCTURE: CPT

## 2025-05-26 DIAGNOSIS — I10 ESSENTIAL HYPERTENSION: ICD-10-CM

## 2025-05-27 RX ORDER — ATENOLOL AND CHLORTHALIDONE TABLET 50; 25 MG/1; MG/1
1 TABLET ORAL DAILY
Qty: 90 TABLET | Refills: 1 | Status: SHIPPED | OUTPATIENT
Start: 2025-05-27

## 2025-06-02 ENCOUNTER — REMOTE DEVICE CLINIC VISIT (OUTPATIENT)
Dept: CARDIOLOGY CLINIC | Facility: CLINIC | Age: 80
End: 2025-06-02
Payer: MEDICARE

## 2025-06-02 ENCOUNTER — RESULTS FOLLOW-UP (OUTPATIENT)
Dept: CARDIOLOGY CLINIC | Facility: CLINIC | Age: 80
End: 2025-06-02

## 2025-06-02 DIAGNOSIS — I48.91 ATRIAL FIBRILLATION, UNSPECIFIED TYPE (HCC): Primary | ICD-10-CM

## 2025-06-02 DIAGNOSIS — I49.5 SSS (SICK SINUS SYNDROME) (HCC): ICD-10-CM

## 2025-06-02 PROCEDURE — 93296 REM INTERROG EVL PM/IDS: CPT | Performed by: INTERNAL MEDICINE

## 2025-06-02 PROCEDURE — 93294 REM INTERROG EVL PM/LDLS PM: CPT | Performed by: INTERNAL MEDICINE

## 2025-06-02 NOTE — PROGRESS NOTES
Results for orders placed or performed in visit on 06/02/25   Cardiac EP device report    Narrative    SJM DC PM/NOT MRI CONDITIONAL  MERLIN TRANSMISSION: BATTERY VOLTAGE NEARING DENISE (<3 MOS)  WILL SCHEDULE MONTHLY BATTERY CHECKS. AP: <1%. : 41% (>40%~DDDR/60). ALL AVAILABLE LEAD PARAMETERS WITHIN NORMAL LIMITS. 3 HVR EPISODES W/ EGMS SHOWING RVR W/ -170 BPM, MAX DURATION 4 MINS 11 SECS. 1 AMS W/ AF IN PROGRESS (HX OF SAME). AF BURDEN: >99%. PT TAKES WARFARIN.E F: >60% (ECHO 12/23/19). NORMAL DEVICE FUNCTION. CH

## 2025-06-12 DIAGNOSIS — E11.9 TYPE 2 DIABETES MELLITUS WITHOUT COMPLICATION, WITHOUT LONG-TERM CURRENT USE OF INSULIN (HCC): ICD-10-CM

## 2025-06-12 RX ORDER — LANCETS 28 GAUGE
EACH MISCELLANEOUS
Qty: 200 EACH | Refills: 1 | Status: SHIPPED | OUTPATIENT
Start: 2025-06-12

## 2025-06-12 RX ORDER — BLOOD-GLUCOSE METER
KIT MISCELLANEOUS DAILY
Qty: 100 EACH | Refills: 1 | Status: SHIPPED | OUTPATIENT
Start: 2025-06-12

## 2025-06-12 NOTE — TELEPHONE ENCOUNTER
Reason for call:   [x] Refill   [] Prior Auth  [] Other:     Office:   [x] PCP/Provider - Juice  [] Specialty/Provider -     Medication: Freestyle lancets    Dose/Frequency: use bid    Quantity: 100    Pharmacy: [id number] - [place] - [address] [phone]     Local Pharmacy   Does the patient have enough for 3 days?   [] Yes   [x] No - Send as HP to POD

## 2025-06-24 ENCOUNTER — ANTICOAG VISIT (OUTPATIENT)
Dept: CARDIOLOGY CLINIC | Facility: CLINIC | Age: 80
End: 2025-06-24

## 2025-06-24 ENCOUNTER — APPOINTMENT (OUTPATIENT)
Dept: LAB | Facility: HOSPITAL | Age: 80
End: 2025-06-24
Attending: INTERNAL MEDICINE
Payer: MEDICARE

## 2025-06-24 DIAGNOSIS — I48.0 PAROXYSMAL ATRIAL FIBRILLATION (HCC): ICD-10-CM

## 2025-06-24 DIAGNOSIS — Z79.01 ANTICOAGULANT LONG-TERM USE: ICD-10-CM

## 2025-06-24 LAB
INR PPP: 2.39 (ref 0.85–1.19)
PROTHROMBIN TIME: 26.8 SECONDS (ref 12.3–15)

## 2025-06-24 PROCEDURE — 36415 COLL VENOUS BLD VENIPUNCTURE: CPT

## 2025-06-24 PROCEDURE — 85610 PROTHROMBIN TIME: CPT

## 2025-07-01 ENCOUNTER — OFFICE VISIT (OUTPATIENT)
Dept: OBGYN CLINIC | Facility: CLINIC | Age: 80
End: 2025-07-01
Payer: MEDICARE

## 2025-07-01 VITALS — HEIGHT: 61 IN | WEIGHT: 140.8 LBS | BODY MASS INDEX: 26.58 KG/M2

## 2025-07-01 DIAGNOSIS — M17.12 PRIMARY OSTEOARTHRITIS OF LEFT KNEE: Primary | ICD-10-CM

## 2025-07-01 PROCEDURE — 99213 OFFICE O/P EST LOW 20 MIN: CPT | Performed by: ORTHOPAEDIC SURGERY

## 2025-07-01 PROCEDURE — 20610 DRAIN/INJ JOINT/BURSA W/O US: CPT | Performed by: ORTHOPAEDIC SURGERY

## 2025-07-01 RX ORDER — METHYLPREDNISOLONE ACETATE 40 MG/ML
2 INJECTION, SUSPENSION INTRA-ARTICULAR; INTRALESIONAL; INTRAMUSCULAR; SOFT TISSUE
Status: COMPLETED | OUTPATIENT
Start: 2025-07-01 | End: 2025-07-01

## 2025-07-01 RX ORDER — BUPIVACAINE HYDROCHLORIDE 2.5 MG/ML
2 INJECTION, SOLUTION INFILTRATION; PERINEURAL
Status: COMPLETED | OUTPATIENT
Start: 2025-07-01 | End: 2025-07-01

## 2025-07-01 RX ORDER — LIDOCAINE HYDROCHLORIDE 10 MG/ML
8 INJECTION, SOLUTION INFILTRATION; PERINEURAL
Status: COMPLETED | OUTPATIENT
Start: 2025-07-01 | End: 2025-07-01

## 2025-07-01 RX ADMIN — LIDOCAINE HYDROCHLORIDE 8 ML: 10 INJECTION, SOLUTION INFILTRATION; PERINEURAL at 09:30

## 2025-07-01 RX ADMIN — BUPIVACAINE HYDROCHLORIDE 2 ML: 2.5 INJECTION, SOLUTION INFILTRATION; PERINEURAL at 09:30

## 2025-07-01 RX ADMIN — METHYLPREDNISOLONE ACETATE 2 ML: 40 INJECTION, SUSPENSION INTRA-ARTICULAR; INTRALESIONAL; INTRAMUSCULAR; SOFT TISSUE at 09:30

## 2025-07-01 NOTE — PATIENT INSTRUCTIONS
Discussed conservative treatment and surgical intervention with patient at length  Weight bearing as tolerated left lower extremity   Offered patient aspiration and cortisone injections for the left knee. Patient accepted and tolerated well.   Discussed that cortisone injections can be repeated every 3 months as needed   Over the counter analgesics as needed / directed   Ice / heat as directed   Follow up 3 months

## 2025-07-01 NOTE — PROGRESS NOTES
Name: Florina Pace      : 1945       MRN: [id number]   Encounter Provider: Brent Soliman MD   Encounter Date: 25  Encounter department: [facility] [place]     Assessment & Plan  Primary osteoarthritis of left knee  Discussed conservative treatment and surgical intervention with patient at length  Weight bearing as tolerated left lower extremity   Offered patient aspiration and cortisone injections for the left knee. Patient accepted and tolerated well.   Discussed that cortisone injections can be repeated every 3 months as needed   Over the counter analgesics as needed / directed   Ice / heat as directed   Follow up 3 months             To Do Next Visit:  Evaluate left knee pain   _____________________________________________________  CHIEF COMPLAINT:  Chief Complaint   Patient presents with    Left Knee - Follow-up         SUBJECTIVE:  Florina Pace is a 80 y.o. female who presents to the office for follow evaluation of her left knee.  Patient states that she continues to have diffuse pain throughout her left knee region that becomes worse with evening hours and weightbearing in the evening hours.  Patient received a cortisone injection approximately 16 months ago but is uncertain of the efficacy of the cortisone injection.  Patient does note popping and clicking associated in the knee with range of motion of the knee.  Patient does take Tylenol as needed for pain.  Patient did complete a course of physical therapy months ago with mild improvement in symptoms in regards to strengthening but states that her pain became worse with physical therapy in the left knee.  Patient offers no other complaints at this time.       PAST MEDICAL HISTORY:  Past Medical History[1]    PAST SURGICAL HISTORY:  Past Surgical History[2]    FAMILY HISTORY:  Family History[3]    SOCIAL HISTORY:  Social History[4]    MEDICATIONS:  Current  "Medications[5]    ALLERGIES:  Allergies[6]    LABS:  HgA1c:   Lab Results   Component Value Date    HGBA1C 7.5 (H) 04/02/2025     BMP:   Lab Results   Component Value Date    CALCIUM 11.9 (H) 04/02/2025    K 3.7 04/02/2025    CO2 32 04/02/2025    CL 98 04/02/2025    BUN 20 04/02/2025    CREATININE 0.68 04/02/2025     CBC: No components found for: \"CBC\"    _____________________________________________________  PHYSICAL EXAMINATION:  Vital signs: Ht 5' 1\" (1.549 m)   Wt 63.9 kg (140 lb 12.8 oz)   BMI 26.60 kg/m²   General: No acute distress, awake and alert  Psychiatric: Mood and affect appear appropriate  HEENT: Trachea Midline, No torticollis, no apparent facial trauma  Cardiovascular: No audible murmurs; Extremities appear perfused  Pulmonary: No audible wheezing or stridor  Skin: No open lesions; see further details (if any) below      MUSCULOSKELETAL EXAMINATION:  Left knee examination :  Patient sitting comfortably in the office in no apparent distress   small joint effusion present in the left knee.   No other acute visible abnormalities present.  Medial and lateral joint line tenderness noted with no other bony or soft tissue tenderness to palpation  0 to approximately 95 degrees range of motion of the knee associated with crepitus noted  NV intact    _____________________________________________________  STUDIES REVIEWED:  I personally reviewed the images obtained in office today and my independent interpretation is as follows:  N/A         PROCEDURES PERFORMED:  Large joint arthrocentesis: L knee    Performed by: Brent Soliman MD  Authorized by: Brent Soliman MD    Universal Protocol:  procedure performed by consultantConsent: Verbal consent obtained  Risks and benefits: risks, benefits and alternatives were discussed  Consent given by: patient  Patient understanding: patient states understanding of the procedure being performed  Site marked: the operative site was marked  Patient identity " confirmed: verbally with patient  Supporting Documentation  Indications: pain     Is this a Visco injection? NoProcedure Details  Location: knee - L knee  Preparation: Patient was prepped and draped in the usual sterile fashion  Needle size: 18 G  Ultrasound guidance: no  Approach: superior  Medications administered: 2 mL bupivacaine 0.25 %; 2 mL methylPREDNISolone acetate 40 mg/mL; 8 mL lidocaine 1 %    Aspirate amount: 5 mL  Aspirate: clear and yellow  Patient tolerance: patient tolerated the procedure well with no immediate complications  Dressing:  Sterile dressing applied                    Jeromy Becker PA-C - assisting  Brent Soliman MD             [1]   Past Medical History:  Diagnosis Date    Arthritis Dont remenber    Carpal tunnel syndrome     Diabetes mellitus (HCC) Dont remember    Disease of thyroid gland     Heart murmur     Hypertension     Visual impairment    [2]   Past Surgical History:  Procedure Laterality Date    CATARACT EXTRACTION      CHOLECYSTECTOMY      COLONOSCOPY  11/24/2007    EYE SURGERY      HYSTERECTOMY      JOINT REPLACEMENT      KNEE SURGERY      REPLACEMENT TOTAL KNEE Right 2016    TOTAL ABDOMINAL HYSTERECTOMY W/ BILATERAL SALPINGOOPHORECTOMY     [3]   Family History  Problem Relation Name Age of Onset    Heart attack Father          ARRHYTHMIAS    Coronary artery disease Family      Diabetes Family      Heart attack Family          ARRHYTHMIAS    Mental illness Mother Su castillo     Breast cancer additional onset Mother Su castillo     Cancer Brother Frederick     Psychiatric Illness Brother Frederick    [4]   Social History  Tobacco Use    Smoking status: Never    Smokeless tobacco: Never   Vaping Use    Vaping status: Never Used   Substance Use Topics    Alcohol use: Yes     Alcohol/week: 1.0 standard drink of alcohol     Types: 1 Shots of liquor per week     Comment: Every once in a while zulay    Drug use: No   [5]   Current Outpatient Medications:     B Complex  Vitamins (B COMPLEX 100 PO), Take 1 tablet by mouth in the morning., Disp: , Rfl:     calcium carbonate (Calcium 600) 600 MG tablet, Take 600 mg by mouth in the morning., Disp: , Rfl:     Cholecalciferol (Vitamin D) 50 MCG (2000 UT) CAPS, Take 1 capsule by mouth in the morning, Disp: , Rfl:     cyclobenzaprine (FLEXERIL) 10 mg tablet, TAKE ONE TABLET BY MOUTH THREE TIMES A DAY AS NEEDED FOR MUSCLE SPASMS, Disp: 20 tablet, Rfl: 0    Diclofenac Sodium (VOLTAREN) 1 %, Apply 2 g topically 4 (four) times a day, Disp: 300 g, Rfl: 1    glipiZIDE (GLUCOTROL XL) 2.5 mg 24 hr tablet, TAKE ONE TABLET BY MOUTH EVERY DAY, Disp: 90 tablet, Rfl: 1    glucose blood (FREESTYLE LITE) test strip, TEST ONCE DAILY, Disp: 100 each, Rfl: 1    Lancets (freestyle) lancets, USE TO TEST TWO TIMES A DAY, Disp: 200 each, Rfl: 1    levothyroxine 50 mcg tablet, TAKE ONE TABLET BY MOUTH EVERY DAY, Disp: 90 tablet, Rfl: 1    Multiple Vitamin (MULTIVITAMIN) tablet, Take 1 tablet by mouth in the morning., Disp: , Rfl:     niacin 500 mg tablet, Take by mouth, Disp: , Rfl:     oxybutynin (DITROPAN) 5 mg tablet, TAKE ONE TABLET BY MOUTH TWICE A DAY, Disp: 60 tablet, Rfl: 5    rosuvastatin (CRESTOR) 10 MG tablet, TAKE ONE TABLET BY MOUTH EVERY DAY, Disp: 90 tablet, Rfl: 1    triamcinolone (KENALOG) 0.1 % ointment, Apply topically 2 (two) times a day, Disp: 30 g, Rfl: 0    warfarin (Jantoven) 5 mg tablet, TAKE ONE TO TWO TABLETS BY MOUTH EVERY DAY, Disp: 180 tablet, Rfl: 3    Ascorbic Acid (VITAMIN C) 100 MG tablet, Take 1,000 mg by mouth daily, Disp: , Rfl:     atenolol-chlorthalidone (TENORETIC) 50-25 mg per tablet, TAKE ONE TABLET BY MOUTH EVERY DAY, Disp: 90 tablet, Rfl: 1    Blood Glucose Monitoring Suppl (FREESTYLE LITE) RANULFO, by Does not apply route 2 (two) times a day Dx E11.9, Disp: 1 each, Rfl: 0    dorzolamide (TRUSOPT) 2 % ophthalmic solution, INSTILL 1 DROP INTO THE RIGHT EYE THREE TIMES A DAY, Disp: , Rfl:     FREESTYLE LITE test strip, TEST  TWICE DAILY, Disp: 100 each, Rfl: 3  [6] No Known Allergies

## 2025-07-02 ENCOUNTER — REMOTE DEVICE CLINIC VISIT (OUTPATIENT)
Dept: CARDIOLOGY CLINIC | Facility: CLINIC | Age: 80
End: 2025-07-02
Payer: MEDICARE

## 2025-07-02 DIAGNOSIS — Z95.0 CARDIAC PACEMAKER IN SITU: Primary | ICD-10-CM

## 2025-07-02 PROCEDURE — 93296 REM INTERROG EVL PM/IDS: CPT | Performed by: INTERNAL MEDICINE

## 2025-07-02 PROCEDURE — 93294 REM INTERROG EVL PM/LDLS PM: CPT | Performed by: INTERNAL MEDICINE

## 2025-07-02 NOTE — PROGRESS NOTES
Results for orders placed or performed in visit on 07/02/25   Cardiac EP device report    Narrative    S[facility] DC PM/NOT MRI CONDITIONAL  [facility] TRANSMISSION TO CHECK BATTERY STATUS- NB: BATTERY VOLTAGE NEARING DENISE (<3 MOS). WILL SCHEDULE MONTHLY BATTERY CHECKS. AP<1%, -42% (>40% DDDR@60PPM). ALL AVAILABLE LEAD PARAMETERS WITHIN NORMAL LIMITS. 4 HVR EPISODES @ 154-170 BPM MAX DURATION 10.5 MINS- RVR ON EGM'S. 1 AMS EPISODE IN PROGRESS. AT/AF BURDEN SINCE 2/14/25 >99%. PT ON WARFARIN & TENORETIC. NORMAL DEVICE FUNCTION. GV

## 2025-07-26 ENCOUNTER — APPOINTMENT (OUTPATIENT)
Dept: LAB | Facility: HOSPITAL | Age: 80
End: 2025-07-26
Payer: MEDICARE

## 2025-07-26 DIAGNOSIS — Z79.01 ANTICOAGULANT LONG-TERM USE: ICD-10-CM

## 2025-07-26 DIAGNOSIS — I48.0 PAROXYSMAL ATRIAL FIBRILLATION (HCC): ICD-10-CM

## 2025-07-26 LAB
INR PPP: 2.39 (ref 0.85–1.19)
PROTHROMBIN TIME: 26.8 SECONDS (ref 12.3–15)

## 2025-07-26 PROCEDURE — 36415 COLL VENOUS BLD VENIPUNCTURE: CPT

## 2025-07-26 PROCEDURE — 85610 PROTHROMBIN TIME: CPT

## 2025-07-28 ENCOUNTER — ANTICOAG VISIT (OUTPATIENT)
Dept: CARDIOLOGY CLINIC | Facility: CLINIC | Age: 80
End: 2025-07-28

## 2025-08-07 ENCOUNTER — TELEPHONE (OUTPATIENT)
Dept: CARDIOLOGY CLINIC | Facility: CLINIC | Age: 80
End: 2025-08-07

## 2025-08-13 ENCOUNTER — PREP FOR PROCEDURE (OUTPATIENT)
Dept: CARDIOLOGY CLINIC | Facility: CLINIC | Age: 80
End: 2025-08-13

## 2025-08-13 ENCOUNTER — OFFICE VISIT (OUTPATIENT)
Dept: CARDIOLOGY CLINIC | Facility: CLINIC | Age: 80
End: 2025-08-13
Payer: MEDICARE

## 2025-08-13 ENCOUNTER — TELEPHONE (OUTPATIENT)
Dept: CARDIOLOGY CLINIC | Facility: CLINIC | Age: 80
End: 2025-08-13